# Patient Record
Sex: MALE | Race: WHITE | NOT HISPANIC OR LATINO | Employment: OTHER | URBAN - METROPOLITAN AREA
[De-identification: names, ages, dates, MRNs, and addresses within clinical notes are randomized per-mention and may not be internally consistent; named-entity substitution may affect disease eponyms.]

---

## 2017-06-26 RX ORDER — AMOXICILLIN 500 MG/1
500 TABLET, FILM COATED ORAL 2 TIMES DAILY
COMMUNITY
End: 2017-08-31

## 2017-06-26 RX ORDER — AMLODIPINE BESYLATE AND ATORVASTATIN CALCIUM 10; 20 MG/1; MG/1
1 TABLET, FILM COATED ORAL DAILY
Status: ON HOLD | COMMUNITY
End: 2017-06-27 | Stop reason: CLARIF

## 2017-06-26 RX ORDER — SIMVASTATIN 40 MG
40 TABLET ORAL
COMMUNITY
End: 2019-01-15 | Stop reason: SDUPTHER

## 2017-06-26 RX ORDER — ASPIRIN 81 MG/1
81 TABLET ORAL DAILY
COMMUNITY

## 2017-06-26 RX ORDER — ATENOLOL 100 MG/1
100 TABLET ORAL DAILY
COMMUNITY
End: 2019-01-18 | Stop reason: SDUPTHER

## 2017-06-26 RX ORDER — PHENOL 1.4 %
600 AEROSOL, SPRAY (ML) MUCOUS MEMBRANE DAILY
COMMUNITY

## 2017-06-27 ENCOUNTER — HOSPITAL ENCOUNTER (OUTPATIENT)
Facility: HOSPITAL | Age: 72
Setting detail: OUTPATIENT SURGERY
Discharge: HOME | End: 2017-06-27
Attending: SURGERY | Admitting: SURGERY
Payer: MEDICARE

## 2017-06-27 ENCOUNTER — ANESTHESIA EVENT (OUTPATIENT)
Dept: GASTROENTEROLOGY | Facility: HOSPITAL | Age: 72
End: 2017-06-27
Payer: MEDICARE

## 2017-06-27 ENCOUNTER — ANESTHESIA (OUTPATIENT)
Dept: GASTROENTEROLOGY | Facility: HOSPITAL | Age: 72
End: 2017-06-27
Payer: MEDICARE

## 2017-06-27 VITALS
HEIGHT: 69 IN | WEIGHT: 184 LBS | OXYGEN SATURATION: 97 % | HEART RATE: 76 BPM | SYSTOLIC BLOOD PRESSURE: 116 MMHG | RESPIRATION RATE: 16 BRPM | DIASTOLIC BLOOD PRESSURE: 61 MMHG | BODY MASS INDEX: 27.25 KG/M2 | TEMPERATURE: 97.5 F

## 2017-06-27 RX ORDER — TAMSULOSIN HYDROCHLORIDE 0.4 MG/1
0.4 CAPSULE ORAL
COMMUNITY
End: 2019-01-07 | Stop reason: SDUPTHER

## 2017-06-27 RX ORDER — NIACIN 500 MG
500 TABLET ORAL
COMMUNITY
End: 2018-07-27 | Stop reason: SDUPTHER

## 2017-06-27 RX ORDER — PROPOFOL 10 MG/ML
INJECTION, EMULSION INTRAVENOUS AS NEEDED
Status: DISCONTINUED | OUTPATIENT
Start: 2017-06-27 | End: 2017-06-27 | Stop reason: SURG

## 2017-06-27 RX ORDER — PROPOFOL 10 MG/ML
INJECTION, EMULSION INTRAVENOUS CONTINUOUS PRN
Status: DISCONTINUED | OUTPATIENT
Start: 2017-06-27 | End: 2017-06-27 | Stop reason: SURG

## 2017-06-27 RX ORDER — AMLODIPINE BESYLATE AND BENAZEPRIL HYDROCHLORIDE 10; 20 MG/1; MG/1
1 CAPSULE ORAL DAILY
COMMUNITY
End: 2018-09-05 | Stop reason: SDUPTHER

## 2017-06-27 RX ORDER — SODIUM CHLORIDE 9 MG/ML
125 INJECTION, SOLUTION INTRAVENOUS CONTINUOUS
Status: DISCONTINUED | OUTPATIENT
Start: 2017-06-27 | End: 2017-06-27 | Stop reason: HOSPADM

## 2017-06-27 RX ADMIN — PROPOFOL 100 MCG/KG/MIN: 10 INJECTION, EMULSION INTRAVENOUS at 09:46

## 2017-06-27 RX ADMIN — SODIUM CHLORIDE 125 ML/HR: 0.9 INJECTION, SOLUTION INTRAVENOUS at 09:03

## 2017-06-27 RX ADMIN — PROPOFOL 30 MG: 10 INJECTION, EMULSION INTRAVENOUS at 09:46

## 2017-06-27 RX ADMIN — PROPOFOL 20 MG: 10 INJECTION, EMULSION INTRAVENOUS at 09:47

## 2017-08-31 ENCOUNTER — HOSPITAL ENCOUNTER (EMERGENCY)
Facility: HOSPITAL | Age: 72
Discharge: HOME | End: 2017-08-31
Attending: EMERGENCY MEDICINE | Admitting: EMERGENCY MEDICINE
Payer: MEDICARE

## 2017-08-31 ENCOUNTER — APPOINTMENT (EMERGENCY)
Dept: RADIOLOGY | Facility: HOSPITAL | Age: 72
End: 2017-08-31
Payer: MEDICARE

## 2017-08-31 VITALS
WEIGHT: 184 LBS | OXYGEN SATURATION: 96 % | TEMPERATURE: 98.6 F | HEART RATE: 66 BPM | BODY MASS INDEX: 27.17 KG/M2 | DIASTOLIC BLOOD PRESSURE: 78 MMHG | SYSTOLIC BLOOD PRESSURE: 117 MMHG | RESPIRATION RATE: 16 BRPM

## 2017-08-31 DIAGNOSIS — M54.9 BACK PAIN: Primary | ICD-10-CM

## 2017-08-31 LAB
ALBUMIN SERPL BCP-MCNC: 3.6 G/DL (ref 3.5–5)
ALP SERPL-CCNC: 73 U/L (ref 46–116)
ALT SERPL W P-5'-P-CCNC: 25 U/L (ref 12–78)
ANION GAP SERPL CALCULATED.3IONS-SCNC: 7 MMOL/L (ref 4–13)
AST SERPL W P-5'-P-CCNC: 21 U/L (ref 5–45)
ATRIAL RATE: 54 BPM
BASOPHILS # BLD AUTO: 0.03 THOUSANDS/ΜL (ref 0–0.1)
BASOPHILS NFR BLD AUTO: 1 % (ref 0–1)
BILIRUB SERPL-MCNC: 0.98 MG/DL (ref 0.2–1)
BUN SERPL-MCNC: 10 MG/DL (ref 5–25)
CALCIUM SERPL-MCNC: 9 MG/DL (ref 8.3–10.1)
CHLORIDE SERPL-SCNC: 105 MMOL/L (ref 100–108)
CO2 SERPL-SCNC: 26 MMOL/L (ref 21–32)
CREAT SERPL-MCNC: 0.76 MG/DL (ref 0.6–1.3)
EOSINOPHIL # BLD AUTO: 0.07 THOUSAND/ΜL (ref 0–0.61)
EOSINOPHIL NFR BLD AUTO: 1 % (ref 0–6)
ERYTHROCYTE [DISTWIDTH] IN BLOOD BY AUTOMATED COUNT: 14.8 % (ref 11.6–15.1)
GFR SERPL CREATININE-BSD FRML MDRD: 92 ML/MIN/1.73SQ M
GLUCOSE SERPL-MCNC: 105 MG/DL (ref 65–140)
HCT VFR BLD AUTO: 47.6 % (ref 36.5–49.3)
HGB BLD-MCNC: 16.6 G/DL (ref 12–17)
LYMPHOCYTES # BLD AUTO: 1.69 THOUSANDS/ΜL (ref 0.6–4.47)
LYMPHOCYTES NFR BLD AUTO: 27 % (ref 14–44)
MCH RBC QN AUTO: 29.3 PG (ref 26.8–34.3)
MCHC RBC AUTO-ENTMCNC: 34.9 G/DL (ref 31.4–37.4)
MCV RBC AUTO: 84 FL (ref 82–98)
MONOCYTES # BLD AUTO: 0.55 THOUSAND/ΜL (ref 0.17–1.22)
MONOCYTES NFR BLD AUTO: 9 % (ref 4–12)
NEUTROPHILS # BLD AUTO: 3.99 THOUSANDS/ΜL (ref 1.85–7.62)
NEUTS SEG NFR BLD AUTO: 62 % (ref 43–75)
NRBC BLD AUTO-RTO: 0 /100 WBCS
P AXIS: 63 DEGREES
PLATELET # BLD AUTO: 136 THOUSANDS/UL (ref 149–390)
PMV BLD AUTO: 10.2 FL (ref 8.9–12.7)
POTASSIUM SERPL-SCNC: 4 MMOL/L (ref 3.5–5.3)
PR INTERVAL: 190 MS
PROT SERPL-MCNC: 7.7 G/DL (ref 6.4–8.2)
QRS AXIS: 53 DEGREES
QRSD INTERVAL: 90 MS
QT INTERVAL: 436 MS
QTC INTERVAL: 413 MS
RBC # BLD AUTO: 5.66 MILLION/UL (ref 3.88–5.62)
SODIUM SERPL-SCNC: 138 MMOL/L (ref 136–145)
SPECIMEN SOURCE: NORMAL
SPECIMEN SOURCE: NORMAL
T WAVE AXIS: 49 DEGREES
TROPONIN I BLD-MCNC: 0 NG/ML (ref 0–0.08)
TROPONIN I BLD-MCNC: 0 NG/ML (ref 0–0.08)
VENTRICULAR RATE: 54 BPM
WBC # BLD AUTO: 6.34 THOUSAND/UL (ref 4.31–10.16)

## 2017-08-31 PROCEDURE — 96360 HYDRATION IV INFUSION INIT: CPT

## 2017-08-31 PROCEDURE — 93005 ELECTROCARDIOGRAM TRACING: CPT

## 2017-08-31 PROCEDURE — 99284 EMERGENCY DEPT VISIT MOD MDM: CPT

## 2017-08-31 PROCEDURE — 36415 COLL VENOUS BLD VENIPUNCTURE: CPT | Performed by: EMERGENCY MEDICINE

## 2017-08-31 PROCEDURE — 80053 COMPREHEN METABOLIC PANEL: CPT | Performed by: EMERGENCY MEDICINE

## 2017-08-31 PROCEDURE — 84484 ASSAY OF TROPONIN QUANT: CPT

## 2017-08-31 PROCEDURE — 85025 COMPLETE CBC W/AUTO DIFF WBC: CPT | Performed by: EMERGENCY MEDICINE

## 2017-08-31 PROCEDURE — 71275 CT ANGIOGRAPHY CHEST: CPT

## 2017-08-31 PROCEDURE — 74175 CTA ABDOMEN W/CONTRAST: CPT

## 2017-08-31 RX ADMIN — SODIUM CHLORIDE 500 ML: 0.9 INJECTION, SOLUTION INTRAVENOUS at 09:50

## 2017-08-31 RX ADMIN — IOHEXOL 100 ML: 350 INJECTION, SOLUTION INTRAVENOUS at 10:40

## 2017-10-23 ENCOUNTER — ALLSCRIPTS OFFICE VISIT (OUTPATIENT)
Dept: OTHER | Facility: OTHER | Age: 72
End: 2017-10-23

## 2017-10-23 LAB
BILIRUB UR QL STRIP: NORMAL
CLARITY UR: NORMAL
COLOR UR: YELLOW
GLUCOSE (HISTORICAL): NORMAL
HGB UR QL STRIP.AUTO: NORMAL
KETONES UR STRIP-MCNC: NORMAL MG/DL
LEUKOCYTE ESTERASE UR QL STRIP: NORMAL
NITRITE UR QL STRIP: NORMAL
PH UR STRIP.AUTO: 7 [PH]
PROT UR STRIP-MCNC: NORMAL MG/DL
SP GR UR STRIP.AUTO: 1.01
UROBILINOGEN UR QL STRIP.AUTO: 0.2

## 2018-01-13 VITALS
WEIGHT: 189 LBS | HEIGHT: 69 IN | BODY MASS INDEX: 27.99 KG/M2 | DIASTOLIC BLOOD PRESSURE: 70 MMHG | SYSTOLIC BLOOD PRESSURE: 112 MMHG

## 2018-04-23 DIAGNOSIS — R97.20 ELEVATED PROSTATE SPECIFIC ANTIGEN (PSA): ICD-10-CM

## 2018-04-30 ENCOUNTER — APPOINTMENT (OUTPATIENT)
Dept: LAB | Facility: HOSPITAL | Age: 73
End: 2018-04-30
Attending: UROLOGY
Payer: MEDICARE

## 2018-04-30 DIAGNOSIS — R97.20 ELEVATED PROSTATE SPECIFIC ANTIGEN (PSA): ICD-10-CM

## 2018-04-30 PROCEDURE — 36415 COLL VENOUS BLD VENIPUNCTURE: CPT

## 2018-04-30 PROCEDURE — 84153 ASSAY OF PSA TOTAL: CPT

## 2018-04-30 PROCEDURE — 84154 ASSAY OF PSA FREE: CPT

## 2018-05-01 LAB
PSA FREE MFR SERPL: 16 %
PSA FREE SERPL-MCNC: 0.67 NG/ML
PSA SERPL-MCNC: 4.2 NG/ML (ref 0–4)

## 2018-05-10 RX ORDER — DIPHENOXYLATE HYDROCHLORIDE AND ATROPINE SULFATE 2.5; .025 MG/1; MG/1
1 TABLET ORAL
COMMUNITY
Start: 2012-12-12

## 2018-05-10 RX ORDER — VITAMIN E 268 MG
400 CAPSULE ORAL
COMMUNITY
End: 2018-09-05 | Stop reason: ALTCHOICE

## 2018-05-14 ENCOUNTER — OFFICE VISIT (OUTPATIENT)
Dept: UROLOGY | Facility: MEDICAL CENTER | Age: 73
End: 2018-05-14
Payer: MEDICARE

## 2018-05-14 VITALS
SYSTOLIC BLOOD PRESSURE: 136 MMHG | WEIGHT: 185 LBS | DIASTOLIC BLOOD PRESSURE: 78 MMHG | HEIGHT: 69 IN | BODY MASS INDEX: 27.4 KG/M2

## 2018-05-14 DIAGNOSIS — N13.8 BPH WITH OBSTRUCTION/LOWER URINARY TRACT SYMPTOMS: Primary | ICD-10-CM

## 2018-05-14 DIAGNOSIS — R97.20 ELEVATED PSA: ICD-10-CM

## 2018-05-14 DIAGNOSIS — N40.1 BPH WITH OBSTRUCTION/LOWER URINARY TRACT SYMPTOMS: Primary | ICD-10-CM

## 2018-05-14 DIAGNOSIS — R31.29 OTHER MICROSCOPIC HEMATURIA: ICD-10-CM

## 2018-05-14 PROBLEM — D75.1 ERYTHROCYTOSIS: Status: ACTIVE | Noted: 2017-03-28

## 2018-05-14 PROBLEM — M16.11 PRIMARY OSTEOARTHRITIS OF RIGHT HIP: Status: ACTIVE | Noted: 2017-03-28

## 2018-05-14 LAB
BACTERIA UR QL AUTO: NORMAL /HPF
BILIRUB UR QL STRIP: NEGATIVE
CLARITY UR: CLEAR
COLOR UR: YELLOW
GLUCOSE UR STRIP-MCNC: NEGATIVE MG/DL
HGB UR QL STRIP.AUTO: NEGATIVE
HYALINE CASTS #/AREA URNS LPF: NORMAL /LPF
KETONES UR STRIP-MCNC: NEGATIVE MG/DL
LEUKOCYTE ESTERASE UR QL STRIP: NEGATIVE
NITRITE UR QL STRIP: NEGATIVE
NON-SQ EPI CELLS URNS QL MICRO: NORMAL /HPF
PH UR STRIP.AUTO: 5.5 [PH] (ref 4.5–8)
PROT UR STRIP-MCNC: NEGATIVE MG/DL
RBC #/AREA URNS AUTO: NORMAL /HPF
SL AMB  POCT GLUCOSE, UA: ABNORMAL
SL AMB LEUKOCYTE ESTERASE,UA: ABNORMAL
SL AMB POCT BILIRUBIN,UA: ABNORMAL
SL AMB POCT BLOOD,UA: ABNORMAL
SL AMB POCT CLARITY,UA: CLEAR
SL AMB POCT COLOR,UA: YELLOW
SL AMB POCT KETONES,UA: ABNORMAL
SL AMB POCT NITRITE,UA: ABNORMAL
SL AMB POCT PH,UA: 5.5
SL AMB POCT SPECIFIC GRAVITY,UA: 1.01
SL AMB POCT URINE PROTEIN: ABNORMAL
SL AMB POCT UROBILINOGEN: 0.2
SP GR UR STRIP.AUTO: 1.02 (ref 1–1.03)
UROBILINOGEN UR QL STRIP.AUTO: 0.2 E.U./DL
WBC #/AREA URNS AUTO: NORMAL /HPF

## 2018-05-14 PROCEDURE — 81003 URINALYSIS AUTO W/O SCOPE: CPT | Performed by: UROLOGY

## 2018-05-14 PROCEDURE — 81001 URINALYSIS AUTO W/SCOPE: CPT | Performed by: UROLOGY

## 2018-05-14 PROCEDURE — 99214 OFFICE O/P EST MOD 30 MIN: CPT | Performed by: UROLOGY

## 2018-05-14 NOTE — ASSESSMENT & PLAN NOTE
AUA symptom score is 7  He is happy with his voiding pattern  Urinalysis reveals trace positive blood and will be sent for microscopic evaluation  The patient will continue tamsulosin

## 2018-05-14 NOTE — PROGRESS NOTES
IPSS Questionnaire (AUA-7): Over the past month    1)  How often have you had a sensation of not emptying your bladder completely after you finish urinating? 1 - Less than 1 time in 5   2)  How often have you had to urinate again less than two hours after you finished urinating? 1 - Less than 1 time in 5   3)  How often have you found you stopped and started again several times when you urinated? 0 - Not at all   4) How difficult have you found it to postpone urination? 1 - Less than 1 time in 5   5) How often have you had a weak urinary stream?  1 - Less than 1 time in 5   6) How often have you had to push or strain to begin urination? 0 - Not at all   7) How many times did you most typically get up to urinate from the time you went to bed until the time you got up in the morning?   3 - 3 times   Total Score:  7

## 2018-05-14 NOTE — PATIENT INSTRUCTIONS
Benign Prostatic Hypertrophy   WHAT YOU NEED TO KNOW:   Benign prostatic hypertrophy (BPH) is a condition that causes your prostate gland to grow larger than normal  The prostate gland is the male sex gland that produces a fluid that is part of semen  It is about the size of a walnut and it is located under the bladder  As the prostate grows, it can squeeze the urethra  This can block urine flow and cause urinary problems  DISCHARGE INSTRUCTIONS:   Medicines:   · Alpha blockers: This medicine relaxes the muscles in your prostate and bladder  It may help you urinate more easily  · 5 alpha reductase inhibitors: These medicines block the production of a hormone that causes the prostate to get larger  It may help slow the growth of the prostate or shrink the prostate  · Take your medicine as directed  Contact your healthcare provider if you think your medicine is not helping or if you have side effects  Tell him or her if you are allergic to any medicine  Keep a list of the medicines, vitamins, and herbs you take  Include the amounts, and when and why you take them  Bring the list or the pill bottles to follow-up visits  Carry your medicine list with you in case of an emergency  Follow up with your healthcare provider as directed:  Write down your questions so you remember to ask them during your visits  Manage BPH:   · Do not let your bladder get too full before you empty it  Urinate when you feel the urge  · Limit alcohol  Do not drink large amounts of any liquid at one time  · Decrease the amount of salt you eat  Examples of salty foods are chips, cured meats, and canned soups  Do not use table salt  · Healthcare providers may tell you not to eat spicy foods such as chilli peppers  This may help you find out if spicy food makes your BPH symptoms worse  · You may have sex if you feel well  Contact your healthcare provider if:   · There is a large amount of blood in your urine  · Your signs and symptoms get worse  · You have a fever  · You have questions or concerns about your condition or care  Seek care immediately if:   · You are unable to urinate  · Your bladder feels very full and painful  © 2017 [address] Information is for End User's use only and may not be sold, redistributed or otherwise used for commercial purposes  All illustrations and images included in CareNotes® are the copyrighted property of A D A M [employer] Inc  or Azeem German  The above information is an  only  It is not intended as medical advice for individual conditions or treatments  Talk to your doctor, nurse or pharmacist before following any medical regimen to see if it is safe and effective for you

## 2018-05-14 NOTE — PROGRESS NOTES
Assessment/Plan:    BPH with obstruction/lower urinary tract symptoms  AUA symptom score is 7  He is happy with his voiding pattern  Urinalysis reveals trace positive blood and will be sent for microscopic evaluation  The patient will continue tamsulosin  Elevated PSA  PSA was 4 2 on April 30, 2018  This is  stable  The risk of prostate cancer is estimated at 23%  This was discussed with the patient  We discussed transrectal ultrasound of the prostate with biopsy  At this point we have elected to continue to follow the PSA and recheck it in 1 year  Diagnoses and all orders for this visit:    BPH with obstruction/lower urinary tract symptoms  -     POCT urine dip auto non-scope    Elevated PSA  -     PSA, total and free; Future    Other microscopic hematuria  -     Urinalysis with microscopic    Other orders  -     B Complex Vitamins (B COMPLEX 100 PO); Take by mouth  -     multivitamin (THERAGRAN) TABS; Take 1 tablet by mouth  -     vitamin E, tocopherol, 400 units capsule; Take 400 Units by mouth          Subjective:      Patient ID: Mykel Hutchison  is a 72 y o  male  73-year-old male followed for lower tract symptoms and for an elevated PSA  He is voiding well with the use of tamsulosin  His stream is good and he empties his bladder adequately  He gets up once a night to urinate  There is no gross hematuria, dysuria or symptoms of infection  He has no new back or bone pain  He has no urgency or incontinence  He is satisfied with his voiding pattern  The following portions of the patient's history were reviewed and updated as appropriate: allergies, current medications, past family history, past medical history, past social history, past surgical history and problem list     Review of Systems   Constitutional: Negative for chills, diaphoresis, fatigue and fever  HENT: Negative  Eyes: Negative  Respiratory: Negative  Cardiovascular: Negative  Endocrine: Negative  Musculoskeletal: Negative  Skin: Negative  Allergic/Immunologic: Negative  Neurological: Negative  Hematological: Negative  Psychiatric/Behavioral: Negative  Objective:      /78 (BP Location: Left arm, Patient Position: Sitting)   Ht 5' 9" (1 753 m)   Wt 83 9 kg (185 lb)   BMI 27 32 kg/m²          Physical Exam   Constitutional: He is oriented to person, place, and time  He appears well-developed and well-nourished  HENT:   Head: Normocephalic and atraumatic  Eyes: Conjunctivae are normal    Neck: Neck supple  Cardiovascular: Normal rate  Pulmonary/Chest: Effort normal    Abdominal: Soft  Bowel sounds are normal  He exhibits no distension and no mass  There is no tenderness  There is no rebound, no guarding and no CVA tenderness  Hernia confirmed negative in the right inguinal area and confirmed negative in the left inguinal area  Genitourinary: Rectum normal, testes normal and penis normal  Prostate is enlarged  Prostate is not tender  Right testis shows no mass  Left testis shows no mass  No phimosis or hypospadias  Genitourinary Comments: Prostate 2 times enlarged in softly nodular  No induration  Overall impression is palpably benign prostate gland  Musculoskeletal: He exhibits no edema  Neurological: He is alert and oriented to person, place, and time  Skin: Skin is warm and dry  Psychiatric: He has a normal mood and affect  His behavior is normal  Judgment and thought content normal    Nursing note and vitals reviewed

## 2018-05-14 NOTE — ASSESSMENT & PLAN NOTE
PSA was 4 2 on April 30, 2018  This is  stable  The risk of prostate cancer is estimated at 23%  This was discussed with the patient  We discussed transrectal ultrasound of the prostate with biopsy  At this point we have elected to continue to follow the PSA and recheck it in 1 year

## 2018-07-27 DIAGNOSIS — E78.5 HYPERLIPIDEMIA, UNSPECIFIED HYPERLIPIDEMIA TYPE: Primary | ICD-10-CM

## 2018-07-27 RX ORDER — NIACIN 500 MG
500 TABLET ORAL
Qty: 90 TABLET | Refills: 3 | Status: SHIPPED | OUTPATIENT
Start: 2018-07-27 | End: 2018-07-31 | Stop reason: SDUPTHER

## 2018-07-31 DIAGNOSIS — E78.5 HYPERLIPIDEMIA, UNSPECIFIED HYPERLIPIDEMIA TYPE: ICD-10-CM

## 2018-07-31 RX ORDER — NIACIN 500 MG
500 TABLET ORAL
Qty: 90 TABLET | Refills: 3 | Status: SHIPPED | OUTPATIENT
Start: 2018-07-31 | End: 2018-09-05 | Stop reason: SDUPTHER

## 2018-07-31 NOTE — TELEPHONE ENCOUNTER
Patient called he said he needs presp for niacin 500 mg called into [facility] at [phone]  90 day supply address is [address] [address]  Fax [phone] was sent to [facility] pharmacy please change pharmacy to [facility]

## 2018-09-05 ENCOUNTER — OFFICE VISIT (OUTPATIENT)
Dept: FAMILY MEDICINE CLINIC | Facility: CLINIC | Age: 73
End: 2018-09-05
Payer: MEDICARE

## 2018-09-05 VITALS
TEMPERATURE: 97.4 F | WEIGHT: 182.8 LBS | SYSTOLIC BLOOD PRESSURE: 132 MMHG | BODY MASS INDEX: 27.08 KG/M2 | HEART RATE: 72 BPM | DIASTOLIC BLOOD PRESSURE: 80 MMHG | HEIGHT: 69 IN

## 2018-09-05 DIAGNOSIS — R97.20 ELEVATED PSA: ICD-10-CM

## 2018-09-05 DIAGNOSIS — N40.1 BPH WITH OBSTRUCTION/LOWER URINARY TRACT SYMPTOMS: ICD-10-CM

## 2018-09-05 DIAGNOSIS — N13.8 BPH WITH OBSTRUCTION/LOWER URINARY TRACT SYMPTOMS: ICD-10-CM

## 2018-09-05 DIAGNOSIS — I25.10 CHRONIC CORONARY ARTERY DISEASE: ICD-10-CM

## 2018-09-05 DIAGNOSIS — Z23 NEED FOR INFLUENZA VACCINATION: ICD-10-CM

## 2018-09-05 DIAGNOSIS — I10 BENIGN ESSENTIAL HYPERTENSION: Primary | ICD-10-CM

## 2018-09-05 PROCEDURE — G0008 ADMIN INFLUENZA VIRUS VAC: HCPCS | Performed by: FAMILY MEDICINE

## 2018-09-05 PROCEDURE — 99214 OFFICE O/P EST MOD 30 MIN: CPT | Performed by: FAMILY MEDICINE

## 2018-09-05 PROCEDURE — 90662 IIV NO PRSV INCREASED AG IM: CPT | Performed by: FAMILY MEDICINE

## 2018-09-05 RX ORDER — BENAZEPRIL HYDROCHLORIDE 20 MG/1
20 TABLET ORAL DAILY
Refills: 3 | COMMUNITY
Start: 2018-08-16 | End: 2019-05-21 | Stop reason: SDUPTHER

## 2018-09-05 RX ORDER — AMLODIPINE BESYLATE 10 MG/1
10 TABLET ORAL DAILY
Refills: 3 | COMMUNITY
Start: 2018-08-16 | End: 2019-05-21 | Stop reason: SDUPTHER

## 2018-09-05 RX ORDER — NIACIN 500 MG/1
500 TABLET, EXTENDED RELEASE ORAL
Refills: 3 | COMMUNITY
Start: 2018-07-31 | End: 2019-01-07 | Stop reason: SDUPTHER

## 2018-09-05 NOTE — PATIENT INSTRUCTIONS
If the skin lesion on your left arm does not heal in the next 4-5 weeks then check in with advanced Dermatology  IF YOU HAVE NEVER HAD A TDAP SHOT (TETANUS/DIPHTHERIA/PERTUSSIS)  TALK TO YOUR PHARMACIST ABOUT GETTING ONE : GOOD FOR 10 YRS:ESPECIALLY IMPORTATN IF YOU HAVE YOUNG CHILDREN OR GRANDCHILDREN    GET A YEARLY FLU SHOT IN THE FALL : IF OVER 65 GET "HIGH DOSE"     ASK PHARMACIST ABOUT "SHINGRIX" THE NEW SHINGLES VACCINE IF OVER AGE 50      IF YOU HAVE NEVER HAD A TDAP SHOT (TETANUS/DIPHTHERIA/PERTUSSIS)  TALK TO YOUR PHARMACIST ABOUT GETTING ONE : GOOD FOR 10 YRS:ESPECIALLY IMPORTATN IF YOU HAVE YOUNG CHILDREN OR GRANDCHILDREN    GET A YEARLY FLU SHOT IN THE FALL : IF OVER 65 GET "HIGH DOSE"     ASK PHARMACIST ABOUT "SHINGRIX" THE NEW SHINGLES VACCINE IF OVER AGE 50      Stop the vitamin E     Tele your wife to take 2 Aleve twice a day for 1 week for the gout

## 2018-09-05 NOTE — PROGRESS NOTES
Assessment/Plan:    No problem-specific Assessment & Plan notes found for this encounter  Diagnoses and all orders for this visit:    Benign essential hypertension    Chronic coronary artery disease    Elevated PSA    BPH with obstruction/lower urinary tract symptoms    Other orders  -     amLODIPine (NORVASC) 10 mg tablet; Take 10 mg by mouth daily  -     benazepril (LOTENSIN) 20 mg tablet; Take 20 mg by mouth daily  -     niacin (NIASPAN) 500 mg CR tablet; Take 500 mg by mouth daily at bedtime          Subjective:      Patient ID: Mirela Ward  is a 72 y o  male  Blayne Seo comes today for his regular checkup  He has seen the cardiologist and the urologist recently and all was good  PSA was fine  Colonoscopy is up-to-date          The following portions of the patient's history were reviewed and updated as appropriate: allergies, current medications, past family history, past medical history, past social history, past surgical history and problem list     Review of Systems      Objective:  Vitals:    09/05/18 1045   BP: 132/80   BP Location: Left arm   Patient Position: Sitting   Cuff Size: Standard   Pulse: 72   Temp: (!) 97 4 °F (36 3 °C)   TempSrc: Oral   Weight: 82 9 kg (182 lb 12 8 oz)   Height: 5' 9" (1 753 m)      Physical Exam

## 2019-01-07 DIAGNOSIS — N40.0 BENIGN PROSTATIC HYPERPLASIA WITHOUT LOWER URINARY TRACT SYMPTOMS: Primary | ICD-10-CM

## 2019-01-07 RX ORDER — TAMSULOSIN HYDROCHLORIDE 0.4 MG/1
0.4 CAPSULE ORAL
Qty: 90 CAPSULE | Refills: 3 | Status: SHIPPED | OUTPATIENT
Start: 2019-01-07 | End: 2019-12-23 | Stop reason: SDUPTHER

## 2019-01-07 RX ORDER — NIACIN 500 MG/1
500 TABLET, EXTENDED RELEASE ORAL
Qty: 90 TABLET | Refills: 3 | Status: SHIPPED | OUTPATIENT
Start: 2019-01-07 | End: 2019-10-14 | Stop reason: SDUPTHER

## 2019-01-12 ENCOUNTER — TRANSCRIBE ORDERS (OUTPATIENT)
Dept: LAB | Facility: HOSPITAL | Age: 74
End: 2019-01-12

## 2019-01-12 ENCOUNTER — APPOINTMENT (OUTPATIENT)
Dept: LAB | Facility: HOSPITAL | Age: 74
End: 2019-01-12
Payer: MEDICARE

## 2019-01-12 DIAGNOSIS — I25.10 ATHEROSCLEROSIS OF NATIVE CORONARY ARTERY OF NATIVE HEART WITHOUT ANGINA PECTORIS: ICD-10-CM

## 2019-01-12 DIAGNOSIS — E78.5 HYPERLIPIDEMIA, UNSPECIFIED HYPERLIPIDEMIA TYPE: ICD-10-CM

## 2019-01-12 DIAGNOSIS — I25.10 ATHEROSCLEROSIS OF NATIVE CORONARY ARTERY OF NATIVE HEART WITHOUT ANGINA PECTORIS: Primary | ICD-10-CM

## 2019-01-12 LAB
ALBUMIN SERPL BCP-MCNC: 4 G/DL (ref 3.5–5)
ALP SERPL-CCNC: 68 U/L (ref 46–116)
ALT SERPL W P-5'-P-CCNC: 31 U/L (ref 12–78)
ANION GAP SERPL CALCULATED.3IONS-SCNC: 8 MMOL/L (ref 4–13)
AST SERPL W P-5'-P-CCNC: 22 U/L (ref 5–45)
BILIRUB SERPL-MCNC: 1.64 MG/DL (ref 0.2–1)
BUN SERPL-MCNC: 13 MG/DL (ref 5–25)
CALCIUM SERPL-MCNC: 9 MG/DL (ref 8.3–10.1)
CHLORIDE SERPL-SCNC: 99 MMOL/L (ref 100–108)
CHOLEST SERPL-MCNC: 138 MG/DL (ref 50–200)
CO2 SERPL-SCNC: 28 MMOL/L (ref 21–32)
CREAT SERPL-MCNC: 0.76 MG/DL (ref 0.6–1.3)
ERYTHROCYTE [DISTWIDTH] IN BLOOD BY AUTOMATED COUNT: 12.9 % (ref 11.6–15.1)
GFR SERPL CREATININE-BSD FRML MDRD: 91 ML/MIN/1.73SQ M
GLUCOSE P FAST SERPL-MCNC: 86 MG/DL (ref 65–99)
HCT VFR BLD AUTO: 51.6 % (ref 36.5–49.3)
HDLC SERPL-MCNC: 44 MG/DL (ref 40–60)
HGB BLD-MCNC: 17.1 G/DL (ref 12–17)
LDLC SERPL CALC-MCNC: 76 MG/DL (ref 0–100)
MCH RBC QN AUTO: 29.9 PG (ref 26.8–34.3)
MCHC RBC AUTO-ENTMCNC: 33.1 G/DL (ref 31.4–37.4)
MCV RBC AUTO: 90 FL (ref 82–98)
NONHDLC SERPL-MCNC: 94 MG/DL
PLATELET # BLD AUTO: 152 THOUSANDS/UL (ref 149–390)
PMV BLD AUTO: 10.2 FL (ref 8.9–12.7)
POTASSIUM SERPL-SCNC: 3.8 MMOL/L (ref 3.5–5.3)
PROT SERPL-MCNC: 8.1 G/DL (ref 6.4–8.2)
RBC # BLD AUTO: 5.72 MILLION/UL (ref 3.88–5.62)
SODIUM SERPL-SCNC: 135 MMOL/L (ref 136–145)
TRIGL SERPL-MCNC: 88 MG/DL
WBC # BLD AUTO: 5.75 THOUSAND/UL (ref 4.31–10.16)

## 2019-01-12 PROCEDURE — 80061 LIPID PANEL: CPT

## 2019-01-12 PROCEDURE — 36415 COLL VENOUS BLD VENIPUNCTURE: CPT

## 2019-01-12 PROCEDURE — 85027 COMPLETE CBC AUTOMATED: CPT

## 2019-01-12 PROCEDURE — 80053 COMPREHEN METABOLIC PANEL: CPT

## 2019-01-15 DIAGNOSIS — E78.00 HIGH CHOLESTEROL: Primary | ICD-10-CM

## 2019-01-15 RX ORDER — SIMVASTATIN 40 MG
40 TABLET ORAL
Qty: 90 TABLET | Refills: 3 | Status: SHIPPED | OUTPATIENT
Start: 2019-01-15 | End: 2020-01-14 | Stop reason: SDUPTHER

## 2019-01-18 DIAGNOSIS — I10 ESSENTIAL HYPERTENSION: Primary | ICD-10-CM

## 2019-01-18 RX ORDER — ATENOLOL 100 MG/1
100 TABLET ORAL DAILY
Qty: 90 TABLET | Refills: 3 | Status: SHIPPED | OUTPATIENT
Start: 2019-01-18 | End: 2020-01-14 | Stop reason: SDUPTHER

## 2019-03-05 ENCOUNTER — OFFICE VISIT (OUTPATIENT)
Dept: FAMILY MEDICINE CLINIC | Facility: CLINIC | Age: 74
End: 2019-03-05
Payer: MEDICARE

## 2019-03-05 VITALS
HEART RATE: 64 BPM | WEIGHT: 183.4 LBS | SYSTOLIC BLOOD PRESSURE: 130 MMHG | RESPIRATION RATE: 16 BRPM | DIASTOLIC BLOOD PRESSURE: 80 MMHG | HEIGHT: 69 IN | BODY MASS INDEX: 27.16 KG/M2 | TEMPERATURE: 97.8 F

## 2019-03-05 DIAGNOSIS — N52.9 MALE ERECTILE DISORDER: Primary | ICD-10-CM

## 2019-03-05 PROCEDURE — 99214 OFFICE O/P EST MOD 30 MIN: CPT | Performed by: FAMILY MEDICINE

## 2019-03-05 RX ORDER — SILDENAFIL 50 MG/1
50 TABLET, FILM COATED ORAL DAILY PRN
Qty: 10 TABLET | Refills: 6 | Status: SHIPPED | OUTPATIENT
Start: 2019-03-05 | End: 2022-01-18 | Stop reason: SDUPTHER

## 2019-03-05 NOTE — PROGRESS NOTES
Assessment/Plan:    No problem-specific Assessment & Plan notes found for this encounter  There are no diagnoses linked to this encounter  Subjective:      Patient ID: Brian Blood  is a 69 y o  male  PATIENT RETURNS FOR FOLLOW-UP OF CHRONIC MEDICAL CONDITIONS  NO HOSPITAL STAYS OR EMERGENCY VISITS RECENTLY  MEDS WERE REVIEWED AND NO SIDE EFFECTS  NO NEW ISSUES  UNLESS NOTED BELOW  NO NEW MEDICAL PROVIDER REPORTED  Colon UTD; having THR 4/19 ; ETT before is scheduled   The following portions of the patient's history were reviewed and updated as appropriate: allergies, current medications, past family history, past medical history, past social history, past surgical history and problem list     Review of Systems   Constitutional: Negative for activity change and appetite change  HENT: Negative for trouble swallowing  Eyes: Negative for visual disturbance  Respiratory: Negative for cough and shortness of breath  Cardiovascular: Negative for chest pain, palpitations and leg swelling  Gastrointestinal: Negative for abdominal pain and blood in stool  Endocrine: Negative for polyuria  Genitourinary: Negative for difficulty urinating and hematuria  Skin: Negative for rash  Neurological: Negative for dizziness  Psychiatric/Behavioral: Negative for behavioral problems  Objective:  Vitals:    03/05/19 1006   BP: 130/80   BP Location: Left arm   Patient Position: Sitting   Cuff Size: Standard   Pulse: 64   Resp: 16   Temp: 97 8 °F (36 6 °C)   Weight: 83 2 kg (183 lb 6 4 oz)   Height: 5' 9" (1 753 m)      Physical Exam   Constitutional: He appears well-developed and well-nourished  HENT:   Head: Normocephalic and atraumatic  Eyes: Conjunctivae are normal    Neck: Neck supple  No thyromegaly present  Cardiovascular: Normal rate, regular rhythm, normal heart sounds and intact distal pulses  No murmur heard    Pulmonary/Chest: Effort normal and breath sounds normal  No respiratory distress  Musculoskeletal: He exhibits no edema  Lymphadenopathy:     He has no cervical adenopathy  Skin: Skin is warm and dry  Psychiatric: He has a normal mood and affect  His behavior is normal          Patient's chronic problems that were reviewed today are stable  Meds reviewed and no changes made  Appropriate labs and imaging were ordered  Preventive measures appropriate for age and sex were reviewed with patient  Immunizations were updated as appropriate

## 2019-03-05 NOTE — PATIENT INSTRUCTIONS
Go to : [facility] online for the ED rx   WE RECOMMEND GETTING THE 2- DOSE SHINGLES VACCINE ([address]) FROM YOUR PHARMACY  CHECK WITH THEM ON THE COST  YOU SHOULD HAVE THIS IF OVER AGE 48, EVEN IF YOU HAVE HAD THE ORIGINAL VACCINE (ZOSTRIX)

## 2019-04-03 LAB
HBA1C MFR BLD HPLC: 5.8 %
HCV AB SER-ACNC: NEGATIVE

## 2019-04-12 ENCOUNTER — CONSULT (OUTPATIENT)
Dept: FAMILY MEDICINE CLINIC | Facility: CLINIC | Age: 74
End: 2019-04-12
Payer: MEDICARE

## 2019-04-12 VITALS
TEMPERATURE: 96.9 F | SYSTOLIC BLOOD PRESSURE: 126 MMHG | BODY MASS INDEX: 27.48 KG/M2 | DIASTOLIC BLOOD PRESSURE: 84 MMHG | HEART RATE: 54 BPM | HEIGHT: 68 IN | RESPIRATION RATE: 20 BRPM | WEIGHT: 181.3 LBS

## 2019-04-12 DIAGNOSIS — I10 BENIGN ESSENTIAL HYPERTENSION: Primary | ICD-10-CM

## 2019-04-12 DIAGNOSIS — G89.29 CHRONIC LEFT HIP PAIN: ICD-10-CM

## 2019-04-12 DIAGNOSIS — N13.8 BPH WITH OBSTRUCTION/LOWER URINARY TRACT SYMPTOMS: ICD-10-CM

## 2019-04-12 DIAGNOSIS — N40.1 BPH WITH OBSTRUCTION/LOWER URINARY TRACT SYMPTOMS: ICD-10-CM

## 2019-04-12 DIAGNOSIS — M25.552 CHRONIC LEFT HIP PAIN: ICD-10-CM

## 2019-04-12 DIAGNOSIS — M16.11 PRIMARY OSTEOARTHRITIS OF RIGHT HIP: ICD-10-CM

## 2019-04-12 DIAGNOSIS — I25.10 CHRONIC CORONARY ARTERY DISEASE: ICD-10-CM

## 2019-04-12 DIAGNOSIS — E78.49 OTHER HYPERLIPIDEMIA: ICD-10-CM

## 2019-04-12 DIAGNOSIS — R97.20 ELEVATED PSA: ICD-10-CM

## 2019-04-12 PROCEDURE — 99214 OFFICE O/P EST MOD 30 MIN: CPT | Performed by: FAMILY MEDICINE

## 2019-04-30 ENCOUNTER — TELEPHONE (OUTPATIENT)
Dept: OTHER | Facility: OTHER | Age: 74
End: 2019-04-30

## 2019-05-14 ENCOUNTER — APPOINTMENT (OUTPATIENT)
Dept: LAB | Facility: HOSPITAL | Age: 74
End: 2019-05-14
Attending: UROLOGY
Payer: MEDICARE

## 2019-05-14 DIAGNOSIS — N13.8 BPH WITH OBSTRUCTION/LOWER URINARY TRACT SYMPTOMS: ICD-10-CM

## 2019-05-14 DIAGNOSIS — N40.1 BPH WITH OBSTRUCTION/LOWER URINARY TRACT SYMPTOMS: ICD-10-CM

## 2019-05-14 DIAGNOSIS — R97.20 ELEVATED PSA: ICD-10-CM

## 2019-05-14 DIAGNOSIS — R97.20 ELEVATED PSA: Primary | ICD-10-CM

## 2019-05-14 PROCEDURE — 36415 COLL VENOUS BLD VENIPUNCTURE: CPT

## 2019-05-14 PROCEDURE — 84154 ASSAY OF PSA FREE: CPT

## 2019-05-14 PROCEDURE — 84153 ASSAY OF PSA TOTAL: CPT

## 2019-05-15 LAB
PSA FREE MFR SERPL: 9.9 %
PSA FREE SERPL-MCNC: 0.67 NG/ML
PSA SERPL-MCNC: 6.8 NG/ML (ref 0–4)

## 2019-05-20 RX ORDER — HYDROCODONE BITARTRATE AND ACETAMINOPHEN 5; 325 MG/1; MG/1
TABLET ORAL
Refills: 0 | COMMUNITY
Start: 2019-05-01 | End: 2019-10-14

## 2019-05-20 RX ORDER — GABAPENTIN 300 MG/1
CAPSULE ORAL
Refills: 0 | COMMUNITY
Start: 2019-05-01 | End: 2019-10-14

## 2019-05-21 ENCOUNTER — OFFICE VISIT (OUTPATIENT)
Dept: UROLOGY | Facility: MEDICAL CENTER | Age: 74
End: 2019-05-21
Payer: MEDICARE

## 2019-05-21 VITALS
HEART RATE: 54 BPM | SYSTOLIC BLOOD PRESSURE: 118 MMHG | DIASTOLIC BLOOD PRESSURE: 82 MMHG | BODY MASS INDEX: 27.43 KG/M2 | WEIGHT: 181 LBS | HEIGHT: 68 IN

## 2019-05-21 DIAGNOSIS — N40.1 BPH WITH OBSTRUCTION/LOWER URINARY TRACT SYMPTOMS: Primary | ICD-10-CM

## 2019-05-21 DIAGNOSIS — I10 ESSENTIAL HYPERTENSION, BENIGN: Primary | ICD-10-CM

## 2019-05-21 DIAGNOSIS — R97.20 ELEVATED PSA: ICD-10-CM

## 2019-05-21 DIAGNOSIS — N13.8 BPH WITH OBSTRUCTION/LOWER URINARY TRACT SYMPTOMS: Primary | ICD-10-CM

## 2019-05-21 LAB
SL AMB  POCT GLUCOSE, UA: NORMAL
SL AMB LEUKOCYTE ESTERASE,UA: NORMAL
SL AMB POCT BILIRUBIN,UA: NORMAL
SL AMB POCT BLOOD,UA: NORMAL
SL AMB POCT CLARITY,UA: CLEAR
SL AMB POCT COLOR,UA: YELLOW
SL AMB POCT KETONES,UA: NORMAL
SL AMB POCT NITRITE,UA: NORMAL
SL AMB POCT PH,UA: 7
SL AMB POCT SPECIFIC GRAVITY,UA: 1.02
SL AMB POCT URINE PROTEIN: NORMAL
SL AMB POCT UROBILINOGEN: 0.2

## 2019-05-21 PROCEDURE — 99214 OFFICE O/P EST MOD 30 MIN: CPT | Performed by: UROLOGY

## 2019-05-21 PROCEDURE — 81003 URINALYSIS AUTO W/O SCOPE: CPT | Performed by: UROLOGY

## 2019-05-21 RX ORDER — BENAZEPRIL HYDROCHLORIDE 20 MG/1
20 TABLET ORAL DAILY
Qty: 90 TABLET | Refills: 3 | Status: SHIPPED | OUTPATIENT
Start: 2019-05-21 | End: 2019-10-14

## 2019-05-21 RX ORDER — AMLODIPINE BESYLATE 10 MG/1
10 TABLET ORAL DAILY
Qty: 90 TABLET | Refills: 3 | Status: SHIPPED | OUTPATIENT
Start: 2019-05-21 | End: 2020-04-13

## 2019-07-09 ENCOUNTER — APPOINTMENT (OUTPATIENT)
Dept: LAB | Facility: HOSPITAL | Age: 74
End: 2019-07-09
Attending: UROLOGY
Payer: MEDICARE

## 2019-07-09 ENCOUNTER — TRANSCRIBE ORDERS (OUTPATIENT)
Dept: LAB | Facility: HOSPITAL | Age: 74
End: 2019-07-09

## 2019-07-09 DIAGNOSIS — R97.20 ELEVATED PSA: ICD-10-CM

## 2019-07-09 PROCEDURE — 36415 COLL VENOUS BLD VENIPUNCTURE: CPT

## 2019-07-09 PROCEDURE — 84154 ASSAY OF PSA FREE: CPT

## 2019-07-09 PROCEDURE — 84153 ASSAY OF PSA TOTAL: CPT

## 2019-07-10 LAB
PSA FREE MFR SERPL: 17.6 %
PSA FREE SERPL-MCNC: 0.74 NG/ML
PSA SERPL-MCNC: 4.2 NG/ML (ref 0–4)

## 2019-07-15 NOTE — PROGRESS NOTES
7/16/2019      Chief Complaint   Patient presents with    Follow-up    Benign Prostatic Hypertrophy    Elevated PSA       Assessment and Plan    69 y o  male managed by Dr Sophie Calvert    1  BPH  - continue tamsulosin 0 4mg nightly  - not bothered by his current urinary pattern so will not add more medication or proceed with urolift workup    2  Elevated PSA  - PSA 4 2 (7/9/19), 6 8 (5/14/2019), 4 2 (4/30/18)  - PSA back to baseline, JUAN LUIS 5/2019 with no nodules  - resume annual surveillance    FU 1 year with PSA prior and JUAN LUIS at visit        History of Present Illness  Michelle Al  is a 69 y o  male here for follow up evaluation of BPH and elevated PSA  The patient is doing well from a lower urinary tract standpoint  He continues on tamsulosin 0 4 mg nightly and is happy with his current urinary pattern for the most part  He does have some nocturia but he states he follow her back asleep and this is not bothersome to him  His PSA elevated to 6 8 5/14/2019  This was repeated in his PSA has returned back to baseline at 4 2  Review of Systems   Constitutional: Negative for activity change, chills and fever  Gastrointestinal: Negative for abdominal distention and abdominal pain  Musculoskeletal: Negative for back pain and gait problem  Psychiatric/Behavioral: Negative for behavioral problems and confusion  Urinary Incontinence Screening      Most Recent Value   Urinary Incontinence   Urinary Incontinence? No   Incomplete emptying? No   Urinary frequency? Yes   Urinary urgency? Yes   Urinary hesitancy? No   Dysuria (painful difficult urination)? No   Nocturia (waking up to use the bathroom)? Yes [3x]   Straining (having to push to go)? Yes   Weak stream?  Yes   Intermittent stream?  Yes          AUA SYMPTOM SCORE      Most Recent Value   AUA SYMPTOM SCORE   How often have you had a sensation of not emptying your bladder completely after you finished urinating?   1   How often have you had to urinate again less than two hours after you finished urinating? 2   How often have you found you stopped and started again several times when you urinate? 2   How often have you found it difficult to postpone urination? 2   How often have you had a weak urinary stream?  1   How often have you had to push or strain to begin urination? 1   How many times did you most typically get up to urinate from the time you went to bed at night until the time you got up in the morning? 3   Quality of Life: If you were to spend the rest of your life with your urinary condition just the way it is now, how would you feel about that?  3   AUA SYMPTOM SCORE  12          Past Medical History  Past Medical History:   Diagnosis Date    Acute MI ([facility] [address] ) 2002    Arthritis     Atypical chest pain 2008    Basal cell carcinoma 2009    Coronary artery disease     Diverticulosis 2008    Hematuria, microscopic     History of varicose veins     Hyperlipidemia     Hypertension     Nocturia     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     Urinary tract infection        Past Social History  Past Surgical History:   Procedure Laterality Date    COLONOSCOPY      CORONARY ANGIOPLASTY      right CA x 3 vessels: redo PTCA-LAD 10/04    CORONARY STENT PLACEMENT  2002    JOINT REPLACEMENT      R hip    [place] COLONOSCOPY FLX DX W/COLLJ SPEC WHEN PFRMD N/A 2017    Procedure: COLONOSCOPY;  Surgeon: Elver Boland MD;  Location: [facility]; Service: Colorectal    TOTAL HIP ARTHROPLASTY Right      Social History     Tobacco Use   Smoking Status Former Smoker    Types: Cigarettes    Last attempt to quit: 1970    Years since quittin 5   Smokeless Tobacco Never Used   Tobacco Comment    quit 1972   0 5 packs/2 00 pack years       Past Family History  Family History   Problem Relation Age of Onset    Aneurysm Father         post op AAA repair    Hypertension Sister     Hypertension Brother  Hypertension Mother     Diabetes Mother        Past Social history  Social History     Socioeconomic History    Marital status: /Civil Union     Spouse name: Not on file    Number of children: Not on file    Years of education: Not on file    Highest education level: Not on file   Occupational History    Not on file   Social Needs    Financial resource strain: Not on file    Food insecurity:     Worry: Not on file     Inability: Not on file    Transportation needs:     Medical: Not on file     Non-medical: Not on file   Tobacco Use    Smoking status: Former Smoker     Types: Cigarettes     Last attempt to quit: 1970     Years since quittin 5    Smokeless tobacco: Never Used    Tobacco comment: quit    0 5 packs/2 00 pack years   Substance and Sexual Activity    Alcohol use: No    Drug use: No    Sexual activity: Not on file   Lifestyle    Physical activity:     Days per week: Not on file     Minutes per session: Not on file    Stress: Not on file   Relationships    Social connections:     Talks on phone: Not on file     Gets together: Not on file     Attends Protestant service: Not on file     Active member of club or organization: Not on file     Attends meetings of clubs or organizations: Not on file     Relationship status: Not on file    Intimate partner violence:     Fear of current or ex partner: Not on file     Emotionally abused: Not on file     Physically abused: Not on file     Forced sexual activity: Not on file   Other Topics Concern    Not on file   Social History Narrative    Not on file       Current Medications  Current Outpatient Medications   Medication Sig Dispense Refill    amLODIPine (NORVASC) 10 mg tablet Take 1 tablet (10 mg total) by mouth daily 90 tablet 3    aspirin (ECOTRIN LOW STRENGTH) 81 mg EC tablet Take 81 mg by mouth daily      atenolol (TENORMIN) 100 mg tablet Take 1 tablet (100 mg total) by mouth daily 90 tablet 3    B Complex Vitamins (B COMPLEX 100 PO) Take by mouth      benazepril (LOTENSIN) 20 mg tablet Take 1 tablet (20 mg total) by mouth daily 90 tablet 3    calcium carbonate (OS-LUCA) 600 MG tablet Take 600 mg by mouth 2 (two) times a day with meals      Coenzyme Q10 (CO Q 10 PO) Take by mouth      gabapentin (NEURONTIN) 300 mg capsule 1 CAP(S) ORALLY ONCE A DAY (AT BEDTIME) FOR 5 DAYS  NEW MED FOR NERVE PAIN   0    HYDROcodone-acetaminophen (NORCO) 5-325 mg per tablet TAKE 1-2 EVERY 4-6 HRS AS NEEDED FOR PAIN, 1 TAB FOR MODERATE AND 2 TABS FOR SEVERE PAIN  0    multivitamin (THERAGRAN) TABS Take 1 tablet by mouth      niacin (NIASPAN) 500 mg CR tablet Take 1 tablet (500 mg total) by mouth daily at bedtime 90 tablet 3    Omega-3 Fatty Acids (FISH OIL PO) Take by mouth      sildenafil (VIAGRA) 50 MG tablet Take 1 tablet (50 mg total) by mouth daily as needed for erectile dysfunction 10 tablet 6    simvastatin (ZOCOR) 40 mg tablet Take 1 tablet (40 mg total) by mouth daily at bedtime for 90 days 90 tablet 3    tamsulosin (FLOMAX) 0 4 mg Take 1 capsule (0 4 mg total) by mouth daily with dinner 90 capsule 3     No current facility-administered medications for this visit  Allergies  Allergies   Allergen Reactions    No Active Allergies     Other          The following portions of the patient's history were reviewed and updated as appropriate: allergies, current medications, past medical history, past social history, past surgical history and problem list       Vitals  Vitals:    07/16/19 0952   BP: 112/76   BP Location: Left arm   Patient Position: Sitting   Cuff Size: Adult   Pulse: 57   Weight: 84 1 kg (185 lb 6 4 oz)   Height: 5' 8" (1 727 m)         Physical Exam  Constitutional   General appearance: Patient is seated and in no acute distress, well appearing and well nourished  Head and Face   Head and face: Normal     Eyes   Conjunctiva and lids: No erythema, swelling or discharge      Ears, Nose, Mouth, and Throat   Hearing: Normal     Pulmonary   Respiratory effort: No increased work of breathing or signs of respiratory distress  Cardiovascular   Examination of extremities for edema and/or varicosities: Normal     Abdomen   Abdomen: Non-tender, no masses  Musculoskeletal   Gait and station: Normal     Skin   Skin and subcutaneous tissue: Warm, dry, and intact  No visible lesions or rashes  Psychiatric   Judgment and insight: Normal  Recent and remote memory:  Normal  Mood and affect: Normal      Results  No results found for this or any previous visit (from the past 1 hour(s)) ]  Lab Results   Component Value Date    PSA 4 2 (H) 07/09/2019    PSA 6 8 (H) 05/14/2019    PSA 4 2 (H) 04/30/2018     Lab Results   Component Value Date    CALCIUM 9 0 01/12/2019    K 3 8 01/12/2019    CO2 28 01/12/2019    CL 99 (L) 01/12/2019    BUN 13 01/12/2019    CREATININE 0 76 01/12/2019     Lab Results   Component Value Date    WBC 5 75 01/12/2019    HGB 17 1 (H) 01/12/2019    HCT 51 6 (H) 01/12/2019    MCV 90 01/12/2019     01/12/2019       Orders  Orders Placed This Encounter   Procedures    PSA, Total Screen     This is a patient instruction: This test is non-fasting  Please drink two glasses of water morning of bloodwork          Standing Status:   Future     Standing Expiration Date:   7/16/2020

## 2019-07-16 ENCOUNTER — OFFICE VISIT (OUTPATIENT)
Dept: UROLOGY | Facility: MEDICAL CENTER | Age: 74
End: 2019-07-16
Payer: MEDICARE

## 2019-07-16 VITALS
HEART RATE: 57 BPM | HEIGHT: 68 IN | DIASTOLIC BLOOD PRESSURE: 76 MMHG | SYSTOLIC BLOOD PRESSURE: 112 MMHG | BODY MASS INDEX: 28.1 KG/M2 | WEIGHT: 185.4 LBS

## 2019-07-16 DIAGNOSIS — R97.20 ELEVATED PSA: ICD-10-CM

## 2019-07-16 DIAGNOSIS — N40.1 BPH WITH OBSTRUCTION/LOWER URINARY TRACT SYMPTOMS: Primary | ICD-10-CM

## 2019-07-16 DIAGNOSIS — N13.8 BPH WITH OBSTRUCTION/LOWER URINARY TRACT SYMPTOMS: Primary | ICD-10-CM

## 2019-07-16 PROCEDURE — 99213 OFFICE O/P EST LOW 20 MIN: CPT | Performed by: PHYSICIAN ASSISTANT

## 2019-10-05 ENCOUNTER — APPOINTMENT (OUTPATIENT)
Dept: LAB | Facility: HOSPITAL | Age: 74
End: 2019-10-05
Payer: MEDICARE

## 2019-10-05 ENCOUNTER — TRANSCRIBE ORDERS (OUTPATIENT)
Dept: LAB | Facility: HOSPITAL | Age: 74
End: 2019-10-05

## 2019-10-05 DIAGNOSIS — E78.49 FAMILIAL COMBINED HYPERLIPIDEMIA: ICD-10-CM

## 2019-10-05 DIAGNOSIS — I25.10 ATHEROSCLEROSIS OF NATIVE CORONARY ARTERY OF NATIVE HEART WITHOUT ANGINA PECTORIS: ICD-10-CM

## 2019-10-05 DIAGNOSIS — I25.10 ATHEROSCLEROSIS OF NATIVE CORONARY ARTERY OF NATIVE HEART WITHOUT ANGINA PECTORIS: Primary | ICD-10-CM

## 2019-10-05 DIAGNOSIS — I10 ESSENTIAL HYPERTENSION, MALIGNANT: ICD-10-CM

## 2019-10-05 LAB
ALBUMIN SERPL BCP-MCNC: 3.9 G/DL (ref 3.5–5)
ALP SERPL-CCNC: 73 U/L (ref 46–116)
ALT SERPL W P-5'-P-CCNC: 29 U/L (ref 12–78)
ANION GAP SERPL CALCULATED.3IONS-SCNC: 5 MMOL/L (ref 4–13)
AST SERPL W P-5'-P-CCNC: 23 U/L (ref 5–45)
BASOPHILS # BLD AUTO: 0.08 THOUSANDS/ΜL (ref 0–0.1)
BASOPHILS NFR BLD AUTO: 1 % (ref 0–1)
BILIRUB SERPL-MCNC: 1.52 MG/DL (ref 0.2–1)
BUN SERPL-MCNC: 11 MG/DL (ref 5–25)
CALCIUM SERPL-MCNC: 9.4 MG/DL (ref 8.3–10.1)
CHLORIDE SERPL-SCNC: 103 MMOL/L (ref 100–108)
CHOLEST SERPL-MCNC: 131 MG/DL (ref 50–200)
CO2 SERPL-SCNC: 27 MMOL/L (ref 21–32)
CREAT SERPL-MCNC: 0.78 MG/DL (ref 0.6–1.3)
EOSINOPHIL # BLD AUTO: 0.11 THOUSAND/ΜL (ref 0–0.61)
EOSINOPHIL NFR BLD AUTO: 2 % (ref 0–6)
ERYTHROCYTE [DISTWIDTH] IN BLOOD BY AUTOMATED COUNT: 13.8 % (ref 11.6–15.1)
GFR SERPL CREATININE-BSD FRML MDRD: 90 ML/MIN/1.73SQ M
GLUCOSE P FAST SERPL-MCNC: 99 MG/DL (ref 65–99)
HCT VFR BLD AUTO: 50.6 % (ref 36.5–49.3)
HDLC SERPL-MCNC: 46 MG/DL (ref 40–60)
HGB BLD-MCNC: 16.5 G/DL (ref 12–17)
IMM GRANULOCYTES # BLD AUTO: 0.01 THOUSAND/UL (ref 0–0.2)
IMM GRANULOCYTES NFR BLD AUTO: 0 % (ref 0–2)
LDLC SERPL CALC-MCNC: 64 MG/DL (ref 0–100)
LYMPHOCYTES # BLD AUTO: 2.28 THOUSANDS/ΜL (ref 0.6–4.47)
LYMPHOCYTES NFR BLD AUTO: 39 % (ref 14–44)
MCH RBC QN AUTO: 29.1 PG (ref 26.8–34.3)
MCHC RBC AUTO-ENTMCNC: 32.6 G/DL (ref 31.4–37.4)
MCV RBC AUTO: 89 FL (ref 82–98)
MONOCYTES # BLD AUTO: 0.68 THOUSAND/ΜL (ref 0.17–1.22)
MONOCYTES NFR BLD AUTO: 12 % (ref 4–12)
NEUTROPHILS # BLD AUTO: 2.7 THOUSANDS/ΜL (ref 1.85–7.62)
NEUTS SEG NFR BLD AUTO: 46 % (ref 43–75)
NONHDLC SERPL-MCNC: 85 MG/DL
NRBC BLD AUTO-RTO: 0 /100 WBCS
PLATELET # BLD AUTO: 159 THOUSANDS/UL (ref 149–390)
PMV BLD AUTO: 10.2 FL (ref 8.9–12.7)
POTASSIUM SERPL-SCNC: 3.9 MMOL/L (ref 3.5–5.3)
PROT SERPL-MCNC: 7.8 G/DL (ref 6.4–8.2)
RBC # BLD AUTO: 5.67 MILLION/UL (ref 3.88–5.62)
SODIUM SERPL-SCNC: 135 MMOL/L (ref 136–145)
TRIGL SERPL-MCNC: 105 MG/DL
WBC # BLD AUTO: 5.86 THOUSAND/UL (ref 4.31–10.16)

## 2019-10-05 PROCEDURE — 85025 COMPLETE CBC W/AUTO DIFF WBC: CPT

## 2019-10-05 PROCEDURE — 80061 LIPID PANEL: CPT

## 2019-10-05 PROCEDURE — 36415 COLL VENOUS BLD VENIPUNCTURE: CPT

## 2019-10-05 PROCEDURE — 80053 COMPREHEN METABOLIC PANEL: CPT

## 2019-10-14 ENCOUNTER — OFFICE VISIT (OUTPATIENT)
Dept: FAMILY MEDICINE CLINIC | Facility: CLINIC | Age: 74
End: 2019-10-14
Payer: MEDICARE

## 2019-10-14 VITALS
WEIGHT: 182.9 LBS | HEART RATE: 54 BPM | HEIGHT: 69 IN | SYSTOLIC BLOOD PRESSURE: 120 MMHG | DIASTOLIC BLOOD PRESSURE: 80 MMHG | TEMPERATURE: 97.7 F | OXYGEN SATURATION: 96 % | BODY MASS INDEX: 27.09 KG/M2

## 2019-10-14 DIAGNOSIS — Z23 IMMUNIZATION DUE: Primary | ICD-10-CM

## 2019-10-14 DIAGNOSIS — I25.10 CHRONIC CORONARY ARTERY DISEASE: ICD-10-CM

## 2019-10-14 DIAGNOSIS — E78.49 OTHER HYPERLIPIDEMIA: ICD-10-CM

## 2019-10-14 DIAGNOSIS — N40.1 BPH WITH OBSTRUCTION/LOWER URINARY TRACT SYMPTOMS: ICD-10-CM

## 2019-10-14 DIAGNOSIS — N40.0 BENIGN PROSTATIC HYPERPLASIA WITHOUT LOWER URINARY TRACT SYMPTOMS: ICD-10-CM

## 2019-10-14 DIAGNOSIS — I10 BENIGN ESSENTIAL HYPERTENSION: ICD-10-CM

## 2019-10-14 DIAGNOSIS — R97.20 ELEVATED PSA: ICD-10-CM

## 2019-10-14 DIAGNOSIS — N13.8 BPH WITH OBSTRUCTION/LOWER URINARY TRACT SYMPTOMS: ICD-10-CM

## 2019-10-14 PROBLEM — G89.29 CHRONIC LEFT HIP PAIN: Status: RESOLVED | Noted: 2019-04-12 | Resolved: 2019-10-14

## 2019-10-14 PROBLEM — M25.552 CHRONIC LEFT HIP PAIN: Status: RESOLVED | Noted: 2019-04-12 | Resolved: 2019-10-14

## 2019-10-14 PROCEDURE — 90662 IIV NO PRSV INCREASED AG IM: CPT

## 2019-10-14 PROCEDURE — G0439 PPPS, SUBSEQ VISIT: HCPCS | Performed by: FAMILY MEDICINE

## 2019-10-14 PROCEDURE — 99214 OFFICE O/P EST MOD 30 MIN: CPT | Performed by: FAMILY MEDICINE

## 2019-10-14 PROCEDURE — G0008 ADMIN INFLUENZA VIRUS VAC: HCPCS

## 2019-10-14 RX ORDER — NIACIN 500 MG/1
500 TABLET, EXTENDED RELEASE ORAL
Qty: 90 TABLET | Refills: 3 | Status: SHIPPED | OUTPATIENT
Start: 2019-10-14 | End: 2020-10-22 | Stop reason: SDUPTHER

## 2019-10-14 NOTE — PATIENT INSTRUCTIONS
WE RECOMMEND GETTING THE 2- DOSE SHINGLES VACCINE ([address]) FROM YOUR PHARMACY  CHECK WITH THEM ON THE COST  YOU SHOULD HAVE THIS IF OVER AGE 48, EVEN IF YOU HAVE HAD THE ORIGINAL VACCINE (ZOSTRIX)  Check in January if still too expensive  Get living will completed

## 2019-10-14 NOTE — PROGRESS NOTES
Assessment/Plan:    No problem-specific Assessment & Plan notes found for this encounter  Diagnoses and all orders for this visit:    Immunization due  -     influenza vaccine, 3716-2084, high-dose, PF 0 5 mL (FLUZONE HIGH-DOSE)    Benign essential hypertension    Chronic coronary artery disease    Elevated PSA    Other hyperlipidemia    BPH with obstruction/lower urinary tract symptoms          Subjective:      Patient ID: Paresh Gavin  is a 69 y o  male  PATIENT RETURNS FOR FOLLOW-UP OF CHRONIC MEDICAL CONDITIONS  NO HOSPITAL STAYS OR EMERGENCY VISITS RECENTLY  MEDS WERE REVIEWED AND NO SIDE EFFECTS  NO NEW ISSUES  UNLESS NOTED BELOW  NO NEW MEDICAL PROVIDER REPORTED  THE CHRONIC DISEASES LISTED ABOVE ARE STABLE AND UNCHANGED/ THE PLAN OF CARE FOR THOSE WILL REMAIN UNCHANGED UNLESS NOTED BELOW        AWV/sub     Has appt w/ pulm to f/u asx lung nodules on CT scan (had similar in 2008)       The following portions of the patient's history were reviewed and updated as appropriate: allergies, current medications, past family history, past medical history, past social history, past surgical history and problem list     Review of Systems      Objective:  Vitals:    10/14/19 0857   BP: 120/80   BP Location: Left arm   Patient Position: Sitting   Cuff Size: Adult   Pulse: (!) 54   Temp: 97 7 °F (36 5 °C)   TempSrc: Temporal   SpO2: 96%   Weight: 83 kg (182 lb 14 4 oz)   Height: 5' 8 5" (1 74 m)      Physical Exam     Assessment and Plan:     Problem List Items Addressed This Visit        Cardiovascular and Mediastinum    Benign essential hypertension    Chronic coronary artery disease       Genitourinary    BPH with obstruction/lower urinary tract symptoms       Other    Elevated PSA    Hyperlipidemia      Other Visit Diagnoses     Immunization due    -  Primary    Relevant Orders    influenza vaccine, [id number], high-dose, PF 0 5 mL (FLUZONE HIGH-DOSE)           Preventive health issues were discussed with patient, and age appropriate screening tests were ordered as noted in patient's After Visit Summary  Personalized health advice and appropriate referrals for health education or preventive services given if needed, as noted in patient's After Visit Summary  History of Present Illness:     Patient presents for Medicare Annual Wellness visit    Patient Care Team:  Glade Frankel, MD as PCP - General  Dennie Bake, MD as Endoscopist     Problem List:     Patient Active Problem List   Diagnosis    Benign essential hypertension    Chronic coronary artery disease    Elevated PSA    Male erectile disorder    Erythrocytosis    Hyperlipidemia    Primary osteoarthritis of right hip    BPH with obstruction/lower urinary tract symptoms      Past Medical and Surgical History:     Past Medical History:   Diagnosis Date    Acute MI ([facility] [address] ) 06/2002    Arthritis     Atypical chest pain 03/05/2008    Basal cell carcinoma 2009    Coronary artery disease     Diverticulosis 2008    Hematuria, microscopic     History of varicose veins 2008    Hyperlipidemia     Hypertension     Nocturia     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     Urinary tract infection      Past Surgical History:   Procedure Laterality Date    COLONOSCOPY      CORONARY ANGIOPLASTY      right CA x 3 vessels: redo PTCA-LAD 10/04    CORONARY STENT PLACEMENT  01/01/2002    JOINT REPLACEMENT      R hip    [place] COLONOSCOPY FLX DX W/COLLJ SPEC WHEN PFRMD N/A 6/27/2017    Procedure: COLONOSCOPY;  Surgeon: Dennie Bake, MD;  Location: [facility];   Service: Colorectal    TOTAL HIP ARTHROPLASTY Right     TOTAL HIP ARTHROPLASTY Left 04/30/2019      Family History:     Family History   Problem Relation Age of Onset    Aneurysm Father         post op AAA repair    Hypertension Sister     Hypertension Brother     Hypertension Mother     Diabetes Mother       Social History:     Social History     Socioeconomic History    Marital status: /Civil Union     Spouse name: None    Number of children: None    Years of education: None    Highest education level: None   Occupational History    None   Social Needs    Financial resource strain: None    Food insecurity:     Worry: None     Inability: None    Transportation needs:     Medical: None     Non-medical: None   Tobacco Use    Smoking status: Former Smoker     Types: Cigarettes     Last attempt to quit: 1970     Years since quittin 8    Smokeless tobacco: Former User    Tobacco comment: quit    0 5 packs/2 00 pack years   Substance and Sexual Activity    Alcohol use: No    Drug use: No    Sexual activity: None   Lifestyle    Physical activity:     Days per week: None     Minutes per session: None    Stress: None   Relationships    Social connections:     Talks on phone: None     Gets together: None     Attends Anglican service: None     Active member of club or organization: None     Attends meetings of clubs or organizations: None     Relationship status: None    Intimate partner violence:     Fear of current or ex partner: None     Emotionally abused: None     Physically abused: None     Forced sexual activity: None   Other Topics Concern    None   Social History Narrative    None       Medications and Allergies:     Current Outpatient Medications   Medication Sig Dispense Refill    amLODIPine (NORVASC) 10 mg tablet Take 1 tablet (10 mg total) by mouth daily 90 tablet 3    aspirin (ECOTRIN LOW STRENGTH) 81 mg EC tablet Take 81 mg by mouth daily      atenolol (TENORMIN) 100 mg tablet Take 1 tablet (100 mg total) by mouth daily 90 tablet 3    B Complex Vitamins (B COMPLEX 100 PO) Take by mouth      calcium carbonate (OS-LUCA) 600 MG tablet Take 600 mg by mouth 2 (two) times a day with meals      Coenzyme Q10 (CO Q 10 PO) Take by mouth      multivitamin (THERAGRAN) TABS Take 1 tablet by mouth      niacin (NIASPAN) 500 mg CR tablet Take 1 tablet (500 mg total) by mouth daily at bedtime 90 tablet 3    Omega-3 Fatty Acids (FISH OIL PO) Take by mouth      sildenafil (VIAGRA) 50 MG tablet Take 1 tablet (50 mg total) by mouth daily as needed for erectile dysfunction 10 tablet 6    tamsulosin (FLOMAX) 0 4 mg Take 1 capsule (0 4 mg total) by mouth daily with dinner 90 capsule 3    simvastatin (ZOCOR) 40 mg tablet Take 1 tablet (40 mg total) by mouth daily at bedtime for 90 days 90 tablet 3     No current facility-administered medications for this visit        Allergies   Allergen Reactions    No Active Allergies     Other       Immunizations:     Immunization History   Administered Date(s) Administered    H1N1, All Formulations 02/24/2010    INFLUENZA 09/15/2017    Influenza Split High Dose Preservative Free IM 10/15/2014, 10/19/2016, 09/15/2017    Influenza TIV (IM) 01/01/2007, 06/30/2008, 11/10/2008, 11/24/2009, 10/24/2011, 11/04/2012    Influenza, high dose seasonal 0 5 mL 09/05/2018    Pneumococcal Conjugate 13-Valent 02/18/2015    Pneumococcal Polysaccharide PPV23 02/07/2012    Tdap 10/12/2012    Zoster 11/18/2013      Health Maintenance:         Topic Date Due    CRC Screening: Colonoscopy  06/27/2027    Hepatitis C Screening  Completed         Topic Date Due    INFLUENZA VACCINE  07/01/2019      Medicare Health Risk Assessment:     /80 (BP Location: Left arm, Patient Position: Sitting, Cuff Size: Adult)   Pulse (!) 54   Temp 97 7 °F (36 5 °C) (Temporal)   Ht 5' 8 5" (1 74 m)   Wt 83 kg (182 lb 14 4 oz)   SpO2 96%   BMI 27 40 kg/m²          Depression Screening:   PHQ-2 Score: 0      Advance Care Planning:   Living will: No      Comments: Papers given amd reviewed     Cognitive Screening:   Provider or family/friend/caregiver concerned regarding cognition?: No    PREVENTIVE SCREENINGS      Cardiovascular Screening:    General: Screening Not Indicated and History Lipid Disorder      Diabetes Screening:     General: Screening Current      Colorectal Cancer Screening:     General: Screening Current      Prostate Cancer Screening:    General: Screening Current      Osteoporosis Screening:    General: Screening Not Indicated      Abdominal Aortic Aneurysm (AAA) Screening:    Risk factors include: age between 73-67 yo and tobacco use        General: Screening Current      Lung Cancer Screening:     General: Screening Not Indicated      Hepatitis C Screening:    General: Screening Current    Other Counseling Topics:   Exercising regularly       Brianne Joseph MD

## 2019-10-14 NOTE — PROGRESS NOTES
Assessment and Plan:     Problem List Items Addressed This Visit     None      Visit Diagnoses     Immunization due    -  Primary           Preventive health issues were discussed with patient, and age appropriate screening tests were ordered as noted in patient's After Visit Summary  Personalized health advice and appropriate referrals for health education or preventive services given if needed, as noted in patient's After Visit Summary  History of Present Illness:     Patient presents for Medicare Annual Wellness visit    Patient Care Team:  Marylu Peña MD as PCP - General  Lucero Sun MD as Endoscopist     Problem List:     Patient Active Problem List   Diagnosis    Benign essential hypertension    Chronic coronary artery disease    Elevated PSA    Male erectile disorder    Erythrocytosis    Hyperlipidemia    Primary osteoarthritis of right hip    BPH with obstruction/lower urinary tract symptoms    Chronic left hip pain      Past Medical and Surgical History:     Past Medical History:   Diagnosis Date    Acute MI ([facility] [address] ) 06/2002    Arthritis     Atypical chest pain 03/05/2008    Basal cell carcinoma 2009    Coronary artery disease     Diverticulosis 2008    Hematuria, microscopic     History of varicose veins 2008    Hyperlipidemia     Hypertension     Nocturia     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     Urinary tract infection      Past Surgical History:   Procedure Laterality Date    COLONOSCOPY      CORONARY ANGIOPLASTY      right CA x 3 vessels: redo PTCA-LAD 10/04    CORONARY STENT PLACEMENT  01/01/2002    JOINT REPLACEMENT      R hip    FL COLONOSCOPY FLX DX W/COLLJ SPEC WHEN PFRMD N/A 6/27/2017    Procedure: COLONOSCOPY;  Surgeon: Lucero Sun MD;  Location: [facility];   Service: Colorectal    TOTAL HIP ARTHROPLASTY Right     TOTAL HIP ARTHROPLASTY Left 04/30/2019      Family History:     Family History   Problem Relation Age of Onset    Aneurysm Father         post op AAA repair    Hypertension Sister     Hypertension Brother     Hypertension Mother     Diabetes Mother       Social History:     Social History     Socioeconomic History    Marital status: /Civil Union     Spouse name: None    Number of children: None    Years of education: None    Highest education level: None   Occupational History    None   Social Needs    Financial resource strain: None    Food insecurity:     Worry: None     Inability: None    Transportation needs:     Medical: None     Non-medical: None   Tobacco Use    Smoking status: Former Smoker     Types: Cigarettes     Last attempt to quit: 1970     Years since quittin 8    Smokeless tobacco: Former User    Tobacco comment: quit    0 5 packs/2 00 pack years   Substance and Sexual Activity    Alcohol use: No    Drug use: No    Sexual activity: None   Lifestyle    Physical activity:     Days per week: None     Minutes per session: None    Stress: None   Relationships    Social connections:     Talks on phone: None     Gets together: None     Attends Faith service: None     Active member of club or organization: None     Attends meetings of clubs or organizations: None     Relationship status: None    Intimate partner violence:     Fear of current or ex partner: None     Emotionally abused: None     Physically abused: None     Forced sexual activity: None   Other Topics Concern    None   Social History Narrative    None       Medications and Allergies:     Current Outpatient Medications   Medication Sig Dispense Refill    amLODIPine (NORVASC) 10 mg tablet Take 1 tablet (10 mg total) by mouth daily 90 tablet 3    aspirin (ECOTRIN LOW STRENGTH) 81 mg EC tablet Take 81 mg by mouth daily      atenolol (TENORMIN) 100 mg tablet Take 1 tablet (100 mg total) by mouth daily 90 tablet 3    B Complex Vitamins (B COMPLEX 100 PO) Take by mouth      calcium carbonate (OS-LUCA) 600 MG tablet Take 600 mg by mouth 2 (two) times a day with meals      Coenzyme Q10 (CO Q 10 PO) Take by mouth      multivitamin (THERAGRAN) TABS Take 1 tablet by mouth      niacin (NIASPAN) 500 mg CR tablet Take 1 tablet (500 mg total) by mouth daily at bedtime 90 tablet 3    Omega-3 Fatty Acids (FISH OIL PO) Take by mouth      sildenafil (VIAGRA) 50 MG tablet Take 1 tablet (50 mg total) by mouth daily as needed for erectile dysfunction 10 tablet 6    tamsulosin (FLOMAX) 0 4 mg Take 1 capsule (0 4 mg total) by mouth daily with dinner 90 capsule 3    simvastatin (ZOCOR) 40 mg tablet Take 1 tablet (40 mg total) by mouth daily at bedtime for 90 days 90 tablet 3     No current facility-administered medications for this visit  Allergies   Allergen Reactions    No Active Allergies     Other       Immunizations:     Immunization History   Administered Date(s) Administered    H1N1, All Formulations 02/24/2010    INFLUENZA 09/15/2017    Influenza Split High Dose Preservative Free IM 10/15/2014, 10/19/2016, 09/15/2017    Influenza TIV (IM) 01/01/2007, 06/30/2008, 11/10/2008, 11/24/2009, 10/24/2011, 11/04/2012    Influenza, high dose seasonal 0 5 mL 09/05/2018    Pneumococcal Conjugate 13-Valent 02/18/2015    Pneumococcal Polysaccharide PPV23 02/07/2012    Tdap 10/12/2012    Zoster 11/18/2013      Health Maintenance:         Topic Date Due    CRC Screening: Colonoscopy  06/27/2027    Hepatitis C Screening  Completed         Topic Date Due    INFLUENZA VACCINE  07/01/2019      Medicare Health Risk Assessment:     Ht 5' 8 5" (1 74 m)   Wt 83 kg (182 lb 14 4 oz)   BMI 27 40 kg/m²      Lucila Mortimer is here for his Subsequent Wellness visit  Last Medicare Wellness visit information reviewed, patient interviewed and updates made to the record today  Health Risk Assessment:   Patient rates overall health as very good  Patient feels that their physical health rating is slightly better  Eyesight was rated as same  Hearing was rated as same  Patient feels that their emotional and mental health rating is same  Pain experienced in the last 7 days has been none  Patient states that he has experienced no weight loss or gain in last 6 months  Depression Screening:   PHQ-2 Score: 0      Fall Risk Screening: In the past year, patient has experienced: no history of falling in past year      Home Safety:  Patient does not have trouble with stairs inside or outside of their home  Patient has working smoke alarms and has no working carbon monoxide detector  Home safety hazards include: none  Nutrition:   Current diet is Regular  Medications:   Patient is currently taking over-the-counter supplements  OTC medications include: see medication list  Patient is able to manage medications  Activities of Daily Living (ADLs)/Instrumental Activities of Daily Living (IADLs):   Walk and transfer into and out of bed and chair?: Yes  Dress and groom yourself?: Yes    Bathe or shower yourself?: Yes    Feed yourself?  Yes  Do your laundry/housekeeping?: Yes  Manage your money, pay your bills and track your expenses?: Yes  Make your own meals?: Yes    Do your own shopping?: Yes    Previous Hospitalizations:   Any hospitalizations or ED visits within the last 12 months?: Yes    How many hospitalizations have you had in the last year?: 1-2    PREVENTIVE SCREENINGS      Cardiovascular Screening:    General: Screening Not Indicated and History Lipid Disorder      Diabetes Screening:     General: Screening Current      Colorectal Cancer Screening:     General: Screening Current      Prostate Cancer Screening:    General: Screening Current      Abdominal Aortic Aneurysm (AAA) Screening:    Risk factors include: age between 73-67 yo and tobacco use        Hepatitis C Screening:    General: Screening Current      Daniel Mishra MD

## 2019-12-23 DIAGNOSIS — N40.0 BENIGN PROSTATIC HYPERPLASIA WITHOUT LOWER URINARY TRACT SYMPTOMS: ICD-10-CM

## 2019-12-23 RX ORDER — TAMSULOSIN HYDROCHLORIDE 0.4 MG/1
CAPSULE ORAL
Qty: 90 CAPSULE | Refills: 3 | Status: SHIPPED | OUTPATIENT
Start: 2019-12-23 | End: 2021-01-11 | Stop reason: SDUPTHER

## 2020-01-02 ENCOUNTER — OFFICE VISIT (OUTPATIENT)
Dept: FAMILY MEDICINE CLINIC | Facility: CLINIC | Age: 75
End: 2020-01-02
Payer: MEDICARE

## 2020-01-02 VITALS
BODY MASS INDEX: 27.31 KG/M2 | OXYGEN SATURATION: 95 % | SYSTOLIC BLOOD PRESSURE: 124 MMHG | DIASTOLIC BLOOD PRESSURE: 84 MMHG | HEART RATE: 66 BPM | TEMPERATURE: 98.5 F | HEIGHT: 69 IN | WEIGHT: 184.4 LBS | RESPIRATION RATE: 18 BRPM

## 2020-01-02 DIAGNOSIS — J06.9 VIRAL UPPER RESPIRATORY INFECTION: Primary | ICD-10-CM

## 2020-01-02 PROBLEM — R91.1 LUNG NODULE SEEN ON IMAGING STUDY: Status: ACTIVE | Noted: 2019-04-19

## 2020-01-02 PROCEDURE — 99214 OFFICE O/P EST MOD 30 MIN: CPT | Performed by: NURSE PRACTITIONER

## 2020-01-02 RX ORDER — BENZONATATE 100 MG/1
100 CAPSULE ORAL 3 TIMES DAILY PRN
Qty: 20 CAPSULE | Refills: 0 | Status: SHIPPED | OUTPATIENT
Start: 2020-01-02 | End: 2020-04-22 | Stop reason: ALTCHOICE

## 2020-01-02 NOTE — PATIENT INSTRUCTIONS
Viral Syndrome   WHAT YOU NEED TO KNOW:   What is viral syndrome? Viral syndrome is a term used for a viral infection that has no clear cause  Viruses are spread easily from person to person through the air and on shared items  What are the signs and symptoms of viral syndrome? Signs and symptoms may start slowly or suddenly and last hours to days  They can be mild to severe and can change over days or hours  You may have any of the following:  · Fever and chills    · A runny or stuffy nose     · Cough, sore throat, or hoarseness     · Headache, or pain and pressure around your eyes     · Muscle aches and joint pain     · Shortness of breath or wheezing     · Abdominal pain, cramps, and diarrhea     · Nausea, vomiting, or loss of appetite  How is viral syndrome diagnosed and treated? Your healthcare provider will ask about your symptoms and examine you  Tell him about any recent travel or insect bites  An illness caused by a virus usually goes away in 10 to 14 days without treatment  You may need medicine to help manage your symptoms such as fever, muscle aches, cough, or congestion  How can I manage my symptoms? · Drink liquids as directed  to prevent dehydration  Ask how much liquid to drink each day and which liquids are best for you  Ask if you should drink an oral rehydration solution (ORS)  An ORS has the right amounts of water, salts, and sugar you need to replace body fluids  This may help prevent dehydration caused by vomiting or diarrhea  Do not drink liquids with caffeine  Liquids with caffeine can make dehydration worse  · Get plenty of rest  to help your body heal  Take naps throughout the day  Ask your healthcare provider when you can return to work and your normal activities  · Use a cool mist humidifier  to help you breathe easier if you have nasal or chest congestion  Ask your healthcare provider how to use a cool mist humidifier       · Eat honey or use cough drops  to help decrease throat discomfort  Ask your healthcare provider how much honey you should eat each day  Cough drops are available without a doctor's order  Follow directions for taking cough drops  · Do not smoke and stay away from others who smoke  Nicotine and other chemicals in cigarettes and cigars can cause lung damage  Smoking can also delay healing  Ask your healthcare provider for information if you currently smoke and need help to quit  E-cigarettes or smokeless tobacco still contain nicotine  Talk to your healthcare provider before you use these products  · Wash your hands frequently  to prevent the spread of germs to others  Use soap and water  Use gel hand  when soap and water are not available  Wash your hands after you use the bathroom, cough, or sneeze  Wash your hands before you prepare or eat food  Call 911 or have someone else call 911 if:   · You have a seizure  · You cannot be woken  · You have chest pain or trouble breathing  When should I seek immediate care? · You have a stiff neck, a bad headache, and sensitivity to light  · You feel weak, dizzy, or confused  · You stop urinating or urinate a lot less than normal      · You cough up blood or thick, yellow or green, mucus  · You have severe abdominal pain or your abdomen is larger than usual   When should I contact my healthcare provider? · Your symptoms do not get better with treatment or get worse after 3 days  · You have a rash or ear pain  · You have burning when you urinate  · You have questions or concerns about your condition or care  CARE AGREEMENT:   You have the right to help plan your care  Learn about your health condition and how it may be treated  Discuss treatment options with your caregivers to decide what care you want to receive  You always have the right to refuse treatment  The above information is an  only   It is not intended as medical advice for individual conditions or treatments  Talk to your doctor, nurse or pharmacist before following any medical regimen to see if it is safe and effective for you  © 2017 [employer]  Information is for End User's use only and may not be sold, redistributed or otherwise used for commercial purposes  All illustrations and images included in CareNotes® are the copyrighted property of [employer]  or Azeem German  1  mucinex as needed for congestion  2  Delsym as needed for cough  3  Encourage fluids     4  Tessalon as needed for cough   RX

## 2020-01-02 NOTE — PROGRESS NOTES
BMI Counseling: Body mass index is 27 63 kg/m²  The BMI is above normal  Nutrition recommendations include reducing portion sizes, decreasing overall calorie intake, 3-5 servings of fruits/vegetables daily, reducing fast food intake, consuming healthier snacks, decreasing soda and/or juice intake, moderation in carbohydrate intake, increasing intake of lean protein, reducing intake of saturated fat and trans fat and reducing intake of cholesterol  Exercise recommendations include moderate aerobic physical activity for 150 minutes/week and exercising 3-5 times per week  Assessment/Plan:    No problem-specific Assessment & Plan notes found for this encounter  Diagnoses and all orders for this visit:    Viral upper respiratory infection  -     benzonatate (TESSALON PERLES) 100 mg capsule; Take 1 capsule (100 mg total) by mouth 3 (three) times a day as needed for cough    BMI 27 0-27 9,adult          Subjective:      Patient ID: Debbie David  is a 69 y o  male  HPI  Started on Sunday with URI symptoms and then developed cough   No fevers or chills  Afraid to try anything  Cough is bothering him the most     Able to sleep  Taking fluids  The following portions of the patient's history were reviewed and updated as appropriate: allergies, current medications, past family history, past medical history, past social history, past surgical history and problem list     Review of Systems   Constitutional: Negative for activity change, appetite change, chills, fatigue and fever  HENT: Positive for congestion and postnasal drip  Negative for sore throat  Respiratory: Positive for cough  Negative for shortness of breath and wheezing  Cardiovascular: Negative for chest pain  Gastrointestinal: Negative for abdominal pain, constipation and diarrhea  Genitourinary: Negative for dysuria, frequency and urgency  Neurological: Negative for light-headedness           Objective:  Vitals:    01/02/20 1257 BP: 124/84   BP Location: Left arm   Patient Position: Sitting   Cuff Size: Standard   Pulse: 66   Resp: 18   Temp: 98 5 °F (36 9 °C)   TempSrc: Temporal   SpO2: 95%   Weight: 83 6 kg (184 lb 6 4 oz)   Height: 5' 8 5" (1 74 m)      Physical Exam   Constitutional: He is oriented to person, place, and time  He appears well-developed and well-nourished  HENT:   Right Ear: External ear normal    Left Ear: External ear normal    Nose: Nose normal    Mouth/Throat: Oropharynx is clear and moist    Eyes: Conjunctivae and EOM are normal    Neck: Neck supple  Cardiovascular: Normal rate, regular rhythm and normal heart sounds  Pulmonary/Chest: Effort normal and breath sounds normal    Musculoskeletal: Normal range of motion  Lymphadenopathy:     He has no cervical adenopathy  Neurological: He is alert and oriented to person, place, and time  Skin: Skin is warm  Vitals reviewed  Patient Instructions   Viral Syndrome   WHAT YOU NEED TO KNOW:   What is viral syndrome? Viral syndrome is a term used for a viral infection that has no clear cause  Viruses are spread easily from person to person through the air and on shared items  What are the signs and symptoms of viral syndrome? Signs and symptoms may start slowly or suddenly and last hours to days  They can be mild to severe and can change over days or hours  You may have any of the following:  · Fever and chills    · A runny or stuffy nose     · Cough, sore throat, or hoarseness     · Headache, or pain and pressure around your eyes     · Muscle aches and joint pain     · Shortness of breath or wheezing     · Abdominal pain, cramps, and diarrhea     · Nausea, vomiting, or loss of appetite  How is viral syndrome diagnosed and treated? Your healthcare provider will ask about your symptoms and examine you  Tell him about any recent travel or insect bites  An illness caused by a virus usually goes away in 10 to 14 days without treatment   You may need medicine to help manage your symptoms such as fever, muscle aches, cough, or congestion  How can I manage my symptoms? · Drink liquids as directed  to prevent dehydration  Ask how much liquid to drink each day and which liquids are best for you  Ask if you should drink an oral rehydration solution (ORS)  An ORS has the right amounts of water, salts, and sugar you need to replace body fluids  This may help prevent dehydration caused by vomiting or diarrhea  Do not drink liquids with caffeine  Liquids with caffeine can make dehydration worse  · Get plenty of rest  to help your body heal  Take naps throughout the day  Ask your healthcare provider when you can return to work and your normal activities  · Use a cool mist humidifier  to help you breathe easier if you have nasal or chest congestion  Ask your healthcare provider how to use a cool mist humidifier  · Eat honey or use cough drops  to help decrease throat discomfort  Ask your healthcare provider how much honey you should eat each day  Cough drops are available without a doctor's order  Follow directions for taking cough drops  · Do not smoke and stay away from others who smoke  Nicotine and other chemicals in cigarettes and cigars can cause lung damage  Smoking can also delay healing  Ask your healthcare provider for information if you currently smoke and need help to quit  E-cigarettes or smokeless tobacco still contain nicotine  Talk to your healthcare provider before you use these products  · Wash your hands frequently  to prevent the spread of germs to others  Use soap and water  Use gel hand  when soap and water are not available  Wash your hands after you use the bathroom, cough, or sneeze  Wash your hands before you prepare or eat food  Call 911 or have someone else call 911 if:   · You have a seizure  · You cannot be woken  · You have chest pain or trouble breathing  When should I seek immediate care?    · You have a stiff neck, a bad headache, and sensitivity to light  · You feel weak, dizzy, or confused  · You stop urinating or urinate a lot less than normal      · You cough up blood or thick, yellow or green, mucus  · You have severe abdominal pain or your abdomen is larger than usual   When should I contact my healthcare provider? · Your symptoms do not get better with treatment or get worse after 3 days  · You have a rash or ear pain  · You have burning when you urinate  · You have questions or concerns about your condition or care  CARE AGREEMENT:   You have the right to help plan your care  Learn about your health condition and how it may be treated  Discuss treatment options with your caregivers to decide what care you want to receive  You always have the right to refuse treatment  The above information is an  only  It is not intended as medical advice for individual conditions or treatments  Talk to your doctor, nurse or pharmacist before following any medical regimen to see if it is safe and effective for you  © 2017 [address] [employer] Information is for End User's use only and may not be sold, redistributed or otherwise used for commercial purposes  All illustrations and images included in CareNotes® are the copyrighted property of [employer]  or [employer]  1  mucinex as needed for congestion  2  Delsym as needed for cough  3  Encourage fluids     4  Tessalon as needed for cough   RX

## 2020-01-14 DIAGNOSIS — E78.00 HIGH CHOLESTEROL: ICD-10-CM

## 2020-01-14 DIAGNOSIS — I10 ESSENTIAL HYPERTENSION: ICD-10-CM

## 2020-01-14 RX ORDER — SIMVASTATIN 40 MG
40 TABLET ORAL
Qty: 90 TABLET | Refills: 3 | Status: SHIPPED | OUTPATIENT
Start: 2020-01-14 | End: 2020-12-12

## 2020-01-14 RX ORDER — ATENOLOL 100 MG/1
100 TABLET ORAL DAILY
Qty: 90 TABLET | Refills: 3 | Status: SHIPPED | OUTPATIENT
Start: 2020-01-14 | End: 2020-12-12

## 2020-04-13 DIAGNOSIS — I10 ESSENTIAL HYPERTENSION, BENIGN: ICD-10-CM

## 2020-04-13 RX ORDER — BENAZEPRIL HYDROCHLORIDE 20 MG/1
TABLET ORAL
Qty: 90 TABLET | Refills: 3 | Status: SHIPPED | OUTPATIENT
Start: 2020-04-13 | End: 2021-03-08

## 2020-04-13 RX ORDER — AMLODIPINE BESYLATE 10 MG/1
TABLET ORAL
Qty: 90 TABLET | Refills: 3 | Status: SHIPPED | OUTPATIENT
Start: 2020-04-13 | End: 2021-03-08

## 2020-04-22 ENCOUNTER — OFFICE VISIT (OUTPATIENT)
Dept: FAMILY MEDICINE CLINIC | Facility: CLINIC | Age: 75
End: 2020-04-22
Payer: MEDICARE

## 2020-04-22 VITALS
HEART RATE: 55 BPM | SYSTOLIC BLOOD PRESSURE: 150 MMHG | WEIGHT: 184.8 LBS | HEIGHT: 69 IN | OXYGEN SATURATION: 94 % | BODY MASS INDEX: 27.37 KG/M2 | DIASTOLIC BLOOD PRESSURE: 80 MMHG

## 2020-04-22 DIAGNOSIS — R91.1 LUNG NODULE SEEN ON IMAGING STUDY: ICD-10-CM

## 2020-04-22 DIAGNOSIS — R97.20 ELEVATED PSA: ICD-10-CM

## 2020-04-22 DIAGNOSIS — N13.8 BPH WITH OBSTRUCTION/LOWER URINARY TRACT SYMPTOMS: ICD-10-CM

## 2020-04-22 DIAGNOSIS — I10 BENIGN ESSENTIAL HYPERTENSION: Primary | ICD-10-CM

## 2020-04-22 DIAGNOSIS — I25.10 CHRONIC CORONARY ARTERY DISEASE: ICD-10-CM

## 2020-04-22 DIAGNOSIS — N40.1 BPH WITH OBSTRUCTION/LOWER URINARY TRACT SYMPTOMS: ICD-10-CM

## 2020-04-22 DIAGNOSIS — E78.49 OTHER HYPERLIPIDEMIA: ICD-10-CM

## 2020-04-22 PROCEDURE — 99214 OFFICE O/P EST MOD 30 MIN: CPT | Performed by: FAMILY MEDICINE

## 2020-04-22 PROCEDURE — 1160F RVW MEDS BY RX/DR IN RCRD: CPT | Performed by: FAMILY MEDICINE

## 2020-04-22 PROCEDURE — 3008F BODY MASS INDEX DOCD: CPT | Performed by: FAMILY MEDICINE

## 2020-04-22 PROCEDURE — 3077F SYST BP >= 140 MM HG: CPT | Performed by: FAMILY MEDICINE

## 2020-04-22 PROCEDURE — 1036F TOBACCO NON-USER: CPT | Performed by: FAMILY MEDICINE

## 2020-04-22 PROCEDURE — 4040F PNEUMOC VAC/ADMIN/RCVD: CPT | Performed by: FAMILY MEDICINE

## 2020-04-22 PROCEDURE — 3079F DIAST BP 80-89 MM HG: CPT | Performed by: FAMILY MEDICINE

## 2020-07-14 ENCOUNTER — TRANSCRIBE ORDERS (OUTPATIENT)
Dept: LAB | Facility: HOSPITAL | Age: 75
End: 2020-07-14

## 2020-07-14 ENCOUNTER — APPOINTMENT (OUTPATIENT)
Dept: LAB | Facility: HOSPITAL | Age: 75
End: 2020-07-14
Payer: MEDICARE

## 2020-07-14 DIAGNOSIS — I25.10 ATHEROSCLEROSIS OF NATIVE CORONARY ARTERY OF NATIVE HEART WITHOUT ANGINA PECTORIS: Primary | ICD-10-CM

## 2020-07-14 DIAGNOSIS — N40.1 BPH WITH OBSTRUCTION/LOWER URINARY TRACT SYMPTOMS: ICD-10-CM

## 2020-07-14 DIAGNOSIS — E78.49 FAMILIAL COMBINED HYPERLIPIDEMIA: ICD-10-CM

## 2020-07-14 DIAGNOSIS — N13.8 BPH WITH OBSTRUCTION/LOWER URINARY TRACT SYMPTOMS: ICD-10-CM

## 2020-07-14 DIAGNOSIS — I10 ESSENTIAL HYPERTENSION, MALIGNANT: ICD-10-CM

## 2020-07-14 DIAGNOSIS — R97.20 ELEVATED PSA: ICD-10-CM

## 2020-07-14 DIAGNOSIS — I25.10 ATHEROSCLEROSIS OF NATIVE CORONARY ARTERY OF NATIVE HEART WITHOUT ANGINA PECTORIS: ICD-10-CM

## 2020-07-14 LAB
ALBUMIN SERPL BCP-MCNC: 3.7 G/DL (ref 3.5–5)
ALP SERPL-CCNC: 76 U/L (ref 46–116)
ALT SERPL W P-5'-P-CCNC: 34 U/L (ref 12–78)
AST SERPL W P-5'-P-CCNC: 20 U/L (ref 5–45)
BASOPHILS # BLD AUTO: 0.06 THOUSANDS/ΜL (ref 0–0.1)
BASOPHILS NFR BLD AUTO: 1 % (ref 0–1)
BILIRUB DIRECT SERPL-MCNC: 0.27 MG/DL (ref 0–0.2)
BILIRUB SERPL-MCNC: 1.2 MG/DL (ref 0.2–1)
CHOLEST SERPL-MCNC: 123 MG/DL (ref 50–200)
EOSINOPHIL # BLD AUTO: 0.15 THOUSAND/ΜL (ref 0–0.61)
EOSINOPHIL NFR BLD AUTO: 3 % (ref 0–6)
ERYTHROCYTE [DISTWIDTH] IN BLOOD BY AUTOMATED COUNT: 13.6 % (ref 11.6–15.1)
HCT VFR BLD AUTO: 51 % (ref 36.5–49.3)
HDLC SERPL-MCNC: 47 MG/DL
HGB BLD-MCNC: 16.7 G/DL (ref 12–17)
IMM GRANULOCYTES # BLD AUTO: 0.02 THOUSAND/UL (ref 0–0.2)
IMM GRANULOCYTES NFR BLD AUTO: 0 % (ref 0–2)
LDLC SERPL CALC-MCNC: 57 MG/DL (ref 0–100)
LYMPHOCYTES # BLD AUTO: 2.19 THOUSANDS/ΜL (ref 0.6–4.47)
LYMPHOCYTES NFR BLD AUTO: 40 % (ref 14–44)
MCH RBC QN AUTO: 29.9 PG (ref 26.8–34.3)
MCHC RBC AUTO-ENTMCNC: 32.7 G/DL (ref 31.4–37.4)
MCV RBC AUTO: 91 FL (ref 82–98)
MONOCYTES # BLD AUTO: 0.64 THOUSAND/ΜL (ref 0.17–1.22)
MONOCYTES NFR BLD AUTO: 12 % (ref 4–12)
NEUTROPHILS # BLD AUTO: 2.44 THOUSANDS/ΜL (ref 1.85–7.62)
NEUTS SEG NFR BLD AUTO: 44 % (ref 43–75)
NONHDLC SERPL-MCNC: 76 MG/DL
NRBC BLD AUTO-RTO: 0 /100 WBCS
PLATELET # BLD AUTO: 153 THOUSANDS/UL (ref 149–390)
PMV BLD AUTO: 10.6 FL (ref 8.9–12.7)
PROT SERPL-MCNC: 7.7 G/DL (ref 6.4–8.2)
PSA SERPL-MCNC: 4 NG/ML (ref 0–4)
RBC # BLD AUTO: 5.58 MILLION/UL (ref 3.88–5.62)
TRIGL SERPL-MCNC: 94 MG/DL
WBC # BLD AUTO: 5.5 THOUSAND/UL (ref 4.31–10.16)

## 2020-07-14 PROCEDURE — 36415 COLL VENOUS BLD VENIPUNCTURE: CPT

## 2020-07-14 PROCEDURE — 80061 LIPID PANEL: CPT

## 2020-07-14 PROCEDURE — 85025 COMPLETE CBC W/AUTO DIFF WBC: CPT

## 2020-07-14 PROCEDURE — G0103 PSA SCREENING: HCPCS

## 2020-07-14 PROCEDURE — 80076 HEPATIC FUNCTION PANEL: CPT

## 2020-07-24 ENCOUNTER — OFFICE VISIT (OUTPATIENT)
Dept: UROLOGY | Facility: MEDICAL CENTER | Age: 75
End: 2020-07-24
Payer: MEDICARE

## 2020-07-24 VITALS
DIASTOLIC BLOOD PRESSURE: 70 MMHG | WEIGHT: 185 LBS | SYSTOLIC BLOOD PRESSURE: 114 MMHG | TEMPERATURE: 98.4 F | HEIGHT: 69 IN | BODY MASS INDEX: 27.4 KG/M2

## 2020-07-24 DIAGNOSIS — N40.1 BPH WITH OBSTRUCTION/LOWER URINARY TRACT SYMPTOMS: Primary | ICD-10-CM

## 2020-07-24 DIAGNOSIS — R97.20 ELEVATED PSA: ICD-10-CM

## 2020-07-24 DIAGNOSIS — N13.8 BPH WITH OBSTRUCTION/LOWER URINARY TRACT SYMPTOMS: Primary | ICD-10-CM

## 2020-07-24 PROCEDURE — 3074F SYST BP LT 130 MM HG: CPT | Performed by: UROLOGY

## 2020-07-24 PROCEDURE — 3078F DIAST BP <80 MM HG: CPT | Performed by: UROLOGY

## 2020-07-24 PROCEDURE — 3008F BODY MASS INDEX DOCD: CPT | Performed by: UROLOGY

## 2020-07-24 PROCEDURE — 99214 OFFICE O/P EST MOD 30 MIN: CPT | Performed by: UROLOGY

## 2020-07-24 PROCEDURE — 4040F PNEUMOC VAC/ADMIN/RCVD: CPT | Performed by: UROLOGY

## 2020-07-24 PROCEDURE — 1036F TOBACCO NON-USER: CPT | Performed by: UROLOGY

## 2020-07-24 PROCEDURE — 1160F RVW MEDS BY RX/DR IN RCRD: CPT | Performed by: UROLOGY

## 2020-07-24 NOTE — ASSESSMENT & PLAN NOTE
PSA continues to improve and is presently 4 0 (July 14, 2020)  His digital rectal examination reveals enlarged benign-feeling prostate  We will continue to follow his PSA and plan to recheck in 1 year  He will return in 1 year for follow-up

## 2020-07-24 NOTE — PROGRESS NOTES
Assessment/Plan:    BPH with obstruction/lower urinary tract symptoms  AUA symptom score 6  He is satisfied his voiding pattern on tamsulosin  Options were discussed and we will continue present therapy  He will return in 1 year and we will  check a urinalysis prior to his next visit  Elevated PSA  PSA continues to improve and is presently 4 0 (July 14, 2020)  His digital rectal examination reveals enlarged benign-feeling prostate  We will continue to follow his PSA and plan to recheck in 1 year  He will return in 1 year for follow-up  Diagnoses and all orders for this visit:    BPH with obstruction/lower urinary tract symptoms  -     PSA Total, Diagnostic; Future  -     Urinalysis with microscopic; Future    Elevated PSA          Subjective:      Patient ID: Nathalia Aguilera  is a 70 y o  male  Benign Prostatic Hypertrophy   This is a chronic problem  The current episode started more than 1 year ago  The problem is unchanged  Irritative symptoms include nocturia (Nocturia 2-3x)  Irritative symptoms do not include frequency or urgency  Obstructive symptoms include dribbling and a slower stream  Obstructive symptoms do not include incomplete emptying, an intermittent stream, straining or a weak stream  Pertinent negatives include no chills, dysuria, genital pain, hematuria, hesitancy, nausea or vomiting  AUA score is 0-7  His sexual activity is non-contributory to the current illness  Nothing aggravates the symptoms  Past treatments include tamsulosin  The treatment provided moderate relief  He has been using treatment for 2 or more years  He is 3 weeks post left hip replacement  The following portions of the patient's history were reviewed and updated as appropriate: allergies, current medications, past family history, past medical history, past social history, past surgical history and problem list     Review of Systems   Constitutional: Negative for chills, diaphoresis, fatigue and fever  HENT: Negative  Eyes: Negative  Respiratory: Negative  Cardiovascular: Negative  Gastrointestinal: Negative  Negative for nausea and vomiting  Endocrine: Negative  Genitourinary: Positive for nocturia (Nocturia 2-3x)  Negative for dysuria, frequency, hematuria, hesitancy, incomplete emptying and urgency  See HPI   Musculoskeletal: Negative  Negative for arthralgias  Skin: Negative  Allergic/Immunologic: Negative  Neurological: Negative  Hematological: Negative  Psychiatric/Behavioral: Negative  AUA SYMPTOM SCORE      Most Recent Value   AUA SYMPTOM SCORE   How often have you had a sensation of not emptying your bladder completely after you finished urinating? 0   How often have you had to urinate again less than two hours after you finished urinating? 1   How often have you found you stopped and started again several times when you urinate? 2   How often have you found it difficult to postpone urination? 0   How often have you had a weak urinary stream?  0   How often have you had to push or strain to begin urination? 0   How many times did you most typically get up to urinate from the time you went to bed at night until the time you got up in the morning? 3   Quality of Life: If you were to spend the rest of your life with your urinary condition just the way it is now, how would you feel about that?  2   AUA SYMPTOM SCORE  6            Objective:      /70 (BP Location: Left arm, Patient Position: Sitting, Cuff Size: Adult)   Temp 98 4 °F (36 9 °C)   Ht 5' 8 5" (1 74 m)   Wt 83 9 kg (185 lb)   BMI 27 72 kg/m²          Physical Exam   Constitutional: He is oriented to person, place, and time  He appears well-developed and well-nourished  HENT:   Head: Normocephalic and atraumatic  Eyes: Conjunctivae are normal    Neck: Neck supple  Cardiovascular: Normal rate  Pulmonary/Chest: Effort normal    Abdominal: Soft   Bowel sounds are normal  He exhibits no distension and no mass  There is no tenderness  There is no rebound, no guarding and no CVA tenderness  No hernia  Genitourinary: Rectum normal, testes normal and penis normal  Right testis shows no mass  Left testis shows no mass  No phimosis or hypospadias  Genitourinary Comments: Prostate 2 5  times enlarged and palpably benign  Musculoskeletal: He exhibits no edema  Neurological: He is alert and oriented to person, place, and time  Skin: Skin is warm and dry  Psychiatric: He has a normal mood and affect  His behavior is normal  Judgment and thought content normal    Vitals reviewed

## 2020-07-24 NOTE — ASSESSMENT & PLAN NOTE
AUA symptom score 6  He is satisfied his voiding pattern on tamsulosin  Options were discussed and we will continue present therapy  He will return in 1 year and we will  check a urinalysis prior to his next visit

## 2020-10-22 ENCOUNTER — OFFICE VISIT (OUTPATIENT)
Dept: FAMILY MEDICINE CLINIC | Facility: CLINIC | Age: 75
End: 2020-10-22
Payer: MEDICARE

## 2020-10-22 VITALS
WEIGHT: 184.2 LBS | OXYGEN SATURATION: 95 % | SYSTOLIC BLOOD PRESSURE: 120 MMHG | HEART RATE: 61 BPM | TEMPERATURE: 97 F | HEIGHT: 69 IN | DIASTOLIC BLOOD PRESSURE: 90 MMHG | BODY MASS INDEX: 27.28 KG/M2

## 2020-10-22 DIAGNOSIS — I25.10 CHRONIC CORONARY ARTERY DISEASE: ICD-10-CM

## 2020-10-22 DIAGNOSIS — N40.1 BPH WITH OBSTRUCTION/LOWER URINARY TRACT SYMPTOMS: ICD-10-CM

## 2020-10-22 DIAGNOSIS — E78.49 OTHER HYPERLIPIDEMIA: ICD-10-CM

## 2020-10-22 DIAGNOSIS — I10 BENIGN ESSENTIAL HYPERTENSION: ICD-10-CM

## 2020-10-22 DIAGNOSIS — R97.20 ELEVATED PSA: ICD-10-CM

## 2020-10-22 DIAGNOSIS — I25.709 ATHEROSCLEROSIS OF CORONARY ARTERY BYPASS GRAFT OF NATIVE HEART WITH ANGINA PECTORIS (HCC): ICD-10-CM

## 2020-10-22 DIAGNOSIS — Z23 IMMUNIZATION DUE: Primary | ICD-10-CM

## 2020-10-22 DIAGNOSIS — N13.8 BPH WITH OBSTRUCTION/LOWER URINARY TRACT SYMPTOMS: ICD-10-CM

## 2020-10-22 DIAGNOSIS — N40.0 BENIGN PROSTATIC HYPERPLASIA WITHOUT LOWER URINARY TRACT SYMPTOMS: ICD-10-CM

## 2020-10-22 DIAGNOSIS — C44.41 BASAL CELL CARCINOMA (BCC) OF SCALP: ICD-10-CM

## 2020-10-22 LAB
ANION GAP SERPL CALCULATED.3IONS-SCNC: 6 MMOL/L (ref 4–13)
BUN SERPL-MCNC: 14 MG/DL (ref 5–25)
CALCIUM SERPL-MCNC: 9.1 MG/DL (ref 8.3–10.1)
CHLORIDE SERPL-SCNC: 101 MMOL/L (ref 100–108)
CO2 SERPL-SCNC: 27 MMOL/L (ref 21–32)
CREAT SERPL-MCNC: 0.9 MG/DL (ref 0.6–1.3)
CREAT UR-MCNC: 159 MG/DL
GFR SERPL CREATININE-BSD FRML MDRD: 84 ML/MIN/1.73SQ M
GLUCOSE SERPL-MCNC: 78 MG/DL (ref 65–140)
MICROALBUMIN UR-MCNC: 9.9 MG/L (ref 0–20)
MICROALBUMIN/CREAT 24H UR: 6 MG/G CREATININE (ref 0–30)
POTASSIUM SERPL-SCNC: 4 MMOL/L (ref 3.5–5.3)
SODIUM SERPL-SCNC: 134 MMOL/L (ref 136–145)

## 2020-10-22 PROCEDURE — 90662 IIV NO PRSV INCREASED AG IM: CPT | Performed by: FAMILY MEDICINE

## 2020-10-22 PROCEDURE — 99214 OFFICE O/P EST MOD 30 MIN: CPT | Performed by: FAMILY MEDICINE

## 2020-10-22 PROCEDURE — 1123F ACP DISCUSS/DSCN MKR DOCD: CPT | Performed by: FAMILY MEDICINE

## 2020-10-22 PROCEDURE — 82043 UR ALBUMIN QUANTITATIVE: CPT | Performed by: FAMILY MEDICINE

## 2020-10-22 PROCEDURE — G0439 PPPS, SUBSEQ VISIT: HCPCS | Performed by: FAMILY MEDICINE

## 2020-10-22 PROCEDURE — 36415 COLL VENOUS BLD VENIPUNCTURE: CPT | Performed by: FAMILY MEDICINE

## 2020-10-22 PROCEDURE — 82570 ASSAY OF URINE CREATININE: CPT | Performed by: FAMILY MEDICINE

## 2020-10-22 PROCEDURE — 80048 BASIC METABOLIC PNL TOTAL CA: CPT | Performed by: FAMILY MEDICINE

## 2020-10-22 PROCEDURE — G0008 ADMIN INFLUENZA VIRUS VAC: HCPCS | Performed by: FAMILY MEDICINE

## 2020-10-22 RX ORDER — NIACIN 500 MG/1
500 TABLET, EXTENDED RELEASE ORAL
Qty: 90 TABLET | Refills: 3 | Status: SHIPPED | OUTPATIENT
Start: 2020-10-22 | End: 2021-10-25 | Stop reason: SDUPTHER

## 2020-12-08 ENCOUNTER — TELEPHONE (OUTPATIENT)
Dept: FAMILY MEDICINE CLINIC | Facility: CLINIC | Age: 75
End: 2020-12-08

## 2020-12-11 DIAGNOSIS — E78.00 HIGH CHOLESTEROL: ICD-10-CM

## 2020-12-11 DIAGNOSIS — I10 ESSENTIAL HYPERTENSION: ICD-10-CM

## 2020-12-12 RX ORDER — ATENOLOL 100 MG/1
TABLET ORAL
Qty: 90 TABLET | Refills: 3 | Status: SHIPPED | OUTPATIENT
Start: 2020-12-12 | End: 2021-12-01

## 2020-12-12 RX ORDER — SIMVASTATIN 40 MG
TABLET ORAL
Qty: 90 TABLET | Refills: 3 | Status: SHIPPED | OUTPATIENT
Start: 2020-12-12 | End: 2021-12-01

## 2021-01-11 DIAGNOSIS — N40.0 BENIGN PROSTATIC HYPERPLASIA WITHOUT LOWER URINARY TRACT SYMPTOMS: ICD-10-CM

## 2021-01-11 DIAGNOSIS — E78.00 HIGH CHOLESTEROL: ICD-10-CM

## 2021-01-11 RX ORDER — TAMSULOSIN HYDROCHLORIDE 0.4 MG/1
0.4 CAPSULE ORAL
Qty: 90 CAPSULE | Refills: 3 | Status: SHIPPED | OUTPATIENT
Start: 2021-01-11 | End: 2022-01-03

## 2021-02-13 ENCOUNTER — IMMUNIZATIONS (OUTPATIENT)
Dept: FAMILY MEDICINE CLINIC | Facility: HOSPITAL | Age: 76
End: 2021-02-13

## 2021-02-13 DIAGNOSIS — Z23 ENCOUNTER FOR IMMUNIZATION: Primary | ICD-10-CM

## 2021-02-13 PROCEDURE — 91300 SARS-COV-2 / COVID-19 MRNA VACCINE (PFIZER-BIONTECH) 30 MCG: CPT

## 2021-02-13 PROCEDURE — 0001A SARS-COV-2 / COVID-19 MRNA VACCINE (PFIZER-BIONTECH) 30 MCG: CPT

## 2021-03-06 ENCOUNTER — IMMUNIZATIONS (OUTPATIENT)
Dept: FAMILY MEDICINE CLINIC | Facility: HOSPITAL | Age: 76
End: 2021-03-06

## 2021-03-06 DIAGNOSIS — Z23 ENCOUNTER FOR IMMUNIZATION: Primary | ICD-10-CM

## 2021-03-06 PROCEDURE — 0002A SARS-COV-2 / COVID-19 MRNA VACCINE (PFIZER-BIONTECH) 30 MCG: CPT

## 2021-03-06 PROCEDURE — 91300 SARS-COV-2 / COVID-19 MRNA VACCINE (PFIZER-BIONTECH) 30 MCG: CPT

## 2021-03-08 DIAGNOSIS — I10 ESSENTIAL HYPERTENSION, BENIGN: ICD-10-CM

## 2021-03-08 RX ORDER — BENAZEPRIL HYDROCHLORIDE 20 MG/1
TABLET ORAL
Qty: 90 TABLET | Refills: 3 | Status: SHIPPED | OUTPATIENT
Start: 2021-03-08 | End: 2022-03-08

## 2021-03-08 RX ORDER — AMLODIPINE BESYLATE 10 MG/1
TABLET ORAL
Qty: 90 TABLET | Refills: 3 | Status: SHIPPED | OUTPATIENT
Start: 2021-03-08 | End: 2022-03-08

## 2021-04-22 ENCOUNTER — OFFICE VISIT (OUTPATIENT)
Dept: FAMILY MEDICINE CLINIC | Facility: CLINIC | Age: 76
End: 2021-04-22
Payer: MEDICARE

## 2021-04-22 VITALS
SYSTOLIC BLOOD PRESSURE: 126 MMHG | HEIGHT: 69 IN | BODY MASS INDEX: 27.49 KG/M2 | HEART RATE: 51 BPM | DIASTOLIC BLOOD PRESSURE: 78 MMHG | TEMPERATURE: 97.7 F | OXYGEN SATURATION: 98 % | WEIGHT: 185.6 LBS

## 2021-04-22 DIAGNOSIS — I10 BENIGN ESSENTIAL HYPERTENSION: ICD-10-CM

## 2021-04-22 DIAGNOSIS — I25.10 CHRONIC CORONARY ARTERY DISEASE: Primary | ICD-10-CM

## 2021-04-22 DIAGNOSIS — N40.1 BPH WITH OBSTRUCTION/LOWER URINARY TRACT SYMPTOMS: ICD-10-CM

## 2021-04-22 DIAGNOSIS — R91.1 LUNG NODULE SEEN ON IMAGING STUDY: ICD-10-CM

## 2021-04-22 DIAGNOSIS — R97.20 ELEVATED PSA: ICD-10-CM

## 2021-04-22 DIAGNOSIS — E78.49 OTHER HYPERLIPIDEMIA: ICD-10-CM

## 2021-04-22 DIAGNOSIS — M16.11 PRIMARY OSTEOARTHRITIS OF RIGHT HIP: ICD-10-CM

## 2021-04-22 DIAGNOSIS — N13.8 BPH WITH OBSTRUCTION/LOWER URINARY TRACT SYMPTOMS: ICD-10-CM

## 2021-04-22 PROBLEM — Z23 IMMUNIZATION DUE: Status: RESOLVED | Noted: 2020-10-22 | Resolved: 2021-04-22

## 2021-04-22 PROCEDURE — 99214 OFFICE O/P EST MOD 30 MIN: CPT | Performed by: FAMILY MEDICINE

## 2021-04-22 NOTE — PROGRESS NOTES
Assessment/Plan:    Elevated PSA  4 0 7/21       Diagnoses and all orders for this visit:    Chronic coronary artery disease    Benign essential hypertension    Primary osteoarthritis of right hip    BPH with obstruction/lower urinary tract symptoms    Lung nodule seen on imaging study    Elevated PSA    Other hyperlipidemia          Subjective:      Patient ID: Shreya Ye  is a 78 y o  male  PATIENT RETURNS FOR FOLLOW-UP OF CHRONIC MEDICAL CONDITIONS  NO HOSPITAL STAYS OR EMERGENCY VISITS RECENTLY  MEDS WERE REVIEWED AND NO SIDE EFFECTS  NO NEW ISSUES  UNLESS NOTED BELOW  NO NEW MEDICAL PROVIDER REPORTED  THE CHRONIC DISEASES LISTED ABOVE ARE STABLE AND UNCHANGED/ THE PLAN OF CARE FOR THOSE WILL REMAIN UNCHANGED UNLESS NOTED BELOW  The following portions of the patient's history were reviewed and updated as appropriate: allergies, current medications, past family history, past medical history, past social history, past surgical history and problem list     Review of Systems   Constitutional: Negative for activity change, appetite change, fatigue, fever and unexpected weight change  HENT: Negative for congestion, dental problem and sneezing  Eyes: Negative for discharge and visual disturbance  Respiratory: Negative for cough and wheezing  Gastrointestinal: Negative for abdominal pain, constipation, diarrhea, nausea and vomiting  Endocrine: Negative for polydipsia and polyuria  Genitourinary: Negative for dysuria and frequency  Musculoskeletal: Negative for arthralgias  Skin: Negative for rash  Allergic/Immunologic: Negative for environmental allergies and food allergies  Neurological: Negative for headaches  Hematological: Negative for adenopathy  Psychiatric/Behavioral: Negative for behavioral problems and sleep disturbance           Objective:  Vitals:    04/22/21 0858   BP: 126/78   BP Location: Left arm   Patient Position: Sitting   Cuff Size: Large   Pulse: (!) 51   Temp: 97 7 °F (36 5 °C)   TempSrc: Temporal   SpO2: 98%   Weight: 84 2 kg (185 lb 9 6 oz)   Height: 5' 8 5" (1 74 m)      Physical Exam  Constitutional:       Appearance: Normal appearance  He is well-developed  HENT:      Head: Normocephalic and atraumatic  Eyes:      Conjunctiva/sclera: Conjunctivae normal    Neck:      Musculoskeletal: Neck supple  Thyroid: No thyromegaly  Cardiovascular:      Rate and Rhythm: Normal rate and regular rhythm  Heart sounds: Normal heart sounds  No murmur  Pulmonary:      Effort: Pulmonary effort is normal  No respiratory distress  Breath sounds: Normal breath sounds  Lymphadenopathy:      Cervical: No cervical adenopathy  Skin:     General: Skin is warm and dry  Neurological:      Mental Status: He is alert  Psychiatric:         Behavior: Behavior normal            Patient's chronic problems that were reviewed today are stable  Recent hospital stays reviewed  Recent labs and imaging reviewed  Recent visits to other providers reviewed  Meds reviewed and no changes made  Appropriate labs and imaging were ordered  Preventive measures appropriate for age and sex were reviewed with patient  Immunizations were updated as appropriate

## 2021-06-26 ENCOUNTER — APPOINTMENT (OUTPATIENT)
Dept: LAB | Facility: HOSPITAL | Age: 76
End: 2021-06-26
Payer: MEDICARE

## 2021-06-26 DIAGNOSIS — I10 ESSENTIAL HYPERTENSION, MALIGNANT: ICD-10-CM

## 2021-06-26 DIAGNOSIS — I25.10 ATHEROSCLEROSIS OF NATIVE CORONARY ARTERY OF NATIVE HEART WITHOUT ANGINA PECTORIS: ICD-10-CM

## 2021-06-26 DIAGNOSIS — E78.49 FAMILIAL COMBINED HYPERLIPIDEMIA: ICD-10-CM

## 2021-06-26 LAB
ALBUMIN SERPL BCP-MCNC: 3.5 G/DL (ref 3.5–5)
ALP SERPL-CCNC: 64 U/L (ref 46–116)
ALT SERPL W P-5'-P-CCNC: 37 U/L (ref 12–78)
ANION GAP SERPL CALCULATED.3IONS-SCNC: 6 MMOL/L (ref 4–13)
AST SERPL W P-5'-P-CCNC: 26 U/L (ref 5–45)
BASOPHILS # BLD AUTO: 0.03 THOUSANDS/ΜL (ref 0–0.1)
BASOPHILS NFR BLD AUTO: 1 % (ref 0–1)
BILIRUB SERPL-MCNC: 1.34 MG/DL (ref 0.2–1)
BUN SERPL-MCNC: 10 MG/DL (ref 5–25)
CALCIUM SERPL-MCNC: 8.9 MG/DL (ref 8.3–10.1)
CHLORIDE SERPL-SCNC: 104 MMOL/L (ref 100–108)
CHOLEST SERPL-MCNC: 134 MG/DL (ref 50–200)
CO2 SERPL-SCNC: 25 MMOL/L (ref 21–32)
CREAT SERPL-MCNC: 0.77 MG/DL (ref 0.6–1.3)
EOSINOPHIL # BLD AUTO: 0.16 THOUSAND/ΜL (ref 0–0.61)
EOSINOPHIL NFR BLD AUTO: 3 % (ref 0–6)
ERYTHROCYTE [DISTWIDTH] IN BLOOD BY AUTOMATED COUNT: 13.5 % (ref 11.6–15.1)
GFR SERPL CREATININE-BSD FRML MDRD: 89 ML/MIN/1.73SQ M
GLUCOSE P FAST SERPL-MCNC: 89 MG/DL (ref 65–99)
HCT VFR BLD AUTO: 49.5 % (ref 36.5–49.3)
HDLC SERPL-MCNC: 49 MG/DL
HGB BLD-MCNC: 16 G/DL (ref 12–17)
IMM GRANULOCYTES # BLD AUTO: 0.01 THOUSAND/UL (ref 0–0.2)
IMM GRANULOCYTES NFR BLD AUTO: 0 % (ref 0–2)
LDLC SERPL CALC-MCNC: 65 MG/DL (ref 0–100)
LYMPHOCYTES # BLD AUTO: 1.79 THOUSANDS/ΜL (ref 0.6–4.47)
LYMPHOCYTES NFR BLD AUTO: 32 % (ref 14–44)
MCH RBC QN AUTO: 29.9 PG (ref 26.8–34.3)
MCHC RBC AUTO-ENTMCNC: 32.3 G/DL (ref 31.4–37.4)
MCV RBC AUTO: 93 FL (ref 82–98)
MONOCYTES # BLD AUTO: 0.7 THOUSAND/ΜL (ref 0.17–1.22)
MONOCYTES NFR BLD AUTO: 13 % (ref 4–12)
NEUTROPHILS # BLD AUTO: 2.87 THOUSANDS/ΜL (ref 1.85–7.62)
NEUTS SEG NFR BLD AUTO: 51 % (ref 43–75)
NONHDLC SERPL-MCNC: 85 MG/DL
NRBC BLD AUTO-RTO: 0 /100 WBCS
PLATELET # BLD AUTO: 159 THOUSANDS/UL (ref 149–390)
PMV BLD AUTO: 10.5 FL (ref 8.9–12.7)
POTASSIUM SERPL-SCNC: 3.9 MMOL/L (ref 3.5–5.3)
PROT SERPL-MCNC: 7.3 G/DL (ref 6.4–8.2)
RBC # BLD AUTO: 5.35 MILLION/UL (ref 3.88–5.62)
SODIUM SERPL-SCNC: 135 MMOL/L (ref 136–145)
TRIGL SERPL-MCNC: 99 MG/DL
WBC # BLD AUTO: 5.56 THOUSAND/UL (ref 4.31–10.16)

## 2021-06-26 PROCEDURE — 85025 COMPLETE CBC W/AUTO DIFF WBC: CPT

## 2021-06-26 PROCEDURE — 80061 LIPID PANEL: CPT

## 2021-06-26 PROCEDURE — 80053 COMPREHEN METABOLIC PANEL: CPT

## 2021-06-26 PROCEDURE — 36415 COLL VENOUS BLD VENIPUNCTURE: CPT

## 2021-07-24 ENCOUNTER — OFFICE VISIT (OUTPATIENT)
Dept: URGENT CARE | Age: 76
End: 2021-07-24
Payer: MEDICARE

## 2021-07-24 ENCOUNTER — APPOINTMENT (OUTPATIENT)
Dept: LAB | Facility: HOSPITAL | Age: 76
End: 2021-07-24
Attending: UROLOGY
Payer: MEDICARE

## 2021-07-24 VITALS
BODY MASS INDEX: 27.58 KG/M2 | SYSTOLIC BLOOD PRESSURE: 130 MMHG | HEIGHT: 68 IN | TEMPERATURE: 98.2 F | DIASTOLIC BLOOD PRESSURE: 70 MMHG | WEIGHT: 182 LBS | OXYGEN SATURATION: 98 % | HEART RATE: 62 BPM | RESPIRATION RATE: 18 BRPM

## 2021-07-24 DIAGNOSIS — N13.8 BPH WITH OBSTRUCTION/LOWER URINARY TRACT SYMPTOMS: ICD-10-CM

## 2021-07-24 DIAGNOSIS — R35.0 FREQUENCY OF MICTURITION: Primary | ICD-10-CM

## 2021-07-24 DIAGNOSIS — N40.1 BPH WITH OBSTRUCTION/LOWER URINARY TRACT SYMPTOMS: ICD-10-CM

## 2021-07-24 LAB
BACTERIA UR QL AUTO: ABNORMAL /HPF
BILIRUB UR QL STRIP: NEGATIVE
CLARITY UR: CLEAR
COLOR UR: YELLOW
GLUCOSE UR STRIP-MCNC: NEGATIVE MG/DL
HGB UR QL STRIP.AUTO: NEGATIVE
HYALINE CASTS #/AREA URNS LPF: ABNORMAL /LPF
KETONES UR STRIP-MCNC: NEGATIVE MG/DL
LEUKOCYTE ESTERASE UR QL STRIP: ABNORMAL
NITRITE UR QL STRIP: NEGATIVE
NON-SQ EPI CELLS URNS QL MICRO: ABNORMAL /HPF
PH UR STRIP.AUTO: 7 [PH]
PROT UR STRIP-MCNC: NEGATIVE MG/DL
PSA SERPL-MCNC: 18 NG/ML (ref 0–4)
RBC #/AREA URNS AUTO: ABNORMAL /HPF
SL AMB  POCT GLUCOSE, UA: ABNORMAL
SL AMB LEUKOCYTE ESTERASE,UA: ABNORMAL
SL AMB POCT BILIRUBIN,UA: ABNORMAL
SL AMB POCT BLOOD,UA: ABNORMAL
SL AMB POCT CLARITY,UA: CLEAR
SL AMB POCT COLOR,UA: ABNORMAL
SL AMB POCT KETONES,UA: ABNORMAL
SL AMB POCT NITRITE,UA: ABNORMAL
SL AMB POCT PH,UA: 5
SL AMB POCT SPECIFIC GRAVITY,UA: 1.03
SL AMB POCT URINE PROTEIN: ABNORMAL
SL AMB POCT UROBILINOGEN: 0.2
SP GR UR STRIP.AUTO: 1.01 (ref 1–1.03)
UROBILINOGEN UR QL STRIP.AUTO: 1 E.U./DL
WBC #/AREA URNS AUTO: ABNORMAL /HPF

## 2021-07-24 PROCEDURE — 87086 URINE CULTURE/COLONY COUNT: CPT | Performed by: PHYSICIAN ASSISTANT

## 2021-07-24 PROCEDURE — G0463 HOSPITAL OUTPT CLINIC VISIT: HCPCS | Performed by: PHYSICIAN ASSISTANT

## 2021-07-24 PROCEDURE — 36415 COLL VENOUS BLD VENIPUNCTURE: CPT

## 2021-07-24 PROCEDURE — 87186 SC STD MICRODIL/AGAR DIL: CPT | Performed by: PHYSICIAN ASSISTANT

## 2021-07-24 PROCEDURE — 99203 OFFICE O/P NEW LOW 30 MIN: CPT | Performed by: PHYSICIAN ASSISTANT

## 2021-07-24 PROCEDURE — 81002 URINALYSIS NONAUTO W/O SCOPE: CPT | Performed by: PHYSICIAN ASSISTANT

## 2021-07-24 PROCEDURE — 81001 URINALYSIS AUTO W/SCOPE: CPT

## 2021-07-24 PROCEDURE — 84153 ASSAY OF PSA TOTAL: CPT

## 2021-07-24 RX ORDER — CIPROFLOXACIN 250 MG/1
250 TABLET, FILM COATED ORAL EVERY 12 HOURS SCHEDULED
Qty: 14 TABLET | Refills: 0 | Status: SHIPPED | OUTPATIENT
Start: 2021-07-24 | End: 2021-07-31

## 2021-07-24 NOTE — PATIENT INSTRUCTIONS

## 2021-07-24 NOTE — PROGRESS NOTES
[facility]        NAME: Oliver Meeks  is a 78 y o  male  : 1945    MRN: [id number]  DATE: 2021  TIME: 12:22 PM    Assessment and Plan   Frequency of micturition [R35 0]  1  Frequency of micturition  POCT urine dip    Urine culture    ciprofloxacin (CIPRO) 250 mg tablet         Patient Instructions       Follow up with PCP in 3-5 days  Proceed to  ER if symptoms worsen  Chief Complaint     Chief Complaint   Patient presents with    Possible UTI     Patient first noticed s/s on Thursday night- having burning and frequency         History of Present Illness         Patient presents with urinary frequency, urgency and burning since Thursday  He did have a urinalysis and the PSA done today ordered by his urologist   He denies any fevers, chills, nausea, flank pain  Review of Systems   Review of Systems   Constitutional: Negative  HENT: Negative  Respiratory: Negative  Cardiovascular: Negative  Gastrointestinal: Negative  Genitourinary: Positive for dysuria, frequency and urgency  Negative for flank pain  Musculoskeletal: Negative  Neurological: Negative  Psychiatric/Behavioral: Negative            Current Medications       Current Outpatient Medications:     amLODIPine (NORVASC) 10 mg tablet, TAKE 1 TABLET BY MOUTH EVERY DAY, Disp: 90 tablet, Rfl: 3    aspirin (ECOTRIN LOW STRENGTH) 81 mg EC tablet, Take 81 mg by mouth daily, Disp: , Rfl:     atenolol (TENORMIN) 100 mg tablet, TAKE 1 TABLET BY MOUTH EVERY DAY, Disp: 90 tablet, Rfl: 3    B Complex Vitamins (B COMPLEX 100 PO), Take by mouth, Disp: , Rfl:     benazepril (LOTENSIN) 20 mg tablet, TAKE 1 TABLET BY MOUTH EVERY DAY, Disp: 90 tablet, Rfl: 3    calcium carbonate (OS-LUCA) 600 MG tablet, Take 600 mg by mouth daily , Disp: , Rfl:     ciprofloxacin (CIPRO) 250 mg tablet, Take 1 tablet (250 mg total) by mouth every 12 (twelve) hours for 7 days, Disp: 14 tablet, Rfl: 0    Coenzyme Q10 (CO Q 10 PO), Take by mouth, Disp: , Rfl:     multivitamin (THERAGRAN) TABS, Take 1 tablet by mouth, Disp: , Rfl:     niacin (NIASPAN) 500 mg CR tablet, Take 1 tablet (500 mg total) by mouth daily at bedtime, Disp: 90 tablet, Rfl: 3    Omega-3 Fatty Acids (FISH OIL PO), Take by mouth daily , Disp: , Rfl:     sildenafil (VIAGRA) 50 MG tablet, Take 1 tablet (50 mg total) by mouth daily as needed for erectile dysfunction, Disp: 10 tablet, Rfl: 6    simvastatin (ZOCOR) 40 mg tablet, TAKE 1 TABLET BY MOUTH DAILY AT BEDTIME, Disp: 90 tablet, Rfl: 3    tamsulosin (FLOMAX) 0 4 mg, Take 1 capsule (0 4 mg total) by mouth daily with dinner, Disp: 90 capsule, Rfl: 3    Current Allergies     Allergies as of 07/24/2021 - Reviewed 07/24/2021   Allergen Reaction Noted    No active allergies  05/19/2018    Other  04/20/2018            The following portions of the patient's history were reviewed and updated as appropriate: allergies, current medications, past family history, past medical history, past social history, past surgical history and problem list      Past Medical History:   Diagnosis Date    Acute MI ([address] ) 06/2002    Arthritis     Atypical chest pain 03/05/2008    Basal cell carcinoma 2009    Coronary artery disease     Diverticulosis 2008    Hematuria, microscopic     History of varicose veins 2008    Hyperlipidemia     Hypertension     Nocturia     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     Urinary tract infection        Past Surgical History:   Procedure Laterality Date    COLONOSCOPY      CORONARY ANGIOPLASTY      right CA x 3 vessels: redo [facility]CA-LAD 10/04    CORONARY STENT PLACEMENT  01/01/2002    JOINT REPLACEMENT      R hip    LA COLONOSCOPY FLX DX W/COLLJ SPEC WHEN PFRMD N/A 6/27/2017    Procedure: COLONOSCOPY;  Surgeon: Ashlie Braga MD;  Location: [facility];   Service: Colorectal    TOTAL HIP ARTHROPLASTY Right     TOTAL HIP ARTHROPLASTY Left 04/30/2019       Family History   Problem Relation Age of Onset    Aneurysm Father         post op AAA repair    Hypertension Sister     Hypertension Brother     Hypertension Mother     Diabetes Mother          Medications have been verified  Objective   /70 (BP Location: Right arm, Patient Position: Sitting, Cuff Size: Standard)   Pulse 62   Temp 98 2 °F (36 8 °C) (Temporal)   Resp 18   Ht 5' 8" (1 727 m)   Wt 82 6 kg (182 lb)   SpO2 98%   BMI 27 67 kg/m²        Physical Exam     Physical Exam  Vitals and nursing note reviewed  Constitutional:       General: He is not in acute distress  Appearance: Normal appearance  He is not ill-appearing or toxic-appearing  HENT:      Head: Normocephalic and atraumatic  Cardiovascular:      Rate and Rhythm: Normal rate and regular rhythm  Pulses: Normal pulses  Heart sounds: Normal heart sounds  Pulmonary:      Effort: Pulmonary effort is normal       Breath sounds: Normal breath sounds  Abdominal:      General: Abdomen is flat  Tenderness: There is no abdominal tenderness  There is no right CVA tenderness or left CVA tenderness  Skin:     General: Skin is warm and dry  Capillary Refill: Capillary refill takes less than 2 seconds  Neurological:      General: No focal deficit present  Mental Status: He is alert and oriented to person, place, and time     Psychiatric:         Mood and Affect: Mood normal          Behavior: Behavior normal

## 2021-07-26 LAB — BACTERIA UR CULT: ABNORMAL

## 2021-08-03 RX ORDER — MAGNESIUM OXIDE 400 MG/1
400 TABLET ORAL DAILY
COMMUNITY

## 2021-08-05 ENCOUNTER — OFFICE VISIT (OUTPATIENT)
Dept: UROLOGY | Facility: MEDICAL CENTER | Age: 76
End: 2021-08-05
Payer: MEDICARE

## 2021-08-05 VITALS
DIASTOLIC BLOOD PRESSURE: 80 MMHG | HEART RATE: 54 BPM | BODY MASS INDEX: 27.58 KG/M2 | HEIGHT: 68 IN | SYSTOLIC BLOOD PRESSURE: 120 MMHG | WEIGHT: 182 LBS

## 2021-08-05 DIAGNOSIS — N40.1 BPH WITH OBSTRUCTION/LOWER URINARY TRACT SYMPTOMS: Primary | ICD-10-CM

## 2021-08-05 DIAGNOSIS — N13.8 BPH WITH OBSTRUCTION/LOWER URINARY TRACT SYMPTOMS: Primary | ICD-10-CM

## 2021-08-05 DIAGNOSIS — R97.20 ELEVATED PSA: ICD-10-CM

## 2021-08-05 LAB
SL AMB  POCT GLUCOSE, UA: ABNORMAL
SL AMB LEUKOCYTE ESTERASE,UA: ABNORMAL
SL AMB POCT BILIRUBIN,UA: ABNORMAL
SL AMB POCT BLOOD,UA: ABNORMAL
SL AMB POCT CLARITY,UA: CLEAR
SL AMB POCT COLOR,UA: YELLOW
SL AMB POCT KETONES,UA: ABNORMAL
SL AMB POCT NITRITE,UA: ABNORMAL
SL AMB POCT PH,UA: 7.5
SL AMB POCT SPECIFIC GRAVITY,UA: 1.02
SL AMB POCT URINE PROTEIN: ABNORMAL
SL AMB POCT UROBILINOGEN: 0.2

## 2021-08-05 PROCEDURE — 81003 URINALYSIS AUTO W/O SCOPE: CPT | Performed by: NURSE PRACTITIONER

## 2021-08-05 PROCEDURE — 99214 OFFICE O/P EST MOD 30 MIN: CPT | Performed by: NURSE PRACTITIONER

## 2021-08-05 NOTE — PROGRESS NOTES
8/5/2021      No chief complaint on file  Assessment and Plan    78 y o  male managed by Dr Evie Quinones    1  Benign prostatic hyperplasia with lower urinary tract symptoms  · Urine dip in office unmarkable  · Continue tamsulosin  · Discussed need to maintain adequate hydration upwards to 40 oz of water Intake per day  · Will continue to monitor for worsening/progression of urinary symptoms  · Uro lift patient education information provided  · Bladder Scan PVR and uroflow at next office visit    2  Elevated PSA  · PSA performed 07/24/2021 resulted 18 0  · Patient reports diagnosis of urinary tract infection 07/24/2021 is noted p o  antibiotics  · JUAN LUIS-prostate size of about 40 g smooth nontender  No nodules noted  · Repeat PSA in 3 months and JUAN LUIS in 1 year  · Follow up in the office in 3 months        History of Present Illness  Michelle Al  is a 78 y o  male here for follow up evaluation of urinary symptoms secondary to benign prostatic hyperplasia  Patient is also here for evaluation of prostate cancer screening  Patient has been managed on tamsulosin with good results  He denies side effects of medication  He currently reports continued control of urinary symptoms with no complaints  He reports his urinary stream to be full and strong  On rare occasion in move the night he does note a weak urinary stream   Patient reports a recent diagnosis of urinary tract infection 07/24/2021  He was treated with p o  antibiotics with complete resolution of urinary tract symptoms  He denies a family history of prostate cancer  Component       PSA, Total   Latest Ref Rng & Units       0 0 - 4 0 ng/mL   4/30/2018      11:55 AM 4 2 (H)   5/14/2019      11:54 AM 6 8 (H)   7/9/2019      8:48 AM 4 2 (H)   7/14/2020      8:50 AM 4 0   7/24/2021      8:42 AM 18 0 (H)       Review of Systems   Constitutional: Negative for chills and fever  Respiratory: Negative for cough and shortness of breath      Cardiovascular: Negative for chest pain  Gastrointestinal: Negative for abdominal distention, abdominal pain, blood in stool, nausea and vomiting  Genitourinary: Negative for difficulty urinating, dysuria, enuresis, flank pain, frequency, hematuria and urgency  Musculoskeletal: Negative for back pain  Skin: Negative for rash  Past Medical History  Past Medical History:   Diagnosis Date    Acute MI ([facility] [address] ) 2002    Arthritis     Atypical chest pain 2008    Basal cell carcinoma 2009    Coronary artery disease     Diverticulosis     Hematuria, microscopic     History of varicose veins     Hyperlipidemia     Hypertension     Nocturia     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     Urinary tract infection        Past Social History  Past Surgical History:   Procedure Laterality Date    COLONOSCOPY      CORONARY ANGIOPLASTY      right CA x 3 vessels: redo PTCA-LAD 10/04    CORONARY STENT PLACEMENT  2002    JOINT REPLACEMENT      R hip    [place] COLONOSCOPY FLX DX W/COLLJ SPEC WHEN PFRMD N/A 2017    Procedure: COLONOSCOPY;  Surgeon: Chidi Angulo MD;  [facility]; Service: Colorectal    TOTAL HIP ARTHROPLASTY Right     TOTAL HIP ARTHROPLASTY Left 2019     Social History     Tobacco Use   Smoking Status Former Smoker    Types: Cigarettes    Quit date: 1970    Years since quittin 6   Smokeless Tobacco Former User   Tobacco Comment    quit    0 5 packs/2 00 pack years       Past Family History  Family History   Problem Relation Age of Onset    Aneurysm Father         post op AAA repair    Hypertension Sister     Hypertension Brother     Hypertension Mother     Diabetes Mother        Past Social history  Social History     Socioeconomic History    Marital status: /Civil Union     Spouse name: Not on file    Number of children: Not on file    Years of education: Not on file    Highest education level: Not on file   Occupational History    Not on file   Tobacco Use    Smoking status: Former Smoker     Types: Cigarettes     Quit date: 1970     Years since quittin 6    Smokeless tobacco: Former User    Tobacco comment: quit    0 5 packs/2 00 pack years   Vaping Use    Vaping Use: Never used   Substance and Sexual Activity    Alcohol use: No    Drug use: No    Sexual activity: Not on file   Other Topics Concern    Not on file   Social History Narrative    Not on file     Social Determinants of Health     Financial Resource Strain:     Difficulty of Paying Living Expenses:    Food Insecurity:     Worried About Running Out of Food in the Last Year:     [address] in the Last Year:    Transportation Needs:     Lack of Transportation (Medical):      Lack of Transportation (Non-Medical):    Physical Activity:     Days of Exercise per Week:     Minutes of Exercise per Session:    Stress:     Feeling of Stress :    Social Connections:     Frequency of Communication with Friends and Family:     Frequency of Social Gatherings with Friends and Family:     Attends Holiness Services:     Active Member of Clubs or Organizations:     Attends Club or Organization Meetings:     Marital Status:    Intimate Partner Violence:     Fear of Current or Ex-Partner:     Emotionally Abused:     Physically Abused:     Sexually Abused:        Current Medications  Current Outpatient Medications   Medication Sig Dispense Refill    amLODIPine (NORVASC) 10 mg tablet TAKE 1 TABLET BY MOUTH EVERY DAY 90 tablet 3    ascorbic acid (VITAMIN C) 1000 MG tablet Take 1,000 mg by mouth daily      aspirin (ECOTRIN LOW STRENGTH) 81 mg EC tablet Take 81 mg by mouth daily      atenolol (TENORMIN) 100 mg tablet TAKE 1 TABLET BY MOUTH EVERY DAY 90 tablet 3    B Complex Vitamins (B COMPLEX 100 PO) Take by mouth      benazepril (LOTENSIN) 20 mg tablet TAKE 1 TABLET BY MOUTH EVERY DAY 90 tablet 3    calcium carbonate (OS-LUCA) 600 MG tablet Take 600 mg by mouth daily       Coenzyme Q10 (CO Q 10 PO) Take by mouth      magnesium oxide (MAG-OX) 400 mg tablet Take 400 mg by mouth daily      multivitamin (THERAGRAN) TABS Take 1 tablet by mouth      niacin (NIASPAN) 500 mg CR tablet Take 1 tablet (500 mg total) by mouth daily at bedtime 90 tablet 3    Omega-3 Fatty Acids (FISH OIL PO) Take by mouth daily       sildenafil (VIAGRA) 50 MG tablet Take 1 tablet (50 mg total) by mouth daily as needed for erectile dysfunction 10 tablet 6    simvastatin (ZOCOR) 40 mg tablet TAKE 1 TABLET BY MOUTH DAILY AT BEDTIME 90 tablet 3    tamsulosin (FLOMAX) 0 4 mg Take 1 capsule (0 4 mg total) by mouth daily with dinner 90 capsule 3     No current facility-administered medications for this visit  Allergies  Allergies   Allergen Reactions    No Active Allergies     Other          The following portions of the patient's history were reviewed and updated as appropriate: allergies, current medications, past medical history, past social history, past surgical history and problem list       Vitals  Vitals:    08/05/21 0909   BP: 120/80   Pulse: (!) 54   Weight: 82 6 kg (182 lb)   Height: 5' 8" (1 727 m)           Physical Exam  Physical Exam  Vitals reviewed  Constitutional:       General: He is not in acute distress  Appearance: Normal appearance  He is normal weight  HENT:      Head: Normocephalic  Pulmonary:      Effort: No respiratory distress  Breath sounds: Normal breath sounds  Genitourinary:     Comments: JUAN LUIS- prostate 40 grams with no nodules noted   Skin:     General: Skin is warm and dry  Neurological:      General: No focal deficit present  Mental Status: He is alert and oriented to person, place, and time     Psychiatric:         Mood and Affect: Mood normal          Behavior: Behavior normal        Results  Recent Results (from the past 1 hour(s))   POCT urine dip auto non-scope    Collection Time: 08/05/21  9:16 AM   Result Value Ref Range     COLOR,UA yellow     CLARITY,UA clear     SPECIFIC GRAVITY,UA 1 020      PH,UA 7 5     LEUKOCYTE ESTERASE,UA neg     NITRITE,UA neg     GLUCOSE, UA neg     KETONES,UA neg     BILIRUBIN,UA neg     BLOOD,UA trace-intact     POCT URINE PROTEIN neg     SL AMB POCT UROBILINOGEN 0 2      Lab Results   Component Value Date    PSA 18 0 (H) 07/24/2021    PSA 4 0 07/14/2020    PSA 4 2 (H) 07/09/2019     Lab Results   Component Value Date    CALCIUM 8 9 06/26/2021    K 3 9 06/26/2021    CO2 25 06/26/2021     06/26/2021    BUN 10 06/26/2021    CREATININE 0 77 06/26/2021     Lab Results   Component Value Date    WBC 5 56 06/26/2021    HGB 16 0 06/26/2021    HCT 49 5 (H) 06/26/2021    MCV 93 06/26/2021     06/26/2021     Orders  Orders Placed This Encounter   Procedures    PSA Total, Diagnostic     Standing Status:   Future     Standing Expiration Date:   8/5/2022    POCT urine dip auto non-scope       GLYNN Wills

## 2021-10-25 ENCOUNTER — TELEPHONE (OUTPATIENT)
Dept: FAMILY MEDICINE CLINIC | Facility: CLINIC | Age: 76
End: 2021-10-25

## 2021-10-25 DIAGNOSIS — N40.0 BENIGN PROSTATIC HYPERPLASIA WITHOUT LOWER URINARY TRACT SYMPTOMS: ICD-10-CM

## 2021-10-25 RX ORDER — NIACIN 500 MG/1
500 TABLET, EXTENDED RELEASE ORAL
Qty: 90 TABLET | Refills: 3 | Status: SHIPPED | OUTPATIENT
Start: 2021-10-25 | End: 2021-10-26 | Stop reason: SDUPTHER

## 2021-10-26 DIAGNOSIS — N40.0 BENIGN PROSTATIC HYPERPLASIA WITHOUT LOWER URINARY TRACT SYMPTOMS: ICD-10-CM

## 2021-10-26 RX ORDER — NIACIN 500 MG/1
500 TABLET, EXTENDED RELEASE ORAL
Qty: 90 TABLET | Refills: 3 | Status: SHIPPED | OUTPATIENT
Start: 2021-10-26

## 2021-11-06 ENCOUNTER — APPOINTMENT (OUTPATIENT)
Dept: LAB | Facility: HOSPITAL | Age: 76
End: 2021-11-06
Payer: MEDICARE

## 2021-11-06 DIAGNOSIS — R97.20 ELEVATED PSA: ICD-10-CM

## 2021-11-06 LAB — PSA SERPL-MCNC: 4.2 NG/ML (ref 0–4)

## 2021-11-06 PROCEDURE — 84153 ASSAY OF PSA TOTAL: CPT

## 2021-11-10 ENCOUNTER — OFFICE VISIT (OUTPATIENT)
Dept: FAMILY MEDICINE CLINIC | Facility: CLINIC | Age: 76
End: 2021-11-10
Payer: MEDICARE

## 2021-11-10 VITALS
TEMPERATURE: 96.7 F | OXYGEN SATURATION: 96 % | RESPIRATION RATE: 18 BRPM | HEIGHT: 68 IN | DIASTOLIC BLOOD PRESSURE: 82 MMHG | WEIGHT: 183.6 LBS | HEART RATE: 63 BPM | SYSTOLIC BLOOD PRESSURE: 124 MMHG | BODY MASS INDEX: 27.83 KG/M2

## 2021-11-10 DIAGNOSIS — I25.10 CHRONIC CORONARY ARTERY DISEASE: ICD-10-CM

## 2021-11-10 DIAGNOSIS — N40.1 BPH WITH OBSTRUCTION/LOWER URINARY TRACT SYMPTOMS: ICD-10-CM

## 2021-11-10 DIAGNOSIS — R97.20 ELEVATED PSA: ICD-10-CM

## 2021-11-10 DIAGNOSIS — E78.49 OTHER HYPERLIPIDEMIA: ICD-10-CM

## 2021-11-10 DIAGNOSIS — M16.11 PRIMARY OSTEOARTHRITIS OF RIGHT HIP: ICD-10-CM

## 2021-11-10 DIAGNOSIS — N13.8 BPH WITH OBSTRUCTION/LOWER URINARY TRACT SYMPTOMS: ICD-10-CM

## 2021-11-10 DIAGNOSIS — I25.709 ATHEROSCLEROSIS OF CORONARY ARTERY BYPASS GRAFT OF NATIVE HEART WITH ANGINA PECTORIS (HCC): ICD-10-CM

## 2021-11-10 DIAGNOSIS — I10 BENIGN ESSENTIAL HYPERTENSION: ICD-10-CM

## 2021-11-10 DIAGNOSIS — Z23 NEED FOR IMMUNIZATION AGAINST INFLUENZA: ICD-10-CM

## 2021-11-10 DIAGNOSIS — H61.23 CERUMEN DEBRIS ON TYMPANIC MEMBRANE OF BOTH EARS: ICD-10-CM

## 2021-11-10 DIAGNOSIS — R91.1 LUNG NODULE SEEN ON IMAGING STUDY: ICD-10-CM

## 2021-11-10 DIAGNOSIS — Z00.00 MEDICARE ANNUAL WELLNESS VISIT, SUBSEQUENT: Primary | ICD-10-CM

## 2021-11-10 PROCEDURE — 99214 OFFICE O/P EST MOD 30 MIN: CPT | Performed by: FAMILY MEDICINE

## 2021-11-10 PROCEDURE — G0439 PPPS, SUBSEQ VISIT: HCPCS | Performed by: FAMILY MEDICINE

## 2021-11-10 PROCEDURE — G0008 ADMIN INFLUENZA VIRUS VAC: HCPCS

## 2021-11-10 PROCEDURE — 90662 IIV NO PRSV INCREASED AG IM: CPT

## 2021-11-18 ENCOUNTER — OFFICE VISIT (OUTPATIENT)
Dept: UROLOGY | Facility: MEDICAL CENTER | Age: 76
End: 2021-11-18
Payer: MEDICARE

## 2021-11-18 VITALS
SYSTOLIC BLOOD PRESSURE: 118 MMHG | WEIGHT: 183 LBS | BODY MASS INDEX: 27.83 KG/M2 | HEART RATE: 78 BPM | DIASTOLIC BLOOD PRESSURE: 80 MMHG

## 2021-11-18 DIAGNOSIS — Z12.5 PROSTATE CANCER SCREENING: Primary | ICD-10-CM

## 2021-11-18 LAB — POST-VOID RESIDUAL VOLUME, ML POC: 0 ML

## 2021-11-18 PROCEDURE — 51798 US URINE CAPACITY MEASURE: CPT | Performed by: NURSE PRACTITIONER

## 2021-11-18 PROCEDURE — 99213 OFFICE O/P EST LOW 20 MIN: CPT | Performed by: NURSE PRACTITIONER

## 2021-12-01 DIAGNOSIS — E78.00 HIGH CHOLESTEROL: ICD-10-CM

## 2021-12-01 DIAGNOSIS — I10 ESSENTIAL HYPERTENSION: ICD-10-CM

## 2021-12-01 RX ORDER — SIMVASTATIN 40 MG
TABLET ORAL
Qty: 90 TABLET | Refills: 3 | Status: SHIPPED | OUTPATIENT
Start: 2021-12-01

## 2021-12-01 RX ORDER — ATENOLOL 100 MG/1
TABLET ORAL
Qty: 90 TABLET | Refills: 3 | Status: SHIPPED | OUTPATIENT
Start: 2021-12-01

## 2022-01-03 DIAGNOSIS — N40.0 BENIGN PROSTATIC HYPERPLASIA WITHOUT LOWER URINARY TRACT SYMPTOMS: ICD-10-CM

## 2022-01-03 RX ORDER — TAMSULOSIN HYDROCHLORIDE 0.4 MG/1
CAPSULE ORAL
Qty: 90 CAPSULE | Refills: 3 | Status: SHIPPED | OUTPATIENT
Start: 2022-01-03

## 2022-01-05 ENCOUNTER — TELEMEDICINE (OUTPATIENT)
Dept: FAMILY MEDICINE CLINIC | Facility: CLINIC | Age: 77
End: 2022-01-05
Payer: MEDICARE

## 2022-01-05 DIAGNOSIS — B34.9 VIRAL INFECTION: Primary | ICD-10-CM

## 2022-01-05 PROCEDURE — 99442 PR PHYS/QHP TELEPHONE EVALUATION 11-20 MIN: CPT | Performed by: FAMILY MEDICINE

## 2022-01-05 NOTE — PROGRESS NOTES
Virtual Regular Visit    Verification of patient location:    Patient is located in the following state in which I hold an active license [place]      Assessment/Plan:    Problem List Items Addressed This Visit        Other    Viral infection - Primary     Improving  · Symptom onset 01/02/2022  · Symptoms include congestion postnasal drip, headache, and slight cough  · Do afebrile  · Fully vaccinated with booster  · The patient is remain in self quarantine since symptom onset  Recommend continued quarantine until 5 days from symptom onset  As long as patient is afebrile and symptoms continue to improve he may leave quarantine but continue to wear a mask for 5 more days  · As testing for COVID 19 would not change treatment, patient does not require testing at this time  Follow-up if symptoms worsen or fail to improve  · All questions answered                    Reason for visit is   Chief Complaint   Patient presents with    Cough     symptoms started  1/2/2022      Cold Like Symptoms    Headache    Virtual Regular Visit        Encounter provider [address] [place] [address] Modesta Mendenhallo [address], DO    Provider located at [facility] AT [place] [id number]      Recent Visits  No visits were found meeting these conditions  Showing recent visits within past 7 days and meeting all other requirements  Today's Visits  Date Type Provider Dept   01/05/22 Telemedicine Fan Lobo DO [facility] [address]    Showing today's visits and meeting all other requirements  Future Appointments  No visits were found meeting these conditions  Showing future appointments within next 150 days and meeting all other requirements       The patient was identified by name and date of birth  Brian Blood  was informed that this is a telemedicine visit and that the visit is being conducted through Telephone  My office door was closed   No one else was in the room  He acknowledged consent and understanding of privacy and security of the video platform  The patient has agreed to participate and understands they can discontinue the visit at any time  Patient is aware this is a billable service  Subjective  Johnny Palmer  is a 78 y o  male presents today for cold like symptoms  Symptoms: congestion, headache, raspy voice, cough, post nasal drip,  Symptom onset: 1/2/22   Medications tried: none  COVID vaccine: Fully vaccinated with booster  Sick contacts: wife          HPI     Past Medical History:   Diagnosis Date    Acute MI ([address] ) 06/2002    Arthritis     Atypical chest pain 03/05/2008    Basal cell carcinoma 2009    Coronary artery disease     Diverticulosis 2008    Hematuria, microscopic     History of varicose veins 2008    Hyperlipidemia     Hypertension     Nocturia     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     Urinary tract infection        Past Surgical History:   Procedure Laterality Date    COLONOSCOPY      CORONARY ANGIOPLASTY      right CA x 3 vessels: redo PTCA-LAD 10/04    CORONARY STENT PLACEMENT  01/01/2002    JOINT REPLACEMENT      R hip    [place] COLONOSCOPY FLX DX W/COLLJ SPEC WHEN PFRMD N/A 6/27/2017    Procedure: COLONOSCOPY;  Surgeon: Malcolm Roca MD;  [facility];   Service: Colorectal    TOTAL HIP ARTHROPLASTY Right     TOTAL HIP ARTHROPLASTY Left 04/30/2019       Current Outpatient Medications   Medication Sig Dispense Refill    amLODIPine (NORVASC) 10 mg tablet TAKE 1 TABLET BY MOUTH EVERY DAY 90 tablet 3    ascorbic acid (VITAMIN C) 1000 MG tablet Take 1,000 mg by mouth daily      aspirin (ECOTRIN LOW STRENGTH) 81 mg EC tablet Take 81 mg by mouth daily      atenolol (TENORMIN) 100 mg tablet TAKE 1 TABLET BY MOUTH EVERY DAY 90 tablet 3    B Complex Vitamins (B COMPLEX 100 PO) Take by mouth      benazepril (LOTENSIN) 20 mg tablet TAKE 1 TABLET BY MOUTH EVERY DAY 90 tablet 3    calcium carbonate (OS-LUCA) 600 MG tablet Take 600 mg by mouth daily       Coenzyme Q10 (CO Q 10 PO) Take by mouth daily        magnesium oxide (MAG-OX) 400 mg tablet Take 400 mg by mouth daily      multivitamin (THERAGRAN) TABS Take 1 tablet by mouth      niacin (NIASPAN) 500 mg CR tablet Take 1 tablet (500 mg total) by mouth daily at bedtime 90 tablet 3    Omega-3 Fatty Acids (FISH OIL PO) Take by mouth daily       sildenafil (VIAGRA) 50 MG tablet Take 1 tablet (50 mg total) by mouth daily as needed for erectile dysfunction 10 tablet 6    simvastatin (ZOCOR) 40 mg tablet TAKE 1 TABLET BY MOUTH EVERYDAY AT BEDTIME 90 tablet 3    tamsulosin (FLOMAX) 0 4 mg TAKE 1 CAPSULE BY MOUTH EVERY DAY WITH DINNER 90 capsule 3     No current facility-administered medications for this visit  Allergies   Allergen Reactions    No Active Allergies     Other        Review of Systems   Constitutional: Negative for activity change, chills, fatigue and fever  HENT: Positive for congestion, postnasal drip, rhinorrhea, sinus pressure and sinus pain  Negative for hearing loss, sneezing and sore throat  Eyes: Negative for visual disturbance  Respiratory: Negative for cough, chest tightness, shortness of breath and wheezing  Cardiovascular: Negative for chest pain, palpitations and leg swelling  Gastrointestinal: Negative for abdominal pain, blood in stool, constipation, diarrhea, nausea and vomiting  Genitourinary: Negative for difficulty urinating, dysuria, flank pain, frequency, hematuria and urgency  Musculoskeletal: Negative for back pain, myalgias and neck pain  Neurological: Negative for dizziness, syncope, light-headedness, numbness and headaches  Video Exam    There were no vitals filed for this visit  It was my intent to perform this visit via video technology but the patient was not able to do a video connection so the visit was completed via audio telephone only        I spent 15 minutes directly with the patient during this visit    VIRTUAL VISIT 50 Jeremie Gilliland  verbally agrees to participate in [employer]  Pt is aware that [employer] could be limited without vital signs or the ability to perform a full hands-on physical Billy Pérez  understands he or the provider may request at any time to terminate the video visit and request the patient to seek care or treatment in person

## 2022-01-05 NOTE — ASSESSMENT & PLAN NOTE
Improving  · Symptom onset 01/02/2022  · Symptoms include congestion postnasal drip, headache, and slight cough  · Do afebrile  · Fully vaccinated with booster  · The patient is remain in self quarantine since symptom onset  Recommend continued quarantine until 5 days from symptom onset  As long as patient is afebrile and symptoms continue to improve he may leave quarantine but continue to wear a mask for 5 more days  · As testing for COVID 19 would not change treatment, patient does not require testing at this time    Follow-up if symptoms worsen or fail to improve  · All questions answered

## 2022-01-08 ENCOUNTER — OFFICE VISIT (OUTPATIENT)
Dept: URGENT CARE | Age: 77
End: 2022-01-08
Payer: MEDICARE

## 2022-01-08 VITALS — HEART RATE: 60 BPM | TEMPERATURE: 98.8 F | OXYGEN SATURATION: 96 %

## 2022-01-08 DIAGNOSIS — R05.9 COUGH: ICD-10-CM

## 2022-01-08 DIAGNOSIS — J01.90 ACUTE SINUSITIS, RECURRENCE NOT SPECIFIED, UNSPECIFIED LOCATION: Primary | ICD-10-CM

## 2022-01-08 PROCEDURE — 99213 OFFICE O/P EST LOW 20 MIN: CPT | Performed by: PHYSICIAN ASSISTANT

## 2022-01-08 PROCEDURE — G0463 HOSPITAL OUTPT CLINIC VISIT: HCPCS | Performed by: PHYSICIAN ASSISTANT

## 2022-01-08 RX ORDER — AMOXICILLIN AND CLAVULANATE POTASSIUM 875; 125 MG/1; MG/1
1 TABLET, FILM COATED ORAL EVERY 12 HOURS SCHEDULED
Qty: 14 TABLET | Refills: 0 | Status: SHIPPED | OUTPATIENT
Start: 2022-01-08 | End: 2022-01-15

## 2022-01-08 RX ORDER — BENZONATATE 200 MG/1
200 CAPSULE ORAL 3 TIMES DAILY PRN
Qty: 20 CAPSULE | Refills: 0 | Status: SHIPPED | OUTPATIENT
Start: 2022-01-08 | End: 2022-05-11

## 2022-01-08 NOTE — PROGRESS NOTES
[facility]        NAME: Lea Lee  is a 78 y o  male  : 1945    MRN: [id number]  DATE: 2022  TIME: 9:44 AM    Assessment and Plan   Acute sinusitis, recurrence not specified, unspecified location [[address]]  1  Acute sinusitis, recurrence not specified, unspecified location  amoxicillin-clavulanate (AUGMENTIN) 875-125 mg per tablet    benzonatate (TESSALON) 200 MG capsule   2  Cough  benzonatate (TESSALON) 200 MG capsule         Patient Instructions       Follow up with PCP in 3-5 days  Proceed to  ER if symptoms worsen  Chief Complaint     Chief Complaint   Patient presents with    sinus congestion    head congestion    Cough     with phlegm         History of Present Illness       Patient for evaluation sinus congestion, pressure, postnasal drip, cough  Cough  Associated symptoms include postnasal drip  Pertinent negatives include no ear pain, rhinorrhea, sore throat, shortness of breath or wheezing  Review of Systems   Review of Systems   Constitutional: Negative  HENT: Positive for congestion, postnasal drip and sinus pressure  Negative for ear discharge, ear pain, rhinorrhea, sinus pain, sore throat, trouble swallowing and voice change  Eyes: Negative  Respiratory: Positive for cough  Negative for shortness of breath and wheezing  Cardiovascular: Negative            Current Medications       Current Outpatient Medications:     amLODIPine (NORVASC) 10 mg tablet, TAKE 1 TABLET BY MOUTH EVERY DAY, Disp: 90 tablet, Rfl: 3    ascorbic acid (VITAMIN C) 1000 MG tablet, Take 1,000 mg by mouth daily, Disp: , Rfl:     aspirin (ECOTRIN LOW STRENGTH) 81 mg EC tablet, Take 81 mg by mouth daily, Disp: , Rfl:     atenolol (TENORMIN) 100 mg tablet, TAKE 1 TABLET BY MOUTH EVERY DAY, Disp: 90 tablet, Rfl: 3    B Complex Vitamins (B COMPLEX 100 PO), Take by mouth, Disp: , Rfl:     benazepril (LOTENSIN) 20 mg tablet, TAKE 1 TABLET BY MOUTH EVERY DAY, Disp: 90 tablet, Rfl: 3    calcium carbonate (OS-LUCA) 600 MG tablet, Take 600 mg by mouth daily , Disp: , Rfl:     Coenzyme Q10 (CO Q 10 PO), Take by mouth daily  , Disp: , Rfl:     magnesium oxide (MAG-OX) 400 mg tablet, Take 400 mg by mouth daily, Disp: , Rfl:     multivitamin (THERAGRAN) TABS, Take 1 tablet by mouth, Disp: , Rfl:     niacin (NIASPAN) 500 mg CR tablet, Take 1 tablet (500 mg total) by mouth daily at bedtime, Disp: 90 tablet, Rfl: 3    Omega-3 Fatty Acids (FISH OIL PO), Take by mouth daily , Disp: , Rfl:     sildenafil (VIAGRA) 50 MG tablet, Take 1 tablet (50 mg total) by mouth daily as needed for erectile dysfunction, Disp: 10 tablet, Rfl: 6    simvastatin (ZOCOR) 40 mg tablet, TAKE 1 TABLET BY MOUTH EVERYDAY AT BEDTIME, Disp: 90 tablet, Rfl: 3    tamsulosin (FLOMAX) 0 4 mg, TAKE 1 CAPSULE BY MOUTH EVERY DAY WITH DINNER, Disp: 90 capsule, Rfl: 3    amoxicillin-clavulanate (AUGMENTIN) 875-125 mg per tablet, Take 1 tablet by mouth every 12 (twelve) hours for 7 days, Disp: 14 tablet, Rfl: 0    benzonatate (TESSALON) 200 MG capsule, Take 1 capsule (200 mg total) by mouth 3 (three) times a day as needed for cough, Disp: 20 capsule, Rfl: 0    Current Allergies     Allergies as of 01/08/2022 - Reviewed 01/08/2022   Allergen Reaction Noted    No active allergies  05/19/2018    Other  04/20/2018            The following portions of the patient's history were reviewed and updated as appropriate: allergies, current medications, past family history, past medical history, past social history, past surgical history and problem list      Past Medical History:   Diagnosis Date    Acute MI ([facility] [address] ) 06/2002    Arthritis     Atypical chest pain 03/05/2008    Basal cell carcinoma 2009    Coronary artery disease     Diverticulosis 2008    Hematuria, microscopic     History of varicose veins 2008    Hyperlipidemia     Hypertension     Nocturia     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     Urinary tract infection        Past Surgical History:   Procedure Laterality Date    COLONOSCOPY      CORONARY ANGIOPLASTY      right CA x 3 vessels: redo PTCA-LAD 10/04    CORONARY STENT PLACEMENT  01/01/2002    JOINT REPLACEMENT      R hip    [place] COLONOSCOPY FLX DX W/COLLJ SPEC WHEN PFRMD N/A 6/27/2017    Procedure: COLONOSCOPY;  Surgeon: Zeb Payne MD;  Location:  GI LAB; Service: Colorectal    TOTAL HIP ARTHROPLASTY Right     TOTAL HIP ARTHROPLASTY Left 04/30/2019       Family History   Problem Relation Age of Onset    Aneurysm Father         post op AAA repair    Hypertension Sister     Hypertension Brother     Hypertension Mother     Diabetes Mother          Medications have been verified  Objective   Pulse 60   Temp 98 8 °F (37 1 °C) (Temporal)   SpO2 96%   No LMP for male patient  Physical Exam     Physical Exam  Vitals and nursing note reviewed  Constitutional:       General: He is not in acute distress  Appearance: Normal appearance  He is well-developed  He is not ill-appearing, toxic-appearing or diaphoretic  HENT:      Head: Normocephalic and atraumatic  Right Ear: Tympanic membrane and external ear normal       Left Ear: Tympanic membrane and external ear normal       Nose: Congestion present  No rhinorrhea  Comments: Bilateral nasal congestion and mucopurulent drainage  Mouth/Throat:      Pharynx: No oropharyngeal exudate or posterior oropharyngeal erythema  Eyes:      General:         Right eye: No discharge  Left eye: No discharge  Conjunctiva/sclera: Conjunctivae normal       Pupils: Pupils are equal, round, and reactive to light  Cardiovascular:      Rate and Rhythm: Normal rate and regular rhythm  Heart sounds: Normal heart sounds  No murmur heard  Pulmonary:      Effort: Pulmonary effort is normal  No respiratory distress  Breath sounds: Normal breath sounds  No stridor  No wheezing or rales  Lymphadenopathy:      Cervical: Cervical adenopathy present  Skin:     General: Skin is warm and dry  Neurological:      Mental Status: He is alert and oriented to person, place, and time  Psychiatric:         Behavior: Behavior normal          Thought Content:  Thought content normal          Judgment: Judgment normal

## 2022-01-18 ENCOUNTER — TELEPHONE (OUTPATIENT)
Dept: UROLOGY | Facility: AMBULATORY SURGERY CENTER | Age: 77
End: 2022-01-18

## 2022-01-18 ENCOUNTER — TELEPHONE (OUTPATIENT)
Dept: UROLOGY | Facility: MEDICAL CENTER | Age: 77
End: 2022-01-18

## 2022-01-18 DIAGNOSIS — N52.9 MALE ERECTILE DISORDER: ICD-10-CM

## 2022-01-18 RX ORDER — SILDENAFIL 50 MG/1
50 TABLET, FILM COATED ORAL DAILY PRN
Qty: 10 TABLET | Refills: 6 | Status: SHIPPED | OUTPATIENT
Start: 2022-01-18 | End: 2022-01-18 | Stop reason: SDUPTHER

## 2022-01-18 RX ORDER — SILDENAFIL 50 MG/1
50 TABLET, FILM COATED ORAL DAILY PRN
Qty: 10 TABLET | Refills: 6 | Status: SHIPPED | OUTPATIENT
Start: 2022-01-18

## 2022-01-20 ENCOUNTER — OFFICE VISIT (OUTPATIENT)
Dept: FAMILY MEDICINE CLINIC | Facility: CLINIC | Age: 77
End: 2022-01-20
Payer: MEDICARE

## 2022-01-20 VITALS
WEIGHT: 184 LBS | HEART RATE: 63 BPM | HEIGHT: 68 IN | OXYGEN SATURATION: 97 % | DIASTOLIC BLOOD PRESSURE: 80 MMHG | TEMPERATURE: 98 F | SYSTOLIC BLOOD PRESSURE: 124 MMHG | BODY MASS INDEX: 27.89 KG/M2

## 2022-01-20 DIAGNOSIS — K62.89 RECTAL IRRITATION: Primary | ICD-10-CM

## 2022-01-20 DIAGNOSIS — I25.709 ATHEROSCLEROSIS OF CORONARY ARTERY BYPASS GRAFT OF NATIVE HEART WITH ANGINA PECTORIS (HCC): ICD-10-CM

## 2022-01-20 PROCEDURE — 99213 OFFICE O/P EST LOW 20 MIN: CPT | Performed by: NURSE PRACTITIONER

## 2022-01-20 RX ORDER — TRIAMCINOLONE ACETONIDE 1 MG/G
CREAM TOPICAL 2 TIMES DAILY
Qty: 30 G | Refills: 0 | Status: SHIPPED | OUTPATIENT
Start: 2022-01-20 | End: 2022-05-23

## 2022-01-20 NOTE — PROGRESS NOTES
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)          Subjective:      Patient ID: Raymundo Berman  is a 78 y o  male  HPI  Had diarrhea and then started with rectal burning    Was on an antibiotic cortisone not working           The following portions of the patient's history were reviewed and updated as appropriate: allergies, current medications, past family history, past medical history, past social history, past surgical history and problem list     Review of Systems   Constitutional: Negative for activity change, appetite change, fatigue and fever  HENT: Negative for congestion  Gastrointestinal: Negative for constipation, diarrhea and rectal pain  Musculoskeletal: Negative for neck pain  Psychiatric/Behavioral: Negative for sleep disturbance  Objective:  Vitals:    01/20/22 0753   BP: 124/80   BP Location: Left arm   Patient Position: Sitting   Cuff Size: Standard   Pulse: 63   Temp: 98 °F (36 7 °C)   TempSrc: Temporal   SpO2: 97%   Weight: 83 5 kg (184 lb)   Height: 5' 8" (1 727 m)      Physical Exam  Vitals reviewed  Constitutional:       Appearance: Normal appearance  Abdominal:      Comments: Rectal area is red and inflammed    MA in room during exam   Neurological:      Mental Status: He is alert

## 2022-03-06 DIAGNOSIS — I10 ESSENTIAL HYPERTENSION, BENIGN: ICD-10-CM

## 2022-03-08 RX ORDER — AMLODIPINE BESYLATE 10 MG/1
TABLET ORAL
Qty: 90 TABLET | Refills: 3 | Status: SHIPPED | OUTPATIENT
Start: 2022-03-08

## 2022-03-08 RX ORDER — BENAZEPRIL HYDROCHLORIDE 20 MG/1
TABLET ORAL
Qty: 90 TABLET | Refills: 3 | Status: SHIPPED | OUTPATIENT
Start: 2022-03-08

## 2022-04-21 ENCOUNTER — RA CDI HCC (OUTPATIENT)
Dept: OTHER | Facility: HOSPITAL | Age: 77
End: 2022-04-21

## 2022-04-21 NOTE — PROGRESS NOTES
[address]  coding opportunities       Chart reviewed, no opportunity found: CHART REVIEWED, NO OPPORTUNITY FOUND        Patients Insurance     Medicare Insurance: Medicare

## 2022-04-24 ENCOUNTER — HOSPITAL ENCOUNTER (EMERGENCY)
Facility: HOSPITAL | Age: 77
Discharge: HOME | End: 2022-04-24
Attending: EMERGENCY MEDICINE | Admitting: EMERGENCY MEDICINE
Payer: MEDICARE

## 2022-04-24 ENCOUNTER — APPOINTMENT (EMERGENCY)
Dept: RADIOLOGY | Facility: HOSPITAL | Age: 77
End: 2022-04-24
Payer: MEDICARE

## 2022-04-24 VITALS
OXYGEN SATURATION: 99 % | SYSTOLIC BLOOD PRESSURE: 133 MMHG | HEART RATE: 61 BPM | RESPIRATION RATE: 18 BRPM | DIASTOLIC BLOOD PRESSURE: 76 MMHG | TEMPERATURE: 99.6 F
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
## 2022-04-24 LAB
ATRIAL RATE: 63 BPM
P AXIS: 44 DEGREES
PR INTERVAL: 160 MS
QRS AXIS: 46 DEGREES
QRSD INTERVAL: 90 MS
QT INTERVAL: 414 MS
QTC INTERVAL: 423 MS
T WAVE AXIS: 11 DEGREES
VENTRICULAR RATE: 63 BPM

## 2022-04-24 PROCEDURE — 99283 EMERGENCY DEPT VISIT LOW MDM: CPT

## 2022-04-24 PROCEDURE — 93005 ELECTROCARDIOGRAM TRACING: CPT

## 2022-04-24 PROCEDURE — 73502 X-RAY EXAM HIP UNI 2-3 VIEWS: CPT

## 2022-04-24 PROCEDURE — 99284 EMERGENCY DEPT VISIT MOD MDM: CPT | Performed by: EMERGENCY MEDICINE

## 2022-04-24 PROCEDURE — 93010 ELECTROCARDIOGRAM REPORT: CPT | Performed by: INTERNAL MEDICINE

## 2022-04-24 PROCEDURE — 71101 X-RAY EXAM UNILAT RIBS/CHEST: CPT

## 2022-04-24 NOTE — ED ATTENDING ATTESTATION
4/24/2022  I, Bradley Serrato MD, saw and evaluated the patient  I have discussed the patient with the resident/non-physician practitioner and agree with the resident's/non-physician practitioner's findings, Plan of Care, and MDM as documented in the resident's/non-physician practitioner's note, except where noted  All available labs and Radiology studies were reviewed  I was present for key portions of any procedure(s) performed by the resident/non-physician practitioner and I was immediately available to provide assistance  At this point I agree with the current assessment done in the Emergency Department    I have conducted an independent evaluation of this patient a history and physical is as follows:  Pt was walking upstairs and fell and hit r upper chest wall and flank Pt got up and was able to walk no head strike no syncope pt takes asa Pt has bilat hip replacement and was concerned about them PE: alert neck and spine nontender heart reg lungs clear mild r upper chest wall tenderness abd soft nontender ext nad hips nontender neuro nonfocal MDM: will do cxr and pelvis   ED Course         Critical Care Time  Procedures

## 2022-04-24 NOTE — ED PROVIDER NOTES
History  Chief Complaint   Patient presents with    Fall     i tripped up the stair my right thigh hurts and my right side of ribs hurt  i take a baby asa daily denies striking head  walking without difficulty hx of hip replacements      Rocío Harris  is a 78 y o  with PMH of CAD, hypertension, hyperlipidemia, history of prosthetic hip replacement who presents with complaints of fall  Had patient reports approximately 1:30 p m  today he fell walking up stairs  He reports that he tripped, fell forward, bracing himself with his right arm tucked under his shoulder  He did not fall down any steps  He denies any head strike, loss of consciousness, seizure activity  He only reports some pain associated in his right anterior thigh, as well as right sided chest wall  He denies any pain in his hip region, pain with breathing  He denies any difficulties walking, unilateral weakness, headaches, vision changes  The pt also denies any symptoms of fevers, chills, dizziness, chest pain, shortness of breath, numbness, weakness in the arms or legs, blood in the urine or stool, difficulties walking, or sick contacts  The pt has no other complaints at this time  Prior to Admission Medications   Prescriptions Last Dose Informant Patient Reported? Taking?    B Complex Vitamins (B COMPLEX 100 PO)  Self Yes No   Sig: Take by mouth   Coenzyme Q10 (CO Q 10 PO)  Self Yes No   Sig: Take by mouth daily     Omega-3 Fatty Acids (FISH OIL PO)  Self Yes No   Sig: Take by mouth daily    amLODIPine (NORVASC) 10 mg tablet   No No   Sig: TAKE 1 TABLET BY MOUTH EVERY DAY   ascorbic acid (VITAMIN C) 1000 MG tablet  Self Yes No   Sig: Take 1,000 mg by mouth daily   aspirin (ECOTRIN LOW STRENGTH) 81 mg EC tablet  Self Yes Yes   Sig: Take 81 mg by mouth daily   atenolol (TENORMIN) 100 mg tablet  Self No No   Sig: TAKE 1 TABLET BY MOUTH EVERY DAY   benazepril (LOTENSIN) 20 mg tablet   No No   Sig: TAKE 1 TABLET BY MOUTH EVERY DAY benzonatate (TESSALON) 200 MG capsule  Self No No   Sig: Take 1 capsule (200 mg total) by mouth 3 (three) times a day as needed for cough   calcium carbonate (OS-LUCA) 600 MG tablet  Self Yes No   Sig: Take 600 mg by mouth daily    magnesium oxide (MAG-OX) 400 mg tablet  Self Yes No   Sig: Take 400 mg by mouth daily   multivitamin (THERAGRAN) TABS  Self Yes No   Sig: Take 1 tablet by mouth   niacin (NIASPAN) 500 mg CR tablet  Self No No   Sig: Take 1 tablet (500 mg total) by mouth daily at bedtime   sildenafil (VIAGRA) 50 MG tablet  Self No No   Sig: Take 1 tablet (50 mg total) by mouth daily as needed for erectile dysfunction   simvastatin (ZOCOR) 40 mg tablet  Self No No   Sig: TAKE 1 TABLET BY MOUTH EVERYDAY AT BEDTIME   tamsulosin (FLOMAX) 0 4 mg  Self No No   Sig: TAKE 1 CAPSULE BY MOUTH EVERY DAY WITH DINNER   triamcinolone (KENALOG) 0 1 % cream   No No   Sig: Apply topically 2 (two) times a day      Facility-Administered Medications: None       Past Medical History:   Diagnosis Date    Acute MI ([facility] [address] ) 06/2002    Arthritis     Atypical chest pain 03/05/2008    Basal cell carcinoma 2009    Coronary artery disease     Diverticulosis 2008    Hematuria, microscopic     History of varicose veins 2008    Hyperlipidemia     Hypertension     Nocturia     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     Urinary tract infection        Past Surgical History:   Procedure Laterality Date    COLONOSCOPY      CORONARY ANGIOPLASTY      right CA x 3 vessels: redo [facility] 10/04    CORONARY STENT PLACEMENT  01/01/2002    JOINT REPLACEMENT      R hip    FL COLONOSCOPY FLX DX W/COLLJ SPEC WHEN PFRMD N/A 6/27/2017    Procedure: COLONOSCOPY;  Surgeon: Cintia Rivers MD;  Location: [facility];   Service: Colorectal    TOTAL HIP ARTHROPLASTY Right     TOTAL HIP ARTHROPLASTY Left 04/30/2019       Family History   Problem Relation Age of Onset    Aneurysm Father         post op AAA repair    Hypertension Sister     Hypertension Brother     Hypertension Mother     Diabetes Mother      I have reviewed and agree with the history as documented  E-Cigarette/Vaping    E-Cigarette Use Never User      E-Cigarette/Vaping Substances    Nicotine No     THC No     CBD No     Flavoring No     Other No     Unknown No      Social History     Tobacco Use    Smoking status: Former Smoker     Types: Cigarettes     Quit date: 1970     Years since quittin 3    Smokeless tobacco: Former User    Tobacco comment: quit    0 5 packs/2 00 pack years   Vaping Use    Vaping Use: Never used   Substance Use Topics    Alcohol use: No    Drug use: No        Review of Systems   Constitutional: Negative for chills and fever  HENT: Negative for sore throat  Eyes: Negative for pain and visual disturbance  Respiratory: Negative for cough and shortness of breath  Cardiovascular: Negative for chest pain and palpitations  Gastrointestinal: Negative for abdominal pain and vomiting  Genitourinary: Negative for dysuria  Skin: Negative for color change and rash  Neurological: Negative for syncope  All other systems reviewed and are negative  Physical Exam  ED Triage Vitals [22 1455]   Temperature Pulse Respirations Blood Pressure SpO2   99 6 °F (37 6 °C) 64 16 123/83 95 %      Temp Source Heart Rate Source Patient Position - Orthostatic VS BP Location FiO2 (%)   Temporal Monitor Sitting Left arm --      Pain Score       --             Orthostatic Vital Signs  Vitals:    22 1455   BP: 123/83   Pulse: 64   Patient Position - Orthostatic VS: Sitting       Physical Exam  Vitals and nursing note reviewed  Constitutional:       Appearance: He is well-developed  HENT:      Head: Normocephalic and atraumatic  Eyes:      Conjunctiva/sclera: Conjunctivae normal    Cardiovascular:      Rate and Rhythm: Normal rate and regular rhythm  Heart sounds: No murmur heard        Pulmonary: Effort: Pulmonary effort is normal  No respiratory distress  Breath sounds: Normal breath sounds  Abdominal:      Palpations: Abdomen is soft  Tenderness: There is no abdominal tenderness  Musculoskeletal:      Cervical back: Neck supple  Comments: Abrasion noted over the right anterior thigh  Point tenderness over the right anterior thigh as well  Negative tenderness over the right hip region  No evidence of ecchymosis, crepitus, limb shortening     Mild tenderness in the right upper flank area/chest wall  No evidence of bruising or injury in any other location  Patient ambulates without difficulty   Skin:     General: Skin is warm and dry  Neurological:      Mental Status: He is alert  ED Medications  Medications - No data to display    Diagnostic Studies  Results Reviewed     None                 XR hip/pelv 2-3 vws right    (Results Pending)   XR ribs with pa chest min 3 views RIGHT    (Results Pending)         Procedures  Procedures      ED Course  ED Course as of 04/24/22 1653   Sun Apr 24, 2022   1627 EKG shows NSR, no ST or T wave abnormalities, normal intervals, normal axis, no acute changes when compared to prior                                         MDM  Number of Diagnoses or Management Options  Fall, initial encounter  Diagnosis management comments: Rakan Johnson  is a 78 y o  who presents with complaints of a fall    Vital signs are stable, physical exam shows the abrasion point tenderness over the right anterior thigh areas was the right flank/chest wall  ED Initial Impression: This patient's presentation appears to be related to mechanical fall  Overall he appears well and is able to ambulate without issue  Overall this appears musculoskeletal in nature  Will obtain imaging to rule out right hip pathology in addition to chest wall tenderness    ED Management: R hip plain films, EKG, Rib series   Pt denying pain control for now    Reassessment: Plain films showed no evidence of new fracture  EKG appeared stable  Therefore felt safe to send home  Advised on return precautions and close follow up  I discussed the above care and treatment plan with the pt and answered all of their relevant questions  Reviewed test results and imaging findings, as well as pertinent details of the treatment plan as described above  He expressed understanding, was agreeable to the plan of care, and had no further questions or concerns  Portions of the record may have been created with voice recognition software   Occasional wrong word or "sound a like" substitutions may have occurred due to the inherent limitations of voice recognition software   Read the chart carefully and recognize, using context, where substitutions have occurred         Amount and/or Complexity of Data Reviewed  Clinical lab tests: ordered and reviewed  Tests in the radiology section of CPT®: ordered and reviewed    Risk of Complications, Morbidity, and/or Mortality  Presenting problems: low  Diagnostic procedures: minimal  Management options: minimal    Patient Progress  Patient progress: improved      Disposition  Final diagnoses:   None     ED Disposition     None      Follow-up Information    None         Patient's Medications   Discharge Prescriptions    No medications on file     No discharge procedures on file  PDMP Review     None           ED Provider  Attending physically available and evaluated Shaista Clements    I managed the patient along with the ED Attending      Electronically Signed by         Gracie Brandt MD  04/24/22 4179

## 2022-04-24 NOTE — DISCHARGE INSTRUCTIONS
Your workup here was not concerning for anything dangerous  Therefore there is no need for you to stay at the hospital for further testing  We feel safe to send you home  You can use Tylenol for management of your symptoms  You should follow up with your primary care physician to assess for resolution of your symptoms and to determine if there is further evaluation that needs to be performed      Return to the emergency department if you have any symptoms of inability to walk, or worsening pain

## 2022-05-11 ENCOUNTER — OFFICE VISIT (OUTPATIENT)
Dept: FAMILY MEDICINE CLINIC | Facility: CLINIC | Age: 77
End: 2022-05-11
Payer: MEDICARE

## 2022-05-11 VITALS
SYSTOLIC BLOOD PRESSURE: 136 MMHG | BODY MASS INDEX: 28.13 KG/M2 | DIASTOLIC BLOOD PRESSURE: 84 MMHG | TEMPERATURE: 97.5 F | HEART RATE: 54 BPM | OXYGEN SATURATION: 98 % | WEIGHT: 185 LBS

## 2022-05-11 DIAGNOSIS — R07.81 RIB PAIN: ICD-10-CM

## 2022-05-11 DIAGNOSIS — I25.10 CHRONIC CORONARY ARTERY DISEASE: ICD-10-CM

## 2022-05-11 DIAGNOSIS — R97.20 ELEVATED PSA: Primary | ICD-10-CM

## 2022-05-11 DIAGNOSIS — I10 BENIGN ESSENTIAL HYPERTENSION: ICD-10-CM

## 2022-05-11 DIAGNOSIS — R91.1 LUNG NODULE SEEN ON IMAGING STUDY: ICD-10-CM

## 2022-05-11 DIAGNOSIS — N13.8 BPH WITH OBSTRUCTION/LOWER URINARY TRACT SYMPTOMS: ICD-10-CM

## 2022-05-11 DIAGNOSIS — N40.1 BPH WITH OBSTRUCTION/LOWER URINARY TRACT SYMPTOMS: ICD-10-CM

## 2022-05-11 DIAGNOSIS — E78.49 OTHER HYPERLIPIDEMIA: ICD-10-CM

## 2022-05-11 DIAGNOSIS — I25.709 ATHEROSCLEROSIS OF CORONARY ARTERY BYPASS GRAFT OF NATIVE HEART WITH ANGINA PECTORIS (HCC): ICD-10-CM

## 2022-05-11 PROCEDURE — 99214 OFFICE O/P EST MOD 30 MIN: CPT | Performed by: FAMILY MEDICINE

## 2022-05-11 RX ORDER — TRAMADOL HYDROCHLORIDE 50 MG/1
50 TABLET ORAL EVERY 6 HOURS PRN
Qty: 20 TABLET | Refills: 0 | Status: SHIPPED | OUTPATIENT
Start: 2022-05-11

## 2022-05-11 NOTE — PROGRESS NOTES
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)    Benign essential hypertension    Chronic coronary artery disease    Lung nodule seen on imaging study    Other hyperlipidemia          Subjective:      Patient ID: Juan Smith  is a 78 y o  male  PATIENT RETURNS FOR FOLLOW-UP OF CHRONIC MEDICAL CONDITIONS  ANY HOSPITAL VISITS, EMERGENCY VISITS AND OTHER PROVIDER VISITS SINCE LAST TIME WERE REVIEWED  MEDS WERE REVIEWED AND NO SIDE EFFECTS  NO NEW ISSUES  UNLESS NOTED BELOW  NO NEW MEDICAL PROVIDER REPORTED  THE CHRONIC DISEASES LISTED ABOVE ARE STABLE AND UNCHANGED/ THE PLAN OF CARE FOR THOSE WILL REMAIN UNCHANGED UNLESS NOTED BELOW  Elian Gael 2 wks ago injured R ribcage : seen ER : no acute fx; still w/ some discomfort      The following portions of the patient's history were reviewed and updated as appropriate: allergies, current medications, past family history, past medical history, past social history, past surgical history and problem list     Review of Systems   Constitutional: Negative for activity change and appetite change  HENT: Negative for trouble swallowing  Eyes: Negative for visual disturbance  Respiratory: Negative for cough and shortness of breath  Cardiovascular: Negative for chest pain, palpitations and leg swelling  Gastrointestinal: Negative for abdominal pain and blood in stool  Endocrine: Negative for polyuria  Genitourinary: Negative for difficulty urinating and hematuria  Skin: Negative for rash  Neurological: Negative for dizziness  Psychiatric/Behavioral: Negative for behavioral problems           Objective:  Vitals:    05/11/22 0922   BP: 136/84   BP Location: Left arm   Patient Position: Sitting   Cuff Size: Standard   Pulse: (!) 54   Temp: 97 5 °F (36 4 °C)   TempSrc: Temporal   SpO2: 98%   Weight: 83 9 kg (185 lb)      Physical Exam  Constitutional:       Appearance: He is well-developed  HENT:      Head: Normocephalic and atraumatic  Eyes:      Conjunctiva/sclera: Conjunctivae normal    Neck:      Thyroid: No thyromegaly  Cardiovascular:      Rate and Rhythm: Normal rate and regular rhythm  Heart sounds: Normal heart sounds  No murmur heard  Pulmonary:      Effort: Pulmonary effort is normal  No respiratory distress  Breath sounds: Normal breath sounds  Musculoskeletal:         General: Tenderness present  Cervical back: Neck supple  Lymphadenopathy:      Cervical: No cervical adenopathy  Skin:     General: Skin is warm and dry  Psychiatric:         Behavior: Behavior normal            Patient's chronic problems that were reviewed today are stable  Recent hospital stays reviewed  Recent labs and imaging reviewed  Recent visits to other providers reviewed  Meds reviewed and no changes made  Appropriate labs and imaging were ordered  Preventive measures appropriate for age and sex were reviewed with patient  Immunizations were updated as appropriate

## 2022-05-21 ENCOUNTER — HOSPITAL ENCOUNTER (INPATIENT)
Facility: HOSPITAL | Age: 77
LOS: 1 days | Discharge: HOME | DRG: 310 | End: 2022-05-23
Attending: EMERGENCY MEDICINE | Admitting: INTERNAL MEDICINE
Payer: MEDICARE

## 2022-05-21 ENCOUNTER — APPOINTMENT (EMERGENCY)
Dept: RADIOLOGY | Facility: HOSPITAL | Age: 77
DRG: 310 | End: 2022-05-21
Payer: MEDICARE

## 2022-05-21 DIAGNOSIS — R53.1 GENERALIZED WEAKNESS: ICD-10-CM

## 2022-05-21 DIAGNOSIS — I48.91 NEW ONSET ATRIAL FIBRILLATION (HCC): Primary | ICD-10-CM

## 2022-05-21 DIAGNOSIS — R06.00 DYSPNEA ON EXERTION: ICD-10-CM

## 2022-05-21 PROBLEM — R73.03 PREDIABETES: Status: ACTIVE | Noted: 2022-05-21

## 2022-05-21 LAB
2HR DELTA HS TROPONIN: 0 NG/L
4HR DELTA HS TROPONIN: 0 NG/L
ALBUMIN SERPL BCP-MCNC: 3.9 G/DL (ref 3.5–5)
ALP SERPL-CCNC: 85 U/L (ref 46–116)
ALT SERPL W P-5'-P-CCNC: 37 U/L (ref 12–78)
ANION GAP SERPL CALCULATED.3IONS-SCNC: 6 MMOL/L (ref 4–13)
APTT PPP: 29 SECONDS (ref 23–37)
AST SERPL W P-5'-P-CCNC: 25 U/L (ref 5–45)
BASOPHILS # BLD AUTO: 0.04 THOUSANDS/ΜL (ref 0–0.1)
BASOPHILS NFR BLD AUTO: 1 % (ref 0–1)
BILIRUB SERPL-MCNC: 1.4 MG/DL (ref 0.2–1)
BUN SERPL-MCNC: 15 MG/DL (ref 5–25)
CALCIUM SERPL-MCNC: 10 MG/DL (ref 8.3–10.1)
CARDIAC TROPONIN I PNL SERPL HS: 5 NG/L
CHLORIDE SERPL-SCNC: 104 MMOL/L (ref 100–108)
CO2 SERPL-SCNC: 25 MMOL/L (ref 21–32)
CREAT SERPL-MCNC: 0.97 MG/DL (ref 0.6–1.3)
EOSINOPHIL # BLD AUTO: 0.04 THOUSAND/ΜL (ref 0–0.61)
EOSINOPHIL NFR BLD AUTO: 1 % (ref 0–6)
ERYTHROCYTE [DISTWIDTH] IN BLOOD BY AUTOMATED COUNT: 15.1 % (ref 11.6–15.1)
EST. AVERAGE GLUCOSE BLD GHB EST-MCNC: 114 MG/DL
FLUAV RNA RESP QL NAA+PROBE: NEGATIVE
FLUBV RNA RESP QL NAA+PROBE: NEGATIVE
GFR SERPL CREATININE-BSD FRML MDRD: 76 ML/MIN/1.73SQ M
GLUCOSE SERPL-MCNC: 144 MG/DL (ref 65–140)
HBA1C MFR BLD: 5.6 %
HCT VFR BLD AUTO: 58.4 % (ref 36.5–49.3)
HGB BLD-MCNC: 19.1 G/DL (ref 12–17)
IMM GRANULOCYTES # BLD AUTO: 0.02 THOUSAND/UL (ref 0–0.2)
IMM GRANULOCYTES NFR BLD AUTO: 0 % (ref 0–2)
LYMPHOCYTES # BLD AUTO: 1.71 THOUSANDS/ΜL (ref 0.6–4.47)
LYMPHOCYTES NFR BLD AUTO: 22 % (ref 14–44)
MCH RBC QN AUTO: 29.7 PG (ref 26.8–34.3)
MCHC RBC AUTO-ENTMCNC: 32.7 G/DL (ref 31.4–37.4)
MCV RBC AUTO: 91 FL (ref 82–98)
MONOCYTES # BLD AUTO: 0.5 THOUSAND/ΜL (ref 0.17–1.22)
MONOCYTES NFR BLD AUTO: 7 % (ref 4–12)
NEUTROPHILS # BLD AUTO: 5.34 THOUSANDS/ΜL (ref 1.85–7.62)
NEUTS SEG NFR BLD AUTO: 69 % (ref 43–75)
NRBC BLD AUTO-RTO: 0 /100 WBCS
PLATELET # BLD AUTO: 213 THOUSANDS/UL (ref 149–390)
PMV BLD AUTO: 9.8 FL (ref 8.9–12.7)
POTASSIUM SERPL-SCNC: 4.2 MMOL/L (ref 3.5–5.3)
PROT SERPL-MCNC: 8.2 G/DL (ref 6.4–8.2)
RBC # BLD AUTO: 6.44 MILLION/UL (ref 3.88–5.62)
RSV RNA RESP QL NAA+PROBE: NEGATIVE
SARS-COV-2 RNA RESP QL NAA+PROBE: NEGATIVE
SODIUM SERPL-SCNC: 135 MMOL/L (ref 136–145)
TSH SERPL DL<=0.05 MIU/L-ACNC: 0.85 UIU/ML (ref 0.45–4.5)
WBC # BLD AUTO: 7.65 THOUSAND/UL (ref 4.31–10.16)

## 2022-05-21 PROCEDURE — 85025 COMPLETE CBC W/AUTO DIFF WBC: CPT | Performed by: EMERGENCY MEDICINE

## 2022-05-21 PROCEDURE — 85730 THROMBOPLASTIN TIME PARTIAL: CPT | Performed by: STUDENT IN AN ORGANIZED HEALTH CARE EDUCATION/TRAINING PROGRAM

## 2022-05-21 PROCEDURE — 0241U HB NFCT DS VIR RESP RNA 4 TRGT: CPT | Performed by: EMERGENCY MEDICINE

## 2022-05-21 PROCEDURE — 36415 COLL VENOUS BLD VENIPUNCTURE: CPT | Performed by: EMERGENCY MEDICINE

## 2022-05-21 PROCEDURE — 84484 ASSAY OF TROPONIN QUANT: CPT | Performed by: EMERGENCY MEDICINE

## 2022-05-21 PROCEDURE — 84443 ASSAY THYROID STIM HORMONE: CPT | Performed by: STUDENT IN AN ORGANIZED HEALTH CARE EDUCATION/TRAINING PROGRAM

## 2022-05-21 PROCEDURE — 99285 EMERGENCY DEPT VISIT HI MDM: CPT | Performed by: EMERGENCY MEDICINE

## 2022-05-21 PROCEDURE — 83036 HEMOGLOBIN GLYCOSYLATED A1C: CPT | Performed by: STUDENT IN AN ORGANIZED HEALTH CARE EDUCATION/TRAINING PROGRAM

## 2022-05-21 PROCEDURE — 71046 X-RAY EXAM CHEST 2 VIEWS: CPT

## 2022-05-21 PROCEDURE — 80053 COMPREHEN METABOLIC PANEL: CPT | Performed by: EMERGENCY MEDICINE

## 2022-05-21 PROCEDURE — 96360 HYDRATION IV INFUSION INIT: CPT

## 2022-05-21 PROCEDURE — 84484 ASSAY OF TROPONIN QUANT: CPT | Performed by: STUDENT IN AN ORGANIZED HEALTH CARE EDUCATION/TRAINING PROGRAM

## 2022-05-21 PROCEDURE — 93005 ELECTROCARDIOGRAM TRACING: CPT

## 2022-05-21 PROCEDURE — 99285 EMERGENCY DEPT VISIT HI MDM: CPT

## 2022-05-21 RX ORDER — ACETAMINOPHEN 325 MG/1
650 TABLET ORAL EVERY 6 HOURS PRN
Status: DISCONTINUED | OUTPATIENT
Start: 2022-05-21 | End: 2022-05-23 | Stop reason: HOSPADM

## 2022-05-21 RX ORDER — HEPARIN SODIUM 5000 [USP'U]/ML
5000 INJECTION, SOLUTION INTRAVENOUS; SUBCUTANEOUS EVERY 8 HOURS SCHEDULED
Status: DISCONTINUED | OUTPATIENT
Start: 2022-05-21 | End: 2022-05-21

## 2022-05-21 RX ORDER — TAMSULOSIN HYDROCHLORIDE 0.4 MG/1
0.4 CAPSULE ORAL
Status: DISCONTINUED | OUTPATIENT
Start: 2022-05-21 | End: 2022-05-23 | Stop reason: HOSPADM

## 2022-05-21 RX ORDER — LISINOPRIL 20 MG/1
20 TABLET ORAL DAILY
Status: DISCONTINUED | OUTPATIENT
Start: 2022-05-21 | End: 2022-05-23 | Stop reason: HOSPADM

## 2022-05-21 RX ORDER — AMLODIPINE BESYLATE 10 MG/1
10 TABLET ORAL DAILY
Status: DISCONTINUED | OUTPATIENT
Start: 2022-05-21 | End: 2022-05-23 | Stop reason: HOSPADM

## 2022-05-21 RX ORDER — PRAVASTATIN SODIUM 80 MG/1
80 TABLET ORAL
Status: DISCONTINUED | OUTPATIENT
Start: 2022-05-21 | End: 2022-05-23 | Stop reason: HOSPADM

## 2022-05-21 RX ORDER — HEPARIN SODIUM 10000 [USP'U]/100ML
3-30 INJECTION, SOLUTION INTRAVENOUS
Status: DISCONTINUED | OUTPATIENT
Start: 2022-05-21 | End: 2022-05-21

## 2022-05-21 RX ORDER — TRAMADOL HYDROCHLORIDE 50 MG/1
50 TABLET ORAL EVERY 6 HOURS PRN
Status: DISCONTINUED | OUTPATIENT
Start: 2022-05-21 | End: 2022-05-21

## 2022-05-21 RX ORDER — ASPIRIN 81 MG/1
81 TABLET ORAL DAILY
Status: DISCONTINUED | OUTPATIENT
Start: 2022-05-21 | End: 2022-05-23 | Stop reason: HOSPADM

## 2022-05-21 RX ORDER — ATENOLOL 50 MG/1
100 TABLET ORAL DAILY
Status: DISCONTINUED | OUTPATIENT
Start: 2022-05-21 | End: 2022-05-23 | Stop reason: HOSPADM

## 2022-05-21 RX ADMIN — SODIUM CHLORIDE 500 ML: 900 INJECTION, SOLUTION INTRAVENOUS at 11:06

## 2022-05-21 RX ADMIN — ASPIRIN 81 MG: 81 TABLET, COATED ORAL at 19:09

## 2022-05-21 RX ADMIN — APIXABAN 5 MG: 5 TABLET, FILM COATED ORAL at 19:10

## 2022-05-21 RX ADMIN — ATENOLOL 100 MG: 50 TABLET ORAL at 19:10

## 2022-05-21 RX ADMIN — TAMSULOSIN HYDROCHLORIDE 0.4 MG: 0.4 CAPSULE ORAL at 19:09

## 2022-05-21 RX ADMIN — PRAVASTATIN SODIUM 80 MG: 80 TABLET ORAL at 19:09

## 2022-05-21 NOTE — ED ATTENDING ATTESTATION
5/21/2022  I saw and evaluated the patient  I have discussed the patient with the resident physician and agree with the resident's findings, assessment and plan as documented in the resident physician's note, unless otherwise documented below  All available laboratory and imaging studies were reviewed by myself  I was present for key portions of any procedure(s) performed by the resident and I was immediately available to provide assistance  I agree with the current assessment done in the Emergency Department  I have conducted an independent evaluation of this patient  Emergency Department Note- Rosenda Mcwilliams  78 y o  male MRN: [id number]    Unit/Bed#: [facility] Encounter: [id number]    Chief Complaint   Patient presents with    Weakness - Generalized     Patient states he was walking 3 miles yesterday got tired and sweaty denies cp / Noel Lola  is a 78 y o  male with PMH of CAD on ASA, HTN, presenting with general weakness  Patient is physically fit, he walks 3 miles daily  Yesterday, 2 warts the end of his walk, he felt very tired, broke out in sweat, and felt overall weak  This is not usual for him  There was nothing different about his walk yesterday, he took the usual route  He has not had any recent illnesses, no fevers or chills  He has not had any chest pain  He had no palpitations or shortness of breath  Since he continued to have generalized weakness including into today, he is presenting for further evaluation  No symptoms at present, no chest pain, no palpitations, no shortness of breath  No leg swelling  No change in health  Has been taking aspirin as prescribed  Heart attack 20 years ago felt much different, significant chest pain at that time      REVIEW OF SYSTEMS    Constitutional: denies fevers, reports generalized weakness  Visual/Eyes: no changes in vision  HENT: no rhinorrhea, no sore throat  Cardiac: no chest pain  Respiratory: no shortness of breath, no cough  GI: no abdominal pain, nausea, vomiting, or diarrhea  Heme/Onc: no easy bruising  Endocrine: no diabetes  Neuro: no focal weakness or numbness, no headaches    Ten systems reviewed and negative unless otherwise noted in HPI and above    PAST MEDICAL HISTORY  Past Medical History:   Diagnosis Date    Acute MI ([facility] [address] ) 06/2002    Arthritis     Atypical chest pain 03/05/2008    Basal cell carcinoma 2009    Coronary artery disease     Diverticulosis 2008    Hematuria, microscopic     History of varicose veins 2008    Hyperlipidemia     Hypertension     Nocturia     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     Urinary tract infection        SURGICAL HISTORY  Past Surgical History:   Procedure Laterality Date    COLONOSCOPY      CORONARY ANGIOPLASTY      right CA x 3 vessels: redo PTCA-LAD 10/04    CORONARY STENT PLACEMENT  01/01/2002    JOINT REPLACEMENT      R hip    LA COLONOSCOPY FLX DX W/COLLJ SPEC WHEN PFRMD N/A 6/27/2017    Procedure: COLONOSCOPY;  Surgeon: Alvin Saleem MD;  Location: [facility]; Service: Colorectal    TOTAL HIP ARTHROPLASTY Right     TOTAL HIP ARTHROPLASTY Left 04/30/2019       FAMILY HISTORY  Family History   Problem Relation Age of Onset    Aneurysm Father         post op AAA repair    Hypertension Sister     Hypertension Brother     Hypertension Mother     Diabetes Mother         CURRENT MEDICATIONS  No current facility-administered medications on file prior to encounter       Current Outpatient Medications on File Prior to Encounter   Medication Sig    amLODIPine (NORVASC) 10 mg tablet TAKE 1 TABLET BY MOUTH EVERY DAY    ascorbic acid (VITAMIN C) 1000 MG tablet Take 1,000 mg by mouth daily    aspirin (ECOTRIN LOW STRENGTH) 81 mg EC tablet Take 81 mg by mouth daily    atenolol (TENORMIN) 100 mg tablet TAKE 1 TABLET BY MOUTH EVERY DAY    B Complex Vitamins (B COMPLEX 100 PO) Take by mouth    benazepril (LOTENSIN) 20 mg tablet TAKE 1 TABLET BY MOUTH EVERY DAY    calcium carbonate (OS-LUCA) 600 MG tablet Take 600 mg by mouth daily     Coenzyme Q10 (CO Q 10 PO) Take by mouth daily      magnesium oxide (MAG-OX) 400 mg tablet Take 400 mg by mouth daily    multivitamin (THERAGRAN) TABS Take 1 tablet by mouth    niacin (NIASPAN) 500 mg CR tablet Take 1 tablet (500 mg total) by mouth daily at bedtime    Omega-3 Fatty Acids (FISH OIL PO) Take by mouth daily     sildenafil (VIAGRA) 50 MG tablet Take 1 tablet (50 mg total) by mouth daily as needed for erectile dysfunction    simvastatin (ZOCOR) 40 mg tablet TAKE 1 TABLET BY MOUTH EVERYDAY AT BEDTIME    tamsulosin (FLOMAX) 0 4 mg TAKE 1 CAPSULE BY MOUTH EVERY DAY WITH DINNER    traMADol (ULTRAM) 50 mg tablet Take 1 tablet (50 mg total) by mouth every 6 (six) hours as needed for moderate pain    triamcinolone (KENALOG) 0 1 % cream Apply topically 2 (two) times a day (Patient not taking: Reported on 2022)       ALLERGIES  Allergies   Allergen Reactions    No Active Allergies     Other        SOCIAL HISTORY  Social History     Socioeconomic History    Marital status: /Civil Union     Spouse name: None    Number of children: None    Years of education: None    Highest education level: None   Occupational History    None   Tobacco Use    Smoking status: Former Smoker     Types: Cigarettes     Quit date: 1970     Years since quittin 4    Smokeless tobacco: Former User    Tobacco comment: quit 1972   0 5 packs/2 00 pack years   Vaping Use    Vaping Use: Never used   Substance and Sexual Activity    Alcohol use: No    Drug use: No    Sexual activity: Not Currently   Other Topics Concern    None   Social History Narrative    None     Social Determinants of Health     Financial Resource Strain: Not on file   Food Insecurity: Not on file   Transportation Needs: Not on file   Physical Activity: Not on file   Stress: Not on file   Social Connections: Not on file   Intimate Partner Violence: Not on file   Housing Stability: Not on file       PHYSICAL EXAM  /88   Pulse 78   Temp 98 °F (36 7 °C) (Oral)   Resp 18   SpO2 98%   Vital signs and nursing notes reviewed    CONSTITUTIONAL: male appearing stated age resting in bed, in no acute distress  HEENT: atraumatic, normocephalic  Sclera anicteric, conjunctiva are not injected  Moist oral mucosa  CARDIOVASCULAR/CHEST:  Irregularly irregular, no M/R/G  2+ radial pulses  No lower extremity edema  PULMONARY: Breathing comfortably on RA  Breath sounds are equal and clear to auscultation  ABDOMEN: non-distended  BS present, normoactive  Non-tender  MSK: moves all extremities, no deformities, no peripheral edema, no calf asymmetry  NEURO: Awake, alert, and oriented x 3  Face symmetric  Moves all extremities spontaneously  No focal neurologic deficits  SKIN: Warm, appears well-perfused  MENTAL STATUS: Normal affect        DIAGNOSTIC STUDIES  Results Reviewed     Procedure Component Value Units Date/Time    COVID/FLU/RSV - 2 hour TAT [[id number]] Collected: 05/21/22 1355    Lab Status: In process Specimen: Nares from Nose Updated: 05/21/22 1403    HS Troponin I 2hr [[id number]] Collected: 05/21/22 1355    Lab Status:  In process Specimen: Blood from Arm, Right Updated: 05/21/22 1403    HS Troponin I 4hr [[id number]]     Lab Status: No result Specimen: Blood     Comprehensive metabolic panel [[id number]]  (Abnormal) Collected: 05/21/22 1105    Lab Status: Final result Specimen: Blood from Arm, Right Updated: 05/21/22 1203     Sodium 135 mmol/L      Potassium 4 2 mmol/L      Chloride 104 mmol/L      CO2 25 mmol/L      ANION GAP 6 mmol/L      BUN 15 mg/dL      Creatinine 0 97 mg/dL      Glucose 144 mg/dL      Calcium 10 0 mg/dL      AST 25 U/L      ALT 37 U/L      Alkaline Phosphatase 85 U/L      Total Protein 8 2 g/dL      Albumin 3 9 g/dL      Total Bilirubin 1 40 mg/dL      eGFR 76 ml/min/1 73sq m     Narrative:      National Kid[employer] Disease Foundation guidelines for Chronic Kidney Disease (CKD):     Stage 1 with normal or high GFR (GFR > 90 mL/min/1 73 square meters)    Stage 2 Mild CKD (GFR = 60-89 mL/min/1 73 square meters)    Stage 3A Moderate CKD (GFR = 45-59 mL/min/1 73 square meters)    Stage 3B Moderate CKD (GFR = 30-44 mL/min/1 73 square meters)    Stage 4 Severe CKD (GFR = 15-29 mL/min/1 73 square meters)    Stage 5 End Stage CKD (GFR <15 mL/min/1 73 square meters)  Note: GFR calculation is accurate only with a steady state creatinine    HS Troponin 0hr (reflex protocol) [[id number]]  (Normal) Collected: 05/21/22 1105    Lab Status: Final result Specimen: Blood from Arm, Right Updated: 05/21/22 1201     hs TnI 0hr 5 ng/L     CBC and differential [[id number]]  (Abnormal) Collected: 05/21/22 1105    Lab Status: Final result Specimen: Blood from Arm, Right Updated: 05/21/22 1126     WBC 7 65 Thousand/uL      RBC 6 44 Million/uL      Hemoglobin 19 1 g/dL      Hematocrit 58 4 %      MCV 91 fL      MCH 29 7 pg      MCHC 32 7 g/dL      RDW 15 1 %      MPV 9 8 fL      Platelets 211 Thousands/uL      nRBC 0 /100 WBCs      Neutrophils Relative 69 %      Immat GRANS % 0 %      Lymphocytes Relative 22 %      Monocytes Relative 7 %      Eosinophils Relative 1 %      Basophils Relative 1 %      Neutrophils Absolute 5 34 Thousands/µL      Immature Grans Absolute 0 02 Thousand/uL      Lymphocytes Absolute 1 71 Thousands/µL      Monocytes Absolute 0 50 Thousand/µL      Eosinophils Absolute 0 04 Thousand/µL      Basophils Absolute 0 04 Thousands/µL           XR chest 2 views   Final Result      No acute cardiopulmonary disease                    Workstation performed: [id number]             PROCEDURES  ECG 12 Lead Documentation Only    Date/Time: 5/21/2022 10:57 AM  Performed by: Mikayla Franco MD  Authorized by: Mikayla Franco MD     Comments:      Atrial fibrillation, VR 74, QRS 96, QTc 410, normal axis, non-specific ST segment abnormalities in inferior leads, no STEMI  A fib is new compared to prior EKG dated 4/24/2022  ECG 12 Lead Documentation Only    Date/Time: 5/21/2022 11:58 AM  Performed by: Jose Antonio Pérez MD  Authorized by: Jose Antonio Pérez MD     Comments:      Atrial fibrillation, VR 78, QRS 86, QTc 410, normal axis, non-specific ST segment abnormalities in inferior leads, no STEMI  ED COURSE  Medications   sodium chloride 0 9 % bolus 500 mL (500 mL Intravenous New Bag 5/21/22 1106)     78 y o  male presenting with generalized weakness and dyspnea on exertion since yesterday  VS reviewed, afebrile, not hypoxic  WNL  EKG is with new onset atrial fibrillation, likely explaining patient's symptoms  Labs reveal CMP with Na 135, no significant electrolyte abnormalities  HS trop is 5, repeat unchanged  CBC with Hgb 19 1, up from 16 in June 2021, likely due to dehydartion  TSH normal 0 847  Patient is not clinically overloaded, gentle 500 mL bolus administered for hydration  Repeat EKG is with a fib  CXR to my review is without infiltrates to suggest pneumonia  Covid-19/RSV/Influenza PCR negative  Patient admitted for further evaluation        CLINICAL IMPRESSION  Final diagnoses:   New onset atrial fibrillation (HCC)   Dyspnea on exertion   Generalized weakness

## 2022-05-21 NOTE — ASSESSMENT & PLAN NOTE
S/p stents in the past  Follows cardiology out pt [facility], last seen 7/2021, stress echo was negative for ischemia  -cont medical directed therapy: ASA, statin, ace-I, BB  -outpatient follow up

## 2022-05-21 NOTE — ASSESSMENT & PLAN NOTE
Pt presents to [facility] 5/21/22 with ZAMORA and diaphoresis/ fatigue that began day prior to presentation  when he was on his daily 3 mile walk  Pt states that he never experienced this before    -pt found to be in Afib on presentation, rate controlled w HR 87, stable BP, pt taking his home medication: amlodipine 10mg daily, atenolol 1000mg daily, benzapril   -Troponin 5 x3  No delta  No further trend        Echo:  Normal systolic, diastolic function    -RRF/[id number], BNP normal  -chads Vasc score 5 -started hep gtt transitioned to Eliquis   -tele, patient converted to normal sinus rhythm

## 2022-05-21 NOTE — ED PROVIDER NOTES
History  Chief Complaint   Patient presents with    Weakness - Generalized     Patient states he was walking 3 miles yesterday got tired and sweaty denies cp / sob     60-year-old male history of hypertension and diabetes presenting with weakness  Patient reports he normally walks 3 miles a day does the same walk without difficulty  Patient reports he was doing that walk yesterday when he started feeling diaphoretic, dyspneic, and generally weak at the end  Symptoms persisted through later that day  Denies any associated chest pain or shortness of breath at rest   Denies any viral symptoms for subjective fevers or chills  Currently asymptomatic and states he is coming in for further evaluation for cardiac assessment given his previous history  Denies any other complaints  Prior to Admission Medications   Prescriptions Last Dose Informant Patient Reported? Taking?    B Complex Vitamins (B COMPLEX 100 PO)  Self Yes No   Sig: Take by mouth   Coenzyme Q10 (CO Q 10 PO)  Self Yes No   Sig: Take by mouth daily     Omega-3 Fatty Acids (FISH OIL PO)  Self Yes No   Sig: Take by mouth daily    amLODIPine (NORVASC) 10 mg tablet   No No   Sig: TAKE 1 TABLET BY MOUTH EVERY DAY   ascorbic acid (VITAMIN C) 1000 MG tablet  Self Yes No   Sig: Take 1,000 mg by mouth daily   aspirin (ECOTRIN LOW STRENGTH) 81 mg EC tablet  Self Yes No   Sig: Take 81 mg by mouth daily   atenolol (TENORMIN) 100 mg tablet  Self No No   Sig: TAKE 1 TABLET BY MOUTH EVERY DAY   benazepril (LOTENSIN) 20 mg tablet   No No   Sig: TAKE 1 TABLET BY MOUTH EVERY DAY   calcium carbonate (OS-LUCA) 600 MG tablet  Self Yes No   Sig: Take 600 mg by mouth daily    magnesium oxide (MAG-OX) 400 mg tablet  Self Yes No   Sig: Take 400 mg by mouth daily   multivitamin (THERAGRAN) TABS  Self Yes No   Sig: Take 1 tablet by mouth   niacin (NIASPAN) 500 mg CR tablet  Self No No   Sig: Take 1 tablet (500 mg total) by mouth daily at bedtime   sildenafil (VIAGRA) 50 MG tablet  Self No No   Sig: Take 1 tablet (50 mg total) by mouth daily as needed for erectile dysfunction   simvastatin (ZOCOR) 40 mg tablet  Self No No   Sig: TAKE 1 TABLET BY MOUTH EVERYDAY AT BEDTIME   tamsulosin (FLOMAX) 0 4 mg  Self No No   Sig: TAKE 1 CAPSULE BY MOUTH EVERY DAY WITH DINNER   traMADol (ULTRAM) 50 mg tablet   No No   Sig: Take 1 tablet (50 mg total) by mouth every 6 (six) hours as needed for moderate pain   triamcinolone (KENALOG) 0 1 % cream   No No   Sig: Apply topically 2 (two) times a day   Patient not taking: Reported on 5/11/2022      Facility-Administered Medications: None       Past Medical History:   Diagnosis Date    Acute MI ([facility] [address] ) 06/2002    Arthritis     Atypical chest pain 03/05/2008    Basal cell carcinoma 2009    Coronary artery disease     Diverticulosis 2008    Hematuria, microscopic     History of varicose veins 2008    Hyperlipidemia     Hypertension     Nocturia     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     Urinary tract infection        Past Surgical History:   Procedure Laterality Date    COLONOSCOPY      CORONARY ANGIOPLASTY      right CA x 3 vessels: redo PTCA-LAD 10/04    CORONARY STENT PLACEMENT  01/01/2002    JOINT REPLACEMENT      R hip    OR COLONOSCOPY FLX DX W/COLLJ SPEC WHEN PFRMD N/A 6/27/2017    Procedure: COLONOSCOPY;  Surgeon: Matthew Cordero MD;  Location: [facility]; Service: Colorectal    TOTAL HIP ARTHROPLASTY Right     TOTAL HIP ARTHROPLASTY Left 04/30/2019       Family History   Problem Relation Age of Onset    Aneurysm Father         post op AAA repair    Hypertension Sister     Hypertension Brother     Hypertension Mother     Diabetes Mother      I have reviewed and agree with the history as documented      E-Cigarette/Vaping    E-Cigarette Use Never User      E-Cigarette/Vaping Substances    Nicotine No     THC No     CBD No     Flavoring No     Other No     Unknown No      Social History     Tobacco Use  Smoking status: Former Smoker     Types: Cigarettes     Quit date: 1970     Years since quittin 4    Smokeless tobacco: Former User    Tobacco comment: quit 1972   0 5 packs/2 00 pack years   Vaping Use    Vaping Use: Never used   Substance Use Topics    Alcohol use: No    Drug use: No        Review of Systems   Constitutional: Negative for appetite change, chills, diaphoresis, fever and unexpected weight change  HENT: Negative for congestion and rhinorrhea  Eyes: Negative for photophobia and visual disturbance  Respiratory: Negative for cough, chest tightness and shortness of breath  Cardiovascular: Negative for chest pain, palpitations and leg swelling  Gastrointestinal: Negative for abdominal distention, abdominal pain, blood in stool, constipation, diarrhea, nausea and vomiting  Genitourinary: Negative for dysuria and hematuria  Musculoskeletal: Negative for back pain, joint swelling, neck pain and neck stiffness  Skin: Negative for color change, pallor, rash and wound  Neurological: Positive for weakness  Negative for dizziness, syncope, light-headedness and headaches  Psychiatric/Behavioral: Negative for agitation  All other systems reviewed and are negative  Physical Exam  ED Triage Vitals   Temperature Pulse Respirations Blood Pressure SpO2   22 0941 22 0941 22 0941 22 0941 22 0941   98 °F (36 7 °C) 78 18 142/88 98 %      Temp Source Heart Rate Source Patient Position - Orthostatic VS BP Location FiO2 (%)   22 0941 22 0941 22 1623 22 1623 --   Oral Monitor Sitting Right arm       Pain Score       22 1620       No Pain             Orthostatic Vital Signs  Vitals:    22 2041 22 1458 22 1527 22 0739   BP: 124/76 124/76 132/84 126/75   Pulse: (!) 53 (!) 53 57    Patient Position - Orthostatic VS:           Physical Exam  Vitals and nursing note reviewed     Constitutional: General: He is not in acute distress  Appearance: Normal appearance  He is well-developed  He is not ill-appearing, toxic-appearing or diaphoretic  HENT:      Head: Normocephalic and atraumatic  Nose: Nose normal  No congestion or rhinorrhea  Mouth/Throat:      Mouth: Mucous membranes are moist       Pharynx: Oropharynx is clear  No oropharyngeal exudate or posterior oropharyngeal erythema  Eyes:      General: No scleral icterus  Right eye: No discharge  Left eye: No discharge  Extraocular Movements: Extraocular movements intact  Conjunctiva/sclera: Conjunctivae normal       Pupils: Pupils are equal, round, and reactive to light  Neck:      Vascular: No JVD  Trachea: No tracheal deviation  Cardiovascular:      Rate and Rhythm: Normal rate  Rhythm irregular  Heart sounds: Normal heart sounds  No murmur heard  No friction rub  No gallop  Comments: Normal rate and irregularly irregular rhythm  Pulmonary:      Effort: Pulmonary effort is normal  No respiratory distress  Breath sounds: Normal breath sounds  No stridor  No wheezing or rales  Comments: Clear to auscultation bilaterally  Chest:      Chest wall: No tenderness  Abdominal:      General: Bowel sounds are normal  There is no distension  Palpations: Abdomen is soft  Tenderness: There is no abdominal tenderness  There is no right CVA tenderness, left CVA tenderness, guarding or rebound  Comments: Soft, nontender, nondistended  Normal bowel sounds throughout   Musculoskeletal:         General: No swelling, tenderness, deformity or signs of injury  Normal range of motion  Cervical back: Normal range of motion and neck supple  No rigidity  No muscular tenderness  Right lower leg: No edema  Left lower leg: No edema  Lymphadenopathy:      Cervical: No cervical adenopathy  Skin:     General: Skin is warm and dry  Coloration: Skin is not pale  Findings: No erythema or rash  Neurological:      General: No focal deficit present  Mental Status: He is alert  Mental status is at baseline  Sensory: No sensory deficit  Motor: No weakness or abnormal muscle tone  Coordination: Coordination normal       Gait: Gait normal       Comments: Alert  Strength and sensation grossly intact  Ambulatory without difficulty at baseline   Psychiatric:         Behavior: Behavior normal          Thought Content:  Thought content normal          ED Medications  Medications   sodium chloride 0 9 % bolus 500 mL (0 mL Intravenous Stopped 5/21/22 1223)       Diagnostic Studies  Results Reviewed     Procedure Component Value Units Date/Time    CBC and differential [[id number]]  (Abnormal) Collected: 05/22/22 0510    Lab Status: Final result Specimen: Blood from Arm, Right Updated: 05/22/22 0653     WBC 7 80 Thousand/uL      RBC 5 54 Million/uL      Hemoglobin 16 7 g/dL      Hematocrit 50 3 %      MCV 91 fL      MCH 30 1 pg      MCHC 33 2 g/dL      RDW 14 3 %      MPV 10 4 fL      Platelets 213 Thousands/uL      nRBC 0 /100 WBCs      Neutrophils Relative 52 %      Immat GRANS % 0 %      Lymphocytes Relative 36 %      Monocytes Relative 9 %      Eosinophils Relative 2 %      Basophils Relative 1 %      Neutrophils Absolute 4 13 Thousands/µL      Immature Grans Absolute 0 01 Thousand/uL      Lymphocytes Absolute 2 78 Thousands/µL      Monocytes Absolute 0 71 Thousand/µL      Eosinophils Absolute 0 12 Thousand/µL      Basophils Absolute 0 05 Thousands/µL     Basic metabolic panel [[id number]] Collected: 05/22/22 0510    Lab Status: Final result Specimen: Blood from Arm, Right Updated: 05/22/22 0630     Sodium 136 mmol/L      Potassium 3 7 mmol/L      Chloride 105 mmol/L      CO2 25 mmol/L      ANION GAP 6 mmol/L      BUN 13 mg/dL      Creatinine 0 80 mg/dL      Glucose 97 mg/dL      Glucose, Fasting 97 mg/dL      Calcium 9 1 mg/dL      eGFR 87 ml/min/1 73sq m Narrative:      National Kidney Disease Foundation guidelines for Chronic Kidney Disease (CKD):     Stage 1 with normal or high GFR (GFR > 90 mL/min/1 73 square meters)    Stage 2 Mild CKD (GFR = 60-89 mL/min/1 73 square meters)    Stage 3A Moderate CKD (GFR = 45-59 mL/min/1 73 square meters)    Stage 3B Moderate CKD (GFR = 30-44 mL/min/1 73 square meters)    Stage 4 Severe CKD (GFR = 15-29 mL/min/1 73 square meters)    Stage 5 End Stage CKD (GFR <15 mL/min/1 73 square meters)  Note: GFR calculation is accurate only with a steady state creatinine    APTT [[id number]]  (Normal) Collected: 05/21/22 1706    Lab Status: Final result Specimen: Blood from Arm, Left Updated: 05/21/22 1730     PTT 29 seconds     HS Troponin I 4hr [[id number]]  (Normal) Collected: 05/21/22 1525    Lab Status: Final result Specimen: Blood from Arm, Right Updated: 05/21/22 1626     hs TnI 4hr 5 ng/L      Delta 4hr hsTnI 0 ng/L     Hemoglobin A1C w/ EAG Estimation [[id number]] Collected: 05/21/22 1105    Lab Status: Final result Specimen: Blood from Arm, Right Updated: 05/21/22 1610     Hemoglobin A1C 5 6 %       mg/dl     TSH, 3rd generation [[id number]]  (Normal) Collected: 05/21/22 1105    Lab Status: Final result Specimen: Blood from Arm, Right Updated: 05/21/22 1544     TSH 3RD GENERATON 0 847 uIU/mL     Narrative:      Patients undergoing fluorescein dye angiography may retain small amounts of fluorescein in the body for 48-72 hours post procedure  Samples containing fluorescein can produce falsely depressed TSH values  If the patient had this procedure,a specimen should be resubmitted post fluorescein clearance        HS Troponin I 2hr [[id number]]  (Normal) Collected: 05/21/22 1355    Lab Status: Final result Specimen: Blood from Arm, Right Updated: 05/21/22 1503     hs TnI 2hr 5 ng/L      Delta 2hr hsTnI 0 ng/L     COVID/FLU/RSV - 2 hour TAT [[id number]]  (Normal) Collected: 05/21/22 1355    Lab Status: Final result Specimen: Nares from Nose Updated: 05/21/22 1458     SARS-CoV-2 Negative     INFLUENZA A PCR Negative     INFLUENZA B PCR Negative     RSV PCR Negative    Narrative:      FOR PEDIATRIC PATIENTS - copy/paste COVID Guidelines URL to browser: [email or web address]    SARS-CoV-2 assay is a Nucleic Acid Amplification assay intended for the  qualitative detection of nucleic acid from SARS-CoV-2 in nasopharyngeal  swabs  Results are for the presumptive identification of SARS-CoV-2 RNA  Positive results are indicative of infection with SARS-CoV-2, the virus  causing COVID-19, but do not rule out bacterial infection or co-infection  with other viruses  Laboratories within the [place] and its  territories are required to report all positive results to the appropriate  public health authorities  Negative results do not preclude SARS-CoV-2  infection and should not be used as the sole basis for treatment or other  patient management decisions  Negative results must be combined with  clinical observations, patient history, and epidemiological information  This test has not been FDA cleared or approved  This test has been authorized by FDA under an Emergency Use Authorization  (EUA)  This test is only authorized for the duration of time the  declaration that circumstances exist justifying the authorization of the  emergency use of an in vitro diagnostic tests for detection of SARS-CoV-2  virus and/or diagnosis of COVID-19 infection under section 564(b)(1) of  the Act, 21 U  S C  468WCV-4(D)(7), unless the authorization is terminated  or revoked sooner  The test has been validated but independent review by FDA  and CLIA is pending  Test performed using [employer] Gene[employer]pert: This RT-PCR assay targets N2,  a region unique to SARS-CoV-2  A conserved region in the E-gene was chosen  for pan-Sarbecovirus detection which includes SARS-CoV-2      Comprehensive metabolic panel [[id number]]  (Abnormal) Collected: 05/21/22 1105    Lab Status: Final result Specimen: Blood from Arm, Right Updated: 05/21/22 1203     Sodium 135 mmol/L      Potassium 4 2 mmol/L      Chloride 104 mmol/L      CO2 25 mmol/L      ANION GAP 6 mmol/L      BUN 15 mg/dL      Creatinine 0 97 mg/dL      Glucose 144 mg/dL      Calcium 10 0 mg/dL      AST 25 U/L      ALT 37 U/L      Alkaline Phosphatase 85 U/L      Total Protein 8 2 g/dL      Albumin 3 9 g/dL      Total Bilirubin 1 40 mg/dL      eGFR 76 ml/min/1 73sq m     Narrative:      National Kidney Disease Foundation guidelines for Chronic Kidney Disease (CKD):     Stage 1 with normal or high GFR (GFR > 90 mL/min/1 73 square meters)    Stage 2 Mild CKD (GFR = 60-89 mL/min/1 73 square meters)    Stage 3A Moderate CKD (GFR = 45-59 mL/min/1 73 square meters)    Stage 3B Moderate CKD (GFR = 30-44 mL/min/1 73 square meters)    Stage 4 Severe CKD (GFR = 15-29 mL/min/1 73 square meters)    Stage 5 End Stage CKD (GFR <15 mL/min/1 73 square meters)  Note: GFR calculation is accurate only with a steady state creatinine    HS Troponin 0hr (reflex protocol) [[id number]]  (Normal) Collected: 05/21/22 1105    Lab Status: Final result Specimen: Blood from Arm, Right Updated: 05/21/22 1201     hs TnI 0hr 5 ng/L     CBC and differential [[id number]]  (Abnormal) Collected: 05/21/22 1105    Lab Status: Final result Specimen: Blood from Arm, Right Updated: 05/21/22 1126     WBC 7 65 Thousand/uL      RBC 6 44 Million/uL      Hemoglobin 19 1 g/dL      Hematocrit 58 4 %      MCV 91 fL      MCH 29 7 pg      MCHC 32 7 g/dL      RDW 15 1 %      MPV 9 8 fL      Platelets 476 Thousands/uL      nRBC 0 /100 WBCs      Neutrophils Relative 69 %      Immat GRANS % 0 %      Lymphocytes Relative 22 %      Monocytes Relative 7 %      Eosinophils Relative 1 %      Basophils Relative 1 %      Neutrophils Absolute 5 34 Thousands/µL      Immature Grans Absolute 0 02 Thousand/uL      Lymphocytes Absolute 1 71 Thousands/µL      Monocytes Absolute 0 50 Thousand/µL      Eosinophils Absolute 0 04 Thousand/µL      Basophils Absolute 0 04 Thousands/µL                  XR chest 2 views   Final Result by Edilma Alas MD (05/21 9051)      No acute cardiopulmonary disease  Workstation performed: [id number]               Procedures  Procedures      ED Course                                       MDM  Number of Diagnoses or Management Options  Dyspnea on exertion  Generalized weakness  New onset atrial fibrillation (HCC)  Diagnosis management comments: 53-year-old male history of hypertension and diabetes presenting with weakness  No reported chest pain  Dyspnea with exertion  Currently asymptomatic  Plan for cardiac evaluation  Reassess  EKG normal rate, afib  No reported history of afib  Labs no acute process  Admitted for new onset afib workup and evaluation          Amount and/or Complexity of Data Reviewed  Clinical lab tests: reviewed and ordered  Tests in the radiology section of CPT®: reviewed and ordered  Tests in the medicine section of CPT®: reviewed and ordered  Review and summarize past medical records: yes  Discuss the patient with other providers: yes  Independent visualization of images, tracings, or specimens: yes    Risk of Complications, Morbidity, and/or Mortality  Presenting problems: moderate  Diagnostic procedures: moderate  Management options: moderate    Patient Progress  Patient progress: improved      Disposition  Final diagnoses:   New onset atrial fibrillation ([address] )   Dyspnea on exertion   Generalized weakness     Time reflects when diagnosis was documented in both MDM as applicable and the Disposition within this note     Time User Action Codes Description Comment    5/21/2022 12:28 PM Wolverine Comp Add [I48 91] New onset atrial fibrillation ([address] )     5/21/2022 12:28 PM Wolverine Comp Add [R06 00] Dyspnea on exertion     5/21/2022 12:28 PM Fiona Comp Add [R53 1] Generalized weakness       ED Disposition     ED Disposition   Admit    Condition   Stable    Date/Time   Sat May 21, 2022 12:28 PM    Comment   Case was discussed with SOD and the patient's admission status was agreed to be Admission Status: observation status to the service of Dr Rosa West              Follow-up Information     Follow up With Specialties Details Why Contact Info    Taina Mccartney MD Family Medicine Follow up in 1 week(s)  [address] Lamberto Weiss MD Cardiology Follow up in 1 month(s)  [address]  [phone]            Discharge Medication List as of 5/23/2022 11:55 AM      CONTINUE these medications which have CHANGED    Details   rivaroxaban (Xarelto) 20 mg tablet Take 1 tablet (20 mg total) by mouth daily with breakfast, Starting Mon 5/23/2022, Until Tue 9/20/2022, Normal         CONTINUE these medications which have NOT CHANGED    Details   amLODIPine (NORVASC) 10 mg tablet TAKE 1 TABLET BY MOUTH EVERY DAY, Normal      ascorbic acid (VITAMIN C) 1000 MG tablet Take 1,000 mg by mouth daily, Historical Med      aspirin (ECOTRIN LOW STRENGTH) 81 mg EC tablet Take 81 mg by mouth daily, Historical Med      atenolol (TENORMIN) 100 mg tablet TAKE 1 TABLET BY MOUTH EVERY DAY, Normal      B Complex Vitamins (B COMPLEX 100 PO) Take by mouth, Historical Med      benazepril (LOTENSIN) 20 mg tablet TAKE 1 TABLET BY MOUTH EVERY DAY, Normal      calcium carbonate (OS-LUCA) 600 MG tablet Take 600 mg by mouth daily , Historical Med      Coenzyme Q10 (CO Q 10 PO) Take by mouth daily  , Historical Med      magnesium oxide (MAG-OX) 400 mg tablet Take 400 mg by mouth daily, Historical Med      multivitamin (THERAGRAN) TABS Take 1 tablet by mouth, Starting Wed 12/12/2012, Historical Med      niacin (NIASPAN) 500 mg CR tablet Take 1 tablet (500 mg total) by mouth daily at bedtime, Starting Tue 10/26/2021, Print      Omega-3 Fatty Acids (FISH OIL PO) Take by mouth daily , Historical Med      sildenafil (VIAGRA) 50 MG tablet Take 1 tablet (50 mg total) by mouth daily as needed for erectile dysfunction, Starting Tue 1/18/2022, Print      simvastatin (ZOCOR) 40 mg tablet TAKE 1 TABLET BY MOUTH EVERYDAY AT BEDTIME, Normal      tamsulosin (FLOMAX) 0 4 mg TAKE 1 CAPSULE BY MOUTH EVERY DAY WITH DINNER, Normal      traMADol (ULTRAM) 50 mg tablet Take 1 tablet (50 mg total) by mouth every 6 (six) hours as needed for moderate pain, Starting Wed 5/11/2022, Print         STOP taking these medications       apixaban (ELIQUIS) 5 mg Comments:   Reason for Stopping:         triamcinolone (KENALOG) 0 1 % cream Comments:   Reason for Stopping:             Outpatient Discharge Orders   Activity as tolerated     Call provider for:  extreme fatigue     Call provider for: active or persistent bleeding     Activity as tolerated     Call provider for: active or persistent bleeding       PDMP Review     None           ED Provider  Attending physically available and evaluated Valente Pereira I managed the patient along with the ED Attending      Electronically Signed by         Alban Dakin, MD  05/24/22 2002

## 2022-05-21 NOTE — H&P
INTERNAL MEDICINE RESIDENCY ADMISSION H&P     Name: Daniel Alejo  Age & Sex: 78 y o  male   MRN: [id number]  Unit/Bed#: Nikunj Swann   Encounter: [id number]  Primary Care Provider: Brianne Joseph MD    Code Status: No Order  Admission Status: OBSERVATION  Disposition: Patient requires Med/Surg with Telemetry    Admit to team: SOD Team C     ASSESSMENT/PLAN     Principal Problem:    New onset [facility])  Active Problems:    Benign essential hypertension    Chronic coronary artery disease    Erythrocytosis    Hyperlipidemia    Elevated PSA    Male erectile disorder    Primary osteoarthritis of right hip    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 ([facility] [address] )    Prediabetes      * New onset [facility])  Assessment & Plan  [facility] [place] [facility] with ZAMORA and diaphoresis/ fatigue that began day prior to presentation  which he was on his daily 3 mile walk  Pt states that he never experienced this before    -pt found to be in Afib on presentation, rate controlled w HR 87, stable BP, pt taking his home medication: amlodipine 10mg daily, atenolol 1000mg daily, benzapril   -Troponin 5 x3  No delta   No further trend  -chads Vasc score 5 -started hep ggt  -check TSH  -follow up 2D echo  -tele   -consider cardiology consult       Benign essential hypertension  Assessment & Plan  Pt taking his home medication: amlodipine 10mg daily, atenolol 1000mg daily, benzapril 20mg daily (lisinopril in place of this while in pt)     Chronic coronary artery disease  Assessment & Plan  S/p stents in the past  Follows cardiology out pt [facility], last seen 7/2021, stress echo was negative for ischemia  -cont medical directed therapy: ASA, statin, ace-I, BB  -pt walks 3 miles daily      Erythrocytosis  Assessment & Plan  hgb elevated 19 1  Possibly concentrated 2/2 to diaphoresis 2/2 to Afib  -will monitor for now, if without resolution, obtain EPO level and Alexandr/Stat      Hyperlipidemia  Assessment & Plan  Cont statin     Prediabetes  Assessment & Plan  Prior A1c 5 8% in 2019  Recheck A1c 5 6%  OP follow up; continue lifestyle interventions     Primary osteoarthritis of right hip  Assessment & Plan  S/p total hip replacement in past with resolution of pain    Elevated PSA  Assessment & Plan  Continue tamsulosin  OP urology follow up      VTE Pharmacologic Prophylaxis: Heparin ggt , switch to eliquis   VTE Mechanical Prophylaxis: sequential compression device    CHIEF COMPLAINT     Chief Complaint   Patient presents with    Weakness - Generalized     Patient states he was walking 3 miles yesterday got tired and sweaty denies cp / sob      HISTORY OF PRESENT ILLNESS     Pt is a 51-year-old male with significant past medical history of CAD status post multiple stents in the past (follows up with Cardiology at [facility]), HTN, HLD, BPH, former tobacco smoker (quit more than 20 years ago) who presents [facility]21[facility]22 with ZAMORA and diaphoresis/ fatigue that began yesterday which he was on his daily 3 mile walk  Pt states that he never experienced this before  Denies any CP, palpitations, syncope, visual disturbance, N/V, abd pain, leg swelling  Since time of onset, pt continued to experience similar symptoms with any type of exertion  Denies recent flu like symptoms or sick contacts  Denies Hx of HE  BMI 28  On presentation to ED pt found to be in Afib which is New for pt  VS: HR well controlled at 78, /88  98% RA  Afebrile  EKG: Afib, HR 87  CXR PA/L: unremarkable  Echo pending    Patient's chads Vasc score of 5  start Hep ggt, can switch to eliquis     Admit under obs  Trend trop  Tele, consider cardiology consult     REVIEW OF SYSTEMS     Review of Systems   Constitutional: Positive for diaphoresis  Negative for chills, fatigue and fever  HENT: Negative  Eyes: Negative for visual disturbance  Respiratory: Positive for shortness of breath  Cardiovascular: Negative for chest pain and palpitations  Gastrointestinal: Negative  Negative for abdominal distention, diarrhea and nausea  Musculoskeletal: Negative  Neurological: Negative for syncope and light-headedness  Hematological: Negative  Psychiatric/Behavioral: Negative  All other systems reviewed and are negative  OBJECTIVE     Vitals:    22 0941   BP: 142/88   Pulse: 78   Resp: 18   Temp: 98 °F (36 7 °C)   TempSrc: Oral   SpO2: 98%      Temperature:   Temp (24hrs), Av °F (36 7 °C), Min:98 °F (36 7 °C), Max:98 °F (36 7 °C)    Temperature: 98 °F (36 7 °C)  Intake & Output:  I/O     None        Weights: There is no height or weight on file to calculate BMI  Weight (last 2 days)     None        Physical Exam  Vitals and nursing note reviewed  Constitutional:       General: He is not in acute distress  Appearance: Normal appearance  He is normal weight  He is not toxic-appearing  HENT:      Head: Normocephalic and atraumatic  Cardiovascular:      Rate and Rhythm: Normal rate  Rhythm irregular  Pulses: Normal pulses  Heart sounds: Normal heart sounds  No murmur heard  No gallop  Pulmonary:      Effort: Pulmonary effort is normal  No respiratory distress  Breath sounds: Normal breath sounds  No wheezing  Abdominal:      General: Bowel sounds are normal  There is no distension  Palpations: Abdomen is soft  Tenderness: There is no abdominal tenderness  Musculoskeletal:         General: No swelling or tenderness  Normal range of motion  Cervical back: Normal range of motion  Skin:     General: Skin is warm and dry  Coloration: Skin is not jaundiced  Neurological:      Mental Status: He is alert and oriented to person, place, and time  Mental status is at baseline  Psychiatric:         Mood and Affect: Mood normal          Thought Content:  Thought content normal          Judgment: Judgment normal        PAST MEDICAL HISTORY     Past Medical History:   Diagnosis Date    Acute MI ([facility] [address] ) 2002    Arthritis     Atypical chest pain 2008    Basal cell carcinoma 2009    Coronary artery disease     Diverticulosis 2008    Hematuria, microscopic     History of varicose veins     Hyperlipidemia     Hypertension     Nocturia     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     Urinary tract infection      PAST SURGICAL HISTORY     Past Surgical History:   Procedure Laterality Date    COLONOSCOPY      CORONARY ANGIOPLASTY      right CA x 3 vessels: redo PTCA-LAD 10/04    CORONARY STENT PLACEMENT  2002    JOINT REPLACEMENT      R hip    NE COLONOSCOPY FLX DX W/COLLJ SPEC WHEN PFRMD N/A 2017    Procedure: COLONOSCOPY;  Surgeon: Alvin Saleem MD;  Location: [facility]; Service: Colorectal    TOTAL HIP ARTHROPLASTY Right     TOTAL HIP ARTHROPLASTY Left 2019     SOCIAL & FAMILY HISTORY     Social History     Substance and Sexual Activity   Alcohol Use No     Social History     Substance and Sexual Activity   Drug Use No     Social History     Tobacco Use   Smoking Status Former Smoker    Types: Cigarettes    Quit date: 1970    Years since quittin 4   Smokeless Tobacco Former User   Tobacco Comment    quit 1972   0 5 packs/2 00 pack years     Family History   Problem Relation Age of Onset    Aneurysm Father         post op AAA repair    Hypertension Sister     Hypertension Brother     Hypertension Mother     Diabetes Mother      LABORATORY DATA     Labs: I have personally reviewed pertinent reports      Results from last 7 days   Lab Units 22  1105   WBC Thousand/uL 7 65   HEMOGLOBIN g/dL 19 1*   HEMATOCRIT % 58 4*   PLATELETS Thousands/uL 213   NEUTROS PCT % 69   MONOS PCT % 7      Results from last 7 days   Lab Units 22  1105   POTASSIUM mmol/L 4 2   CHLORIDE mmol/L 104   CO2 mmol/L 25   BUN mg/dL 15   CREATININE mg/dL 0 97   CALCIUM mg/dL 10 0   ALK PHOS U/L 85   ALT U/L 37   AST U/L 25 Micro:  Lab Results   Component Value Date    URINECX >100,000 cfu/ml Staphylococcus coagulase negative (A) 07/24/2021     IMAGING & DIAGNOSTIC TESTS     Imaging: I have personally reviewed pertinent reports  No results found  EKG, Pathology, and Other Studies: I have personally reviewed pertinent reports  ALLERGIES     Allergies   Allergen Reactions    No Active Allergies     Other      MEDICATIONS PRIOR TO ARRIVAL     Prior to Admission medications    Medication Sig Start Date End Date Taking?  Authorizing Provider   amLODIPine (NORVASC) 10 mg tablet TAKE 1 TABLET BY MOUTH EVERY DAY 3/8/22   Nilson Carrillo MD   ascorbic acid (VITAMIN C) 1000 MG tablet Take 1,000 mg by mouth daily    Historical Provider, MD   aspirin (ECOTRIN LOW STRENGTH) 81 mg EC tablet Take 81 mg by mouth daily    Historical Provider, MD   atenolol (TENORMIN) 100 mg tablet TAKE 1 TABLET BY MOUTH EVERY DAY 12/1/21   Nilson Carrillo MD   B Complex Vitamins (B COMPLEX 100 PO) Take by mouth    Historical Provider, MD   benazepril (LOTENSIN) 20 mg tablet TAKE 1 TABLET BY MOUTH EVERY DAY 3/8/22   Nilson Carrillo MD   calcium carbonate (OS-LUCA) 600 MG tablet Take 600 mg by mouth daily     Historical Provider, MD   Coenzyme Q10 (CO Q 10 PO) Take by mouth daily      Historical Provider, MD   magnesium oxide (MAG-OX) 400 mg tablet Take 400 mg by mouth daily    Historical Provider, MD   multivitamin [facility]) TABS Take 1 tablet by mouth 12/12/12   Historical Provider, MD   niacin (NIASPAN) 500 mg CR tablet Take 1 tablet (500 mg total) by mouth daily at bedtime 10/26/21   Nilson Carrillo MD   Omega-3 Fatty Acids (FISH OIL PO) Take by mouth daily     Historical Provider, MD   sildenafil (VIAGRA) 50 MG tablet Take 1 tablet (50 mg total) by mouth daily as needed for erectile dysfunction 1/18/22   GLYNN Light   simvastatin (ZOCOR) 40 mg tablet TAKE 1 TABLET BY MOUTH EVERYDAY AT BEDTIME 12/1/21   Nilson Carrillo MD   tamsulosin (FLOMAX) 0 4 mg TAKE 1 CAPSULE BY MOUTH EVERY DAY WITH DINNER 1/3/22   Sergo Black MD   traMADol [facility]) 50 mg tablet Take 1 tablet (50 mg total) by mouth every 6 (six) hours as needed for moderate pain 5/11/22   Sergo Black MD   triamcinolone (KENALOG) 0 1 % cream Apply topically 2 (two) times a day  Patient not taking: Reported on 5/11/2022 1/20/22   GLYNN Murray     MEDICATIONS ADMINISTERED IN LAST 24 HOURS     Medication Administration - last 24 hours from 05/20/2022 1227 to 05/21/2022 1227       Date/Time Order Dose Route Action Action by     05/21/2022 1223 sodium chloride 0 9 % bolus 500 mL 0 mL Intravenous Joy Duffy RN     05/21/2022 1106 sodium chloride 0 9 % bolus 500 mL 500 mL Intravenous New Bag Monie Aguilera RN        CURRENT MEDICATIONS     No current facility-administered medications for this encounter  Admission Time  I spent 30 minutes admitting the patient  This involved direct patient contact where I performed a full history and physical, reviewing previous records, and reviewing laboratory and other diagnostic studies  Portions of the record may have been created with voice recognition software  Occasional wrong word or "sound a like" substitutions may have occurred due to the inherent limitations of voice recognition software    Read the chart carefully and recognize, using context, where substitutions have occurred     ==    Antonina Polk MD   Chief Resident, PGY-3  Internal Medicine Residency  [facility]

## 2022-05-21 NOTE — ASSESSMENT & PLAN NOTE
Pt taking his home medication: amlodipine 10mg daily, atenolol 1000mg daily, benzapril 20mg daily (lisinopril in place of this while in pt)  Continue

## 2022-05-22 ENCOUNTER — APPOINTMENT (OUTPATIENT)
Dept: NON INVASIVE DIAGNOSTICS | Facility: HOSPITAL | Age: 77
DRG: 310 | End: 2022-05-22
Payer: MEDICARE

## 2022-05-22 PROBLEM — D75.1 ERYTHROCYTOSIS: Status: RESOLVED | Noted: 2017-03-28 | Resolved: 2022-05-22

## 2022-05-22 LAB
ANION GAP SERPL CALCULATED.3IONS-SCNC: 6 MMOL/L (ref 4–13)
AORTIC ROOT: 3.8 CM
APICAL FOUR CHAMBER EJECTION FRACTION: 62 %
ASCENDING AORTA: 3.5 CM
ATRIAL RATE: 56 BPM
BASOPHILS # BLD AUTO: 0.05 THOUSANDS/ΜL (ref 0–0.1)
BASOPHILS NFR BLD AUTO: 1 % (ref 0–1)
BUN SERPL-MCNC: 13 MG/DL (ref 5–25)
CALCIUM SERPL-MCNC: 9.1 MG/DL (ref 8.3–10.1)
CHLORIDE SERPL-SCNC: 105 MMOL/L (ref 100–108)
CO2 SERPL-SCNC: 25 MMOL/L (ref 21–32)
CREAT SERPL-MCNC: 0.8 MG/DL (ref 0.6–1.3)
E WAVE DECELERATION TIME: 248 MS
EOSINOPHIL # BLD AUTO: 0.12 THOUSAND/ΜL (ref 0–0.61)
EOSINOPHIL NFR BLD AUTO: 2 % (ref 0–6)
ERYTHROCYTE [DISTWIDTH] IN BLOOD BY AUTOMATED COUNT: 14.3 % (ref 11.6–15.1)
FRACTIONAL SHORTENING: 40 % (ref 28–44)
GFR SERPL CREATININE-BSD FRML MDRD: 87 ML/MIN/1.73SQ M
GLUCOSE P FAST SERPL-MCNC: 97 MG/DL (ref 65–99)
GLUCOSE SERPL-MCNC: 97 MG/DL (ref 65–140)
HCT VFR BLD AUTO: 50.3 % (ref 36.5–49.3)
HGB BLD-MCNC: 16.7 G/DL (ref 12–17)
IMM GRANULOCYTES # BLD AUTO: 0.01 THOUSAND/UL (ref 0–0.2)
IMM GRANULOCYTES NFR BLD AUTO: 0 % (ref 0–2)
INTERVENTRICULAR SEPTUM IN DIASTOLE (PARASTERNAL SHORT AXIS VIEW): 1 CM
INTERVENTRICULAR SEPTUM: 1 CM (ref 0.6–1.1)
LAAS-AP4: 20.3 CM2
LEFT ATRIUM SIZE: 3.8 CM
LEFT INTERNAL DIMENSION IN SYSTOLE: 3.2 CM (ref 2.1–4)
LEFT VENTRICULAR INTERNAL DIMENSION IN DIASTOLE: 5.3 CM (ref 3.5–6)
LEFT VENTRICULAR POSTERIOR WALL IN END DIASTOLE: 0.8 CM
LEFT VENTRICULAR STROKE VOLUME: 93 ML
LVSV (TEICH): 93 ML
LYMPHOCYTES # BLD AUTO: 2.78 THOUSANDS/ΜL (ref 0.6–4.47)
LYMPHOCYTES NFR BLD AUTO: 36 % (ref 14–44)
MCH RBC QN AUTO: 30.1 PG (ref 26.8–34.3)
MCHC RBC AUTO-ENTMCNC: 33.2 G/DL (ref 31.4–37.4)
MCV RBC AUTO: 91 FL (ref 82–98)
MONOCYTES # BLD AUTO: 0.71 THOUSAND/ΜL (ref 0.17–1.22)
MONOCYTES NFR BLD AUTO: 9 % (ref 4–12)
MV E'TISSUE VEL-SEP: 8 CM/S
MV PEAK A VEL: 1.02 M/S
MV PEAK E VEL: 77 CM/S
MV STENOSIS PRESSURE HALF TIME: 72 MS
MV VALVE AREA P 1/2 METHOD: 3.06 CM2
NEUTROPHILS # BLD AUTO: 4.13 THOUSANDS/ΜL (ref 1.85–7.62)
NEUTS SEG NFR BLD AUTO: 52 % (ref 43–75)
NRBC BLD AUTO-RTO: 0 /100 WBCS
NT-PROBNP SERPL-MCNC: 288 PG/ML
P AXIS: 49 DEGREES
PLATELET # BLD AUTO: 186 THOUSANDS/UL (ref 149–390)
PMV BLD AUTO: 10.4 FL (ref 8.9–12.7)
POTASSIUM SERPL-SCNC: 3.7 MMOL/L (ref 3.5–5.3)
PR INTERVAL: 198 MS
QRS AXIS: 30 DEGREES
QRSD INTERVAL: 94 MS
QT INTERVAL: 434 MS
QTC INTERVAL: 418 MS
RBC # BLD AUTO: 5.54 MILLION/UL (ref 3.88–5.62)
RIGHT ATRIUM AREA SYSTOLE A4C: 19.5 CM2
RIGHT VENTRICLE ID DIMENSION: 3.3 CM
SL CV LV EF: 65
SL CV PED ECHO LEFT VENTRICLE DIASTOLIC VOLUME (MOD BIPLANE) 2D: 133 ML
SL CV PED ECHO LEFT VENTRICLE SYSTOLIC VOLUME (MOD BIPLANE) 2D: 40 ML
SODIUM SERPL-SCNC: 136 MMOL/L (ref 136–145)
T WAVE AXIS: -7 DEGREES
VENTRICULAR RATE: 56 BPM
WBC # BLD AUTO: 7.8 THOUSAND/UL (ref 4.31–10.16)

## 2022-05-22 PROCEDURE — 93306 TTE W/DOPPLER COMPLETE: CPT | Performed by: INTERNAL MEDICINE

## 2022-05-22 PROCEDURE — 93306 TTE W/DOPPLER COMPLETE: CPT

## 2022-05-22 PROCEDURE — NC001 PR NO CHARGE: Performed by: INTERNAL MEDICINE

## 2022-05-22 PROCEDURE — 83880 ASSAY OF NATRIURETIC PEPTIDE: CPT | Performed by: INTERNAL MEDICINE

## 2022-05-22 PROCEDURE — 99222 1ST HOSP IP/OBS MODERATE 55: CPT | Performed by: INTERNAL MEDICINE

## 2022-05-22 PROCEDURE — 80048 BASIC METABOLIC PNL TOTAL CA: CPT | Performed by: STUDENT IN AN ORGANIZED HEALTH CARE EDUCATION/TRAINING PROGRAM

## 2022-05-22 PROCEDURE — 85025 COMPLETE CBC W/AUTO DIFF WBC: CPT | Performed by: STUDENT IN AN ORGANIZED HEALTH CARE EDUCATION/TRAINING PROGRAM

## 2022-05-22 PROCEDURE — 93010 ELECTROCARDIOGRAM REPORT: CPT | Performed by: INTERNAL MEDICINE

## 2022-05-22 RX ADMIN — ASPIRIN 81 MG: 81 TABLET, COATED ORAL at 09:31

## 2022-05-22 RX ADMIN — TAMSULOSIN HYDROCHLORIDE 0.4 MG: 0.4 CAPSULE ORAL at 17:18

## 2022-05-22 RX ADMIN — LISINOPRIL 20 MG: 20 TABLET ORAL at 09:31

## 2022-05-22 RX ADMIN — ATENOLOL 100 MG: 50 TABLET ORAL at 09:31

## 2022-05-22 RX ADMIN — APIXABAN 5 MG: 5 TABLET, FILM COATED ORAL at 09:31

## 2022-05-22 RX ADMIN — AMLODIPINE BESYLATE 10 MG: 10 TABLET ORAL at 09:31

## 2022-05-22 RX ADMIN — APIXABAN 5 MG: 5 TABLET, FILM COATED ORAL at 17:18

## 2022-05-22 RX ADMIN — PRAVASTATIN SODIUM 80 MG: 80 TABLET ORAL at 17:18

## 2022-05-22 NOTE — PROGRESS NOTES
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 ([facility] [address] )    Prediabetes      * New onset [facility])  Assessment & Plan  Pt presents to [facility] 5/21/22 with ZAMORA and diaphoresis/ fatigue that began day prior to presentation  when he was on his daily 3 mile walk  Pt states that he never experienced this before    -pt found to be in Afib on presentation, rate controlled w HR 87, stable BP, pt taking his home medication: amlodipine 10mg daily, atenolol 1000mg daily, benzapril   -Troponin 5 x3  No delta  No further trend  -TSH/HbA1c normal  -chads Vasc score 5 -started hep gtt transitioned to Eliquis  -follow up 2D echo, discharge after read  -tele, patient converted to normal sinus rhythm    Prediabetes  Assessment & Plan  Prior A1c 5 8% in 2019   Recheck A1c 5 6%  OP follow up; continue lifestyle interventions     Primary osteoarthritis of right hip  Assessment & Plan  S/p total hip replacement in past with resolution of pain    Hyperlipidemia  Assessment & Plan  Cont statin     Elevated PSA  Assessment & Plan  Continue tamsulosin  OP urology follow up    Chronic coronary artery disease  Assessment & Plan  S/p stents in the past  Follows cardiology out pt [facility], last seen 7/2021, stress echo was negative for ischemia  -cont medical directed therapy: ASA, statin, ace-I, BB  -outpatient follow up       Benign essential hypertension  Assessment & Plan  Pt taking his home medication: amlodipine 10mg daily, atenolol 1000mg daily, benzapril 20mg daily (lisinopril in place of this while in pt)  Continue  Erythrocytosis-resolved as of 2022  Assessment & Plan  hgb elevated 19 1  Like 2/2 dehydration  Possibly concentrated 2/2 to diaphoresis 2/2 to Afib          Disposition: inpatient, pending echocardiography      SUBJECTIVE     Patient seen and examined  No acute events overnight  Denies chest pain, palpitations  Feels well this morning  OBJECTIVE     Vitals:    22 1620 22 1623 22 1910 22 2041   BP:  118/77 124/76    BP Location:  Right arm     Pulse:  80 62    Resp:  17     Temp:  97 6 °F (36 4 °C) 98 1 °F (36 7 °C)    TempSrc:  Oral     SpO2: 94% 97% 95% 95%   Weight:  85 1 kg (187 lb 9 8 oz)     Height:  5' 8" (1 727 m)        Temperature:   Temp (24hrs), Av 9 °F (36 6 °C), Min:97 6 °F (36 4 °C), Max:98 1 °F (36 7 °C)    Temperature: 98 1 °F (36 7 °C)  Intake & Output:  I/O     None        Weights:   IBW (Ideal Body Weight): 68 4 kg    Body mass index is 28 53 kg/m²  Weight (last 2 days)     Date/Time Weight    22 16:23:19 85 1 (187 61)        Physical Exam  HENT:      Head: Normocephalic and atraumatic  Mouth/Throat:      Pharynx: Oropharynx is clear  Eyes:      Conjunctiva/sclera: Conjunctivae normal    Cardiovascular:      Rate and Rhythm: Normal rate and regular rhythm  Heart sounds: No murmur heard  Pulmonary:      Effort: Pulmonary effort is normal       Breath sounds: Normal breath sounds  Abdominal:      Palpations: Abdomen is soft  Tenderness: There is no abdominal tenderness  Musculoskeletal:      Right lower leg: No edema  Left lower leg: No edema  Neurological:      Mental Status: He is alert and oriented to person, place, and time  LABORATORY DATA     Labs: I have personally reviewed pertinent reports    Results from last 7 days   Lab Units 22  0510 05/21/22  1105   WBC Thousand/uL 7 80 7 65   HEMOGLOBIN g/dL 16 7 19 1*   HEMATOCRIT % 50 3* 58 4*   PLATELETS Thousands/uL 186 213   NEUTROS PCT % 52 69   MONOS PCT % 9 7      Results from last 7 days   Lab Units 05/22/22  0510 05/21/22  1105   POTASSIUM mmol/L 3 7 4 2   CHLORIDE mmol/L 105 104   CO2 mmol/L 25 25   BUN mg/dL 13 15   CREATININE mg/dL 0 80 0 97   CALCIUM mg/dL 9 1 10 0   ALK PHOS U/L  --  85   ALT U/L  --  37   AST U/L  --  25              Results from last 7 days   Lab Units 05/21/22  1706   PTT seconds 29               IMAGING & DIAGNOSTIC TESTING     Radiology Results: I have personally reviewed pertinent reports  XR chest 2 views    Result Date: 5/21/2022  Impression: No acute cardiopulmonary disease  Workstation performed: [id number]     Other Diagnostic Testing: I have personally reviewed pertinent reports  ACTIVE MEDICATIONS     Current Facility-Administered Medications   Medication Dose Route Frequency    acetaminophen (TYLENOL) tablet 650 mg  650 mg Oral Q6H PRN    amLODIPine (NORVASC) tablet 10 mg  10 mg Oral Daily    apixaban (ELIQUIS) tablet 5 mg  5 mg Oral BID    aspirin (ECOTRIN LOW STRENGTH) EC tablet 81 mg  81 mg Oral Daily    atenolol (TENORMIN) tablet 100 mg  100 mg Oral Daily    lisinopril (ZESTRIL) tablet 20 mg  20 mg Oral Daily    pravastatin (PRAVACHOL) tablet 80 mg  80 mg Oral Daily With Dinner    tamsulosin (FLOMAX) capsule 0 4 mg  0 4 mg Oral Daily With Dinner       VTE Pharmacologic Prophylaxis: Elliquis    Portions of the record may have been created with voice recognition software  Occasional wrong word or "sound a like" substitutions may have occurred due to the inherent limitations of voice recognition software    Read the chart carefully and recognize, using context, where substitutions have occurred   ==  Mike Javier MD  [address]  Internal Medicine Residency PGY-1

## 2022-05-22 NOTE — CASE MANAGEMENT
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 ([facility] ) 10/22/2020    Lung nodule seen on imaging study 2019    BPH with obstruction/lower urinary tract symptoms 2018    Elevated PSA 10/23/2017    Primary osteoarthritis of right hip 2017    Hyperlipidemia 2012    Benign essential hypertension 2008    Chronic coronary artery disease 2008    Male erectile disorder 2008      LOS (days): 0  Geometric Mean LOS (GMLOS) (days):   Days to GMLOS:     OBJECTIVE:              Current admission status: Observation       Preferred Pharmacy:   [facility]/pharmacy [facility][id number]  [address]  Phone: [phone] Fax: [phone]    [facility] [address] [place] [address]  Phone: [phone] Fax: [phone]    Primary Care Provider: Michael Epstein MD    Primary Insurance: MEDICARE  Secondary Insurance: AARP    ASSESSMENT:  [address] Proxies    There are no active Health Care Proxies on file  Patient Information  Admitted from[de-identified] Home  Mental Status: Alert  During Assessment patient was accompanied by: Not accompanied during assessment  Assessment information provided by[de-identified] Patient  Primary Caregiver: Self  Support Systems: Self, Spouse/significant other  [place] of Residence: [address] [place] [address] do you live in?: [facility] entry access options   Select all that apply : Stairs  Number of steps to enter home : 6  Type of Current Residence: Bi-level  Upon entering residence, is there a bedroom on the main floor (no further steps)?: Yes  Upon entering residence, is there a bathroom on the main floor (no further steps)?: No  In the last 12 months, was there a time when you were not able to pay the mortgage or rent on time?: Patient refused  In the last 12 months, was there a time when you did not have a steady place to sleep or slept in a shelter (including now)?: Patient refused  Homeless/housing insecurity resource given?: N/A  Living Arrangements: Lives w/ Spouse/significant other    Activities of Daily Living Prior to Admission  Functional Status: Independent  Completes ADLs independently?: Yes  Ambulates independently?: Yes  Does patient use assisted devices?: No  Does patient currently own DME?: No  Does patient have a history of Outpatient Therapy (PT/OT)?: No  Does the patient have a history of Short-Term Rehab?: No  Does patient have a history of HHC?: No  Does patient currently have [facility]?: No         Patient Information Continued  Income Source: Pension/long term  Does patient have prescription coverage?: No  Food insecurity resource given?: N/A         Means of Transportation  Was application for public transport provided?: N/A        DISCHARGE DETAILS:    Discharge planning discussed with[de-identified] Cm spoke to pt introduced self, role and discharged process  Pt presented as alert during conversation  Pt lives with spouse in bi-level, 6 step to enter  Pt reported being indep with ADLS PTA  Pt denies any hx of inpt rehab nor [facility] services  Pt to discharged home when medically cleared and spouse to transport upon discharged

## 2022-05-22 NOTE — DISCHARGE SUMMARY
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 ([address] )    Prediabetes      * [facility]  Assessment & Plan  Pt presents to [facility] 5/21/22 with ZAMORA and diaphoresis/ fatigue that began day prior to presentation  when he was on his daily 3 mile walk  Pt states that he never experienced this before    -pt found to be in Afib on presentation, rate controlled w HR 87, stable BP, pt taking his home medication: amlodipine 10mg daily, atenolol 1000mg daily, benzapril   -Troponin 5 x3  No delta  No further trend  Echo:  Normal systolic, diastolic function    -HARJINDER/FIZ2J, BNP normal  -chads Vasc score 5 -started hep gtt transitioned to Eliquis   -tele, patient converted to normal sinus rhythm    Prediabetes  Assessment & Plan  Prior A1c 5 8% in 2019   Recheck A1c 5 6%  OP follow up; continue lifestyle interventions     Primary osteoarthritis of right hip  Assessment & Plan  S/p total hip replacement in past with resolution of pain    Hyperlipidemia  Assessment & Plan  Cont statin     Elevated PSA  Assessment & Plan  Continue tamsulosin  OP urology follow up    Chronic coronary artery disease  Assessment & Plan  S/p stents in the past  Follows cardiology out pt [facility], last seen 7/2021, stress echo was negative for ischemia  -cont medical directed therapy: ASA, statin, ace-I, BB  -outpatient follow up       Benign essential hypertension  Assessment & Plan  Pt taking his home medication: amlodipine 10mg daily, atenolol 1000mg daily, benzapril 20mg daily (lisinopril in place of this while in pt)  Continue  Erythrocytosis-resolved as of 5/22/2022  Assessment & Plan  hgb elevated 19 1  Like 2/2 dehydration  Possibly concentrated 2/2 to diaphoresis 2/2 to Afib          Physical Exam  HENT:      Head: Normocephalic and atraumatic  Mouth/Throat:      Pharynx: Oropharynx is clear  Cardiovascular:      Rate and Rhythm: Normal rate and regular rhythm  Pulmonary:      Effort: Pulmonary effort is normal       Breath sounds: Normal breath sounds  Abdominal:      Palpations: Abdomen is soft  Musculoskeletal:      Right lower leg: No edema  Left lower leg: No edema  Neurological:      Mental Status: He is alert and oriented to person, place, and time  Neurologic Exam     Mental Status   Oriented to person, place, and time  Vitals:    05/23/22 0739   BP: 126/75   Pulse:    Resp: 17   Temp: 97 7 °F (36 5 °C)   SpO2:        Subjective:  Patient seen and examined  No acute events overnight  Patient feels well this morning  [address] COURSE     Patient is a pleasant 78year old male with PMH of heart attack 20 years ago, CAD s/p multiple stents, HTN, hyperlipidemia, and BPH presenting to [facility] ED 5/21 with weakness and diaphoresis on daily 3 mile walk, with symptoms continuing on any subsequent exertion  He denied palpitations, shortness of breath, chest pain, leg swelling  He is compliant with home medications  Found to have new onset atrial fibrillation on presentation, rate controlled with stable BP, chads vasc score 5 started on heparin 5/21 switched to eliquis  Admission Chest X-ray unremarkable  Trops negative  CBC showed erythrocytosis (HgB 19 1)  Admitted 5/21 for further evaluation  Patient converted to normal sinus rhythm overnight  Normal TSH, normal A1c  Erythrocytosis resolved (HgB 16 7), likely 2/2 to dehydration   Echo 5/22 showed EF 65% with normal valves and normal wall motion, thickness, and function  Patient be discharged on Xarelto  He was counseled on the risks/benefits of anticoagulation  He was given return precautions  He will follow up with his PCP in 1 week and his cardiologist within 1 month  Important follow-up:  - outpatient cardiology  - outpatient urology   - continue lifestyle interventions, outpatient follow up for prediabetes  - continue ASA, statin, amlodipine, atenolol, benzapril  - New medications: Xarelto     DISCHARGE INFORMATION     PCP at Discharge: Margo Paredes MD    Admitting Provider: Kim Brock MD  Admission Date: 5/21/2022    Discharge Provider: Latricia Stephenson DO  Discharge Date:  05/23/2022    Discharge Disposition: Home/Self Care  Discharge Condition: stable  Discharge with Lines: no    Discharge Diet: regular diet  Activity Restrictions: as tolerated  Test Results Pending at Discharge: N/A    Discharge Diagnoses:  Principal Problem:    New onset a-fib [facility])  Active Problems:    Benign essential hypertension    Chronic coronary artery disease    Elevated PSA    Male erectile disorder    Hyperlipidemia    Primary osteoarthritis of right hip    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 ([facility] [address] )    Prediabetes  Resolved Problems:    Erythrocytosis      Consulting Providers:      Diagnostic & Therapeutic Procedures Performed:  XR chest 2 views    Result Date: 5/21/2022  Impression: No acute cardiopulmonary disease  Workstation performed: [id number]       Code Status: Level 1 - Full Code  Advance Directive & Living Will: <no information>  Power of :    POLST:      Medications:  Current Discharge Medication List        Current Discharge Medication List        Current Discharge Medication List      CONTINUE these medications which have NOT CHANGED    Details   amLODIPine (NORVASC) 10 mg tablet TAKE 1 TABLET BY MOUTH EVERY DAY  Qty: 90 tablet, Refills: 3    Comments: DX Code Needed      Associated Diagnoses: Essential hypertension, benign      ascorbic acid (VITAMIN C) 1000 MG tablet Take 1,000 mg by mouth daily      aspirin (ECOTRIN LOW STRENGTH) 81 mg EC tablet Take 81 mg by mouth daily      atenolol (TENORMIN) 100 mg tablet TAKE 1 TABLET BY MOUTH EVERY DAY  Qty: 90 tablet, Refills: 3    Associated Diagnoses: Essential hypertension      B Complex Vitamins (B COMPLEX 100 PO) Take by mouth      benazepril (LOTENSIN) 20 mg tablet TAKE 1 TABLET BY MOUTH EVERY DAY  Qty: 90 tablet, Refills: 3    Comments: DX Code Needed      Associated Diagnoses: Essential hypertension, benign      calcium carbonate (OS-LUCA) 600 MG tablet Take 600 mg by mouth daily       Coenzyme Q10 (CO Q 10 PO) Take by mouth daily        magnesium oxide (MAG-OX) 400 mg tablet Take 400 mg by mouth daily      multivitamin (THERAGRAN) TABS Take 1 tablet by mouth      niacin (NIASPAN) 500 mg CR tablet Take 1 tablet (500 mg total) by mouth daily at bedtime  Qty: 90 tablet, Refills: 3    Associated Diagnoses: Benign prostatic hyperplasia without lower urinary tract symptoms      Omega-3 Fatty Acids (FISH OIL PO) Take by mouth daily       sildenafil (VIAGRA) 50 MG tablet Take 1 tablet (50 mg total) by mouth daily as needed for erectile dysfunction  Qty: 10 tablet, Refills: 6    Associated Diagnoses: Male erectile disorder      simvastatin (ZOCOR) 40 mg tablet TAKE 1 TABLET BY MOUTH EVERYDAY AT BEDTIME  Qty: 90 tablet, Refills: 3    Associated Diagnoses: High cholesterol      tamsulosin (FLOMAX) 0 4 mg TAKE 1 CAPSULE BY MOUTH EVERY DAY WITH DINNER  Qty: 90 capsule, Refills: 3    Associated Diagnoses: Benign prostatic hyperplasia without lower urinary tract symptoms      traMADol (ULTRAM) 50 mg tablet Take 1 tablet (50 mg total) by mouth every 6 (six) hours as needed for moderate pain  Qty: 20 tablet, Refills: 0    Associated Diagnoses: Rib pain      triamcinolone (KENALOG) 0 1 % cream Apply topically 2 (two) times a day  Qty: 30 g, Refills: 0    Associated Diagnoses: Rectal irritation             Allergies: Allergies   Allergen Reactions    No Active Allergies     Other        FOLLOW-UP     PCP Outpatient Follow-up:  yes within 1 week    Consulting Providers Follow-up:  follow with established cardiologist     Active Issues Requiring Follow-up:   New Onset AFib    Patient Active Problem List   Diagnosis    Benign essential hypertension    Chronic coronary artery disease    Elevated PSA    Male erectile disorder    Hyperlipidemia    Primary osteoarthritis of right hip    BPH with obstruction/lower urinary tract symptoms    Lung nodule seen on imaging study    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 ([facility] [address] )    Viral infection    New onset a-fib ([facility] )    Prediabetes         Discharge Statement:   I spent 1 hour minutes discharging the patient  This time was spent on the day of discharge  I had direct contact with the patient on the day of discharge  Additional documentation is required if more than 30 minutes were spent on discharge  Portions of the record may have been created with voice recognition software  Occasional wrong word or "sound a like" substitutions may have occurred due to the inherent limitations of voice recognition software    Read the chart carefully and recognize, using context, where substitutions have occurred     ==  Tin Ritchie MD  [address]  Internal Medicine Resident PGY-1

## 2022-05-23 ENCOUNTER — TRANSITIONAL CARE MANAGEMENT (OUTPATIENT)
Dept: FAMILY MEDICINE CLINIC | Facility: CLINIC | Age: 77
End: 2022-05-23

## 2022-05-23 ENCOUNTER — EPISODE CHANGES (OUTPATIENT)
Dept: CASE MANAGEMENT | Facility: OTHER | Age: 77
End: 2022-05-23

## 2022-05-23 VITALS
WEIGHT: 187 LBS | DIASTOLIC BLOOD PRESSURE: 75 MMHG | RESPIRATION RATE: 17 BRPM | BODY MASS INDEX: 28.34 KG/M2 | SYSTOLIC BLOOD PRESSURE: 126 MMHG | HEIGHT: 68 IN | OXYGEN SATURATION: 95 % | TEMPERATURE: 97.7 F | HEART RATE: 57 BPM

## 2022-05-23 PROCEDURE — 99239 HOSP IP/OBS DSCHRG MGMT >30: CPT | Performed by: INTERNAL MEDICINE

## 2022-05-23 RX ADMIN — AMLODIPINE BESYLATE 10 MG: 10 TABLET ORAL at 08:31

## 2022-05-23 RX ADMIN — LISINOPRIL 20 MG: 20 TABLET ORAL at 08:31

## 2022-05-23 RX ADMIN — APIXABAN 5 MG: 5 TABLET, FILM COATED ORAL at 08:31

## 2022-05-23 RX ADMIN — ATENOLOL 100 MG: 50 TABLET ORAL at 08:31

## 2022-05-23 RX ADMIN — ASPIRIN 81 MG: 81 TABLET, COATED ORAL at 08:31

## 2022-05-23 NOTE — PLAN OF CARE
Problem: PAIN - ADULT  Goal: Verbalizes/displays adequate comfort level or baseline comfort level  Description: Interventions:  - Encourage patient to monitor pain and request assistance  - Assess pain using appropriate pain scale  - Administer analgesics based on type and severity of pain and evaluate response  - Implement non-pharmacological measures as appropriate and evaluate response  - Consider cultural and social influences on pain and pain management  - Notify physician/advanced practitioner if interventions unsuccessful or patient reports new pain  Outcome: Progressing     Problem: DISCHARGE PLANNING  Goal: Discharge to home or other facility with appropriate resources  Description: INTERVENTIONS:  - Identify barriers to discharge w/patient and caregiver  - Arrange for needed discharge resources and transportation as appropriate  - Identify discharge learning needs (meds, wound care, etc )  - Arrange for interpretive services to assist at discharge as needed  - Refer to Case Management Department for coordinating discharge planning if the patient needs post-hospital services based on physician/advanced practitioner order or complex needs related to functional status, cognitive ability, or social support system  Outcome: Progressing     Problem: Knowledge Deficit  Goal: Patient/family/caregiver demonstrates understanding of disease process, treatment plan, medications, and discharge instructions  Description: Complete learning assessment and assess knowledge base    Interventions:  - Provide teaching at level of understanding  - Provide teaching via preferred learning methods  Outcome: Progressing     Problem: CARDIOVASCULAR - ADULT  Goal: Maintains optimal cardiac output and hemodynamic stability  Description: INTERVENTIONS:  - Monitor I/O, vital signs and rhythm  - Monitor for S/S and trends of decreased cardiac output  - Administer and titrate ordered vasoactive medications to optimize hemodynamic stability  - Assess quality of pulses, skin color and temperature  - Assess for signs of decreased coronary artery perfusion  - Instruct patient to report change in severity of symptoms  Outcome: Progressing  Goal: Absence of cardiac dysrhythmias or at baseline rhythm  Description: INTERVENTIONS:  - Continuous cardiac monitoring, vital signs, obtain 12 lead EKG if ordered  - Administer antiarrhythmic and heart rate control medications as ordered  - Monitor electrolytes and administer replacement therapy as ordered  Outcome: Progressing

## 2022-05-23 NOTE — NURSING NOTE
Patient and wife concerned about cost of eliquis  Please provide patient further drug information and possiblity of coverage card thank you

## 2022-05-24 ENCOUNTER — PATIENT OUTREACH (OUTPATIENT)
Dept: FAMILY MEDICINE CLINIC | Facility: CLINIC | Age: 77
End: 2022-05-24

## 2022-05-24 NOTE — PROGRESS NOTES
Contacted patient for f/u post hospitialization for a fib  Tamekachar Curry is doing well at home and denies needing additional assistance  No c/o palpitation, chest pain or sob  He has resumed his normal activities  Nutritional intake adequate, he is staying well hydrated  Taking all medications as prescribed  He started xarelto today, reviewed to watch for signs of bleeding  He does monitor BP at home but did not check today, encouraged to monitor several times per week  All f/u appointments scheduled  No transportation issues  Explained the bundle program and he is willing to participate  Provided him with my contact information for any questions or concerns  Agreeable to continued outreach

## 2022-05-25 LAB
ATRIAL RATE: 202 BPM
ATRIAL RATE: 326 BPM
P AXIS: 206 DEGREES
QRS AXIS: 55 DEGREES
QRS AXIS: 57 DEGREES
QRSD INTERVAL: 86 MS
QRSD INTERVAL: 96 MS
QT INTERVAL: 360 MS
QT INTERVAL: 370 MS
QTC INTERVAL: 410 MS
QTC INTERVAL: 410 MS
T WAVE AXIS: -25 DEGREES
T WAVE AXIS: 15 DEGREES
VENTRICULAR RATE: 74 BPM
VENTRICULAR RATE: 78 BPM

## 2022-05-25 PROCEDURE — 93010 ELECTROCARDIOGRAM REPORT: CPT | Performed by: INTERNAL MEDICINE

## 2022-05-27 NOTE — PHYSICIAN ADVISOR
Current patient class: Inpatient  The patient is currently on Hospital Day: 3 at [address] [place] [address]      This patient was originally admitted to the hospital under observation class  After admission the patient was reevaluated and determined to require further hospitalization  The patient was then documented to require at least a 2nd midnight in the hospital  As such the patient was then expected to satisfy the 2 midnight benchmark and was therefore eligible for inpatient admission  After review of the relevant documentation, labs, vital signs and test results, the patient is appropriate for INPATIENT ADMISSION  Admission to the hospital as an inpatient is a complex decision making process which requires the practitioner to consider the patients presenting complaint, history and physical examination and all relevant testing  With this in mind, in this case, the patient was deemed appropriate for INPATIENT ADMISSION  After review of the documentation and testing available at the time of the admission I concur with this clinical determination of medical necessity  Rationale is as follows: The patient was hospitalized initially under observation class with new onset atrial fibrillation and controlled ventricular response being on a beta-blocker  Heparin was converted to Eliquis  The patient's heart rate was low at times  Provider recommended an additional day of management  Telemetry monitoring was continued  Echocardiogram was ordered  The patient converted back to sinus rhythm during the stay  The provider had concern because of presentation with shortness of breath    He was hospitalized for two midnights total     The patients vitals on arrival were   ED Triage Vitals   Temperature Pulse Respirations Blood Pressure SpO2   05/21/22 0941 05/21/22 0941 05/21/22 0941 05/21/22 0941 05/21/22 0941   98 °F (36 7 °C) 78 18 142/88 98 %      Temp Source Heart Rate Source Patient Position - Orthostatic VS BP Location FiO2 (%)   05/21/22 0941 05/21/22 0941 05/21/22 1623 05/21/22 1623 --   Oral Monitor Sitting Right arm       Pain Score       05/21/22 1620       No Pain           Past Medical History:   Diagnosis Date    Acute MI ([facility] ) 06/2002    Arthritis     Atypical chest pain 03/05/2008    Basal cell carcinoma 2009    Coronary artery disease     Diverticulosis 2008    Hematuria, microscopic     History of varicose veins 2008    Hyperlipidemia     Hypertension     Nocturia     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     Urinary tract infection      Past Surgical History:   Procedure Laterality Date    COLONOSCOPY      CORONARY ANGIOPLASTY      right CA x 3 vessels: redo PTCA-LAD 10/04    CORONARY STENT PLACEMENT  01/01/2002    JOINT REPLACEMENT      R hip    W[place] COLONOSCOPY FLX DX W/COLLJ SPEC WHEN PFRMD N/A 6/27/2017    Procedure: COLONOSCOPY;  Surgeon: Kendra Collier MD;  Location: [facility]; Service: Colorectal    TOTAL HIP ARTHROPLASTY Right     TOTAL HIP ARTHROPLASTY Left 04/30/2019       The patient was admitted to the hospital at  3:55 PM on 5/22/22 for the following diagnosis:  Dyspnea on exertion [R06 00]  New onset atrial fibrillation ([facility] [address] [facility] ) [I48 91]  Generalized weakness [R53 1]    Consults have been placed to:   None    Vitals:    05/21/22 2041 05/22/22 1458 05/22/22 1527 05/23/22 0739   BP: 124/76 124/76 132/84 126/75   BP Location:       Pulse: (!) 53 (!) 53 57    Resp:    17   Temp:   97 5 °F (36 4 °C) 97 7 °F (36 5 °C)   TempSrc:       SpO2: 95%  95%    Weight: 84 8 kg (187 lb) 84 8 kg (187 lb)     Height: 5' 8" (1 727 m) 5' 8" (1 727 m)         Most recent labs:    No results for input(s): WBC, HGB, HCT, PLT, K, NA, CALCIUM, BUN, CREATININE, LIPASE, AMYLASE, INR, TROPONINI, CKTOTAL, AST, ALT, ALKPHOS, BILITOT in the last 72 hours      Scheduled Meds:  Continuous Infusions:No current facility-administered medications for this encounter  PRN Meds:      Surgical procedures (if appropriate):

## 2022-05-31 ENCOUNTER — OFFICE VISIT (OUTPATIENT)
Dept: FAMILY MEDICINE CLINIC | Facility: CLINIC | Age: 77
End: 2022-05-31
Payer: MEDICARE

## 2022-05-31 VITALS
SYSTOLIC BLOOD PRESSURE: 118 MMHG | TEMPERATURE: 97.2 F | OXYGEN SATURATION: 96 % | RESPIRATION RATE: 16 BRPM | HEIGHT: 68 IN | BODY MASS INDEX: 28.23 KG/M2 | WEIGHT: 186.3 LBS | HEART RATE: 78 BPM | DIASTOLIC BLOOD PRESSURE: 62 MMHG

## 2022-05-31 DIAGNOSIS — I48.91 NEW ONSET A-FIB (HCC): Primary | ICD-10-CM

## 2022-05-31 PROCEDURE — 99496 TRANSJ CARE MGMT HIGH F2F 7D: CPT | Performed by: FAMILY MEDICINE

## 2022-05-31 NOTE — PROGRESS NOTES
TCM Call (since 4/30/2022)     Date and time call was made  5/23/2022  3:19 PM    Hospital care reviewed  Discussed with Inpatient Physician    Patient was hospitialized at  [facility]    Date of Admission  05/21/22    Date of discharge  05/23/22    Disposition  Home    Current Symptoms  None      TCM Call (since 4/30/2022)     Post hospital issues  None    Should patient be enrolled in anticoag monitoring? No    Scheduled for follow up? Yes    Did you obtain your prescribed medications  Yes    Do you need help managing your prescriptions or medications  No    Is transportation to your appointment needed  No    I have advised the patient to call PCP with any new or worsening symptoms  [address] or [employer] other    Support System  Partner    The type of support provided  Emotional; Financial; Physical    Do you have social support  Yes, as much as I need    Are you recieving any outpatient services  No    Are you recieving home care services  No    Are you using any community resources  No    Current waiver services  No    Have you fallen in the last 12 months  No    Interperter language line needed  No    Counseling  Patient               Assessment/Plan:      1  New onset a-fib [facility])  Assessment & Plan:  Stable  5/22/2022: ECHO = wnl, EF 65% no valvular disease  Has cardiology follow up with Dr Eliana Castellanos on 6/29/2022  Currently RRR  Rate is 66  Anticoagulated on xarelto 20mg - waiver form provided for patient to fill out  Denies any abnormal bleeding  Discussed precautions and answered all patients questions  Encouraged hydration and continued walking/sexual activity as tolerated  Follow up at next scheduled appointment              Subjective:      Patient ID: Margie Martell  is a 78 y o  male presents today for hospital follow up  He was out walking and felt tired and diaphoretic when he got back   When he was seen in ED he was found to be in afib and self converted after fluid hydration  He is cutting grass and doing yard work without difficulty  Has tooth extraction 6/22/22 6/29/2022: Cardiology Dr Lata Swan      hospitals    The following portions of the patient's history were reviewed and updated as appropriate: allergies, current medications, past family history, past medical history, past social history, past surgical history and problem list     Review of Systems   Constitutional: Negative for activity change, chills, diaphoresis, fatigue and fever  HENT: Negative for congestion, ear pain, hearing loss, postnasal drip, rhinorrhea, sinus pressure, sinus pain, sneezing and sore throat  Eyes: Negative for visual disturbance  Respiratory: Negative for cough, chest tightness, shortness of breath and wheezing  Cardiovascular: Negative for chest pain, palpitations and leg swelling  Gastrointestinal: Negative for abdominal pain, blood in stool, constipation, diarrhea, nausea and vomiting  Genitourinary: Negative for difficulty urinating, dysuria, flank pain, frequency, hematuria and urgency  Musculoskeletal: Negative for arthralgias, back pain, myalgias and neck pain  Neurological: Negative for dizziness, syncope, weakness, light-headedness, numbness and headaches  Objective:      /62 (BP Location: Left arm, Patient Position: Sitting, Cuff Size: Adult)   Pulse 78   Temp (!) 97 2 °F (36 2 °C) (Tympanic)   Resp 16   Ht 5' 8" (1 727 m)   Wt 84 5 kg (186 lb 4 8 oz)   SpO2 96%   BMI 28 33 kg/m²          Physical Exam  Vitals reviewed  Constitutional:       General: He is not in acute distress  Appearance: He is well-developed  He is not diaphoretic  HENT:      Head: Normocephalic and atraumatic  Right Ear: External ear normal       Left Ear: External ear normal       Nose: Nose normal  No congestion  Mouth/Throat:      Mouth: Mucous membranes are moist       Pharynx: Oropharynx is clear  No oropharyngeal exudate  Eyes:      General: No scleral icterus  Pupils: Pupils are equal, round, and reactive to light  Neck:      Thyroid: No thyromegaly  Vascular: No JVD  Trachea: No tracheal deviation  Cardiovascular:      Rate and Rhythm: Normal rate and regular rhythm  Pulses: Normal pulses  Heart sounds: Normal heart sounds  No murmur heard  No friction rub  Pulmonary:      Effort: Pulmonary effort is normal  No respiratory distress  Breath sounds: Normal breath sounds  No wheezing or rales  Chest:      Chest wall: No tenderness  Abdominal:      General: Bowel sounds are normal  There is no distension  Palpations: Abdomen is soft  Tenderness: There is no abdominal tenderness  There is no right CVA tenderness, left CVA tenderness, guarding or rebound  Musculoskeletal:         General: No tenderness  Normal range of motion  Cervical back: Normal range of motion and neck supple  No tenderness  Right lower leg: No edema  Left lower leg: No edema  Lymphadenopathy:      Cervical: No cervical adenopathy  Skin:     General: Skin is warm and dry  Neurological:      Mental Status: He is alert and oriented to person, place, and time  Mental status is at baseline  Deep Tendon Reflexes: Reflexes are normal and symmetric  Psychiatric:         Mood and Affect: Mood normal          Behavior: Behavior normal          Thought Content:  Thought content normal          Judgment: Judgment normal

## 2022-06-18 ENCOUNTER — APPOINTMENT (OUTPATIENT)
Dept: LAB | Facility: HOSPITAL | Age: 77
End: 2022-06-18
Payer: MEDICARE

## 2022-06-18 DIAGNOSIS — E78.49 FAMILIAL COMBINED HYPERLIPIDEMIA: ICD-10-CM

## 2022-06-18 DIAGNOSIS — I25.10 DISEASE OF CARDIOVASCULAR SYSTEM: ICD-10-CM

## 2022-06-18 DIAGNOSIS — I10 ESSENTIAL HYPERTENSION, MALIGNANT: ICD-10-CM

## 2022-06-18 LAB
ALBUMIN SERPL BCP-MCNC: 3.7 G/DL (ref 3.5–5)
ALP SERPL-CCNC: 72 U/L (ref 46–116)
ALT SERPL W P-5'-P-CCNC: 34 U/L (ref 12–78)
ANION GAP SERPL CALCULATED.3IONS-SCNC: 3 MMOL/L (ref 4–13)
AST SERPL W P-5'-P-CCNC: 26 U/L (ref 5–45)
BILIRUB SERPL-MCNC: 1.34 MG/DL (ref 0.2–1)
BUN SERPL-MCNC: 14 MG/DL (ref 5–25)
CALCIUM SERPL-MCNC: 8.9 MG/DL (ref 8.3–10.1)
CHLORIDE SERPL-SCNC: 103 MMOL/L (ref 100–108)
CHOLEST SERPL-MCNC: 136 MG/DL
CO2 SERPL-SCNC: 29 MMOL/L (ref 21–32)
CREAT SERPL-MCNC: 0.81 MG/DL (ref 0.6–1.3)
ERYTHROCYTE [DISTWIDTH] IN BLOOD BY AUTOMATED COUNT: 13.9 % (ref 11.6–15.1)
GFR SERPL CREATININE-BSD FRML MDRD: 86 ML/MIN/1.73SQ M
GLUCOSE P FAST SERPL-MCNC: 98 MG/DL (ref 65–99)
HCT VFR BLD AUTO: 50.3 % (ref 36.5–49.3)
HDLC SERPL-MCNC: 48 MG/DL
HGB BLD-MCNC: 16.3 G/DL (ref 12–17)
LDLC SERPL CALC-MCNC: 69 MG/DL (ref 0–100)
MCH RBC QN AUTO: 29.2 PG (ref 26.8–34.3)
MCHC RBC AUTO-ENTMCNC: 32.4 G/DL (ref 31.4–37.4)
MCV RBC AUTO: 90 FL (ref 82–98)
NONHDLC SERPL-MCNC: 88 MG/DL
PLATELET # BLD AUTO: 179 THOUSANDS/UL (ref 149–390)
PMV BLD AUTO: 9.7 FL (ref 8.9–12.7)
POTASSIUM SERPL-SCNC: 3.6 MMOL/L (ref 3.5–5.3)
PROT SERPL-MCNC: 7.6 G/DL (ref 6.4–8.2)
RBC # BLD AUTO: 5.58 MILLION/UL (ref 3.88–5.62)
SODIUM SERPL-SCNC: 135 MMOL/L (ref 136–145)
TRIGL SERPL-MCNC: 95 MG/DL
TSH SERPL DL<=0.05 MIU/L-ACNC: 1.37 UIU/ML (ref 0.45–4.5)
WBC # BLD AUTO: 5.84 THOUSAND/UL (ref 4.31–10.16)

## 2022-06-18 PROCEDURE — 85027 COMPLETE CBC AUTOMATED: CPT

## 2022-06-18 PROCEDURE — 84443 ASSAY THYROID STIM HORMONE: CPT

## 2022-06-18 PROCEDURE — 36415 COLL VENOUS BLD VENIPUNCTURE: CPT

## 2022-06-18 PROCEDURE — 80053 COMPREHEN METABOLIC PANEL: CPT

## 2022-06-18 PROCEDURE — 80061 LIPID PANEL: CPT

## 2022-06-21 ENCOUNTER — PATIENT OUTREACH (OUTPATIENT)
Dept: FAMILY MEDICINE CLINIC | Facility: CLINIC | Age: 77
End: 2022-06-21

## 2022-06-29 ENCOUNTER — PATIENT OUTREACH (OUTPATIENT)
Dept: FAMILY MEDICINE CLINIC | Facility: CLINIC | Age: 77
End: 2022-06-29

## 2022-06-29 NOTE — PROGRESS NOTES
Contacted patient for f/u  Aaliyah Polo is managing well at home and denies chest pain, palpitations and sob  He is performing his normal activities without issue  He recently went to cardiology  will be wearing a holter monitor next week  Nutritional intake adequate  He is taking all medications as prescribed  Follow up appointments scheduled  No needs at this time  Agreeable to continued outreach

## 2022-07-29 ENCOUNTER — PATIENT OUTREACH (OUTPATIENT)
Dept: FAMILY MEDICINE CLINIC | Facility: CLINIC | Age: 77
End: 2022-07-29

## 2022-07-29 ENCOUNTER — TELEPHONE (OUTPATIENT)
Dept: FAMILY MEDICINE CLINIC | Facility: CLINIC | Age: 77
End: 2022-07-29

## 2022-07-29 NOTE — TELEPHONE ENCOUNTER
Patient is having a colonoscopy 8/4 an h wants to know does he need to stop rivaroxaban (Xarelto) 20 mg tablet  3 days prior  Awaiting a call back

## 2022-07-29 NOTE — PROGRESS NOTES
Contacted patient for f/u  Candice Theodore is managing well at home and denies any needs at this time  No c/o chest pain, palpitations or sob  He exercises daily by taking a 3 5 mile walk  Nutritional intake adequate, he is staying well hydrated  He takes all medications as prescribed  Follow up appointments scheduled  He is scheduled for colonoscopy on 8/4  He questioned when he should stop xarelto  Instructed him to contact GI office for clarification  No other questions at this time  Agreeable to continued outreach

## 2022-08-19 ENCOUNTER — PATIENT OUTREACH (OUTPATIENT)
Dept: FAMILY MEDICINE CLINIC | Facility: CLINIC | Age: 77
End: 2022-08-19

## 2022-08-19 NOTE — PROGRESS NOTES
Contacted patient for f/u  Gabby Ye is managing well at home with no complaints  He denies chest pain, palpitations or sob  He continues to exercise by taking daily walks and working outside his home  Nutritional intake adequate  He is taking all medications as prescribed  Follow up with PCP scheduled for November  He denies any needs at this time

## 2022-08-22 ENCOUNTER — PATIENT OUTREACH (OUTPATIENT)
Dept: FAMILY MEDICINE CLINIC | Facility: CLINIC | Age: 77
End: 2022-08-22

## 2022-09-15 DIAGNOSIS — I48.91 NEW ONSET ATRIAL FIBRILLATION (HCC): ICD-10-CM

## 2022-09-29 ENCOUNTER — HOSPITAL ENCOUNTER (EMERGENCY)
Facility: HOSPITAL | Age: 77
Discharge: HOME | End: 2022-09-29
Attending: EMERGENCY MEDICINE
Payer: MEDICARE

## 2022-09-29 VITALS
HEART RATE: 69 BPM | TEMPERATURE: 98.2 F | SYSTOLIC BLOOD PRESSURE: 120 MMHG | DIASTOLIC BLOOD PRESSURE: 83 MMHG | OXYGEN SATURATION: 98 % | RESPIRATION RATE: 18 BRPM

## 2022-09-29 DIAGNOSIS — R53.83 FATIGUE: Primary | ICD-10-CM

## 2022-09-29 LAB
ANION GAP SERPL CALCULATED.3IONS-SCNC: 6 MMOL/L (ref 4–13)
ATRIAL RATE: 102 BPM
BASOPHILS # BLD AUTO: 0.05 THOUSANDS/ΜL (ref 0–0.1)
BASOPHILS NFR BLD AUTO: 1 % (ref 0–1)
BILIRUB UR QL STRIP: NEGATIVE
BUN SERPL-MCNC: 9 MG/DL (ref 5–25)
CALCIUM SERPL-MCNC: 8.8 MG/DL (ref 8.3–10.1)
CHLORIDE SERPL-SCNC: 101 MMOL/L (ref 96–108)
CLARITY UR: CLEAR
CO2 SERPL-SCNC: 25 MMOL/L (ref 21–32)
COLOR UR: NORMAL
CREAT SERPL-MCNC: 0.8 MG/DL (ref 0.6–1.3)
EOSINOPHIL # BLD AUTO: 0.03 THOUSAND/ΜL (ref 0–0.61)
EOSINOPHIL NFR BLD AUTO: 0 % (ref 0–6)
ERYTHROCYTE [DISTWIDTH] IN BLOOD BY AUTOMATED COUNT: 13.1 % (ref 11.6–15.1)
GFR SERPL CREATININE-BSD FRML MDRD: 87 ML/MIN/1.73SQ M
GLUCOSE SERPL-MCNC: 106 MG/DL (ref 65–140)
GLUCOSE UR STRIP-MCNC: NEGATIVE MG/DL
HCT VFR BLD AUTO: 51.5 % (ref 36.5–49.3)
HGB BLD-MCNC: 16.8 G/DL (ref 12–17)
HGB UR QL STRIP.AUTO: NEGATIVE
IMM GRANULOCYTES # BLD AUTO: 0.04 THOUSAND/UL (ref 0–0.2)
IMM GRANULOCYTES NFR BLD AUTO: 0 % (ref 0–2)
KETONES UR STRIP-MCNC: NEGATIVE MG/DL
LEUKOCYTE ESTERASE UR QL STRIP: NEGATIVE
LYMPHOCYTES # BLD AUTO: 2.41 THOUSANDS/ΜL (ref 0.6–4.47)
LYMPHOCYTES NFR BLD AUTO: 22 % (ref 14–44)
MCH RBC QN AUTO: 29.7 PG (ref 26.8–34.3)
MCHC RBC AUTO-ENTMCNC: 32.6 G/DL (ref 31.4–37.4)
MCV RBC AUTO: 91 FL (ref 82–98)
MONOCYTES # BLD AUTO: 0.73 THOUSAND/ΜL (ref 0.17–1.22)
MONOCYTES NFR BLD AUTO: 7 % (ref 4–12)
NEUTROPHILS # BLD AUTO: 7.62 THOUSANDS/ΜL (ref 1.85–7.62)
NEUTS SEG NFR BLD AUTO: 70 % (ref 43–75)
NITRITE UR QL STRIP: NEGATIVE
NRBC BLD AUTO-RTO: 0 /100 WBCS
PH UR STRIP.AUTO: 7 [PH]
PLATELET # BLD AUTO: 253 THOUSANDS/UL (ref 149–390)
PMV BLD AUTO: 9.8 FL (ref 8.9–12.7)
POTASSIUM SERPL-SCNC: 4.2 MMOL/L (ref 3.5–5.3)
PROT UR STRIP-MCNC: NEGATIVE MG/DL
QRS AXIS: 68 DEGREES
QRSD INTERVAL: 98 MS
QT INTERVAL: 378 MS
QTC INTERVAL: 441 MS
RBC # BLD AUTO: 5.66 MILLION/UL (ref 3.88–5.62)
SODIUM SERPL-SCNC: 132 MMOL/L (ref 135–147)
SP GR UR STRIP.AUTO: 1.01 (ref 1–1.03)
T WAVE AXIS: -7 DEGREES
TSH SERPL DL<=0.05 MIU/L-ACNC: 1.35 UIU/ML (ref 0.45–4.5)
UROBILINOGEN UR STRIP-ACNC: <2 MG/DL
VENTRICULAR RATE: 82 BPM
WBC # BLD AUTO: 10.88 THOUSAND/UL (ref 4.31–10.16)

## 2022-09-29 PROCEDURE — 36415 COLL VENOUS BLD VENIPUNCTURE: CPT | Performed by: STUDENT IN AN ORGANIZED HEALTH CARE EDUCATION/TRAINING PROGRAM

## 2022-09-29 PROCEDURE — 85025 COMPLETE CBC W/AUTO DIFF WBC: CPT | Performed by: STUDENT IN AN ORGANIZED HEALTH CARE EDUCATION/TRAINING PROGRAM

## 2022-09-29 PROCEDURE — 93010 ELECTROCARDIOGRAM REPORT: CPT | Performed by: INTERNAL MEDICINE

## 2022-09-29 PROCEDURE — 81003 URINALYSIS AUTO W/O SCOPE: CPT | Performed by: STUDENT IN AN ORGANIZED HEALTH CARE EDUCATION/TRAINING PROGRAM

## 2022-09-29 PROCEDURE — 80048 BASIC METABOLIC PNL TOTAL CA: CPT | Performed by: STUDENT IN AN ORGANIZED HEALTH CARE EDUCATION/TRAINING PROGRAM

## 2022-09-29 PROCEDURE — 93005 ELECTROCARDIOGRAM TRACING: CPT

## 2022-09-29 PROCEDURE — 84443 ASSAY THYROID STIM HORMONE: CPT | Performed by: STUDENT IN AN ORGANIZED HEALTH CARE EDUCATION/TRAINING PROGRAM

## 2022-09-29 PROCEDURE — 99284 EMERGENCY DEPT VISIT MOD MDM: CPT | Performed by: EMERGENCY MEDICINE

## 2022-09-29 PROCEDURE — 99283 EMERGENCY DEPT VISIT LOW MDM: CPT

## 2022-09-29 NOTE — DISCHARGE INSTRUCTIONS
You were seen in the ED for fatigue  Your evaluation was normal      Please follow up with your family doctor  Return to the ED for any new or concerning symptoms

## 2022-09-29 NOTE — ED PROVIDER NOTES
History  Chief Complaint   Patient presents with    Fatigue     Pt states he has been taking his pulse lately and he is higher than usual from 80-100bpm, pt states baseline is in 50's, pt denies feeling palpitations CP or SOB, pt states he occasionally feels more sluggish than usual, pt states he did his daily 3 mile walk and felt normal      Patient is a 72-year-old male, past medical history of AFib on Xarelto, coronary artery disease, hypertension, hyperlipidemia, and arthritis, who presents to the emergency department for an elevated heart rate  Patient states he was instructed to take his blood pressure every once in a while  Approximately 2 weeks ago, he noticed that his heart rate has been more elevated than normal at rest (states normal for him is around 60, however it has been in the 80s)  He states his blood pressure has been fine  He states the only associated symptom is intermittent generalized fatigue  Currently, patient states he has no symptoms  He denies any chest pain, dizziness, lightheadedness, vision changes, abdominal pain, back pain, or any other new or concerning symptoms  History provided by:  Patient   used: No    Fatigue  Severity:  Mild  Timing:  Intermittent  Chronicity:  New  Relieved by:  Nothing  Worsened by:  Nothing  Ineffective treatments:  None tried  Associated symptoms: no abdominal pain, no chest pain, no dizziness, no headaches and no shortness of breath        Prior to Admission Medications   Prescriptions Last Dose Informant Patient Reported? Taking?    B Complex Vitamins (B COMPLEX 100 PO)  Self Yes No   Sig: Take by mouth   Coenzyme Q10 (CO Q 10 PO)  Self Yes No   Sig: Take by mouth daily     Omega-3 Fatty Acids (FISH OIL PO)  Self Yes No   Sig: Take by mouth daily    amLODIPine (NORVASC) 10 mg tablet   No No   Sig: TAKE 1 TABLET BY MOUTH EVERY DAY   ascorbic acid (VITAMIN C) 1000 MG tablet  Self Yes No   Sig: Take 1,000 mg by mouth daily aspirin (ECOTRIN LOW STRENGTH) 81 mg EC tablet  Self Yes No   Sig: Take 81 mg by mouth daily   atenolol (TENORMIN) 100 mg tablet  Self No No   Sig: TAKE 1 TABLET BY MOUTH EVERY DAY   benazepril (LOTENSIN) 20 mg tablet   No No   Sig: TAKE 1 TABLET BY MOUTH EVERY DAY   calcium carbonate (OS-LUCA) 600 MG tablet  Self Yes No   Sig: Take 600 mg by mouth daily    magnesium oxide (MAG-OX) 400 mg tablet  Self Yes No   Sig: Take 400 mg by mouth daily   multivitamin (THERAGRAN) TABS  Self Yes No   Sig: Take 1 tablet by mouth   niacin (NIASPAN) 500 mg CR tablet  Self No No   Sig: Take 1 tablet (500 mg total) by mouth daily at bedtime   rivaroxaban (Xarelto) 20 mg tablet   No No   Sig: Take 1 tablet (20 mg total) by mouth daily with breakfast   sildenafil (VIAGRA) 50 MG tablet  Self No No   Sig: Take 1 tablet (50 mg total) by mouth daily as needed for erectile dysfunction   simvastatin (ZOCOR) 40 mg tablet  Self No No   Sig: TAKE 1 TABLET BY MOUTH EVERYDAY AT BEDTIME   tamsulosin (FLOMAX) 0 4 mg  Self No No   Sig: TAKE 1 CAPSULE BY MOUTH EVERY DAY WITH DINNER   traMADol (ULTRAM) 50 mg tablet   No No   Sig: Take 1 tablet (50 mg total) by mouth every 6 (six) hours as needed for moderate pain   triamcinolone (KENALOG) 0 1 % cream   No No   Sig: APPLY TOPICALLY TWICE A DAY      Facility-Administered Medications: None       Past Medical History:   Diagnosis Date    Acute MI ([address] ) 06/2002    Arthritis     Atypical chest pain 03/05/2008    Basal cell carcinoma 2009    Coronary artery disease     Diverticulosis 2008    Hematuria, microscopic     History of varicose veins 2008    Hyperlipidemia     Hypertension     Nocturia     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     Urinary tract infection        Past Surgical History:   Procedure Laterality Date    COLONOSCOPY      CORONARY ANGIOPLASTY      right CA x 3 vessels: redo [facility]-L[facility] 10/04    CORONARY STENT PLACEMENT  01/01/2002    JOINT REPLACEMENT      R hip  PA COLONOSCOPY FLX DX W/COLLJ SPEC WHEN PFRMD N/A 2017    Procedure: COLONOSCOPY;  Surgeon: Allie Peterson MD;  Location: [facility]; Service: Colorectal    TOTAL HIP ARTHROPLASTY Right     TOTAL HIP ARTHROPLASTY Left 2019       Family History   Problem Relation Age of Onset    Aneurysm Father         post op AAA repair    Hypertension Sister     Hypertension Brother     Hypertension Mother     Diabetes Mother      I have reviewed and agree with the history as documented  E-Cigarette/Vaping    E-Cigarette Use Never User      E-Cigarette/Vaping Substances    Nicotine No     THC No     CBD No     Flavoring No     Other No     Unknown No      Social History     Tobacco Use    Smoking status: Former Smoker     Types: Cigarettes     Quit date: 1970     Years since quittin 7    Smokeless tobacco: Former User    Tobacco comment: quit    0 5 packs/2 00 pack years   Vaping Use    Vaping Use: Never used   Substance Use Topics    Alcohol use: No    Drug use: No        Review of Systems   Constitutional: Positive for fatigue  Respiratory: Negative for shortness of breath  Cardiovascular: Negative for chest pain  Gastrointestinal: Negative for abdominal pain  Neurological: Negative for dizziness and headaches  Physical Exam  ED Triage Vitals [22 1141]   Temperature Pulse Respirations Blood Pressure SpO2   98 2 °F (36 8 °C) 71 18 112/80 97 %      Temp Source Heart Rate Source Patient Position - Orthostatic VS BP Location FiO2 (%)   Oral Monitor Sitting Left arm --      Pain Score       --             Orthostatic Vital Signs  Vitals:    22 1141 22 1144 22 1319   BP: 112/80 112/80 120/83   Pulse: 71 82 69   Patient Position - Orthostatic VS: Sitting Sitting Lying       Physical Exam  Vitals and nursing note reviewed  Constitutional:       General: He is not in acute distress  Appearance: He is well-developed  He is not diaphoretic  HENT:      Head: Normocephalic and atraumatic  Right Ear: External ear normal       Left Ear: External ear normal       Nose: Nose normal    Eyes:      General: Lids are normal  No scleral icterus  Cardiovascular:      Rate and Rhythm: Normal rate and regular rhythm  Heart sounds: Normal heart sounds  No murmur heard  No friction rub  No gallop  Pulmonary:      Effort: Pulmonary effort is normal  No respiratory distress  Breath sounds: Normal breath sounds  No wheezing or rales  Abdominal:      Palpations: Abdomen is soft  Tenderness: There is no abdominal tenderness  There is no guarding or rebound  Musculoskeletal:         General: No deformity  Normal range of motion  Cervical back: Normal range of motion and neck supple  Skin:     General: Skin is warm and dry  Neurological:      General: No focal deficit present  Mental Status: He is alert     Psychiatric:         Mood and Affect: Mood normal          Behavior: Behavior normal          ED Medications  Medications - No data to display    Diagnostic Studies  Results Reviewed     Procedure Component Value Units Date/Time    UA w Reflex to Microscopic w Reflex to Culture [[id number]] Collected: 09/29/22 1325    Lab Status: Final result Specimen: Urine, Clean Catch Updated: 09/29/22 1406     Color, UA Light Yellow     Clarity, UA Clear     Specific Gravity, UA 1 014     pH, UA 7 0     Leukocytes, UA Negative     Nitrite, UA Negative     Protein, UA Negative mg/dl      Glucose, UA Negative mg/dl      Ketones, UA Negative mg/dl      Urobilinogen, UA <2 0 mg/dl      Bilirubin, UA Negative     Occult Blood, UA Negative    Basic metabolic panel [[id number]]  (Abnormal) Collected: 09/29/22 1325    Lab Status: Final result Specimen: Blood from Arm, Right Updated: 09/29/22 1404     Sodium 132 mmol/L      Potassium 4 2 mmol/L      Chloride 101 mmol/L      CO2 25 mmol/L      ANION GAP 6 mmol/L      BUN 9 mg/dL      Creatinine 0 80 mg/dL      Glucose 106 mg/dL      Calcium 8 8 mg/dL      eGFR 87 ml/min/1 73sq m     Narrative:      [place] guidelines for Chronic Kidney Disease (CKD):     Stage 1 with normal or high GFR (GFR > 90 mL/min/1 73 square meters)    Stage 2 Mild CKD (GFR = 60-89 mL/min/1 73 square meters)    Stage 3A Moderate CKD (GFR = 45-59 mL/min/1 73 square meters)    Stage 3B Moderate CKD (GFR = 30-44 mL/min/1 73 square meters)    Stage 4 Severe CKD (GFR = 15-29 mL/min/1 73 square meters)    Stage 5 End Stage CKD (GFR <15 mL/min/1 73 square meters)  Note: GFR calculation is accurate only with a steady state creatinine    TSH, 3rd generation with Free T4 reflex [[id number]]  (Normal) Collected: 09/29/22 1325    Lab Status: Final result Specimen: Blood from Arm, Right Updated: 09/29/22 1404     TSH 3RD GENERATON 1 350 uIU/mL     Narrative:      Patients undergoing fluorescein dye angiography may retain small amounts of fluorescein in the body for 48-72 hours post procedure  Samples containing fluorescein can produce falsely depressed TSH values  If the patient had this procedure,a specimen should be resubmitted post fluorescein clearance        CBC and differential [[id number]]  (Abnormal) Collected: 09/29/22 1325    Lab Status: Final result Specimen: Blood from Arm, Right Updated: 09/29/22 1338     WBC 10 88 Thousand/uL      RBC 5 66 Million/uL      Hemoglobin 16 8 g/dL      Hematocrit 51 5 %      MCV 91 fL      MCH 29 7 pg      MCHC 32 6 g/dL      RDW 13 1 %      MPV 9 8 fL      Platelets 059 Thousands/uL      nRBC 0 /100 WBCs      Neutrophils Relative 70 %      Immat GRANS % 0 %      Lymphocytes Relative 22 %      Monocytes Relative 7 %      Eosinophils Relative 0 %      Basophils Relative 1 %      Neutrophils Absolute 7 62 Thousands/µL      Immature Grans Absolute 0 04 Thousand/uL      Lymphocytes Absolute 2 41 Thousands/µL      Monocytes Absolute 0 73 Thousand/µL      Eosinophils Absolute 0 03 Thousand/µL      Basophils Absolute 0 05 Thousands/µL                  No orders to display         Procedures  ECG 12 Lead Documentation Only    Date/Time: 9/29/2022 5:12 PM  Performed by: Brianna Butler DO  Authorized by: Brianna Butler DO     ECG reviewed by me, the ED Provider: yes    Patient location:  ED  Interpretation:     Interpretation: abnormal    Rate:     ECG rate:  82    ECG rate assessment: normal    Rhythm:     Rhythm: atrial fibrillation    Ectopy:     Ectopy: none    QRS:     QRS axis:  Normal  Conduction:     Conduction: normal    ST segments:     ST segments:  Normal  T waves:     T waves: normal            ED Course  ED Course as of 09/29/22 1740   Thu Sep 29, 2022   1339 Hemoglobin: 16 8   1339 WBC(!): 10 88   1410 Patient re-evaluated  Resting comfortably  Asymptomatic at this time  Heart rate high 60s, low 70s  Discussed negative workup  Will discharge  Recommended PCP follow-up  Return precautions discussed  Patient verbalized understanding and agreed to plan of care  Identification of Seniors at [address] [place] [address] Most Recent Value   (ISAR) Identification of Seniors at Risk    Before the illness or injury that brought you to the Emergency, did you need someone to help you on a regular basis? 0 Filed at: 09/29/2022 1149   In the last 24 hours, have you needed more help than usual? 0 Filed at: 09/29/2022 1149   Have you been hospitalized for one or more nights during the past 6 months? 1 Filed at: 09/29/2022 1149   In general, do you see well? 0 Filed at: 09/29/2022 1149   In general, do you have serious problems with your memory? 0 Filed at: 09/29/2022 1149   Do you take more than three different medications every day?  1 Filed at: 09/29/2022 1149   ISAR Score 2 Filed at: 09/29/2022 1149                              MDM  Number of Diagnoses or Management Options  Fatigue  Diagnosis management comments: Patient is a 78 y o  male who presents to the ED for an elevated resting HR (80s, still WNL), as well as intermittent fatigue  Pt non-toxic appearing, vitals WNL  Unclear etiology of elevated resting HR  Differential includes electrolyte abnormality, anemia, UTI     Plan:  Labs, EKG, reassessment                 Portions of the record may have been created with voice recognition software  Occasional wrong word or "sound a like" substitutions may have occurred due to the inherent limitations of voice recognition software  Read the chart carefully and recognize, using context, where substitutions have occurred  Amount and/or Complexity of Data Reviewed  Clinical lab tests: ordered  Tests in the medicine section of CPT®: ordered    Risk of Complications, Morbidity, and/or Mortality  Presenting problems: low  Diagnostic procedures: minimal  Management options: minimal    Patient Progress  Patient progress: stable      Disposition  Final diagnoses:   Fatigue     Time reflects when diagnosis was documented in both MDM as applicable and the Disposition within this note     Time User Action Codes Description Comment    9/29/2022  2:09 PM Vj Richter Add [R53 [id number]] Fatigue       ED Disposition     ED Disposition   Discharge    Condition   Stable    Date/Time   Thu Sep 29, 2022  2:09 PM    [address]  discharge to home/self care                 Follow-up Information     Follow up With Specialties Details Why Contact Info Additional Information    Libby Varma MD [facility]   [address] [facility] [phone]       [address] [place] [address] Emergency Department Emergency Medicine  As needed [address] [phone]  [address] [facility] [address] [facility] Emergency Department, [address], [place], [address]          Discharge Medication List as of 9/29/2022  2:21 PM      CONTINUE these medications which have NOT CHANGED    Details   amLODIPine (NORVASC) 10 mg tablet TAKE 1 TABLET BY MOUTH EVERY DAY, Normal      ascorbic acid (VITAMIN C) 1000 MG tablet Take 1,000 mg by mouth daily, Historical Med      aspirin (ECOTRIN LOW STRENGTH) 81 mg EC tablet Take 81 mg by mouth daily, Historical Med      atenolol (TENORMIN) 100 mg tablet TAKE 1 TABLET BY MOUTH EVERY DAY, Normal      B Complex Vitamins (B COMPLEX 100 PO) Take by mouth, Historical Med      benazepril (LOTENSIN) 20 mg tablet TAKE 1 TABLET BY MOUTH EVERY DAY, Normal      calcium carbonate (OS-LUCA) 600 MG tablet Take 600 mg by mouth daily , Historical Med      Coenzyme Q10 (CO Q 10 PO) Take by mouth daily  , Historical Med      magnesium oxide (MAG-OX) 400 mg tablet Take 400 mg by mouth daily, Historical Med      multivitamin (THERAGRAN) TABS Take 1 tablet by mouth, Starting Wed 12/12/2012, Historical Med      niacin (NIASPAN) 500 mg CR tablet Take 1 tablet (500 mg total) by mouth daily at bedtime, Starting Tue 10/26/2021, Print      Omega-3 Fatty Acids (FISH OIL PO) Take by mouth daily , Historical Med      rivaroxaban (Xarelto) 20 mg tablet Take 1 tablet (20 mg total) by mouth daily with breakfast, Starting Thu 9/15/2022, Until Fri 1/13/2023, Normal      sildenafil (VIAGRA) 50 MG tablet Take 1 tablet (50 mg total) by mouth daily as needed for erectile dysfunction, Starting Tue 1/18/2022, Print      simvastatin (ZOCOR) 40 mg tablet TAKE 1 TABLET BY MOUTH EVERYDAY AT BEDTIME, Normal      tamsulosin (FLOMAX) 0 4 mg TAKE 1 CAPSULE BY MOUTH EVERY DAY WITH DINNER, Normal      traMADol (ULTRAM) 50 mg tablet Take 1 tablet (50 mg total) by mouth every 6 (six) hours as needed for moderate pain, Starting Wed 5/11/2022, Print      triamcinolone (KENALOG) 0 1 % cream APPLY TOPICALLY TWICE A DAY, Normal           No discharge procedures on file  PDMP Review     None           ED Provider  Attending physically available and evaluated Tosha Kumarloc WILSON managed the patient along with the ED Attending      Electronically Signed by         Elder Barros,   09/29/22 5905

## 2022-09-29 NOTE — ED ATTENDING ATTESTATION
9/29/2022  Edouard Kaur DO, saw and evaluated the patient  I have discussed the patient with the resident/non-physician practitioner and agree with the resident's/non-physician practitioner's findings, Plan of Care, and MDM as documented in the resident's/non-physician practitioner's note, except where noted  All available labs and Radiology studies were reviewed  I was present for key portions of any procedure(s) performed by the resident/non-physician practitioner and I was immediately available to provide assistance  At this point I agree with the current assessment done in the Emergency Department  I have conducted an independent evaluation of this patient a history and physical is as follows:    77-year-old male presents for evaluation of generalized fatigue and he noted that his resting heart rate was higher than it normally is  Otherwise offers no other complaints  Patient is asymptomatic at this time  Reviewed history, noted to have AFib diagnosed 2 months ago started on a NOAC  No other complaints  Impression:  Generalized fatigue unclear etiology plan:  EKG, CBC, BMP, UA, TSH, reassess        ED Course         Critical Care Time  Procedures

## 2022-11-06 ENCOUNTER — RA CDI HCC (OUTPATIENT)
Dept: OTHER | Facility: HOSPITAL | Age: 77
End: 2022-11-06

## 2022-11-07 ENCOUNTER — APPOINTMENT (OUTPATIENT)
Dept: LAB | Facility: CLINIC | Age: 77
End: 2022-11-07

## 2022-11-07 DIAGNOSIS — Z12.5 PROSTATE CANCER SCREENING: ICD-10-CM

## 2022-11-07 LAB — PSA SERPL-MCNC: 4.9 NG/ML (ref 0–4)

## 2022-11-14 ENCOUNTER — OFFICE VISIT (OUTPATIENT)
Dept: FAMILY MEDICINE CLINIC | Facility: CLINIC | Age: 77
End: 2022-11-14

## 2022-11-14 VITALS
WEIGHT: 183.6 LBS | OXYGEN SATURATION: 99 % | SYSTOLIC BLOOD PRESSURE: 102 MMHG | DIASTOLIC BLOOD PRESSURE: 80 MMHG | TEMPERATURE: 96.1 F | HEART RATE: 53 BPM | BODY MASS INDEX: 27.83 KG/M2 | HEIGHT: 68 IN

## 2022-11-14 DIAGNOSIS — N13.8 BPH WITH OBSTRUCTION/LOWER URINARY TRACT SYMPTOMS: ICD-10-CM

## 2022-11-14 DIAGNOSIS — R73.03 PREDIABETES: ICD-10-CM

## 2022-11-14 DIAGNOSIS — I48.91 NEW ONSET A-FIB (HCC): ICD-10-CM

## 2022-11-14 DIAGNOSIS — N40.1 BPH WITH OBSTRUCTION/LOWER URINARY TRACT SYMPTOMS: ICD-10-CM

## 2022-11-14 DIAGNOSIS — M16.11 PRIMARY OSTEOARTHRITIS OF RIGHT HIP: ICD-10-CM

## 2022-11-14 DIAGNOSIS — I10 BENIGN ESSENTIAL HYPERTENSION: Primary | ICD-10-CM

## 2022-11-14 DIAGNOSIS — I25.709 ATHEROSCLEROSIS OF CORONARY ARTERY BYPASS GRAFT OF NATIVE HEART WITH ANGINA PECTORIS (HCC): ICD-10-CM

## 2022-11-14 DIAGNOSIS — R91.1 LUNG NODULE SEEN ON IMAGING STUDY: ICD-10-CM

## 2022-11-14 PROBLEM — B34.9 VIRAL INFECTION: Status: RESOLVED | Noted: 2022-01-05 | Resolved: 2022-11-14

## 2022-11-14 RX ORDER — CHLORHEXIDINE GLUCONATE 0.12 MG/ML
RINSE ORAL
COMMUNITY
Start: 2022-08-26 | End: 2022-11-14

## 2022-11-14 RX ORDER — POLYETHYLENE GLYCOL-3350 AND ELECTROLYTES WITH FLAVOR PACK 240; 5.84; 2.98; 6.72; 22.72 G/278.26G; G/278.26G; G/278.26G; G/278.26G; G/278.26G
POWDER, FOR SOLUTION ORAL
COMMUNITY
End: 2022-11-14

## 2022-11-14 RX ORDER — POLYETHYLENE GLYCOL-3350 AND ELECTROLYTES 236; 6.74; 5.86; 2.97; 22.74 G/274.31G; G/274.31G; G/274.31G; G/274.31G; G/274.31G
4000 POWDER, FOR SOLUTION ORAL
COMMUNITY
End: 2022-11-14

## 2022-11-14 RX ORDER — B-COMPLEX WITH VITAMIN C
1 TABLET ORAL DAILY
COMMUNITY
Start: 2022-09-23

## 2022-11-14 RX ORDER — HYDROCODONE BITARTRATE AND ACETAMINOPHEN 5; 325 MG/1; MG/1
1 TABLET ORAL
COMMUNITY
Start: 2022-08-26 | End: 2022-11-14

## 2022-11-14 NOTE — PROGRESS NOTES
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 ([address] )    New onset a-fib (HCC)    Primary osteoarthritis of right hip    BPH with obstruction/lower urinary tract symptoms    Lung nodule seen on imaging study    Other orders  -     calcium carbonate-vitamin D 500 mg-5 mcg per tablet; Take 1 tablet by mouth daily  -     Discontinue: chlorhexidine (PERIDEX) 0 12 % solution; RINSE MOUTH WITH 15ML (1 CAPFUL) FOR 30 SECONDS IN MORNING AND EVENING AFTER BRUSHING, THEN SPIT (Patient not taking: Reported on 11/14/2022)  -     Discontinue: HYDROcodone-acetaminophen (NORCO) 5-325 mg per tablet; Take 1 tablet by mouth every 4 to 6 hours as needed for pain (Patient not taking: Reported on 11/14/2022)  -     Discontinue: polyethylene glycol (GaviLyte-C) 4000 mL solution; Gavilyte-C 240 gram-22 72 gram-6 72 gram-5 84 gram oral solution   TAKE 4000 ML BY ORAL ROUTE  (Patient not taking: Reported on 11/14/2022)  -     Discontinue: polyethylene glycol (GaviLyte-G) 4000 mL solution; 4,000 mL (Patient not taking: Reported on 11/14/2022)          Subjective:      Patient ID: Brian Blood  is a 78 y o  male  PATIENT RETURNS FOR FOLLOW-UP OF CHRONIC MEDICAL CONDITIONS  ANY HOSPITAL VISITS, EMERGENCY VISITS AND OTHER PROVIDER VISITS SINCE LAST TIME WERE REVIEWED  MEDS WERE REVIEWED AND NO SIDE EFFECTS  NO NEW ISSUES  UNLESS NOTED BELOW  NO NEW MEDICAL PROVIDER REPORTED  THE CHRONIC DISEASES LISTED ABOVE ARE STABLE AND UNCHANGED/ THE PLAN OF CARE FOR THOSE WILL REMAIN UNCHANGED UNLESS NOTED BELOW      Awv/sub     Had New onset afib in spring :now on NOAC       The following portions of the patient's history were reviewed and updated as appropriate: allergies, current medications, past family history, past medical history, past social history, past surgical history and problem list     Review of Systems   Constitutional: Negative for activity change and appetite change  HENT: Negative for trouble swallowing  Eyes: Negative for visual disturbance  Respiratory: Negative for cough and shortness of breath  Cardiovascular: Negative for chest pain, palpitations and leg swelling  Gastrointestinal: Negative for abdominal pain and blood in stool  Endocrine: Negative for polyuria  Genitourinary: Negative for difficulty urinating and hematuria  Skin: Negative for rash  Neurological: Negative for dizziness  Psychiatric/Behavioral: Negative for behavioral problems  Objective:  Vitals:    11/14/22 0929   BP: 102/80   BP Location: Left arm   Patient Position: Sitting   Cuff Size: Standard   Pulse: (!) 53   Temp: (!) 96 1 °F (35 6 °C)   TempSrc: Temporal   SpO2: 99%   Weight: 83 3 kg (183 lb 9 6 oz)   Height: 5' 8 25" (1 734 m)      Physical Exam  Constitutional:       Appearance: He is well-developed  HENT:      Head: Normocephalic and atraumatic  Eyes:      Conjunctiva/sclera: Conjunctivae normal    Neck:      Thyroid: No thyromegaly  Cardiovascular:      Rate and Rhythm: Normal rate and regular rhythm  Heart sounds: Normal heart sounds  No murmur heard  Pulmonary:      Effort: Pulmonary effort is normal  No respiratory distress  Breath sounds: Normal breath sounds  Musculoskeletal:      Cervical back: Neck supple  Lymphadenopathy:      Cervical: No cervical adenopathy  Skin:     General: Skin is warm and dry  Psychiatric:         Behavior: Behavior normal            Patient's chronic problems that were reviewed today are stable  Recent hospital stays reviewed  Recent labs and imaging reviewed  Recent visits to other providers reviewed  Meds reviewed and no changes made  Appropriate labs and imaging were ordered  Preventive measures appropriate for age and sex were reviewed with patient  Immunizations were updated as appropriate

## 2022-11-14 NOTE — PATIENT INSTRUCTIONS
Discussed with your cardiologist the possibility of coming off of aspirin  Medicare Preventive Visit Patient Instructions  Thank you for completing your Welcome to Medicare Visit or Medicare Annual Wellness Visit today  Your next wellness visit will be due in one year (11/15/2023)  The screening/preventive services that you may require over the next 5-10 years are detailed below  Some tests may not apply to you based off risk factors and/or age  Screening tests ordered at today's visit but not completed yet may show as past due  Also, please note that scanned in results may not display below  Preventive Screenings:  Service Recommendations Previous Testing/Comments   Colorectal Cancer Screening  · Colonoscopy    · Fecal Occult Blood Test (FOBT)/Fecal Immunochemical Test (FIT)  · Fecal DNA/Cologuard Test  · Flexible Sigmoidoscopy Age: 39-70 years old   Colonoscopy: every 10 years (May be performed more frequently if at higher risk)  OR  FOBT/FIT: every 1 year  OR  Cologuard: every 3 years  OR  Sigmoidoscopy: every 5 years  Screening may be recommended earlier than age 39 if at higher risk for colorectal cancer  Also, an individualized decision between you and your healthcare provider will decide whether screening between the ages of 74-80 would be appropriate   Colonoscopy: 08/04/2022  FOBT/FIT: Not on file  Cologuard: Not on file  Sigmoidoscopy: Not on file    Screening Current  Screening Current     Prostate Cancer Screening Individualized decision between patient and health care provider in men between ages of 53-78   Medicare will cover every 12 months beginning on the day after your 50th birthday PSA: 4 9 ng/mL     Screening Not Indicated  Screening Not Indicated     Hepatitis C Screening Once for adults born between 1945 and 1965  More frequently in patients at high risk for Hepatitis C Hep C Antibody: 04/03/2019    Screening Current  Screening Current   Diabetes Screening 1-2 times per year if you're at risk for diabetes or have pre-diabetes Fasting glucose: 98 mg/dL (6/18/2022)  A1C: 5 6 % (5/21/2022)  Screening Current  Screening Current   Cholesterol Screening Once every 5 years if you don't have a lipid disorder  May order more often based on risk factors  Lipid panel: 06/18/2022  Screening Not Indicated  History Lipid Disorder  Screening Not Indicated  History Lipid Disorder      Other Preventive Screenings Covered by Medicare:  1  Abdominal Aortic Aneurysm (AAA) Screening: covered once if your at risk  You're considered to be at risk if you have a family history of AAA or a male between the age of 73-68 who smoking at least 100 cigarettes in your lifetime  2  Lung Cancer Screening: covers low dose CT scan once per year if you meet all of the following conditions: (1) Age 50-69; (2) No signs or symptoms of lung cancer; (3) Current smoker or have quit smoking within the last 15 years; (4) You have a tobacco smoking history of at least 20 pack years (packs per day x number of years you smoked); (5) You get a written order from a healthcare provider  3  Glaucoma Screening: covered annually if you're considered high risk: (1) You have diabetes OR (2) Family history of glaucoma OR (3)  aged 48 and older OR (3)  American aged 72 and older  3  Osteoporosis Screening: covered every 2 years if you meet one of the following conditions: (1) Have a vertebral abnormality; (2) On glucocorticoid therapy for more than 3 months; (3) Have primary hyperparathyroidism; (4) On osteoporosis medications and need to assess response to drug therapy  5  HIV Screening: covered annually if you're between the age of 12-76  Also covered annually if you are younger than 13 and older than 72 with risk factors for HIV infection  For pregnant patients, it is covered up to 3 times per pregnancy      Immunizations:  Immunization Recommendations   Influenza Vaccine Annual influenza vaccination during flu season is recommended for all persons aged >= 6 months who do not have contraindications   Pneumococcal Vaccine   * Pneumococcal conjugate vaccine = PCV13 (Prevnar 13), PCV15 (Vaxneuvance), PCV20 (Prevnar 20)  * Pneumococcal polysaccharide vaccine = PPSV23 (Pneumovax) Adults 2364 years old: 1-3 doses may be recommended based on certain risk factors  Adults 72 years old: 1-2 doses may be recommended based off what pneumonia vaccine you previously received   Hepatitis B Vaccine 3 dose series if at intermediate or high risk (ex: diabetes, end stage renal disease, liver disease)   Tetanus (Td) Vaccine - COST NOT COVERED BY MEDICARE PART B Following completion of primary series, a booster dose should be given every 10 years to maintain immunity against tetanus  Td may also be given as tetanus wound prophylaxis  Tdap Vaccine - COST NOT COVERED BY MEDICARE PART B Recommended at least once for all adults  For pregnant patients, recommended with each pregnancy  Shingles Vaccine (Shingrix) - COST NOT COVERED BY MEDICARE PART B  2 shot series recommended in those aged 48 and above     Health Maintenance Due:      Topic Date Due   • Colorectal Cancer Screening  06/27/2027   • Hepatitis C Screening  Completed     Immunizations Due:  There are no preventive care reminders to display for this patient  Advance Directives   What are advance directives? Advance directives are legal documents that state your wishes and plans for medical care  These plans are made ahead of time in case you lose your ability to make decisions for yourself  Advance directives can apply to any medical decision, such as the treatments you want, and if you want to donate organs  What are the types of advance directives? There are many types of advance directives, and each state has rules about how to use them  You may choose a combination of any of the following:  · Living will: This is a written record of the treatment you want   You can also choose which treatments you do not want, which to limit, and which to stop at a certain time  This includes surgery, medicine, IV fluid, and tube feedings  · Durable power of  for healthcare [place] [facility]): This is a written record that states who you want to make healthcare choices for you when you are unable to make them for yourself  This person, called a proxy, is usually a family member or a friend  You may choose more than 1 proxy  · Do not resuscitate (DNR) order:  A DNR order is used in case your heart stops beating or you stop breathing  It is a request not to have certain forms of treatment, such as CPR  A DNR order may be included in other types of advance directives  · Medical directive: This covers the care that you want if you are in a coma, near death, or unable to make decisions for yourself  You can list the treatments you want for each condition  Treatment may include pain medicine, surgery, blood transfusions, dialysis, IV or tube feedings, and a ventilator (breathing machine)  · Values history: This document has questions about your views, beliefs, and how you feel and think about life  This information can help others choose the care that you would choose  Why are advance directives important? An advance directive helps you control your care  Although spoken wishes may be used, it is better to have your wishes written down  Spoken wishes can be misunderstood, or not followed  Treatments may be given even if you do not want them  An advance directive may make it easier for your family to make difficult choices about your care  Weight Management   Why it is important to manage your weight:  Being overweight increases your risk of health conditions such as heart disease, high blood pressure, type 2 diabetes, and certain types of cancer  It can also increase your risk for osteoarthritis, sleep apnea, and other respiratory problems  Aim for a slow, steady weight loss   Even a small amount of weight loss can lower your risk of health problems  How to lose weight safely:  A safe and healthy way to lose weight is to eat fewer calories and get regular exercise  You can lose up about 1 pound a week by decreasing the number of calories you eat by 500 calories each day  Healthy meal plan for weight management:  A healthy meal plan includes a variety of foods, contains fewer calories, and helps you stay healthy  A healthy meal plan includes the following:  · Eat whole-grain foods more often  A healthy meal plan should contain fiber  Fiber is the part of grains, fruits, and vegetables that is not broken down by your body  Whole-grain foods are healthy and provide extra fiber in your diet  Some examples of whole-grain foods are whole-wheat breads and pastas, oatmeal, brown rice, and bulgur  · Eat a variety of vegetables every day  Include dark, leafy greens such as spinach, kale, gino greens, and mustard greens  Eat yellow and orange vegetables such as carrots, sweet potatoes, and winter squash  · Eat a variety of fruits every day  Choose fresh or canned fruit (canned in its own juice or light syrup) instead of juice  Fruit juice has very little or no fiber  · Eat low-fat dairy foods  Drink fat-free (skim) milk or 1% milk  Eat fat-free yogurt and low-fat cottage cheese  Try low-fat cheeses such as mozzarella and other reduced-fat cheeses  · Choose meat and other protein foods that are low in fat  Choose beans or other legumes such as split peas or lentils  Choose fish, skinless poultry (chicken or turkey), or lean cuts of red meat (beef or pork)  Before you cook meat or poultry, cut off any visible fat  · Use less fat and oil  Try baking foods instead of frying them  Add less fat, such as margarine, sour cream, regular salad dressing and mayonnaise to foods  Eat fewer high-fat foods  Some examples of high-fat foods include french fries, doughnuts, ice cream, and cakes  · Eat fewer sweets    Limit foods and drinks that are high in sugar  This includes candy, cookies, regular soda, and sweetened drinks  Exercise:  Exercise at least 30 minutes per day on most days of the week  Some examples of exercise include walking, biking, dancing, and swimming  You can also fit in more physical activity by taking the stairs instead of the elevator or parking farther away from stores  Ask your healthcare provider about the best exercise plan for you  © Copyright [employer] 2018 Information is for End User's use only and may not be sold, redistributed or otherwise used for commercial purposes   All illustrations and images included in CareNotes® are the copyrighted property of [employer]  or [address] [place] [address]

## 2022-11-14 NOTE — PROGRESS NOTES
Assessment and Plan:     Problem List Items Addressed This Visit    None          Preventive health issues were discussed with patient, and age appropriate screening tests were ordered as noted in patient's After Visit Summary  Personalized health advice and appropriate referrals for health education or preventive services given if needed, as noted in patient's After Visit Summary       History of Present Illness:     Patient presents for a Medicare Wellness Visit    HPI   Patient Care Team:  Cecelia Donnelly MD as PCP - General  Stacey Lowery MD as Endoscopist     Review of Systems:     Review of Systems     Problem List:     Patient Active Problem List   Diagnosis   • Benign essential hypertension   • Chronic coronary artery disease   • Elevated PSA   • Male erectile disorder   • Hyperlipidemia   • Primary osteoarthritis of right hip   • BPH with obstruction/lower urinary tract symptoms   • Lung nodule seen on imaging study   • 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 ([facility] )   • Viral infection   • New onset a-fib [facility])   • Prediabetes      Past Medical and Surgical History:     Past Medical History:   Diagnosis Date   • [facility]   • Arthritis    • Atypical chest pain 03/05/2008   • Basal cell carcinoma 2009   • Coronary artery disease    • Diverticulosis 2008   • Hematuria, microscopic    • History of varicose veins 2008   • Hyperlipidemia    • Hypertension    • Myocardial infarction [facility])    • Nocturia    • 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    • Urinary tract infection      Past Surgical History:   Procedure Laterality Date   • COLONOSCOPY     • CORONARY ANGIOPLASTY      right CA x 3 vessels: redo PTCA-LAD 10/04   • CORONARY STENT PLACEMENT  01/01/2002   • JOINT REPLACEMENT      R hip   • TN COLONOSCOPY FLX DX W/COLLJ SPEC WHEN PFRMD N/A 6/27/2017    Procedure: COLONOSCOPY;  Surgeon: Stacey Lowery MD;  Location: [facility]; Service: Colorectal   • TOTAL HIP ARTHROPLASTY Right    • TOTAL HIP ARTHROPLASTY Left 2019      Family History:     Family History   Problem Relation Age of Onset   • Aneurysm Father         post op AAA repair   • Hypertension Sister    • Hypertension Brother    • Hypertension Mother    • Diabetes Mother       Social History:     Social History     Socioeconomic History   • Marital status: /Civil Union     Spouse name: None   • Number of children: None   • Years of education: None   • Highest education level: None   Occupational History   • None   Tobacco Use   • Smoking status: Former Smoker     Packs/day: 0 50     Years: 5 00     Pack years: 2 50     Types: Cigarettes     Start date: 1967     Quit date: 1970     Years since quittin 9   • Smokeless tobacco: Former User   • Tobacco comment: quit    0 5 packs/2 00 pack years   Vaping Use   • Vaping Use: Never used   Substance and Sexual Activity   • Alcohol use: No   • Drug use: No   • Sexual activity: Yes     Partners: Female     Birth control/protection: None   Other Topics Concern   • None   Social History Narrative   • None     Social Determinants of Health     Financial Resource Strain: Low Risk    • Difficulty of Paying Living Expenses: Not hard at all   Food Insecurity: Not on file   Transportation Needs: No Transportation Needs   • Lack of Transportation (Medical): No   • Lack of Transportation (Non-Medical):  No   Physical Activity: Not on file   Stress: Not on file   Social Connections: Not on file   Intimate Partner Violence: Not on file   Housing Stability: Unknown   • Unable to Pay for Housing in the Last Year: Patient refused   • Number of Places Lived in the Last Year: Not on file   • Unstable Housing in the Last Year: Patient refused      Medications and Allergies:     Current Outpatient Medications   Medication Sig Dispense Refill   • amLODIPine (NORVASC) 10 mg tablet TAKE 1 TABLET BY MOUTH EVERY DAY 90 tablet 3   • ascorbic acid (VITAMIN C) 1000 MG tablet Take 1,000 mg by mouth daily     • aspirin (ECOTRIN LOW STRENGTH) 81 mg EC tablet Take 81 mg by mouth daily     • atenolol (TENORMIN) 100 mg tablet TAKE 1 TABLET BY MOUTH EVERY DAY 90 tablet 3   • B Complex Vitamins (B COMPLEX 100 PO) Take by mouth     • benazepril (LOTENSIN) 20 mg tablet TAKE 1 TABLET BY MOUTH EVERY DAY 90 tablet 3   • calcium carbonate (OS-LUCA) 600 MG tablet Take 600 mg by mouth daily      • calcium carbonate-vitamin D 500 mg-5 mcg per tablet Take 1 tablet by mouth daily     • magnesium oxide (MAG-OX) 400 mg tablet Take 400 mg by mouth daily     • multivitamin (THERAGRAN) TABS Take 1 tablet by mouth     • niacin (NIASPAN) 500 mg CR tablet Take 1 tablet (500 mg total) by mouth daily at bedtime 90 tablet 3   • Omega-3 Fatty Acids (FISH OIL PO) Take by mouth daily      • rivaroxaban (Xarelto) 20 mg tablet Take 1 tablet (20 mg total) by mouth daily with breakfast 90 tablet 3   • sildenafil (VIAGRA) 50 MG tablet Take 1 tablet (50 mg total) by mouth daily as needed for erectile dysfunction 10 tablet 6   • simvastatin (ZOCOR) 40 mg tablet TAKE 1 TABLET BY MOUTH EVERYDAY AT BEDTIME 90 tablet 3   • tamsulosin (FLOMAX) 0 4 mg TAKE 1 CAPSULE BY MOUTH EVERY DAY WITH DINNER 90 capsule 3   • chlorhexidine (PERIDEX) 0 12 % solution RINSE MOUTH WITH 15ML (1 CAPFUL) FOR 30 SECONDS IN MORNING AND EVENING AFTER BRUSHING, THEN SPIT (Patient not taking: Reported on 11/14/2022)     • Coenzyme Q10 (CO Q 10 PO) Take by mouth daily   (Patient not taking: Reported on 11/14/2022)     • HYDROcodone-acetaminophen (NORCO) 5-325 mg per tablet Take 1 tablet by mouth every 4 to 6 hours as needed for pain (Patient not taking: Reported on 11/14/2022)     • polyethylene glycol (GaviLyte-C) 4000 mL solution Gavilyte-C 240 gram-22 72 gram-6 72 gram-5 84 gram oral solution   TAKE 4000 ML BY ORAL ROUTE  (Patient not taking: Reported on 11/14/2022)     • polyethylene glycol (GaviLyte-G) 4000 mL solution 4,000 mL (Patient not taking: Reported on 11/14/2022)     • traMADol (ULTRAM) 50 mg tablet Take 1 tablet (50 mg total) by mouth every 6 (six) hours as needed for moderate pain (Patient not taking: Reported on 11/14/2022) 20 tablet 0   • triamcinolone (KENALOG) 0 1 % cream APPLY TOPICALLY TWICE A DAY (Patient not taking: Reported on 11/14/2022) 30 g 3     No current facility-administered medications for this visit  Allergies   Allergen Reactions   • No Active Allergies    • Other       Immunizations:     Immunization History   Administered Date(s) Administered   • COVID-19 MODERNA VACC 0 5 ML IM 10/21/2022   • COVID-19 PFIZER VACCINE 0 3 ML IM 02/13/2021, 03/06/2021, 10/06/2021   • H1N1, All Formulations 02/24/2010   • INFLUENZA 09/15/2017, 10/11/2022   • Influenza Split High Dose Preservative Free IM 10/15/2014, 10/19/2016, 09/15/2017   • Influenza, high dose seasonal 0 7 mL 09/05/2018, 10/14/2019, 10/22/2020, 11/10/2021   • Influenza, seasonal, injectable 01/01/2007, 06/30/2008, 11/10/2008, 11/24/2009, 10/24/2011, 11/04/2012   • Pneumococcal Conjugate 13-Valent 02/18/2015   • Pneumococcal Polysaccharide PPV23 02/07/2012   • Tdap 10/12/2012   • Zoster 11/18/2013   • Zoster Vaccine Recombinant 12/27/2019, 06/24/2020      Health Maintenance:         Topic Date Due   • Colorectal Cancer Screening  06/27/2027   • Hepatitis C Screening  Completed     There are no preventive care reminders to display for this patient  Medicare Screening Tests and Risk Assessments:     Nataly Mitchell is here for his Subsequent Wellness visit  Health Risk Assessment:   Patient rates overall health as very good  Patient feels that their physical health rating is same  Patient is satisfied with their life  Eyesight was rated as same  Hearing was rated as same  Patient feels that their emotional and mental health rating is same  Patients states they are never, rarely angry   Patient states they are never, rarely unusually tired/fatigued  Pain experienced in the last 7 days has been none  Patient states that he has experienced no weight loss or gain in last 6 months  Depression Screening:   PHQ-2 Score: 0      Fall Risk Screening: In the past year, patient has experienced: no history of falling in past year      Home Safety:  Patient does not have trouble with stairs inside or outside of their home  Patient has working smoke alarms and has no working carbon monoxide detector  Home safety hazards include: none  Nutrition:   Current diet is Low Saturated Fat and No Added Salt  Medications:   Patient is not currently taking any over-the-counter supplements  Patient is able to manage medications  Activities of Daily Living (ADLs)/Instrumental Activities of Daily Living (IADLs):   Walk and transfer into and out of bed and chair?: Yes  Dress and groom yourself?: Yes    Bathe or shower yourself?: Yes    Feed yourself?  Yes  Do your laundry/housekeeping?: Yes  Manage your money, pay your bills and track your expenses?: Yes  Make your own meals?: Yes    Do your own shopping?: Yes    Durable Medical Equipment Suppliers  N/A    Previous Hospitalizations:   Any hospitalizations or ED visits within the last 12 months?: Yes    How many hospitalizations have you had in the last year?: 1-2    Advance Care Planning:   Living will: No    Durable POA for healthcare: No    Advanced directive: No      PREVENTIVE SCREENINGS      Cardiovascular Screening:    General: Screening Not Indicated and History Lipid Disorder      Diabetes Screening:     General: Screening Current      Colorectal Cancer Screening:     General: Screening Current      Prostate Cancer Screening:    General: Screening Not Indicated      Abdominal Aortic Aneurysm (AAA) Screening:    Risk factors include: age between 73-69 yo and tobacco use        Lung Cancer Screening:     General: Screening Not Indicated      Hepatitis C Screening:    General: Screening Current    Screening, Brief Intervention, and Referral to Treatment (SBIRT)    Screening  Typical number of drinks in a day: 0  Typical number of drinks in a week: 0  Interpretation: Low risk drinking behavior  AUDIT-C Screenin) How often did you have a drink containing alcohol in the past year? never  2) How many drinks did you have on a typical day when you were drinking in the past year? 0  3) How often did you have 6 or more drinks on one occasion in the past year? never    AUDIT-C Score: 0  Interpretation: Score 0-3 (male): Negative screen for alcohol misuse    Single Item Drug Screening:  How often have you used an illegal drug (including marijuana) or a prescription medication for non-medical reasons in the past year? never    Single Item Drug Screen Score: 0  Interpretation: Negative screen for possible drug use disorder    No exam data present     Physical Exam:     There were no vitals taken for this visit      Physical Exam     Cecelia Donnelly MD

## 2022-11-14 NOTE — PROGRESS NOTES
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 ([facility] [address] )    New onset a-fib ([facility] [address] )       Musculoskeletal and Integument    Primary osteoarthritis of right hip       Genitourinary    BPH with obstruction/lower urinary tract symptoms       Other    Lung nodule seen on imaging study    Prediabetes     Recent b/w OK                 Preventive health issues were discussed with patient, and age appropriate screening tests were ordered as noted in patient's After Visit Summary  Personalized health advice and appropriate referrals for health education or preventive services given if needed, as noted in patient's After Visit Summary       History of Present Illness:     Patient presents for a Medicare Wellness Visit    HPI   Patient Care Team:  Araceli Berry MD as PCP - General  Audra Stafford MD as Endoscopist     Review of Systems:     Review of Systems     Problem List:     Patient Active Problem List   Diagnosis   • Benign essential hypertension   • Chronic coronary artery disease   • Elevated PSA   • Male erectile disorder   • Hyperlipidemia   • Primary osteoarthritis of right hip   • BPH with obstruction/lower urinary tract symptoms   • Lung nodule seen on imaging study   • 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 ([facility] [address] )   • New onset a-fib [facility])   • Prediabetes      Past Medical and Surgical History:     Past Medical History:   Diagnosis Date   • Acute MI ([facility] [address] ) 06/2002   • Arthritis    • Atypical chest pain 03/05/2008   • Basal cell carcinoma 2009   • Coronary artery disease    • Diverticulosis 2008   • Hematuria, microscopic    • History of varicose veins 2008   • Hyperlipidemia    • Hypertension    • Myocardial infarction [facility])    • Nocturia    • 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    • Urinary tract infection      Past Surgical History:   Procedure Laterality Date   • COLONOSCOPY     • CORONARY ANGIOPLASTY      right CA x 3 vessels: redo PTCA-LAD 10/04   • CORONARY STENT PLACEMENT  2002   • JOINT REPLACEMENT      R hip   • [place] COLONOSCOPY FLX DX W/COLLJ SPEC WHEN PFRMD N/A 2017    Procedure: COLONOSCOPY;  Surgeon: Mukund Lombardi MD;  Location: [facility]; Service: Colorectal   • TOTAL HIP ARTHROPLASTY Right    • TOTAL HIP ARTHROPLASTY Left 2019      Family History:     Family History   Problem Relation Age of Onset   • Aneurysm Father         post op AAA repair   • Hypertension Sister    • Hypertension Brother    • Hypertension Mother    • Diabetes Mother       Social History:     Social History     Socioeconomic History   • Marital status: /Civil Union     Spouse name: None   • Number of children: None   • Years of education: None   • Highest education level: None   Occupational History   • None   Tobacco Use   • Smoking status: Former Smoker     Packs/day: 0 50     Years: 5 00     Pack years: 2 50     Types: Cigarettes     Start date: 1967     Quit date: 1970     Years since quittin 9   • Smokeless tobacco: Former User   • Tobacco comment: quit    0 5 packs/2 00 pack years   Vaping Use   • Vaping Use: Never used   Substance and Sexual Activity   • Alcohol use: No   • Drug use: No   • Sexual activity: Yes     Partners: Female     Birth control/protection: None   Other Topics Concern   • None   Social History Narrative   • None     Social Determinants of Health     Financial Resource Strain: Low Risk    • Difficulty of Paying Living Expenses: Not hard at all   Food Insecurity: Not on file   Transportation Needs: No Transportation Needs   • Lack of Transportation (Medical): No   • Lack of Transportation (Non-Medical):  No   Physical Activity: Not on file   Stress: Not on file   Social Connections: Not on file   Intimate Partner Violence: Not on file   Housing Stability: Unknown   • Unable to Pay for Housing in the Last Year: Patient refused   • Number of Places Lived in the Last Year: Not on file   • Unstable Housing in the Last Year: Patient refused      Medications and Allergies:     Current Outpatient Medications   Medication Sig Dispense Refill   • amLODIPine (NORVASC) 10 mg tablet TAKE 1 TABLET BY MOUTH EVERY DAY 90 tablet 3   • ascorbic acid (VITAMIN C) 1000 MG tablet Take 1,000 mg by mouth daily     • aspirin (ECOTRIN LOW STRENGTH) 81 mg EC tablet Take 81 mg by mouth daily     • atenolol (TENORMIN) 100 mg tablet TAKE 1 TABLET BY MOUTH EVERY DAY 90 tablet 3   • B Complex Vitamins (B COMPLEX 100 PO) Take by mouth     • benazepril (LOTENSIN) 20 mg tablet TAKE 1 TABLET BY MOUTH EVERY DAY 90 tablet 3   • calcium carbonate (OS-LUCA) 600 MG tablet Take 600 mg by mouth daily      • calcium carbonate-vitamin D 500 mg-5 mcg per tablet Take 1 tablet by mouth daily     • magnesium oxide (MAG-OX) 400 mg tablet Take 400 mg by mouth daily     • multivitamin (THERAGRAN) TABS Take 1 tablet by mouth     • niacin (NIASPAN) 500 mg CR tablet Take 1 tablet (500 mg total) by mouth daily at bedtime 90 tablet 3   • Omega-3 Fatty Acids (FISH OIL PO) Take by mouth daily      • rivaroxaban (Xarelto) 20 mg tablet Take 1 tablet (20 mg total) by mouth daily with breakfast 90 tablet 3   • sildenafil (VIAGRA) 50 MG tablet Take 1 tablet (50 mg total) by mouth daily as needed for erectile dysfunction 10 tablet 6   • simvastatin (ZOCOR) 40 mg tablet TAKE 1 TABLET BY MOUTH EVERYDAY AT BEDTIME 90 tablet 3   • tamsulosin (FLOMAX) 0 4 mg TAKE 1 CAPSULE BY MOUTH EVERY DAY WITH DINNER 90 capsule 3   • triamcinolone (KENALOG) 0 1 % cream APPLY TOPICALLY TWICE A DAY (Patient not taking: Reported on 11/14/2022) 30 g 3     No current facility-administered medications for this visit       Allergies   Allergen Reactions   • No Active Allergies    • Other       Immunizations:     Immunization History   Administered Date(s) Administered   • COVID-19 MODERNA VACC 0 5 ML IM 10/21/2022   • COVID-19 PFIZER VACCINE 0 3 ML IM 02/13/2021, 03/06/2021, 10/06/2021   • H1N1, All Formulations 02/24/2010   • INFLUENZA 09/15/2017, 10/11/2022   • Influenza Split High Dose Preservative Free IM 10/15/2014, 10/19/2016, 09/15/2017   • Influenza, high dose seasonal 0 7 mL 09/05/2018, 10/14/2019, 10/22/2020, 11/10/2021   • Influenza, seasonal, injectable 01/01/2007, 06/30/2008, 11/10/2008, 11/24/2009, 10/24/2011, 11/04/2012   • Pneumococcal Conjugate 13-Valent 02/18/2015   • Pneumococcal Polysaccharide PPV23 02/07/2012   • Tdap 10/12/2012   • Zoster 11/18/2013   • Zoster Vaccine Recombinant 12/27/2019, 06/24/2020      Health Maintenance:         Topic Date Due   • Colorectal Cancer Screening  06/27/2027   • Hepatitis C Screening  Completed     There are no preventive care reminders to display for this patient  Medicare Screening Tests and Risk Assessments:     Armin Atwood is here for his Subsequent Wellness visit  Last Medicare Wellness visit information reviewed, patient interviewed, no change since last AWV  Health Risk Assessment:   Patient rates overall health as very good  Patient feels that their physical health rating is same  Patient is satisfied with their life  Eyesight was rated as same  Hearing was rated as same  Patient feels that their emotional and mental health rating is same  Patients states they are never, rarely angry  Patient states they are never, rarely unusually tired/fatigued  Pain experienced in the last 7 days has been none  Patient states that he has experienced no weight loss or gain in last 6 months  Depression Screening:   PHQ-2 Score: 0      Fall Risk Screening: In the past year, patient has experienced: no history of falling in past year      Home Safety:  Patient does not have trouble with stairs inside or outside of their home   Patient has working smoke alarms and has no working carbon monoxide detector  Home safety hazards include: none  Nutrition:   Current diet is Low Saturated Fat and No Added Salt  Medications:   Patient is not currently taking any over-the-counter supplements  Patient is able to manage medications  Activities of Daily Living (ADLs)/Instrumental Activities of Daily Living (IADLs):   Walk and transfer into and out of bed and chair?: Yes  Dress and groom yourself?: Yes    Bathe or shower yourself?: Yes    Feed yourself? Yes  Do your laundry/housekeeping?: Yes  Manage your money, pay your bills and track your expenses?: Yes  Make your own meals?: Yes    Do your own shopping?: Yes    Durable Medical Equipment Suppliers  N/A    Previous Hospitalizations:   Any hospitalizations or ED visits within the last 12 months?: Yes    How many hospitalizations have you had in the last year?: 1-2    Hospitalization Comments: Reviewed other health providers     Advance Care Planning:   Living will: No    Durable POA for healthcare: No    Advanced directive: No      Comments: Has papers and we reviewed the issues     Cognitive Screening:   Provider or family/friend/caregiver concerned regarding cognition?: No    PREVENTIVE SCREENINGS      Cardiovascular Screening:    General: Screening Not Indicated and History Lipid Disorder      Diabetes Screening:     General: Screening Current      Colorectal Cancer Screening:     General: Screening Current      Prostate Cancer Screening:    General: Screening Not Indicated      Abdominal Aortic Aneurysm (AAA) Screening:    Risk factors include: age between 73-69 yo and tobacco use        Lung Cancer Screening:     General: Screening Not Indicated      Hepatitis C Screening:    General: Screening Current    Screening, Brief Intervention, and Referral to Treatment (SBIRT)    Screening  Typical number of drinks in a day: 0  Typical number of drinks in a week: 0  Interpretation: Low risk drinking behavior      AUDIT-C Screenin) How often did you have a drink containing alcohol in the past year? never  2) How many drinks did you have on a typical day when you were drinking in the past year? 0  3) How often did you have 6 or more drinks on one occasion in the past year? never    AUDIT-C Score: 0  Interpretation: Score 0-3 (male): Negative screen for alcohol misuse    Single Item Drug Screening:  How often have you used an illegal drug (including marijuana) or a prescription medication for non-medical reasons in the past year? never    Single Item Drug Screen Score: 0  Interpretation: Negative screen for possible drug use disorder    Brief Intervention  Alcohol & drug use screenings were reviewed  No concerns regarding substance use disorder identified       No exam data present     Physical Exam:     /80 (BP Location: Left arm, Patient Position: Sitting, Cuff Size: Standard)   Pulse (!) 53   Temp (!) 96 1 °F (35 6 °C) (Temporal)   Ht 5' 8 25" (1 734 m)   Wt 83 3 kg (183 lb 9 6 oz)   SpO2 99%   BMI 27 71 kg/m²     Physical Exam     Lillie Membreno MD

## 2022-11-23 DIAGNOSIS — I10 ESSENTIAL HYPERTENSION: ICD-10-CM

## 2022-11-23 DIAGNOSIS — E78.00 HIGH CHOLESTEROL: ICD-10-CM

## 2022-11-23 RX ORDER — SIMVASTATIN 40 MG
TABLET ORAL
Qty: 90 TABLET | Refills: 3 | Status: SHIPPED | OUTPATIENT
Start: 2022-11-23

## 2022-11-23 RX ORDER — ATENOLOL 100 MG/1
TABLET ORAL
Qty: 90 TABLET | Refills: 3 | Status: SHIPPED | OUTPATIENT
Start: 2022-11-23

## 2022-12-03 ENCOUNTER — APPOINTMENT (OUTPATIENT)
Dept: LAB | Facility: CLINIC | Age: 77
End: 2022-12-03

## 2022-12-03 DIAGNOSIS — I48.0 PAROXYSMAL ATRIAL FIBRILLATION (HCC): ICD-10-CM

## 2022-12-03 DIAGNOSIS — E78.49 FAMILIAL COMBINED HYPERLIPIDEMIA: ICD-10-CM

## 2022-12-03 DIAGNOSIS — I10 ESSENTIAL HYPERTENSION, MALIGNANT: ICD-10-CM

## 2022-12-03 LAB
ALBUMIN SERPL BCP-MCNC: 3.6 G/DL (ref 3.5–5)
ALP SERPL-CCNC: 75 U/L (ref 46–116)
ALT SERPL W P-5'-P-CCNC: 30 U/L (ref 12–78)
ANION GAP SERPL CALCULATED.3IONS-SCNC: 5 MMOL/L (ref 4–13)
AST SERPL W P-5'-P-CCNC: 22 U/L (ref 5–45)
BILIRUB SERPL-MCNC: 1.29 MG/DL (ref 0.2–1)
BUN SERPL-MCNC: 12 MG/DL (ref 5–25)
CALCIUM SERPL-MCNC: 9.3 MG/DL (ref 8.3–10.1)
CHLORIDE SERPL-SCNC: 103 MMOL/L (ref 96–108)
CHOLEST SERPL-MCNC: 123 MG/DL
CO2 SERPL-SCNC: 26 MMOL/L (ref 21–32)
CREAT SERPL-MCNC: 0.86 MG/DL (ref 0.6–1.3)
ERYTHROCYTE [DISTWIDTH] IN BLOOD BY AUTOMATED COUNT: 13.6 % (ref 11.6–15.1)
GFR SERPL CREATININE-BSD FRML MDRD: 84 ML/MIN/1.73SQ M
GLUCOSE P FAST SERPL-MCNC: 104 MG/DL (ref 65–99)
HCT VFR BLD AUTO: 51.4 % (ref 36.5–49.3)
HDLC SERPL-MCNC: 45 MG/DL
HGB BLD-MCNC: 16.2 G/DL (ref 12–17)
LDLC SERPL CALC-MCNC: 58 MG/DL (ref 0–100)
MCH RBC QN AUTO: 28.3 PG (ref 26.8–34.3)
MCHC RBC AUTO-ENTMCNC: 31.5 G/DL (ref 31.4–37.4)
MCV RBC AUTO: 90 FL (ref 82–98)
NONHDLC SERPL-MCNC: 78 MG/DL
PLATELET # BLD AUTO: 205 THOUSANDS/UL (ref 149–390)
PMV BLD AUTO: 10.4 FL (ref 8.9–12.7)
POTASSIUM SERPL-SCNC: 4 MMOL/L (ref 3.5–5.3)
PROT SERPL-MCNC: 7.9 G/DL (ref 6.4–8.4)
RBC # BLD AUTO: 5.72 MILLION/UL (ref 3.88–5.62)
SODIUM SERPL-SCNC: 134 MMOL/L (ref 135–147)
T4 FREE SERPL-MCNC: 1.04 NG/DL (ref 0.76–1.46)
TRIGL SERPL-MCNC: 101 MG/DL
TSH SERPL DL<=0.05 MIU/L-ACNC: 1.65 UIU/ML (ref 0.45–4.5)
WBC # BLD AUTO: 5.98 THOUSAND/UL (ref 4.31–10.16)

## 2022-12-30 DIAGNOSIS — N40.0 BENIGN PROSTATIC HYPERPLASIA WITHOUT LOWER URINARY TRACT SYMPTOMS: ICD-10-CM

## 2023-01-01 RX ORDER — TAMSULOSIN HYDROCHLORIDE 0.4 MG/1
CAPSULE ORAL
Qty: 90 CAPSULE | Refills: 3 | Status: SHIPPED | OUTPATIENT
Start: 2023-01-01

## 2023-01-06 ENCOUNTER — OFFICE VISIT (OUTPATIENT)
Dept: UROLOGY | Facility: MEDICAL CENTER | Age: 78
End: 2023-01-06

## 2023-01-06 VITALS
DIASTOLIC BLOOD PRESSURE: 72 MMHG | HEIGHT: 68 IN | HEART RATE: 57 BPM | OXYGEN SATURATION: 97 % | BODY MASS INDEX: 28.64 KG/M2 | SYSTOLIC BLOOD PRESSURE: 120 MMHG | WEIGHT: 189 LBS

## 2023-01-06 DIAGNOSIS — N13.8 BPH WITH OBSTRUCTION/LOWER URINARY TRACT SYMPTOMS: Primary | ICD-10-CM

## 2023-01-06 DIAGNOSIS — N40.1 BPH WITH OBSTRUCTION/LOWER URINARY TRACT SYMPTOMS: Primary | ICD-10-CM

## 2023-01-06 DIAGNOSIS — R97.20 ELEVATED PSA: ICD-10-CM

## 2023-01-06 DIAGNOSIS — N52.9 MALE ERECTILE DISORDER: ICD-10-CM

## 2023-01-06 LAB
POST-VOID RESIDUAL VOLUME, ML POC: 114 ML
SL AMB  POCT GLUCOSE, UA: ABNORMAL
SL AMB LEUKOCYTE ESTERASE,UA: ABNORMAL
SL AMB POCT BILIRUBIN,UA: ABNORMAL
SL AMB POCT BLOOD,UA: ABNORMAL
SL AMB POCT CLARITY,UA: CLEAR
SL AMB POCT COLOR,UA: YELLOW
SL AMB POCT KETONES,UA: ABNORMAL
SL AMB POCT NITRITE,UA: ABNORMAL
SL AMB POCT PH,UA: 6
SL AMB POCT SPECIFIC GRAVITY,UA: 1.01
SL AMB POCT URINE PROTEIN: ABNORMAL
SL AMB POCT UROBILINOGEN: 0.2

## 2023-01-06 RX ORDER — SILDENAFIL 50 MG/1
50 TABLET, FILM COATED ORAL DAILY PRN
Qty: 20 TABLET | Refills: 6 | Status: SHIPPED | OUTPATIENT
Start: 2023-01-06

## 2023-01-06 RX ORDER — SILDENAFIL 50 MG/1
50 TABLET, FILM COATED ORAL DAILY PRN
Qty: 10 TABLET | Refills: 6 | Status: SHIPPED | OUTPATIENT
Start: 2023-01-06 | End: 2023-01-06

## 2023-01-06 NOTE — PROGRESS NOTES
1/6/2023      Chief Complaint   Patient presents with   • Follow-up     Annual follow up/ PSA Screen 4 9   • Elevated PSA     4 9   • Benign Prostatic Hypertrophy     Assessment and Plan    78 y o  male managed by Dr Churchill Lillian    1  Benign prostatic hyperplasia with lower urinary tract symptoms  ? continue tamsulosin  ?  discussed the need to maintain adequate hydration avoiding bladder irritating foods beverages to decrease irritative symptoms  ?  will continue to monitor for worsening/progression of urinary symptoms  ? bladder scan  ml  ? Urine dip- in office unremakable   ?  follow up in the office on 1 with bladder scan PVR     2   Elevated PSA  ? PSA performed 11/7/2022 result of 4 9  ? JUAN LUIS-prostate approximate 40 to 45 g with no nodules  Smooth, symmetrical   Nontender  ?  will repeat PSA and JUAN LUIS in 1 year with follow-up in the office at that time    3  Erectile Dysfunction   · Sildenafil-prescription refill provided      History of Present Illness  Xenia Alejadnre  is a 78 y o  male here for follow up evaluation of  urinary symptoms secondary to benign prostatic hyperplasia   Patient is also here for evaluation of prostate cancer screening   Patient has been managed on tamsulosin with good results   He denies side effects of medication   He currently reports continued control of urinary symptoms with no complaints   He reports his urinary stream to be full and strong   On rare occasion in move the night he does note a weak urinary stream   Patient reports a recent diagnosis of urinary tract infection 07/24/2021  Michelle Sanchez was treated with p o  antibiotics with complete resolution of urinary tract symptoms   He denies a family history of prostate cancer        Component       PSA, Total   Latest Ref Rng & Units       0 0 - 4 0 ng/mL   4/30/2018      11:55 AM 4 2 (H)   5/14/2019      11:54 AM 6 8 (H)   7/9/2019      8:48 AM 4 2 (H)   7/14/2020      8:50 AM 4 0   7/24/2021      8:42 AM 18 0 (H) 11/6/2021      7:50 AM 4 2 (H)   11/7/2022      8:10 AM 4 9 (H)       Review of Systems   Constitutional: Negative for chills and fever  Respiratory: Negative for cough and shortness of breath  Cardiovascular: Negative for chest pain  Gastrointestinal: Negative for abdominal distention, abdominal pain, blood in stool, nausea and vomiting  Genitourinary: Negative for difficulty urinating, dysuria, enuresis, flank pain, frequency, hematuria and urgency  Skin: Negative for rash           AUA SYMPTOM SCORE    Flowsheet Row Most Recent Value   AUA SYMPTOM SCORE    How often have you had a sensation of not emptying your bladder completely after you finished urinating? 1 (P)     How often have you had to urinate again less than two hours after you finished urinating? 1 (P)     How often have you found you stopped and started again several times when you urinate? 1 (P)     How often have you found it difficult to postpone urination? 0 (P)     How often have you had a weak urinary stream? 1 (P)     How often have you had to push or strain to begin urination? 0 (P)     How many times did you most typically get up to urinate from the time you went to bed at night until the time you got up in the morning? 3 (P)     Quality of Life: If you were to spend the rest of your life with your urinary condition just the way it is now, how would you feel about that? 2 (P)     AUA SYMPTOM SCORE 7 (P)              Past Medical History  Past Medical History:   Diagnosis Date   • Acute MI ([facility] [address] ) 06/2002   • Arthritis    • Atypical chest pain 03/05/2008   • Basal cell carcinoma 2009   • Coronary artery disease    • Diverticulosis 2008   • Hematuria, microscopic    • History of varicose veins 2008   • Hyperlipidemia    • Hypertension    • Myocardial infarction [facility])    • Nocturia    • 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    • Urinary tract infection        Past Social History  Past Surgical History:   Procedure Laterality Date   • COLONOSCOPY     • CORONARY ANGIOPLASTY      right CA x 3 vessels: redo PTCA-LAD 10/04   • CORONARY STENT PLACEMENT  2002   • JOINT REPLACEMENT      R hip   • [place] COLONOSCOPY FLX DX W/COLLJ SPEC WHEN PFRMD N/A 2017    Procedure: COLONOSCOPY;  Surgeon: Zeb Payne MD;  Location:  GI LAB; Service: Colorectal   • TOTAL HIP ARTHROPLASTY Right    • TOTAL HIP ARTHROPLASTY Left 2019     Social History     Tobacco Use   Smoking Status Former   • Packs/day: 0 50   • Years: 5 00   • Pack years: 2 50   • Types: Cigarettes   • Start date: 1967   • Quit date: 1970   • Years since quittin 0   Smokeless Tobacco Former   Tobacco Comments    quit    0 5 packs/2 00 pack years       Past Family History  Family History   Problem Relation Age of Onset   • Aneurysm Father         post op AAA repair   • Hypertension Sister    • Hypertension Brother    • Hypertension Mother    • Diabetes Mother        Past Social history  Social History     Socioeconomic History   • Marital status: /Civil Union     Spouse name: Not on file   • Number of children: Not on file   • Years of education: Not on file   • Highest education level: Not on file   Occupational History   • Not on file   Tobacco Use   • Smoking status: Former     Packs/day: 0 50     Years: 5 00     Pack years: 2 50     Types: Cigarettes     Start date: 1967     Quit date: 1970     Years since quittin 0   • Smokeless tobacco: Former   • Tobacco comments:     quit    0 5 packs/2 00 pack years   Vaping Use   • Vaping Use: Never used   Substance and Sexual Activity   • Alcohol use: No   • Drug use: No   • Sexual activity: Yes     Partners: Female     Birth control/protection: None   Other Topics Concern   • Not on file   Social History Narrative   • Not on file     Social Determinants of Health     Financial Resource Strain: Low Risk    • Difficulty of Paying Living Expenses: Not hard at all   Food Insecurity: Not on file   Transportation Needs: No Transportation Needs   • Lack of Transportation (Medical): No   • Lack of Transportation (Non-Medical):  No   Physical Activity: Not on file   Stress: Not on file   Social Connections: Not on file   Intimate Partner Violence: Not on file   Housing Stability: Unknown   • Unable to Pay for Housing in the Last Year: Patient refused   • Number of Places Lived in the Last Year: Not on file   • Unstable Housing in the Last Year: Patient refused       Current Medications  Current Outpatient Medications   Medication Sig Dispense Refill   • amLODIPine (NORVASC) 10 mg tablet TAKE 1 TABLET BY MOUTH EVERY DAY 90 tablet 3   • ascorbic acid (VITAMIN C) 1000 MG tablet Take 1,000 mg by mouth daily     • aspirin (ECOTRIN LOW STRENGTH) 81 mg EC tablet Take 81 mg by mouth daily     • atenolol (TENORMIN) 100 mg tablet TAKE 1 TABLET BY MOUTH EVERY DAY 90 tablet 3   • B Complex Vitamins (B COMPLEX 100 PO) Take by mouth     • benazepril (LOTENSIN) 20 mg tablet TAKE 1 TABLET BY MOUTH EVERY DAY 90 tablet 3   • calcium carbonate (OS-LUCA) 600 MG tablet Take 600 mg by mouth daily      • calcium carbonate-vitamin D 500 mg-5 mcg per tablet Take 1 tablet by mouth daily     • magnesium oxide (MAG-OX) 400 mg tablet Take 400 mg by mouth daily     • multivitamin (THERAGRAN) TABS Take 1 tablet by mouth     • niacin (NIASPAN) 500 mg CR tablet Take 1 tablet (500 mg total) by mouth daily at bedtime 90 tablet 3   • Omega-3 Fatty Acids (FISH OIL PO) Take by mouth daily      • rivaroxaban (Xarelto) 20 mg tablet Take 1 tablet (20 mg total) by mouth daily with breakfast 90 tablet 3   • sildenafil (VIAGRA) 50 MG tablet Take 1 tablet (50 mg total) by mouth daily as needed for erectile dysfunction 20 tablet 6   • simvastatin (ZOCOR) 40 mg tablet TAKE 1 TABLET BY MOUTH EVERYDAY AT BEDTIME 90 tablet 3   • tamsulosin (FLOMAX) 0 4 mg TAKE 1 CAPSULE BY MOUTH EVERY DAY WITH DINNER 90 capsule 3     No current facility-administered medications for this visit  Allergies  Allergies   Allergen Reactions   • No Active Allergies    • Other          The following portions of the patient's history were reviewed and updated as appropriate: allergies, current medications, past medical history, past social history, past surgical history and problem list       Vitals  Vitals:    01/06/23 1542   BP: 120/72   BP Location: Left arm   Patient Position: Sitting   Pulse: 57   SpO2: 97%   Weight: 85 7 kg (189 lb)   Height: 5' 8 25" (1 734 m)           Physical Exam  Physical Exam  Vitals reviewed  Constitutional:       General: He is not in acute distress  Appearance: Normal appearance  He is normal weight  HENT:      Head: Normocephalic  Cardiovascular:      Rate and Rhythm: Normal rate  Pulmonary:      Effort: No respiratory distress  Breath sounds: Normal breath sounds  Genitourinary:     Comments: JUAN LUIS-prostate 40 g with no nodules  Smooth, symmetrical   Nontender  Skin:     General: Skin is warm and dry  Neurological:      General: No focal deficit present  Mental Status: He is alert and oriented to person, place, and time     Psychiatric:         Mood and Affect: Mood normal          Behavior: Behavior normal        Results  Recent Results (from the past 1 hour(s))   POCT Measure PVR    Collection Time: 01/06/23  3:49 PM   Result Value Ref Range    POST-VOID RESIDUAL VOLUME, ML  mL   POCT urine dip auto non-scope    Collection Time: 01/06/23  3:50 PM   Result Value Ref Range     COLOR,UA Yellow     CLARITY,UA clear     SPECIFIC GRAVITY,UA 1 015      PH,UA 6 0     LEUKOCYTE ESTERASE,UA neg     NITRITE,UA neg     GLUCOSE, UA neg     KETONES,UA trace     BILIRUBIN,UA neg     BLOOD,UA neg     POCT URINE PROTEIN neg     SL AMB POCT UROBILINOGEN 0 2    ]  Lab Results   Component Value Date    PSA 4 9 (H) 11/07/2022    PSA 4 2 (H) 11/06/2021    PSA 18 0 (H) 07/24/2021     Lab Results   Component Value Date    CALCIUM 9 3 12/03/2022    K 4 0 12/03/2022    CO2 26 12/03/2022     12/03/2022    BUN 12 12/03/2022    CREATININE 0 86 12/03/2022     Lab Results   Component Value Date    WBC 5 98 12/03/2022    HGB 16 2 12/03/2022    HCT 51 4 (H) 12/03/2022    MCV 90 12/03/2022     12/03/2022     Orders  Orders Placed This Encounter   Procedures   • PSA Total, Diagnostic     Standing Status:   Future     Standing Expiration Date:   1/6/2024   • POCT urine dip auto non-scope   • POCT Measure PVR       GLYNN Hung

## 2023-02-28 DIAGNOSIS — I10 ESSENTIAL HYPERTENSION, BENIGN: ICD-10-CM

## 2023-02-28 RX ORDER — BENAZEPRIL HYDROCHLORIDE 20 MG/1
TABLET ORAL
Qty: 90 TABLET | Refills: 3 | Status: SHIPPED | OUTPATIENT
Start: 2023-02-28

## 2023-02-28 RX ORDER — AMLODIPINE BESYLATE 10 MG/1
TABLET ORAL
Qty: 90 TABLET | Refills: 3 | Status: SHIPPED | OUTPATIENT
Start: 2023-02-28

## 2023-03-07 ENCOUNTER — APPOINTMENT (OUTPATIENT)
Dept: LAB | Facility: CLINIC | Age: 78
End: 2023-03-07

## 2023-03-07 ENCOUNTER — TELEPHONE (OUTPATIENT)
Dept: UROLOGY | Facility: MEDICAL CENTER | Age: 78
End: 2023-03-07

## 2023-03-07 DIAGNOSIS — R39.9 UTI SYMPTOMS: Primary | ICD-10-CM

## 2023-03-07 DIAGNOSIS — R39.9 UTI SYMPTOMS: ICD-10-CM

## 2023-03-07 LAB
BACTERIA UR QL AUTO: ABNORMAL /HPF
BILIRUB UR QL STRIP: NEGATIVE
CLARITY UR: ABNORMAL
COLOR UR: YELLOW
GLUCOSE UR STRIP-MCNC: NEGATIVE MG/DL
HGB UR QL STRIP.AUTO: NEGATIVE
KETONES UR STRIP-MCNC: NEGATIVE MG/DL
LEUKOCYTE ESTERASE UR QL STRIP: ABNORMAL
NITRITE UR QL STRIP: POSITIVE
NON-SQ EPI CELLS URNS QL MICRO: ABNORMAL /HPF
PH UR STRIP.AUTO: 7.5 [PH]
PROT UR STRIP-MCNC: NEGATIVE MG/DL
RBC #/AREA URNS AUTO: ABNORMAL /HPF
SP GR UR STRIP.AUTO: 1.01 (ref 1–1.03)
UROBILINOGEN UR STRIP-ACNC: <2 MG/DL
WBC #/AREA URNS AUTO: ABNORMAL /HPF
WBC CLUMPS # UR AUTO: PRESENT /UL

## 2023-03-07 NOTE — TELEPHONE ENCOUNTER
Called and spoke with patientt, reports seem like symptoms started last night  Reports was up several times through out the night but only a little each time  Also having some burning and chills  No nausea/vomting, no blood in his urine  Orders placed for UA/UC, pt advised increase hydration and ED precautions reviewed  Pt will go today for testing

## 2023-03-07 NOTE — TELEPHONE ENCOUNTER
Patient seen by Juan F Ayala in [place]    Patient called stating he is having symptoms of a uti  He is having frequency,urgency,burning and he has a low grade fever  Patient would like to know how to proceed        Patient can be reached at [phone]

## 2023-03-08 RX ORDER — SULFAMETHOXAZOLE AND TRIMETHOPRIM 800; 160 MG/1; MG/1
1 TABLET ORAL EVERY 12 HOURS SCHEDULED
Qty: 10 TABLET | Refills: 0 | Status: SHIPPED | OUTPATIENT
Start: 2023-03-08 | End: 2023-03-13

## 2023-03-08 NOTE — TELEPHONE ENCOUNTER
Based on UA results and symptoms, prescription for Bactim was sent to his pharmacy after reviewing prior urine cultures  Office will call if he requires a different antibiotic based on final culture and sensitivity

## 2023-03-08 NOTE — TELEPHONE ENCOUNTER
Pt went for testing yesterday and he wanted to know if the results were back      Pt can be reached at [phone]

## 2023-03-09 LAB — BACTERIA UR CULT: ABNORMAL

## 2023-03-09 NOTE — TELEPHONE ENCOUNTER
Called and spoke with wife to advise above  She verbally agreed and understood  Advise to remain hydrated

## 2023-05-08 ENCOUNTER — RA CDI HCC (OUTPATIENT)
Dept: OTHER | Facility: HOSPITAL | Age: 78
End: 2023-05-08

## 2023-05-12 RX ORDER — CHLORHEXIDINE GLUCONATE 0.12 MG/ML
RINSE ORAL
COMMUNITY
Start: 2023-02-09

## 2023-05-15 ENCOUNTER — OFFICE VISIT (OUTPATIENT)
Dept: FAMILY MEDICINE CLINIC | Facility: CLINIC | Age: 78
End: 2023-05-15

## 2023-05-15 VITALS
OXYGEN SATURATION: 96 % | HEART RATE: 49 BPM | DIASTOLIC BLOOD PRESSURE: 80 MMHG | WEIGHT: 179.6 LBS | SYSTOLIC BLOOD PRESSURE: 122 MMHG | BODY MASS INDEX: 27.11 KG/M2 | TEMPERATURE: 97.2 F

## 2023-05-15 DIAGNOSIS — I48.91 NEW ONSET A-FIB (HCC): ICD-10-CM

## 2023-05-15 DIAGNOSIS — C44.519 BASAL CELL CARCINOMA (BCC) OF SKIN OF OTHER PART OF TORSO: Chronic | ICD-10-CM

## 2023-05-15 DIAGNOSIS — I25.709 ATHEROSCLEROSIS OF CORONARY ARTERY BYPASS GRAFT OF NATIVE HEART WITH ANGINA PECTORIS (HCC): ICD-10-CM

## 2023-05-15 DIAGNOSIS — R73.03 PREDIABETES: ICD-10-CM

## 2023-05-15 DIAGNOSIS — E78.49 OTHER HYPERLIPIDEMIA: ICD-10-CM

## 2023-05-15 DIAGNOSIS — M16.11 PRIMARY OSTEOARTHRITIS OF RIGHT HIP: ICD-10-CM

## 2023-05-15 DIAGNOSIS — R91.1 LUNG NODULE SEEN ON IMAGING STUDY: ICD-10-CM

## 2023-05-15 DIAGNOSIS — R97.20 ELEVATED PSA: ICD-10-CM

## 2023-05-15 DIAGNOSIS — I10 BENIGN ESSENTIAL HYPERTENSION: Primary | ICD-10-CM

## 2023-05-15 PROBLEM — C44.91 BASAL CELL CARCINOMA: Chronic | Status: ACTIVE | Noted: 2023-05-15

## 2023-05-15 LAB — SL AMB POCT HEMOGLOBIN AIC: 5.6 (ref ?–6.5)

## 2023-05-15 NOTE — PATIENT INSTRUCTIONS
I do recommend updating your tetanus shot with an adult DTaP  This can be secured at your local pharmacy  Consider updating your COVID booster per recent FDA and CDC recommendations

## 2023-07-11 ENCOUNTER — RA CDI HCC (OUTPATIENT)
Dept: OTHER | Facility: HOSPITAL | Age: 78
End: 2023-07-11

## 2023-07-11 NOTE — PROGRESS NOTES
[address] [facility] coding opportunities       Chart reviewed, no opportunity found: CHART REVIEWED, NO OPPORTUNITY FOUND        Patients Insurance     Medicare Insurance: Medicare

## 2023-07-18 ENCOUNTER — OFFICE VISIT (OUTPATIENT)
Dept: FAMILY MEDICINE CLINIC | Facility: CLINIC | Age: 78
End: 2023-07-18
Payer: MEDICARE

## 2023-07-18 VITALS
RESPIRATION RATE: 20 BRPM | OXYGEN SATURATION: 97 % | BODY MASS INDEX: 27.11 KG/M2 | SYSTOLIC BLOOD PRESSURE: 110 MMHG | TEMPERATURE: 98.2 F | HEART RATE: 68 BPM | DIASTOLIC BLOOD PRESSURE: 78 MMHG | WEIGHT: 179.6 LBS

## 2023-07-18 DIAGNOSIS — L82.1 SEBORRHEIC KERATOSIS: Primary | ICD-10-CM

## 2023-07-18 PROCEDURE — 99212 OFFICE O/P EST SF 10 MIN: CPT | Performed by: FAMILY MEDICINE

## 2023-07-31 ENCOUNTER — APPOINTMENT (EMERGENCY)
Dept: RADIOLOGY | Facility: HOSPITAL | Age: 78
End: 2023-07-31
Payer: MEDICARE

## 2023-07-31 ENCOUNTER — HOSPITAL ENCOUNTER (EMERGENCY)
Facility: HOSPITAL | Age: 78
Discharge: HOME | End: 2023-07-31
Attending: EMERGENCY MEDICINE
Payer: MEDICARE

## 2023-07-31 VITALS
TEMPERATURE: 97.6 F | DIASTOLIC BLOOD PRESSURE: 81 MMHG | OXYGEN SATURATION: 96 % | HEART RATE: 65 BPM | SYSTOLIC BLOOD PRESSURE: 136 MMHG | RESPIRATION RATE: 16 BRPM

## 2023-07-31 DIAGNOSIS — M77.02 MEDIAL EPICONDYLITIS OF ELBOW, LEFT: Primary | ICD-10-CM

## 2023-07-31 PROCEDURE — 99283 EMERGENCY DEPT VISIT LOW MDM: CPT

## 2023-07-31 PROCEDURE — 73080 X-RAY EXAM OF ELBOW: CPT

## 2023-07-31 PROCEDURE — 99284 EMERGENCY DEPT VISIT MOD MDM: CPT | Performed by: EMERGENCY MEDICINE

## 2023-07-31 NOTE — ED PROVIDER NOTES
History  Chief Complaint   Patient presents with   • Arm Pain     Pt c/o left elbow pain x3 days, unknown injury. 70-year-old male presents to emergency room for evaluation of a left medial elbow pain. Onset 3 nights ago. States 3 days ago he did his normal routine set of push-ups and mowed the lawn with his push mower. Denies injury. States he is very active and did not do any activity out of his norm. States he noticed it to feel stiff Friday night when he tried to bend it. He also notes it to be a swollen in the location of pain. Denies redness. Denies fever, N/V. He does admit to leaning the left elbow on a armrest at the [employer] game Friday night, but states he thought he switched elbows frequently. Patient is left arm dominant. Denies prior injury or surgeries to the left elbow. Patient took Tylenol this morning which helped      History provided by:  Patient  Arm Pain  Associated symptoms: no chest pain, no fever, no rash and no shortness of breath        Prior to Admission Medications   Prescriptions Last Dose Informant Patient Reported? Taking?    B Complex Vitamins (B COMPLEX 100 PO)  Self Yes No   Sig: Take by mouth   Omega-3 Fatty Acids (FISH OIL PO)  Self Yes No   Sig: Take by mouth daily    amLODIPine (NORVASC) 10 mg tablet  Self No No   Sig: TAKE 1 TABLET BY MOUTH EVERY DAY   ascorbic acid (VITAMIN C) 1000 MG tablet  Self Yes No   Sig: Take 1,000 mg by mouth daily   aspirin (ECOTRIN LOW STRENGTH) 81 mg EC tablet  Self Yes No   Sig: Take 81 mg by mouth daily Twice a week   atenolol (TENORMIN) 100 mg tablet  Self No No   Sig: TAKE 1 TABLET BY MOUTH EVERY DAY   benazepril (LOTENSIN) 20 mg tablet  Self No No   Sig: TAKE 1 TABLET BY MOUTH EVERY DAY   calcium carbonate (OS-LUCA) 600 MG tablet  Self Yes No   Sig: Take 600 mg by mouth daily    calcium carbonate-vitamin D 500 mg-5 mcg per tablet  Self Yes No   Sig: Take 1 tablet by mouth daily   chlorhexidine (PERIDEX) 0.12 % solution  Self Yes No Sig: RINSE MOUTH WITH 15ML (1 CAPFUL) FOR 30 SECONDS IN MORNING AND EVENING AFTER BRUSHING, THEN SPIT   Patient not taking: Reported on 7/18/2023   magnesium oxide (MAG-OX) 400 mg tablet  Self Yes No   Sig: Take 400 mg by mouth daily   multivitamin (THERAGRAN) TABS  Self Yes No   Sig: Take 1 tablet by mouth   niacin (NIASPAN) 500 mg CR tablet  Self No No   Sig: Take 1 tablet (500 mg total) by mouth daily at bedtime   rivaroxaban (Xarelto) 20 mg tablet  Self No No   Sig: Take 1 tablet (20 mg total) by mouth daily with breakfast   sildenafil (VIAGRA) 50 MG tablet  Self No No   Sig: Take 1 tablet (50 mg total) by mouth daily as needed for erectile dysfunction   simvastatin (ZOCOR) 40 mg tablet  Self No No   Sig: TAKE 1 TABLET BY MOUTH EVERYDAY AT BEDTIME   tamsulosin (FLOMAX) 0.4 mg  Self No No   Sig: TAKE 1 CAPSULE BY MOUTH EVERY DAY WITH DINNER      Facility-Administered Medications: None       Past Medical History:   Diagnosis Date   • Acute MI ([address]) 06/2002   • Arthritis    • Atypical chest pain 03/05/2008   • Basal cell carcinoma 2009   • Coronary artery disease    • Diverticulosis 2008   • Hematuria, microscopic    • History of varicose veins 2008   • Hyperlipidemia    • Hypertension    • Myocardial infarction [facility])    • Nocturia    • 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    • Urinary tract infection        Past Surgical History:   Procedure Laterality Date   • COLONOSCOPY     • CORONARY ANGIOPLASTY      right CA x 3 vessels: redo PTCA-LAD 10/04   • CORONARY STENT PLACEMENT  01/01/2002   • JOINT REPLACEMENT      R hip   • OR COLONOSCOPY FLX DX W/COLLJ SPEC WHEN PFRMD N/A 6/27/2017    Procedure: COLONOSCOPY;  Surgeon: Main Cunningham MD;  Location: [facility];   Service: Colorectal   • TOTAL HIP ARTHROPLASTY Right    • TOTAL HIP ARTHROPLASTY Left 04/30/2019       Family History   Problem Relation Age of Onset   • Aneurysm Father         post op AAA repair   • Hypertension Sister    • Hypertension Brother    • Hypertension Mother    • Diabetes Mother      I have reviewed and agree with the history as documented. E-Cigarette/Vaping   • E-Cigarette Use Never User      E-Cigarette/Vaping Substances   • Nicotine No    • THC No    • CBD No    • Flavoring No    • Other No    • Unknown No      Social History     Tobacco Use   • Smoking status: Former     Packs/day: 0.50     Years: 5.00     Total pack years: 2.50     Types: Cigarettes     Start date: 1967     Quit date: 1970     Years since quittin.6   • Smokeless tobacco: Former   • Tobacco comments:     quit . ..0.5 packs/2.00 pack years   Vaping Use   • Vaping Use: Never used   Substance Use Topics   • Alcohol use: No   • Drug use: No       Review of Systems   Constitutional: Negative for chills and fever. Respiratory: Negative for shortness of breath. Cardiovascular: Negative for chest pain and leg swelling. Musculoskeletal: Positive for joint swelling. Skin: Negative for rash and wound. Neurological: Negative for weakness and numbness. Physical Exam  Physical Exam  Vitals and nursing note reviewed. Constitutional:       Appearance: Normal appearance. He is well-developed. HENT:      Head: Atraumatic. Eyes:      Conjunctiva/sclera: Conjunctivae normal.   Cardiovascular:      Pulses: Normal pulses. Musculoskeletal:      Left upper arm: Normal. No edema. Left elbow: Swelling (mild, medial) present. No deformity, effusion or lacerations. Normal range of motion. Tenderness present in medial epicondyle. Left forearm: Normal.      Left wrist: Normal.      Left hand: Normal.        Arms:    Skin:     General: Skin is warm and dry. Capillary Refill: Capillary refill takes less than 2 seconds. Neurological:      Mental Status: He is alert and oriented to person, place, and time.    Psychiatric:         Mood and Affect: Mood normal.         Vital Signs  ED Triage Vitals [23 1035]   Temperature Pulse Respirations Blood Pressure SpO2   97.6 °F (36.4 °C) 65 16 136/81 96 %      Temp Source Heart Rate Source Patient Position - Orthostatic VS BP Location FiO2 (%)   Temporal Monitor Sitting Right arm --      Pain Score       6           Vitals:    07/31/23 1035   BP: 136/81   Pulse: 65   Patient Position - Orthostatic VS: Sitting         Visual Acuity      ED Medications  Medications - No data to display    Diagnostic Studies  Results Reviewed     None                 XR elbow 3+ views LEFT    (Results Pending)              Procedures  Procedures         ED Course                               SBIRT 20yo+    Flowsheet Row Most Recent Value   Initial Alcohol Screen: US AUDIT-C     1. How often do you have a drink containing alcohol? 0 Filed at: 07/31/2023 1037   2. How many drinks containing alcohol do you have on a typical day you are drinking? 0 Filed at: 07/31/2023 1037   3a. Male UNDER 65: How often do you have five or more drinks on one occasion? 0 Filed at: 07/31/2023 1037   3b. FEMALE Any Age, or MALE 65+: How often do you have 4 or more drinks on one occassion? 0 Filed at: 07/31/2023 1037   Audit-C Score 0 Filed at: 07/31/2023 1037   DANILO: How many times in the past year have you. .. Used an illegal drug or used a prescription medication for non-medical reasons? Never Filed at: 07/31/2023 1037                    Medical Decision Making  Differential diagnosis includes but is not limited to: Medial epicondylitis, bursitis, osteoarthritis, less likely Fracture, no signs of cellulitis or infection on exam at this time    Medial epicondylitis of elbow, left: acute illness or injury  Amount and/or Complexity of Data Reviewed  Radiology: ordered.           Disposition  Final diagnoses:   Medial epicondylitis of elbow, left     Time reflects when diagnosis was documented in both MDM as applicable and the Disposition within this note     Time User Action Codes Description Comment    7/31/2023 12:22 PM Dean Sanchez Add [M77.02] Medial epicondylitis of elbow, left       ED Disposition     ED Disposition   Discharge    Condition   Stable    Date/Time   Mon Jul 31, 2023 12:24 PM    Comment   Glorious Moose. discharge to home/self care.                Follow-up Information     Follow up With Specialties Details Why Contact Info Additional [address] Orthopedic Surgery In 3 days  [address] [id number]  [phone] [facility], [address] [place], [place], [address] [place] [address] Av  Use Entrance A     [address] [place] [address] Emergency Department Emergency Medicine  If symptoms worsen [address] [id number]  [facility] Emergency Department, [address] [place], [place], [facility]          Discharge Medication List as of 7/31/2023 12:24 PM      START taking these medications    Details   Diclofenac Sodium (VOLTAREN) 1 % Apply 2 g topically 4 (four) times a day for 10 days Left elbow, Starting Mon 7/31/2023, Until Thu 8/10/2023, Normal         CONTINUE these medications which have NOT CHANGED    Details   amLODIPine (NORVASC) 10 mg tablet TAKE 1 TABLET BY MOUTH EVERY DAY, Normal      ascorbic acid (VITAMIN C) 1000 MG tablet Take 1,000 mg by mouth daily, Historical Med      aspirin (ECOTRIN LOW STRENGTH) 81 mg EC tablet Take 81 mg by mouth daily Twice a week, Historical Med      atenolol (TENORMIN) 100 mg tablet TAKE 1 TABLET BY MOUTH EVERY DAY, Normal      B Complex Vitamins (B COMPLEX 100 PO) Take by mouth, Historical Med      benazepril (LOTENSIN) 20 mg tablet TAKE 1 TABLET BY MOUTH EVERY DAY, Normal      calcium carbonate (OS-LUCA) 600 MG tablet Take 600 mg by mouth daily , Historical Med      calcium carbonate-vitamin D 500 mg-5 mcg per tablet Take 1 tablet by mouth daily, Starting Fri 9/23/2022, Historical Med chlorhexidine (PERIDEX) 0.12 % solution RINSE MOUTH WITH 15ML (1 CAPFUL) FOR 30 SECONDS IN MORNING AND EVENING AFTER BRUSHING, THEN SPIT, Historical Med      magnesium oxide (MAG-OX) 400 mg tablet Take 400 mg by mouth daily, Historical Med      multivitamin (THERAGRAN) TABS Take 1 tablet by mouth, Starting Wed 12/12/2012, Historical Med      niacin (NIASPAN) 500 mg CR tablet Take 1 tablet (500 mg total) by mouth daily at bedtime, Starting Tue 10/26/2021, Print      Omega-3 Fatty Acids (FISH OIL PO) Take by mouth daily , Historical Med      rivaroxaban (Xarelto) 20 mg tablet Take 1 tablet (20 mg total) by mouth daily with breakfast, Starting Thu 9/15/2022, Until Fri 1/13/2023, Normal      sildenafil (VIAGRA) 50 MG tablet Take 1 tablet (50 mg total) by mouth daily as needed for erectile dysfunction, Starting Fri 1/6/2023, Print      simvastatin (ZOCOR) 40 mg tablet TAKE 1 TABLET BY MOUTH EVERYDAY AT BEDTIME, Normal      tamsulosin (FLOMAX) 0.4 mg TAKE 1 CAPSULE BY MOUTH EVERY DAY WITH DINNER, Normal             No discharge procedures on file.     PDMP Review     None          ED Provider  Electronically Signed by           Harvey Gonzalez PA-C  07/31/23 1577

## 2023-08-12 ENCOUNTER — OFFICE VISIT (OUTPATIENT)
Dept: URGENT CARE | Age: 78
End: 2023-08-12
Payer: MEDICARE

## 2023-08-12 VITALS
SYSTOLIC BLOOD PRESSURE: 131 MMHG | HEART RATE: 95 BPM | TEMPERATURE: 97.5 F | OXYGEN SATURATION: 96 % | DIASTOLIC BLOOD PRESSURE: 76 MMHG | RESPIRATION RATE: 18 BRPM

## 2023-08-12 DIAGNOSIS — R39.9 UTI SYMPTOMS: Primary | ICD-10-CM

## 2023-08-12 LAB
SL AMB  POCT GLUCOSE, UA: ABNORMAL
SL AMB LEUKOCYTE ESTERASE,UA: ABNORMAL
SL AMB POCT BILIRUBIN,UA: ABNORMAL
SL AMB POCT BLOOD,UA: ABNORMAL
SL AMB POCT CLARITY,UA: ABNORMAL
SL AMB POCT COLOR,UA: YELLOW
SL AMB POCT KETONES,UA: ABNORMAL
SL AMB POCT NITRITE,UA: ABNORMAL
SL AMB POCT PH,UA: 7.5
SL AMB POCT SPECIFIC GRAVITY,UA: 1.01
SL AMB POCT URINE PROTEIN: ABNORMAL
SL AMB POCT UROBILINOGEN: 0.2

## 2023-08-12 PROCEDURE — 99213 OFFICE O/P EST LOW 20 MIN: CPT | Performed by: PHYSICIAN ASSISTANT

## 2023-08-12 PROCEDURE — 87147 CULTURE TYPE IMMUNOLOGIC: CPT | Performed by: PHYSICIAN ASSISTANT

## 2023-08-12 PROCEDURE — 87077 CULTURE AEROBIC IDENTIFY: CPT | Performed by: PHYSICIAN ASSISTANT

## 2023-08-12 PROCEDURE — 87086 URINE CULTURE/COLONY COUNT: CPT | Performed by: PHYSICIAN ASSISTANT

## 2023-08-12 PROCEDURE — G0463 HOSPITAL OUTPT CLINIC VISIT: HCPCS | Performed by: PHYSICIAN ASSISTANT

## 2023-08-12 PROCEDURE — 81002 URINALYSIS NONAUTO W/O SCOPE: CPT | Performed by: PHYSICIAN ASSISTANT

## 2023-08-12 PROCEDURE — 87186 SC STD MICRODIL/AGAR DIL: CPT | Performed by: PHYSICIAN ASSISTANT

## 2023-08-12 RX ORDER — NITROFURANTOIN 25; 75 MG/1; MG/1
100 CAPSULE ORAL 2 TIMES DAILY
Qty: 10 CAPSULE | Refills: 0 | Status: SHIPPED | OUTPATIENT
Start: 2023-08-12 | End: 2023-08-17

## 2023-08-12 NOTE — PATIENT INSTRUCTIONS
Dysuria   WHAT YOU NEED TO KNOW:   Dysuria is difficulty urinating, or pain, burning, or discomfort with urination. Dysuria is usually a symptom of another problem. DISCHARGE INSTRUCTIONS:   Return to the emergency department if:   You have severe back, side, or abdominal pain. You have fever and shaking chills. You vomit several times in a row. Contact your healthcare provider if:   Your symptoms do not go away, even after treatment. You have questions or concerns about your condition or care. Medicines:   Medicines  may be given to help treat a bacterial infection or help decrease bladder spasms. Take your medicine as directed. Contact your healthcare provider if you think your medicine is not helping or if you have side effects. Tell your provider if you are allergic to any medicine. Keep a list of the medicines, vitamins, and herbs you take. Include the amounts, and when and why you take them. Bring the list or the pill bottles to follow-up visits. Carry your medicine list with you in case of an emergency. Follow up with your healthcare provider as directed: Your healthcare provider may also refer you to a urologist or nephrologist to have additional testing. Write down your questions so you remember to ask them during your visits. Manage your dysuria:   Drink more liquids. Liquids help flush out bacteria that may be causing an infection. Ask your healthcare provider how much liquid to drink each day and which liquids are best for you. Take sitz baths as directed. Fill a bathtub with 4 to 6 inches of warm water. You may also use a sitz bath pan that fits over a toilet. Sit in the sitz bath for 20 minutes. Do this 2 to 3 times a day, or as directed. The warm water can help decrease pain and swelling. © Copyright [employer] 2022 Information is for End User's use only and may not be sold, redistributed or otherwise used for commercial purposes.   The above information is an  only. It is not intended as medical advice for individual conditions or treatments. Talk to your doctor, nurse or pharmacist before following any medical regimen to see if it is safe and effective for you.

## 2023-08-12 NOTE — PROGRESS NOTES
[facility]        NAME: Brianna Rainey is a 70 y.o. male  : 1945    MRN: [id number]  DATE: 2023  TIME: 8:46 AM    Assessment and Plan   UTI symptoms [R39.9]  1. UTI symptoms  Urine culture    POCT urine dip            Patient Instructions     Take antibiotic as directed. Eat yogurt to avoid GI upset. Culture sent. Follow up with urologist.   Take antibiotic as directed. Eat yogurt to avoid GI upset. Increase fluid intake. Avoid holding bladder for prolonged periods. Urinate after intercourse. Go to the ER for worsening symptoms: flank pain, fever/chills, nausea/vomiting, lower back pain or any other concerning symptoms. Follow up with PCP in 3-5 days. Proceed to  ER if symptoms worsen. Chief Complaint     Chief Complaint   Patient presents with   • Possible UTI     Patient states he is having burning and urgency with urination. It has been going on since Tuesday and he has been having symptoms intermittently          History of Present Illness       Urinary Tract Infection   This is a new problem. Episode onset: x 5 days. The problem occurs intermittently. The quality of the pain is described as aching and burning. The pain is at a severity of 5/10. The pain is moderate. There has been no fever. He is not sexually active. There is no history of pyelonephritis. Associated symptoms include frequency, hesitancy and urgency. Pertinent negatives include no chills, discharge, flank pain, hematuria, nausea, sweats or vomiting. He has tried nothing for the symptoms. Hx of UTI x year ago. Review of Systems   Review of Systems   Constitutional: Negative for chills, fatigue and fever. HENT: Negative for congestion, ear pain, hearing loss, postnasal drip, sinus pressure, sinus pain and sore throat. Eyes: Negative for pain and discharge. Respiratory: Negative for chest tightness and shortness of breath. Cardiovascular: Negative for chest pain.    Gastrointestinal: Negative for abdominal pain, constipation, nausea and vomiting. Genitourinary: Positive for frequency, hesitancy and urgency. Negative for difficulty urinating, flank pain and hematuria. Musculoskeletal: Negative for arthralgias and myalgias. Skin: Negative for rash. Neurological: Negative for dizziness and headaches. Psychiatric/Behavioral: Negative for behavioral problems.          Current Medications       Current Outpatient Medications:   •  amLODIPine (NORVASC) 10 mg tablet, TAKE 1 TABLET BY MOUTH EVERY DAY, Disp: 90 tablet, Rfl: 3  •  ascorbic acid (VITAMIN C) 1000 MG tablet, Take 1,000 mg by mouth daily, Disp: , Rfl:   •  aspirin (ECOTRIN LOW STRENGTH) 81 mg EC tablet, Take 81 mg by mouth daily Twice a week, Disp: , Rfl:   •  atenolol (TENORMIN) 100 mg tablet, TAKE 1 TABLET BY MOUTH EVERY DAY, Disp: 90 tablet, Rfl: 3  •  B Complex Vitamins (B COMPLEX 100 PO), Take by mouth, Disp: , Rfl:   •  benazepril (LOTENSIN) 20 mg tablet, TAKE 1 TABLET BY MOUTH EVERY DAY, Disp: 90 tablet, Rfl: 3  •  calcium carbonate (OS-LUCA) 600 MG tablet, Take 600 mg by mouth daily , Disp: , Rfl:   •  calcium carbonate-vitamin D 500 mg-5 mcg per tablet, Take 1 tablet by mouth daily, Disp: , Rfl:   •  magnesium oxide (MAG-OX) 400 mg tablet, Take 400 mg by mouth daily, Disp: , Rfl:   •  multivitamin (THERAGRAN) TABS, Take 1 tablet by mouth, Disp: , Rfl:   •  niacin (NIASPAN) 500 mg CR tablet, Take 1 tablet (500 mg total) by mouth daily at bedtime, Disp: 90 tablet, Rfl: 3  •  Omega-3 Fatty Acids (FISH OIL PO), Take by mouth daily , Disp: , Rfl:   •  sildenafil (VIAGRA) 50 MG tablet, Take 1 tablet (50 mg total) by mouth daily as needed for erectile dysfunction, Disp: 20 tablet, Rfl: 6  •  simvastatin (ZOCOR) 40 mg tablet, TAKE 1 TABLET BY MOUTH EVERYDAY AT BEDTIME, Disp: 90 tablet, Rfl: 3  •  tamsulosin (FLOMAX) 0.4 mg, TAKE 1 CAPSULE BY MOUTH EVERY DAY WITH DINNER, Disp: 90 capsule, Rfl: 3  •  chlorhexidine (PERIDEX) 0.12 % solution, RINSE MOUTH WITH 15ML (1 CAPFUL) FOR 30 SECONDS IN MORNING AND EVENING AFTER BRUSHING, THEN SPIT (Patient not taking: Reported on 7/18/2023), Disp: , Rfl:   •  Diclofenac Sodium (VOLTAREN) 1 %, Apply 2 g topically 4 (four) times a day for 10 days Left elbow, Disp: 100 g, Rfl: 0  •  rivaroxaban (Xarelto) 20 mg tablet, Take 1 tablet (20 mg total) by mouth daily with breakfast, Disp: 90 tablet, Rfl: 3    Current Allergies     Allergies as of 08/12/2023 - Reviewed 08/12/2023   Allergen Reaction Noted   • No active allergies  05/19/2018   • Other  04/20/2018            The following portions of the patient's history were reviewed and updated as appropriate: allergies, current medications, past family history, past medical history, past social history, past surgical history and problem list.     Past Medical History:   Diagnosis Date   • Acute MI ([address]) 06/2002   • Arthritis    • Atypical chest pain 03/05/2008   • Basal cell carcinoma 2009   • Coronary artery disease    • Diverticulosis 2008   • Hematuria, microscopic    • History of varicose veins 2008   • Hyperlipidemia    • Hypertension    • Myocardial infarction [facility])    • Nocturia    • 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    • Urinary tract infection        Past Surgical History:   Procedure Laterality Date   • COLONOSCOPY     • CORONARY ANGIOPLASTY      right CA x 3 vessels: redo PTCA-LAD 10/04   • CORONARY STENT PLACEMENT  01/01/2002   • JOINT REPLACEMENT      R hip   • [facility] COLONOSCOPY FLX DX W/COLLJ SPEC WHEN PFRMD N/A 6/27/2017    Procedure: COLONOSCOPY;  Surgeon: Larisa Newton MD;  Location: [facility];   Service: Colorectal   • TOTAL HIP ARTHROPLASTY Right    • TOTAL HIP ARTHROPLASTY Left 04/30/2019       Family History   Problem Relation Age of Onset   • Aneurysm Father         post op AAA repair   • Hypertension Sister    • Hypertension Brother    • Hypertension Mother    • Diabetes Mother          Medications have been verified. Objective   /76   Pulse 95   Temp 97.5 °F (36.4 °C)   Resp 18   SpO2 96%   No LMP for male patient. Physical Exam     Physical Exam  Vitals and nursing note reviewed. Constitutional:       Appearance: Normal appearance. He is well-developed. Cardiovascular:      Rate and Rhythm: Normal rate and regular rhythm. Heart sounds: Normal heart sounds. No murmur heard. Pulmonary:      Effort: Pulmonary effort is normal. No respiratory distress. Breath sounds: Normal breath sounds. Abdominal:      General: Bowel sounds are normal. There is no distension. Palpations: Abdomen is soft. Abdomen is not rigid. Tenderness: There is no abdominal tenderness. There is no guarding or rebound. Negative signs include Medrano's sign and McBurney's sign. Neurological:      Mental Status: He is alert and oriented to person, place, and time.

## 2023-08-14 LAB — BACTERIA UR CULT: ABNORMAL

## 2023-08-18 ENCOUNTER — OFFICE VISIT (OUTPATIENT)
Dept: UROLOGY | Facility: MEDICAL CENTER | Age: 78
End: 2023-08-18
Payer: MEDICARE

## 2023-08-18 VITALS
HEART RATE: 94 BPM | SYSTOLIC BLOOD PRESSURE: 130 MMHG | BODY MASS INDEX: 27.02 KG/M2 | WEIGHT: 179 LBS | DIASTOLIC BLOOD PRESSURE: 76 MMHG

## 2023-08-18 DIAGNOSIS — N40.1 BPH WITH OBSTRUCTION/LOWER URINARY TRACT SYMPTOMS: ICD-10-CM

## 2023-08-18 DIAGNOSIS — N13.8 BPH WITH OBSTRUCTION/LOWER URINARY TRACT SYMPTOMS: ICD-10-CM

## 2023-08-18 DIAGNOSIS — R97.20 ELEVATED PSA: ICD-10-CM

## 2023-08-18 DIAGNOSIS — Z87.440 HISTORY OF UTI: Primary | ICD-10-CM

## 2023-08-18 LAB

## 2023-08-18 PROCEDURE — 81002 URINALYSIS NONAUTO W/O SCOPE: CPT | Performed by: UROLOGY

## 2023-08-18 PROCEDURE — 99214 OFFICE O/P EST MOD 30 MIN: CPT | Performed by: UROLOGY

## 2023-08-18 PROCEDURE — 51798 US URINE CAPACITY MEASURE: CPT | Performed by: UROLOGY

## 2023-08-18 NOTE — ASSESSMENT & PLAN NOTE
UTI symptoms have resolved and urinalysis is negative. Postvoid residual satisfactory at 67 cc. Options were discussed and I did recommend that we screen the upper tracts and bladder with a renal and bladder ultrasound.

## 2023-08-18 NOTE — PROGRESS NOTES
Assessment/Plan:    History of UTI  UTI symptoms have resolved and urinalysis is negative. Postvoid residual satisfactory at 67 cc. Options were discussed and I did recommend that we screen the upper tracts and bladder with a renal and bladder ultrasound. BPH with obstruction/lower urinary tract symptoms  At the present time he is satisfied with his voiding pattern on Flomax. We will continue to follow now that it appears his infection has resolved. Elevated PSA  PSA was 4.9 in November 2022. We will plan to recheck PSA prior to next visit. Diagnoses and all orders for this visit:    History of UTI  -     POCT urine dip  -     Urinalysis with microscopic; Future  -     US kidney and bladder; Future  -     POCT Measure PVR    BPH with obstruction/lower urinary tract symptoms  -     Urinalysis with microscopic; Future  -     POCT Measure PVR    Elevated PSA  -     PSA, total and free; Future          Subjective:      Patient ID: Rob Skinner. is a 70 y.o. male. Chief complaint: Urinary tract infection    HPI: 56-year-old male who noted the onset of dysuria earlier in August.  The symptoms were intermittent but did persist.  There is no fever or flank pain. He presented to the urgent care and had a urine culture which was positive for infection. He was placed on antibiotic but that symptoms were slow to disappear. Today he noted he was finally voiding well. At the present time he has no dysuria and he is asymptomatic. He slept through the night last night. There is no dysuria, frequency or urgency at this time. He is seen today for follow-up. The following portions of the patient's history were reviewed and updated as appropriate: allergies, current medications, past family history, past medical history, past social history, past surgical history and problem list.    Review of Systems   Constitutional: Negative for chills, diaphoresis, fatigue and fever. HENT: Negative.     Eyes: Negative. Respiratory: Negative. Cardiovascular: Negative. Gastrointestinal: Negative. Endocrine: Negative. Genitourinary:        See HPI   Musculoskeletal: Negative. Skin: Negative. Allergic/Immunologic: Negative. Neurological: Negative. Hematological: Negative. Psychiatric/Behavioral: Negative. AUA SYMPTOM SCORE    Flowsheet Row Most Recent Value   AUA SYMPTOM SCORE    How often have you had a sensation of not emptying your bladder completely after you finished urinating? 3 (P)     How often have you had to urinate again less than two hours after you finished urinating? 3 (P)     How often have you found you stopped and started again several times when you urinate? 3 (P)     How often have you found it difficult to postpone urination? 3 (P)     How often have you had a weak urinary stream? 3 (P)     How often have you had to push or strain to begin urination? 0 (P)     How many times did you most typically get up to urinate from the time you went to bed at night until the time you got up in the morning? 3 (P)     Quality of Life: If you were to spend the rest of your life with your urinary condition just the way it is now, how would you feel about that? 3 (P)     AUA SYMPTOM SCORE 18 (P)         Objective:      /76   Pulse 94   Wt 81.2 kg (179 lb)   BMI 27.02 kg/m²          Physical Exam  Vitals reviewed. Constitutional:       General: He is not in acute distress. Appearance: Normal appearance. He is well-developed and normal weight. He is not ill-appearing, toxic-appearing or diaphoretic. HENT:      Head: Normocephalic and atraumatic. Eyes:      General: No scleral icterus. Conjunctiva/sclera: Conjunctivae normal.   Cardiovascular:      Rate and Rhythm: Normal rate. Pulmonary:      Effort: Pulmonary effort is normal.   Abdominal:      General: Abdomen is flat. Bowel sounds are normal. There is no distension. Palpations: Abdomen is soft.  There is no mass. Tenderness: There is no abdominal tenderness. There is no right CVA tenderness, left CVA tenderness, guarding or rebound. Hernia: No hernia is present. Genitourinary:     Penis: Normal. No phimosis or hypospadias. Testes: Normal.         Right: Mass not present. Left: Mass not present. Rectum: Normal.      Comments: Prostate moderately enlarged, nontender and palpably benign. Musculoskeletal:         General: Normal range of motion. Cervical back: Neck supple. Skin:     General: Skin is warm and dry. Neurological:      Mental Status: He is alert and oriented to person, place, and time. Psychiatric:         Mood and Affect: Mood normal.         Behavior: Behavior normal.         Thought Content:  Thought content normal.         Judgment: Judgment normal.

## 2023-08-18 NOTE — ASSESSMENT & PLAN NOTE
At the present time he is satisfied with his voiding pattern on Flomax. We will continue to follow now that it appears his infection has resolved.

## 2023-08-25 ENCOUNTER — HOSPITAL ENCOUNTER (OUTPATIENT)
Dept: RADIOLOGY | Facility: HOSPITAL | Age: 78
Discharge: HOME | End: 2023-08-25
Attending: UROLOGY
Payer: MEDICARE

## 2023-08-25 DIAGNOSIS — Z87.440 HISTORY OF UTI: ICD-10-CM

## 2023-08-25 PROCEDURE — 76775 US EXAM ABDO BACK WALL LIM: CPT

## 2023-09-02 ENCOUNTER — APPOINTMENT (OUTPATIENT)
Dept: LAB | Facility: CLINIC | Age: 78
End: 2023-09-02
Payer: MEDICARE

## 2023-09-02 ENCOUNTER — TRANSCRIBE ORDERS (OUTPATIENT)
Dept: LAB | Facility: CLINIC | Age: 78
End: 2023-09-02

## 2023-09-02 DIAGNOSIS — I48.0 PAROXYSMAL ATRIAL FIBRILLATION (HCC): ICD-10-CM

## 2023-09-02 DIAGNOSIS — E78.49 FAMILIAL COMBINED HYPERLIPIDEMIA: Primary | ICD-10-CM

## 2023-09-02 DIAGNOSIS — E78.49 FAMILIAL COMBINED HYPERLIPIDEMIA: ICD-10-CM

## 2023-09-02 DIAGNOSIS — R97.20 ELEVATED PSA: ICD-10-CM

## 2023-09-02 DIAGNOSIS — I10 ESSENTIAL HYPERTENSION, MALIGNANT: ICD-10-CM

## 2023-09-02 LAB
ALBUMIN SERPL BCP-MCNC: 4.4 G/DL (ref 3.5–5)
ALP SERPL-CCNC: 64 U/L (ref 34–104)
ALT SERPL W P-5'-P-CCNC: 21 U/L (ref 7–52)
ANION GAP SERPL CALCULATED.3IONS-SCNC: 6 MMOL/L
AST SERPL W P-5'-P-CCNC: 25 U/L (ref 13–39)
BILIRUB SERPL-MCNC: 1.5 MG/DL (ref 0.2–1)
BUN SERPL-MCNC: 15 MG/DL (ref 5–25)
CALCIUM SERPL-MCNC: 9.4 MG/DL (ref 8.4–10.2)
CHLORIDE SERPL-SCNC: 99 MMOL/L (ref 96–108)
CHOLEST SERPL-MCNC: 130 MG/DL
CO2 SERPL-SCNC: 27 MMOL/L (ref 21–32)
CREAT SERPL-MCNC: 0.84 MG/DL (ref 0.6–1.3)
ERYTHROCYTE [DISTWIDTH] IN BLOOD BY AUTOMATED COUNT: 13.2 % (ref 11.6–15.1)
GFR SERPL CREATININE-BSD FRML MDRD: 85 ML/MIN/1.73SQ M
GLUCOSE P FAST SERPL-MCNC: 99 MG/DL (ref 65–99)
HCT VFR BLD AUTO: 50.6 % (ref 36.5–49.3)
HDLC SERPL-MCNC: 47 MG/DL
HGB BLD-MCNC: 16.6 G/DL (ref 12–17)
LDLC SERPL CALC-MCNC: 65 MG/DL (ref 0–100)
MCH RBC QN AUTO: 29.7 PG (ref 26.8–34.3)
MCHC RBC AUTO-ENTMCNC: 32.8 G/DL (ref 31.4–37.4)
MCV RBC AUTO: 91 FL (ref 82–98)
NONHDLC SERPL-MCNC: 83 MG/DL
PLATELET # BLD AUTO: 210 THOUSANDS/UL (ref 149–390)
PMV BLD AUTO: 10.9 FL (ref 8.9–12.7)
POTASSIUM SERPL-SCNC: 3.8 MMOL/L (ref 3.5–5.3)
PROT SERPL-MCNC: 7.4 G/DL (ref 6.4–8.4)
RBC # BLD AUTO: 5.58 MILLION/UL (ref 3.88–5.62)
SODIUM SERPL-SCNC: 132 MMOL/L (ref 135–147)
TRIGL SERPL-MCNC: 88 MG/DL
WBC # BLD AUTO: 5.5 THOUSAND/UL (ref 4.31–10.16)

## 2023-09-02 PROCEDURE — 80053 COMPREHEN METABOLIC PANEL: CPT

## 2023-09-02 PROCEDURE — 85027 COMPLETE CBC AUTOMATED: CPT

## 2023-09-02 PROCEDURE — 36415 COLL VENOUS BLD VENIPUNCTURE: CPT

## 2023-09-02 PROCEDURE — 80061 LIPID PANEL: CPT

## 2023-09-05 ENCOUNTER — TELEPHONE (OUTPATIENT)
Age: 78
End: 2023-09-05

## 2023-09-05 NOTE — TELEPHONE ENCOUNTER
Radiology Test Results - Radiology Calling with report update    Pt under care of: Dr Minor Files in [facility] Completed: US Kidney and bladder    Significant Findings - Please review

## 2023-09-15 ENCOUNTER — OFFICE VISIT (OUTPATIENT)
Dept: FAMILY MEDICINE CLINIC | Facility: CLINIC | Age: 78
End: 2023-09-15
Payer: MEDICARE

## 2023-09-15 VITALS
HEART RATE: 61 BPM | BODY MASS INDEX: 27.65 KG/M2 | SYSTOLIC BLOOD PRESSURE: 130 MMHG | OXYGEN SATURATION: 98 % | DIASTOLIC BLOOD PRESSURE: 80 MMHG | HEIGHT: 68 IN | WEIGHT: 182.4 LBS | TEMPERATURE: 98 F

## 2023-09-15 DIAGNOSIS — K76.89 LIVER CYST: Primary | ICD-10-CM

## 2023-09-15 PROCEDURE — 99213 OFFICE O/P EST LOW 20 MIN: CPT | Performed by: FAMILY MEDICINE

## 2023-09-15 NOTE — PROGRESS NOTES
Name: Taty Hazel. : 1945      MRN: [id number]  Encounter Provider: Caridad Kelley DO  Encounter Date: 9/15/2023   Encounter department: [facility]     1. Liver cyst  Assessment & Plan:  US kidneys and bladder showed cyst on liver edge  - no acute concerns, is not causing symptoms. - will just monitor and watch/wait  - likely benign cyst, seen on CT of chest in 2019 as well. Subjective      Pt is a 71 yo M who recently recovered from an UTI. He had an ultrasound of his K/B ordered by urology. He states he was called and notified that a liver cyst was seen on US and to discuss this further with PCP. He has no weight changes, abdominal pain or any other symptoms. Review of Systems   Constitutional: Negative for chills and fever. HENT: Negative for ear pain and sore throat. Eyes: Negative for pain and visual disturbance. Respiratory: Negative for cough and shortness of breath. Cardiovascular: Negative for chest pain and palpitations. Gastrointestinal: Negative for abdominal pain and vomiting. Genitourinary: Negative for dysuria and hematuria. Musculoskeletal: Negative for arthralgias and back pain. Skin: Negative for color change and rash. Neurological: Negative for seizures and syncope. All other systems reviewed and are negative.       Current Outpatient Medications on File Prior to Visit   Medication Sig   • amLODIPine (NORVASC) 10 mg tablet TAKE 1 TABLET BY MOUTH EVERY DAY   • ascorbic acid (VITAMIN C) 1000 MG tablet Take 1,000 mg by mouth daily   • aspirin (ECOTRIN LOW STRENGTH) 81 mg EC tablet Take 81 mg by mouth daily Twice a week   • atenolol (TENORMIN) 100 mg tablet TAKE 1 TABLET BY MOUTH EVERY DAY   • B Complex Vitamins (B COMPLEX 100 PO) Take by mouth   • benazepril (LOTENSIN) 20 mg tablet TAKE 1 TABLET BY MOUTH EVERY DAY   • calcium carbonate (OS-LUCA) 600 MG tablet Take 600 mg by mouth daily    • calcium carbonate-vitamin D 500 mg-5 mcg per tablet Take 1 tablet by mouth daily   • magnesium oxide (MAG-OX) 400 mg tablet Take 400 mg by mouth daily   • multivitamin (THERAGRAN) TABS Take 1 tablet by mouth   • niacin (NIASPAN) 500 mg CR tablet Take 1 tablet (500 mg total) by mouth daily at bedtime   • Omega-3 Fatty Acids (FISH OIL PO) Take by mouth daily    • sildenafil (VIAGRA) 50 MG tablet Take 1 tablet (50 mg total) by mouth daily as needed for erectile dysfunction   • simvastatin (ZOCOR) 40 mg tablet TAKE 1 TABLET BY MOUTH EVERYDAY AT BEDTIME   • tamsulosin (FLOMAX) 0.4 mg TAKE 1 CAPSULE BY MOUTH EVERY DAY WITH DINNER   • chlorhexidine (PERIDEX) 0.12 % solution RINSE MOUTH WITH 15ML (1 CAPFUL) FOR 30 SECONDS IN MORNING AND EVENING AFTER BRUSHING, THEN SPIT (Patient not taking: Reported on 7/18/2023)   • Diclofenac Sodium (VOLTAREN) 1 % Apply 2 g topically 4 (four) times a day for 10 days Left elbow   • rivaroxaban (Xarelto) 20 mg tablet Take 1 tablet (20 mg total) by mouth daily with breakfast   • [DISCONTINUED] apixaban (ELIQUIS) 5 mg Take 1 tablet (5 mg total) by mouth in the morning and 1 tablet (5 mg total) in the evening. Objective     /80 (BP Location: Left arm, Patient Position: Sitting, Cuff Size: Standard)   Pulse 61   Temp 98 °F (36.7 °C) (Temporal)   Ht 5' 8.25" (1.734 m)   Wt 82.7 kg (182 lb 6.4 oz)   SpO2 98%   BMI 27.53 kg/m²     Physical Exam  Vitals reviewed. Constitutional:       General: He is not in acute distress. Appearance: Normal appearance. He is normal weight. He is not ill-appearing. HENT:      Head: Normocephalic and atraumatic. Nose: Nose normal. No congestion. Eyes:      General: No scleral icterus. Conjunctiva/sclera: Conjunctivae normal.      Pupils: Pupils are equal, round, and reactive to light. Cardiovascular:      Rate and Rhythm: Normal rate and regular rhythm. Pulses: Normal pulses. Heart sounds: Normal heart sounds.  No murmur heard.  Pulmonary:      Effort: Pulmonary effort is normal.      Breath sounds: Normal breath sounds. No wheezing. Chest:      Chest wall: No tenderness. Abdominal:      General: Bowel sounds are normal.      Palpations: There is no mass. Tenderness: There is no abdominal tenderness. Musculoskeletal:         General: No tenderness. Normal range of motion. Cervical back: Normal range of motion. Right lower leg: No edema. Left lower leg: No edema. Skin:     General: Skin is warm. Capillary Refill: Capillary refill takes less than 2 seconds. Findings: No bruising or rash. Neurological:      Mental Status: He is alert and oriented to person, place, and time.    Psychiatric:         Mood and Affect: Mood normal.         Behavior: Behavior normal.       Carmina Valdez DO

## 2023-09-15 NOTE — ASSESSMENT & PLAN NOTE
US kidneys and bladder showed cyst on liver edge  - no acute concerns, is not causing symptoms. - will just monitor and watch/wait  - likely benign cyst, seen on CT of chest in 2019 as well.

## 2023-10-17 PROBLEM — Z87.440 HISTORY OF UTI: Status: RESOLVED | Noted: 2023-08-18 | Resolved: 2023-10-17

## 2023-11-18 ENCOUNTER — APPOINTMENT (OUTPATIENT)
Dept: LAB | Facility: CLINIC | Age: 78
End: 2023-11-18
Payer: MEDICARE

## 2023-11-18 DIAGNOSIS — Z87.440 HISTORY OF UTI: ICD-10-CM

## 2023-11-18 DIAGNOSIS — R97.20 ELEVATED PSA: ICD-10-CM

## 2023-11-18 DIAGNOSIS — N13.8 BPH WITH OBSTRUCTION/LOWER URINARY TRACT SYMPTOMS: ICD-10-CM

## 2023-11-18 DIAGNOSIS — N40.1 BPH WITH OBSTRUCTION/LOWER URINARY TRACT SYMPTOMS: ICD-10-CM

## 2023-11-18 LAB
BACTERIA UR QL AUTO: NORMAL /HPF
BILIRUB UR QL STRIP: NEGATIVE
CLARITY UR: CLEAR
COLOR UR: NORMAL
GLUCOSE UR STRIP-MCNC: NEGATIVE MG/DL
HGB UR QL STRIP.AUTO: NEGATIVE
KETONES UR STRIP-MCNC: NEGATIVE MG/DL
LEUKOCYTE ESTERASE UR QL STRIP: NEGATIVE
NITRITE UR QL STRIP: NEGATIVE
NON-SQ EPI CELLS URNS QL MICRO: NORMAL /HPF
PH UR STRIP.AUTO: 6.5 [PH]
PROT UR STRIP-MCNC: NEGATIVE MG/DL
RBC #/AREA URNS AUTO: NORMAL /HPF
SP GR UR STRIP.AUTO: 1.02 (ref 1–1.03)
UROBILINOGEN UR STRIP-ACNC: <2 MG/DL
WBC #/AREA URNS AUTO: NORMAL /HPF

## 2023-11-18 PROCEDURE — 81001 URINALYSIS AUTO W/SCOPE: CPT

## 2023-11-18 PROCEDURE — 36415 COLL VENOUS BLD VENIPUNCTURE: CPT

## 2023-11-18 PROCEDURE — 84153 ASSAY OF PSA TOTAL: CPT

## 2023-11-18 PROCEDURE — 84154 ASSAY OF PSA FREE: CPT

## 2023-11-19 LAB
PSA FREE MFR SERPL: 17.4 %
PSA FREE SERPL-MCNC: 1.22 NG/ML
PSA SERPL-MCNC: 7 NG/ML (ref 0–4)

## 2023-11-20 ENCOUNTER — OFFICE VISIT (OUTPATIENT)
Dept: FAMILY MEDICINE CLINIC | Facility: CLINIC | Age: 78
End: 2023-11-20
Payer: MEDICARE

## 2023-11-20 VITALS
TEMPERATURE: 97.7 F | WEIGHT: 180.2 LBS | RESPIRATION RATE: 18 BRPM | DIASTOLIC BLOOD PRESSURE: 70 MMHG | HEART RATE: 49 BPM | SYSTOLIC BLOOD PRESSURE: 120 MMHG | BODY MASS INDEX: 27.2 KG/M2 | OXYGEN SATURATION: 93 %

## 2023-11-20 DIAGNOSIS — N13.8 BPH WITH OBSTRUCTION/LOWER URINARY TRACT SYMPTOMS: ICD-10-CM

## 2023-11-20 DIAGNOSIS — I10 BENIGN ESSENTIAL HYPERTENSION: ICD-10-CM

## 2023-11-20 DIAGNOSIS — R73.03 PREDIABETES: ICD-10-CM

## 2023-11-20 DIAGNOSIS — Z23 ENCOUNTER FOR IMMUNIZATION: ICD-10-CM

## 2023-11-20 DIAGNOSIS — I25.10 CHRONIC CORONARY ARTERY DISEASE: ICD-10-CM

## 2023-11-20 DIAGNOSIS — E78.49 OTHER HYPERLIPIDEMIA: ICD-10-CM

## 2023-11-20 DIAGNOSIS — I48.91 NEW ONSET A-FIB (HCC): ICD-10-CM

## 2023-11-20 DIAGNOSIS — R97.20 ELEVATED PSA: ICD-10-CM

## 2023-11-20 DIAGNOSIS — R91.1 LUNG NODULE SEEN ON IMAGING STUDY: Primary | ICD-10-CM

## 2023-11-20 DIAGNOSIS — N40.1 BPH WITH OBSTRUCTION/LOWER URINARY TRACT SYMPTOMS: ICD-10-CM

## 2023-11-20 PROCEDURE — 90662 IIV NO PRSV INCREASED AG IM: CPT | Performed by: FAMILY MEDICINE

## 2023-11-20 PROCEDURE — G0008 ADMIN INFLUENZA VIRUS VAC: HCPCS | Performed by: FAMILY MEDICINE

## 2023-11-20 PROCEDURE — G0439 PPPS, SUBSEQ VISIT: HCPCS | Performed by: FAMILY MEDICINE

## 2023-11-20 PROCEDURE — 99214 OFFICE O/P EST MOD 30 MIN: CPT | Performed by: FAMILY MEDICINE

## 2023-11-20 NOTE — PROGRESS NOTES
Assessment/Plan:    No problem-specific Assessment & Plan notes found for this encounter. Subjective:      Patient ID: Corinne Cervantes is a 70 y.o. male. PATIENT RETURNS FOR FOLLOW-UP OF CHRONIC MEDICAL CONDITIONS. ANY HOSPITAL VISITS, EMERGENCY VISITS AND OTHER PROVIDER VISITS SINCE LAST TIME WERE REVIEWED. MEDS WERE REVIEWED AND NO SIDE EFFECTS. NO NEW ISSUES  UNLESS NOTED BELOW. NO NEW MEDICAL PROVIDER REPORTED. THE CHRONIC DISEASES LISTED ABOVE ARE STABLE AND UNCHANGED/ THE PLAN OF CARE FOR THOSE WILL REMAIN UNCHANGED UNLESS NOTED BELOW. AWV/sub     Still sees [place] [facility]        The following portions of the patient's history were reviewed and updated as appropriate: allergies, current medications, past family history, past medical history, past social history, past surgical history and problem list.    Review of Systems   Constitutional:  Negative for activity change, appetite change, fatigue, fever and unexpected weight change. HENT:  Negative for congestion, dental problem and sneezing. Eyes:  Negative for discharge and visual disturbance. Respiratory:  Negative for cough and wheezing. Gastrointestinal:  Negative for abdominal pain, constipation, diarrhea, nausea and vomiting. Endocrine: Negative for polydipsia and polyuria. Genitourinary:  Negative for dysuria and frequency. Musculoskeletal:  Negative for arthralgias. Skin:  Negative for rash. Allergic/Immunologic: Negative for environmental allergies and food allergies. Neurological:  Negative for headaches. Hematological:  Negative for adenopathy. Psychiatric/Behavioral:  Negative for behavioral problems and sleep disturbance.           Objective:  Vitals:    11/20/23 0940   BP: 120/70   BP Location: Left arm   Patient Position: Sitting   Cuff Size: Standard   Pulse: (!) 49   Resp: 18   Temp: 97.7 °F (36.5 °C)   SpO2: 93%   Weight: 81.7 kg (180 lb 3.2 oz)      Physical Exam  Constitutional:       Appearance: Normal appearance. HENT:      Head: Normocephalic and atraumatic. Right Ear: Tympanic membrane and ear canal normal.      Left Ear: Tympanic membrane and ear canal normal.      Mouth/Throat:      Mouth: Mucous membranes are moist.      Pharynx: Oropharynx is clear. Eyes:      Conjunctiva/sclera: Conjunctivae normal.   Cardiovascular:      Rate and Rhythm: Normal rate and regular rhythm. Pulses: Normal pulses. Heart sounds: Normal heart sounds. No murmur heard. Pulmonary:      Effort: Pulmonary effort is normal. No respiratory distress. Breath sounds: Normal breath sounds. Musculoskeletal:      Cervical back: Neck supple. Lymphadenopathy:      Cervical: No cervical adenopathy. Neurological:      Mental Status: He is alert. Mental status is at baseline. Psychiatric:         Mood and Affect: Mood normal.         Thought Content: Thought content normal.           Patient's chronic problems that were reviewed today are stable. Recent hospital stays reviewed. Recent labs and imaging reviewed. Recent visits to other providers reviewed. Meds reviewed and no changes made. Appropriate labs and imaging were ordered. Preventive measures appropriate for age and sex were reviewed with patient. Immunizations were updated as appropriate.

## 2023-11-20 NOTE — PROGRESS NOTES
Assessment and Plan:     Problem List Items Addressed This Visit       Benign essential hypertension    Chronic coronary artery disease    Hyperlipidemia    BPH with obstruction/lower urinary tract symptoms    Lung nodule seen on imaging study - Primary    New onset a-fib ([address])    Prediabetes     Other Visit Diagnoses       Encounter for immunization                 Preventive health issues were discussed with patient, and age appropriate screening tests were ordered as noted in patient's After Visit Summary. Personalized health advice and appropriate referrals for health education or preventive services given if needed, as noted in patient's After Visit Summary.      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 ([address])    New onset a-fib ([address])    Prediabetes    Basal cell carcinoma    Liver cyst      Past Medical and Surgical History:     Past Medical History:   Diagnosis Date    Acute MI ([address]) 06/2002    Arthritis     Atypical chest pain 03/05/2008    Basal cell carcinoma 2009    Coronary artery disease     Diverticulosis 2008    Hematuria, microscopic     History of varicose veins 2008    Hyperlipidemia     Hypertension     Myocardial infarction ([address])     Nocturia     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     Urinary tract infection      Past Surgical History:   Procedure Laterality Date    COLONOSCOPY      CORONARY ANGIOPLASTY      right CA x 3 vessels: redo PTCA-LAD 10/04    CORONARY STENT PLACEMENT  2002    JOINT REPLACEMENT      R hip    NV COLONOSCOPY FLX DX W/COLLJ SPEC WHEN PFRMD N/A 2017    Procedure: COLONOSCOPY;  Surgeon: Jo Rico MD;  Location: [facility]; Service: Colorectal    TOTAL HIP ARTHROPLASTY Right     TOTAL HIP ARTHROPLASTY Left 2019      Family History:     Family History   Problem Relation Age of Onset    Aneurysm Father         post op AAA repair    Hypertension Sister     Hypertension Brother     Hypertension Mother     Diabetes Mother       Social History:     Social History     Socioeconomic History    Marital status: /Civil Union     Spouse name: None    Number of children: None    Years of education: None    Highest education level: None   Occupational History    None   Tobacco Use    Smoking status: Former     Packs/day: 0.50     Years: 5.00     Total pack years: 2.50     Types: Cigarettes     Start date: 1967     Quit date: 1970     Years since quittin.9    Smokeless tobacco: Former    Tobacco comments:     quit . ..0.5 packs/2.00 pack years   Vaping Use    Vaping Use: Never used   Substance and Sexual Activity    Alcohol use: No    Drug use: No    Sexual activity: Yes     Partners: Female     Birth control/protection: None   Other Topics Concern    None   Social History Narrative    None     Social Determinants of Health     Financial Resource Strain: Low Risk  (2023)    Overall Financial Resource Strain (CARDIA)     Difficulty of Paying Living Expenses: Not hard at all   Food Insecurity: Not on file   Transportation Needs: No Transportation Needs (2023)    PRAPARE - Transportation     Lack of Transportation (Medical): No     Lack of Transportation (Non-Medical):  No   Physical Activity: Not on file   Stress: Not on file   Social Connections: Not on file   Intimate Partner Violence: Not on file   Housing Stability: Unknown (2022) Housing Stability Vital Sign     Unable to Pay for Housing in the Last Year: Patient refused     Number of Places Lived in the Last Year: Not on file     Unstable Housing in the Last Year: Patient refused      Medications and Allergies:     Current Outpatient Medications   Medication Sig Dispense Refill    amLODIPine (NORVASC) 10 mg tablet TAKE 1 TABLET BY MOUTH EVERY DAY 90 tablet 3    ascorbic acid (VITAMIN C) 1000 MG tablet Take 1,000 mg by mouth daily      aspirin (ECOTRIN LOW STRENGTH) 81 mg EC tablet Take 81 mg by mouth daily Twice a week      atenolol (TENORMIN) 100 mg tablet TAKE 1 TABLET BY MOUTH EVERY DAY 90 tablet 3    B Complex Vitamins (B COMPLEX 100 PO) Take by mouth      benazepril (LOTENSIN) 20 mg tablet TAKE 1 TABLET BY MOUTH EVERY DAY 90 tablet 3    calcium carbonate (OS-LUCA) 600 MG tablet Take 600 mg by mouth daily       calcium carbonate-vitamin D 500 mg-5 mcg per tablet Take 1 tablet by mouth daily      magnesium oxide (MAG-OX) 400 mg tablet Take 400 mg by mouth daily      multivitamin (THERAGRAN) TABS Take 1 tablet by mouth      niacin (NIASPAN) 500 mg CR tablet Take 1 tablet (500 mg total) by mouth daily at bedtime 90 tablet 3    Omega-3 Fatty Acids (FISH OIL PO) Take by mouth daily       sildenafil (VIAGRA) 50 MG tablet Take 1 tablet (50 mg total) by mouth daily as needed for erectile dysfunction 20 tablet 6    simvastatin (ZOCOR) 40 mg tablet TAKE 1 TABLET BY MOUTH EVERYDAY AT BEDTIME 90 tablet 3    tamsulosin (FLOMAX) 0.4 mg TAKE 1 CAPSULE BY MOUTH EVERY DAY WITH DINNER 90 capsule 3    rivaroxaban (Xarelto) 20 mg tablet Take 1 tablet (20 mg total) by mouth daily with breakfast 90 tablet 3     No current facility-administered medications for this visit.      Allergies   Allergen Reactions    No Active Allergies     Other       Immunizations:     Immunization History   Administered Date(s) Administered    COVID-19 MODERNA VACC 0.5 ML IM 10/21/2022    COVID-19 Moderna Vac BIVALENT 12 Yr+ IM 0.5 ML 07/05/2023    COVID-19 PFIZER VACCINE 0.3 ML IM 02/13/2021, 03/06/2021, 10/06/2021    H1N1, All Formulations 02/24/2010    INFLUENZA 09/15/2017, 10/11/2022, 11/10/2022    Influenza Split High Dose Preservative Free IM 10/15/2014, 10/19/2016, 09/15/2017    Influenza, high dose seasonal 0.7 mL 09/05/2018, 10/14/2019, 10/22/2020, 11/10/2021    Influenza, seasonal, injectable 01/01/2007, 06/30/2008, 11/10/2008, 11/24/2009, 10/24/2011, 11/04/2012    Pneumococcal Conjugate 13-Valent 02/18/2015    Pneumococcal Polysaccharide PPV23 02/07/2012    Tdap 10/12/2012, 06/14/2023    Zoster 11/18/2013    Zoster Vaccine Recombinant 12/27/2019, 06/24/2020      Health Maintenance:         Topic Date Due    Hepatitis C Screening  Completed    Colorectal Cancer Screening  Discontinued         Topic Date Due    Influenza Vaccine (1) 09/01/2023    COVID-19 Vaccine (6 - Pfizer series) 11/05/2023      Medicare Screening Tests and Risk Assessments:     Woo Thomas is here for his Subsequent Wellness visit. Last Medicare Wellness visit information reviewed, patient interviewed, no change since last AWV.      Activities of Daily Living (ADLs)/Instrumental Activities of Daily Living (IADLs):       ADL comments: Dental and eyes OK ; recent hearing test oK     Previous Hospitalizations:   Any hospitalizations or ED visits within the last 12 months?: No      Advance Care Planning:   Living will: No    Durable POA for healthcare: No    Advanced directive: No      Comments: Has paper     Cognitive Screening:   Provider or family/friend/caregiver concerned regarding cognition?: No    PREVENTIVE SCREENINGS      Cardiovascular Screening:    General: Screening Not Indicated and History Lipid Disorder      Diabetes Screening:     General: Screening Current      Colorectal Cancer Screening:     General: Screening Current      Prostate Cancer Screening:    General: Screening Not Indicated      Osteoporosis Screening:    General: Screening Not Indicated Abdominal Aortic Aneurysm (AAA) Screening:    Risk factors include: tobacco use        Lung Cancer Screening:     General: Screening Not Indicated      Hepatitis C Screening:    General: Screening Current    Screening, Brief Intervention, and Referral to Treatment (SBIRT)      Brief Intervention  Alcohol & drug use screenings were reviewed. No concerns regarding substance use disorder identified. No results found.      Physical Exam:     /70 (BP Location: Left arm, Patient Position: Sitting, Cuff Size: Standard)   Pulse (!) 49   Temp 97.7 °F (36.5 °C)   Resp 18   Wt 81.7 kg (180 lb 3.2 oz)   SpO2 93%   BMI 27.20 kg/m²     Physical Exam     Juan Jose Nazario MD

## 2023-11-20 NOTE — PATIENT INSTRUCTIONS
I do recommend consideration of the newest COVID booster when you see the [place] doctors. Medicare Preventive Visit Patient Instructions  Thank you for completing your Welcome to Medicare Visit or Medicare Annual Wellness Visit today. Your next wellness visit will be due in one year (11/20/2024). The screening/preventive services that you may require over the next 5-10 years are detailed below. Some tests may not apply to you based off risk factors and/or age. Screening tests ordered at today's visit but not completed yet may show as past due. Also, please note that scanned in results may not display below. Preventive Screenings:  Service Recommendations Previous Testing/Comments   Colorectal Cancer Screening  Colonoscopy    Fecal Occult Blood Test (FOBT)/Fecal Immunochemical Test (FIT)  Fecal DNA/Cologuard Test  Flexible Sigmoidoscopy Age: 43-73 years old   Colonoscopy: every 10 years (May be performed more frequently if at higher risk)  OR  FOBT/FIT: every 1 year  OR  Cologuard: every 3 years  OR  Sigmoidoscopy: every 5 years  Screening may be recommended earlier than age 39 if at higher risk for colorectal cancer. Also, an individualized decision between you and your healthcare provider will decide whether screening between the ages of 77-80 would be appropriate.  Colonoscopy: 06/27/2017  FOBT/FIT: Not on file  Cologuard: Not on file  Sigmoidoscopy: Not on file    Screening Current     Prostate Cancer Screening Individualized decision between patient and health care provider in men between ages of 53-66   Medicare will cover every 12 months beginning on the day after your 50th birthday PSA: 7.0 ng/mL     Screening Not Indicated  Screening Not Indicated     Hepatitis C Screening Once for adults born between 1945 and 1965  More frequently in patients at high risk for Hepatitis C Hep C Antibody: 04/03/2019    Screening Current  Screening Current   Diabetes Screening 1-2 times per year if you're at risk for diabetes or have pre-diabetes Fasting glucose: 99 mg/dL (9/2/2023)  A1C: 5.6 (5/15/2023)  Screening Current  Screening Current   Cholesterol Screening Once every 5 years if you don't have a lipid disorder. May order more often based on risk factors. Lipid panel: 09/02/2023  Screening Not Indicated  History Lipid Disorder  Screening Not Indicated  History Lipid Disorder      Other Preventive Screenings Covered by Medicare:  Abdominal Aortic Aneurysm (AAA) Screening: covered once if your at risk. You're considered to be at risk if you have a family history of AAA or a male between the age of 70-76 who smoking at least 100 cigarettes in your lifetime. Lung Cancer Screening: covers low dose CT scan once per year if you meet all of the following conditions: (1) Age 48-67; (2) No signs or symptoms of lung cancer; (3) Current smoker or have quit smoking within the last 15 years; (4) You have a tobacco smoking history of at least 20 pack years (packs per day x number of years you smoked); (5) You get a written order from a healthcare provider. Glaucoma Screening: covered annually if you're considered high risk: (1) You have diabetes OR (2) Family history of glaucoma OR (3)  aged 48 and older OR (3)  American aged 72 and older  Osteoporosis Screening: covered every 2 years if you meet one of the following conditions: (1) Have a vertebral abnormality; (2) On glucocorticoid therapy for more than 3 months; (3) Have primary hyperparathyroidism; (4) On osteoporosis medications and need to assess response to drug therapy. HIV Screening: covered annually if you're between the age of 14-79. Also covered annually if you are younger than 13 and older than 72 with risk factors for HIV infection. For pregnant patients, it is covered up to 3 times per pregnancy.     Immunizations:  Immunization Recommendations   Influenza Vaccine Annual influenza vaccination during flu season is recommended for all persons aged >= 6 months who do not have contraindications   Pneumococcal Vaccine   * Pneumococcal conjugate vaccine = PCV13 (Prevnar 13), PCV15 (Vaxneuvance), PCV20 (Prevnar 20)  * Pneumococcal polysaccharide vaccine = PPSV23 (Pneumovax) Adults 36-52 yo with certain risk factors or if 69+ yo  If never received any pneumonia vaccine: recommend Prevnar 20 (PCV20)  Give PCV20 if previously received 1 dose of PCV13 or PPSV23   Hepatitis B Vaccine 3 dose series if at intermediate or high risk (ex: diabetes, end stage renal disease, liver disease)   Respiratory syncytial virus (RSV) Vaccine - COVERED BY MEDICARE PART D  * RSVPreF3 (Arexvy) CDC recommends that adults 61years of age and older may receive a single dose of RSV vaccine using shared clinical decision-making (SCDM)   Tetanus (Td) Vaccine - COST NOT COVERED BY MEDICARE PART B Following completion of primary series, a booster dose should be given every 10 years to maintain immunity against tetanus. Td may also be given as tetanus wound prophylaxis. Tdap Vaccine - COST NOT COVERED BY MEDICARE PART B Recommended at least once for all adults. For pregnant patients, recommended with each pregnancy. Shingles Vaccine (Shingrix) - COST NOT COVERED BY MEDICARE PART B  2 shot series recommended in those 19 years and older who have or will have weakened immune systems or those 50 years and older     Health Maintenance Due:      Topic Date Due   • Hepatitis C Screening  Completed   • Colorectal Cancer Screening  Discontinued     Immunizations Due:      Topic Date Due   • Influenza Vaccine (1) 09/01/2023   • COVID-19 Vaccine (6 - Pfizer series) 11/05/2023     Advance Directives   What are advance directives? Advance directives are legal documents that state your wishes and plans for medical care. These plans are made ahead of time in case you lose your ability to make decisions for yourself.  Advance directives can apply to any medical decision, such as the treatments you want, and if you want to donate organs. What are the types of advance directives? There are many types of advance directives, and each state has rules about how to use them. You may choose a combination of any of the following:  Living will: This is a written record of the treatment you want. You can also choose which treatments you do not want, which to limit, and which to stop at a certain time. This includes surgery, medicine, IV fluid, and tube feedings. Durable power of  for healthcare [facility]): This is a written record that states who you want to make healthcare choices for you when you are unable to make them for yourself. This person, called a proxy, is usually a family member or a friend. You may choose more than 1 proxy. Do not resuscitate (DNR) order:  A DNR order is used in case your heart stops beating or you stop breathing. It is a request not to have certain forms of treatment, such as CPR. A DNR order may be included in other types of advance directives. Medical directive: This covers the care that you want if you are in a coma, near death, or unable to make decisions for yourself. You can list the treatments you want for each condition. Treatment may include pain medicine, surgery, blood transfusions, dialysis, IV or tube feedings, and a ventilator (breathing machine). Values history: This document has questions about your views, beliefs, and how you feel and think about life. This information can help others choose the care that you would choose. Why are advance directives important? An advance directive helps you control your care. Although spoken wishes may be used, it is better to have your wishes written down. Spoken wishes can be misunderstood, or not followed. Treatments may be given even if you do not want them. An advance directive may make it easier for your family to make difficult choices about your care.    Weight Management   Why it is important to manage your weight:  Being overweight increases your risk of health conditions such as heart disease, high blood pressure, type 2 diabetes, and certain types of cancer. It can also increase your risk for osteoarthritis, sleep apnea, and other respiratory problems. Aim for a slow, steady weight loss. Even a small amount of weight loss can lower your risk of health problems. How to lose weight safely:  A safe and healthy way to lose weight is to eat fewer calories and get regular exercise. You can lose up about 1 pound a week by decreasing the number of calories you eat by 500 calories each day. Healthy meal plan for weight management:  A healthy meal plan includes a variety of foods, contains fewer calories, and helps you stay healthy. A healthy meal plan includes the following:  Eat whole-grain foods more often. A healthy meal plan should contain fiber. Fiber is the part of grains, fruits, and vegetables that is not broken down by your body. Whole-grain foods are healthy and provide extra fiber in your diet. Some examples of whole-grain foods are whole-wheat breads and pastas, oatmeal, brown rice, and bulgur. Eat a variety of vegetables every day. Include dark, leafy greens such as spinach, kale, gino greens, and mustard greens. Eat yellow and orange vegetables such as carrots, sweet potatoes, and winter squash. Eat a variety of fruits every day. Choose fresh or canned fruit (canned in its own juice or light syrup) instead of juice. Fruit juice has very little or no fiber. Eat low-fat dairy foods. Drink fat-free (skim) milk or 1% milk. Eat fat-free yogurt and low-fat cottage cheese. Try low-fat cheeses such as mozzarella and other reduced-fat cheeses. Choose meat and other protein foods that are low in fat. Choose beans or other legumes such as split peas or lentils. Choose fish, skinless poultry (chicken or turkey), or lean cuts of red meat (beef or pork). Before you cook meat or poultry, cut off any visible fat. Use less fat and oil. Try baking foods instead of frying them. Add less fat, such as margarine, sour cream, regular salad dressing and mayonnaise to foods. Eat fewer high-fat foods. Some examples of high-fat foods include french fries, doughnuts, ice cream, and cakes. Eat fewer sweets. Limit foods and drinks that are high in sugar. This includes candy, cookies, regular soda, and sweetened drinks. Exercise:  Exercise at least 30 minutes per day on most days of the week. Some examples of exercise include walking, biking, dancing, and swimming. You can also fit in more physical activity by taking the stairs instead of the elevator or parking farther away from stores. Ask your healthcare provider about the best exercise plan for you. © Copyright [address] 2018 Information is for End User's use only and may not be sold, redistributed or otherwise used for commercial purposes.  All illustrations and images included in CareNotes® are the copyrighted property of [employer] Inc[employer] or [address] [place] [address]

## 2023-11-20 NOTE — PROGRESS NOTES
Assessment and Plan:     Problem List Items Addressed This Visit    None       Preventive health issues were discussed with patient, and age appropriate screening tests were ordered as noted in patient's After Visit Summary. Personalized health advice and appropriate referrals for health education or preventive services given if needed, as noted in patient's After Visit Summary. 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 ([address])    New onset a-fib ([address])    Prediabetes    Basal cell carcinoma    Liver cyst      Past Medical and Surgical History:     Past Medical History:   Diagnosis Date    Acute MI ([address]) 06/2002    Arthritis     Atypical chest pain 03/05/2008    Basal cell carcinoma 2009    Coronary artery disease     Diverticulosis 2008    Hematuria, microscopic     History of varicose veins 2008    Hyperlipidemia     Hypertension     Myocardial infarction ([address])     Nocturia     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     Urinary tract infection      Past Surgical History:   Procedure Laterality Date    COLONOSCOPY      CORONARY ANGIOPLASTY      right CA x 3 vessels: redo PTCA-LAD 10/04    CORONARY STENT PLACEMENT  01/01/2002    JOINT REPLACEMENT      R hip    [place] COLONOSCOPY FLX DX W/COLLJ SPEC WHEN PFRMD N/A 6/27/2017    Procedure: COLONOSCOPY;  Surgeon: Triston Moran MD;  Location: [facility];   Service: Colorectal TOTAL HIP ARTHROPLASTY Right     TOTAL HIP ARTHROPLASTY Left 2019      Family History:     Family History   Problem Relation Age of Onset    Aneurysm Father         post op AAA repair    Hypertension Sister     Hypertension Brother     Hypertension Mother     Diabetes Mother       Social History:     Social History     Socioeconomic History    Marital status: /Civil Union     Spouse name: Not on file    Number of children: Not on file    Years of education: Not on file    Highest education level: Not on file   Occupational History    Not on file   Tobacco Use    Smoking status: Former     Packs/day: 0.50     Years: 5.00     Total pack years: 2.50     Types: Cigarettes     Start date: 1967     Quit date: 1970     Years since quittin.9    Smokeless tobacco: Former    Tobacco comments:     quit . ..0.5 packs/2.00 pack years   Vaping Use    Vaping Use: Never used   Substance and Sexual Activity    Alcohol use: No    Drug use: No    Sexual activity: Yes     Partners: Female     Birth control/protection: None   Other Topics Concern    Not on file   Social History Narrative    Not on file     Social Determinants of Health     Financial Resource Strain: Low Risk  (2022)    Overall Financial Resource Strain (CARDIA)     Difficulty of Paying Living Expenses: Not hard at all   Food Insecurity: Not on file   Transportation Needs: No Transportation Needs (2022)    PRAPARE - Transportation     Lack of Transportation (Medical): No     Lack of Transportation (Non-Medical):  No   Physical Activity: Not on file   Stress: Not on file   Social Connections: Not on file   Intimate Partner Violence: Not on file   Housing Stability: Unknown (2022)    Housing Stability Vital Sign     Unable to Pay for Housing in the Last Year: Patient refused     Number of Places Lived in the Last Year: Not on file     Unstable Housing in the Last Year: Patient refused      Medications and Allergies: Current Outpatient Medications   Medication Sig Dispense Refill    amLODIPine (NORVASC) 10 mg tablet TAKE 1 TABLET BY MOUTH EVERY DAY 90 tablet 3    ascorbic acid (VITAMIN C) 1000 MG tablet Take 1,000 mg by mouth daily      aspirin (ECOTRIN LOW STRENGTH) 81 mg EC tablet Take 81 mg by mouth daily Twice a week      atenolol (TENORMIN) 100 mg tablet TAKE 1 TABLET BY MOUTH EVERY DAY 90 tablet 3    B Complex Vitamins (B COMPLEX 100 PO) Take by mouth      benazepril (LOTENSIN) 20 mg tablet TAKE 1 TABLET BY MOUTH EVERY DAY 90 tablet 3    calcium carbonate (OS-LUCA) 600 MG tablet Take 600 mg by mouth daily       calcium carbonate-vitamin D 500 mg-5 mcg per tablet Take 1 tablet by mouth daily      magnesium oxide (MAG-OX) 400 mg tablet Take 400 mg by mouth daily      multivitamin (THERAGRAN) TABS Take 1 tablet by mouth      niacin (NIASPAN) 500 mg CR tablet Take 1 tablet (500 mg total) by mouth daily at bedtime 90 tablet 3    Omega-3 Fatty Acids (FISH OIL PO) Take by mouth daily       sildenafil (VIAGRA) 50 MG tablet Take 1 tablet (50 mg total) by mouth daily as needed for erectile dysfunction 20 tablet 6    simvastatin (ZOCOR) 40 mg tablet TAKE 1 TABLET BY MOUTH EVERYDAY AT BEDTIME 90 tablet 3    tamsulosin (FLOMAX) 0.4 mg TAKE 1 CAPSULE BY MOUTH EVERY DAY WITH DINNER 90 capsule 3    rivaroxaban (Xarelto) 20 mg tablet Take 1 tablet (20 mg total) by mouth daily with breakfast 90 tablet 3     No current facility-administered medications for this visit.      Allergies   Allergen Reactions    No Active Allergies     Other       Immunizations:     Immunization History   Administered Date(s) Administered    COVID-19 MODERNA VACC 0.5 ML IM 10/21/2022    COVID-19 Moderna Vac BIVALENT 12 Yr+ IM 0.5 ML 07/05/2023    COVID-19 PFIZER VACCINE 0.3 ML IM 02/13/2021, 03/06/2021, 10/06/2021    H1N1, All Formulations 02/24/2010    INFLUENZA 09/15/2017, 10/11/2022, 11/10/2022    Influenza Split High Dose Preservative Free IM 10/15/2014, 10/19/2016, 09/15/2017    Influenza, high dose seasonal 0.7 mL 09/05/2018, 10/14/2019, 10/22/2020, 11/10/2021    Influenza, seasonal, injectable 01/01/2007, 06/30/2008, 11/10/2008, 11/24/2009, 10/24/2011, 11/04/2012    Pneumococcal Conjugate 13-Valent 02/18/2015    Pneumococcal Polysaccharide PPV23 02/07/2012    Tdap 10/12/2012, 06/14/2023    Zoster 11/18/2013    Zoster Vaccine Recombinant 12/27/2019, 06/24/2020      Health Maintenance:         Topic Date Due    Hepatitis C Screening  Completed    Colorectal Cancer Screening  Discontinued         Topic Date Due    Influenza Vaccine (1) 09/01/2023    COVID-19 Vaccine (6 - Pfizer series) 11/05/2023      Medicare Screening Tests and Risk Assessments:         Health Risk Assessment:   Patient rates overall health as very good. Patient feels that their physical health rating is same. Patient is satisfied with their life. Eyesight was rated as same. Hearing was rated as same. Patient feels that their emotional and mental health rating is slightly better. Patients states they are sometimes angry. Patient states they are sometimes unusually tired/fatigued. Pain experienced in the last 7 days has been none. Patient states that he has experienced no weight loss or gain in last 6 months. Fall Risk Screening: In the past year, patient has experienced: no history of falling in past year      Home Safety:  Patient does not have trouble with stairs inside or outside of their home. Patient has working smoke alarms and has working carbon monoxide detector. Home safety hazards include: none. Nutrition:   Current diet is Regular. Medications:   Patient is currently taking over-the-counter supplements. OTC medications include: see medication list. Patient is able to manage medications.      Activities of Daily Living (ADLs)/Instrumental Activities of Daily Living (IADLs):   Walk and transfer into and out of bed and chair?: Yes  Dress and groom yourself?: Yes    Bathe or shower yourself?: Yes    Feed yourself? Yes  Do your laundry/housekeeping?: Yes  Manage your money, pay your bills and track your expenses?: Yes  Make your own meals?: Yes    Do your own shopping?: Yes    Previous Hospitalizations:   Any hospitalizations or ED visits within the last 12 months?: No      Advance Care Planning:   Living will: Yes    Advanced directive: Yes    Advanced directive counseling given: Yes      PREVENTIVE SCREENINGS      Cardiovascular Screening:    General: Screening Not Indicated and History Lipid Disorder      Diabetes Screening:     General: Screening Current      Prostate Cancer Screening:    General: Screening Not Indicated      Abdominal Aortic Aneurysm (AAA) Screening:    Risk factors include: tobacco use        Lung Cancer Screening:     General: Screening Not Indicated      Hepatitis C Screening:    General: Screening Current    No results found. Physical Exam:     There were no vitals taken for this visit.     Physical Exam     Amanda Heredia MD

## 2023-11-25 DIAGNOSIS — I10 ESSENTIAL HYPERTENSION: ICD-10-CM

## 2023-11-25 DIAGNOSIS — E78.00 HIGH CHOLESTEROL: ICD-10-CM

## 2023-11-27 RX ORDER — SIMVASTATIN 40 MG
TABLET ORAL
Qty: 90 TABLET | Refills: 3 | Status: SHIPPED | OUTPATIENT
Start: 2023-11-27

## 2023-11-27 RX ORDER — ATENOLOL 100 MG/1
TABLET ORAL
Qty: 90 TABLET | Refills: 3 | Status: SHIPPED | OUTPATIENT
Start: 2023-11-27

## 2023-11-28 ENCOUNTER — OFFICE VISIT (OUTPATIENT)
Dept: UROLOGY | Facility: MEDICAL CENTER | Age: 78
End: 2023-11-28
Payer: MEDICARE

## 2023-11-28 VITALS
WEIGHT: 182 LBS | HEIGHT: 68 IN | HEART RATE: 53 BPM | DIASTOLIC BLOOD PRESSURE: 80 MMHG | SYSTOLIC BLOOD PRESSURE: 124 MMHG | BODY MASS INDEX: 27.58 KG/M2 | OXYGEN SATURATION: 98 %

## 2023-11-28 DIAGNOSIS — N40.1 BPH WITH OBSTRUCTION/LOWER URINARY TRACT SYMPTOMS: ICD-10-CM

## 2023-11-28 DIAGNOSIS — R97.20 ELEVATED PSA: Primary | ICD-10-CM

## 2023-11-28 DIAGNOSIS — N13.8 BPH WITH OBSTRUCTION/LOWER URINARY TRACT SYMPTOMS: ICD-10-CM

## 2023-11-28 PROCEDURE — 99214 OFFICE O/P EST MOD 30 MIN: CPT | Performed by: UROLOGY

## 2023-11-28 NOTE — ASSESSMENT & PLAN NOTE
Most recent PSA is 7.0 with 17.4% free PSA. JUAN LUIS was palpably benign at his last visit. Options were discussed and I did recommend that we pursue a multiparametric MRI of the prostate. This will give us an estimate of prostate size and we will be able to better evaluate his risk for significant prostate cancer.

## 2023-11-28 NOTE — ASSESSMENT & PLAN NOTE
AUA symptom score is 12 on tamsulosin. Urinalysis is negative. Options were discussed and we will continue present therapy.

## 2023-11-28 NOTE — PROGRESS NOTES
Assessment/Plan:    Elevated PSA  Most recent PSA is 7.0 with 17.4% free PSA. JUAN LUIS was palpably benign at his last visit. Options were discussed and I did recommend that we pursue a multiparametric MRI of the prostate. This will give us an estimate of prostate size and we will be able to better evaluate his risk for significant prostate cancer. BPH with obstruction/lower urinary tract symptoms  AUA symptom score is 12 on tamsulosin. Urinalysis is negative. Options were discussed and we will continue present therapy. Diagnoses and all orders for this visit:    Elevated PSA  -     MRI prostate multiparametric wo w contrast; Future  -     Basic metabolic panel; Future    BPH with obstruction/lower urinary tract symptoms          Subjective:      Patient ID: Hesham Henriquez. is a 70 y.o. male. Chief complaint: h/o  Urinary tract infection    HPI: He notes all symptoms are better - he is drinking more and  voiding better. No dysuria or fever. No flank pain    Benign Prostatic Hypertrophy  This is a chronic problem. The current episode started more than 1 year ago. The problem is unchanged. Irritative symptoms include nocturia (nocturia 2-3x) and urgency. Obstructive symptoms include a slower stream. Obstructive symptoms do not include dribbling, incomplete emptying, an intermittent stream, straining or a weak stream. Pertinent negatives include no chills, dysuria, hematuria or hesitancy. AUA score is 8-19. His sexual activity is non-contributory to the current illness. Nothing aggravates the symptoms. Past treatments include tamsulosin. The treatment provided moderate relief. The following portions of the patient's history were reviewed and updated as appropriate: allergies, current medications, past family history, past medical history, past social history, past surgical history and problem list.    Review of Systems   Constitutional:  Negative for chills, diaphoresis, fatigue and fever.    HENT: Negative. Eyes: Negative. Respiratory: Negative. Cardiovascular: Negative. Gastrointestinal: Negative. Endocrine: Negative. Genitourinary:  Positive for nocturia (nocturia 2-3x) and urgency. Negative for dysuria, hematuria, hesitancy and incomplete emptying. See HPI   Musculoskeletal: Negative. Skin: Negative. Allergic/Immunologic: Negative. Neurological: Negative. Hematological: Negative. Psychiatric/Behavioral: Negative. AUA SYMPTOM SCORE      Flowsheet Row Most Recent Value   AUA SYMPTOM SCORE    How often have you had a sensation of not emptying your bladder completely after you finished urinating? 1 (P)     How often have you had to urinate again less than two hours after you finished urinating? 2 (P)     How often have you found you stopped and started again several times when you urinate? 2 (P)     How often have you found it difficult to postpone urination? 3 (P)     How often have you had a weak urinary stream? 1 (P)     How often have you had to push or strain to begin urination? 0 (P)     How many times did you most typically get up to urinate from the time you went to bed at night until the time you got up in the morning? 3 (P)     Quality of Life: If you were to spend the rest of your life with your urinary condition just the way it is now, how would you feel about that? 2 (P)     AUA SYMPTOM SCORE 12 (P)              Objective:      /80 (BP Location: Left arm, Patient Position: Sitting, Cuff Size: Standard)   Pulse (!) 53   Ht 5' 8.25" (1.734 m)   Wt 82.6 kg (182 lb)   SpO2 98%   BMI 27.47 kg/m²          Physical Exam  Vitals reviewed. Constitutional:       General: He is not in acute distress. Appearance: Normal appearance. He is well-developed and normal weight. He is not ill-appearing, toxic-appearing or diaphoretic. HENT:      Head: Normocephalic and atraumatic. Eyes:      General: No scleral icterus.      Conjunctiva/sclera: Conjunctivae normal.   Cardiovascular:      Rate and Rhythm: Normal rate. Pulmonary:      Effort: Pulmonary effort is normal.   Abdominal:      General: Abdomen is flat. Bowel sounds are normal. There is no distension. Palpations: Abdomen is soft. There is no mass. Tenderness: There is no abdominal tenderness. There is no right CVA tenderness, left CVA tenderness, guarding or rebound. Hernia: No hernia is present. Genitourinary:     Penis: Normal. No phimosis or hypospadias. Testes: Normal.         Right: Mass not present. Left: Mass not present. Rectum: Normal.   Musculoskeletal:         General: Normal range of motion. Cervical back: Neck supple. Skin:     General: Skin is warm and dry. Neurological:      Mental Status: He is alert and oriented to person, place, and time. Psychiatric:         Mood and Affect: Mood normal.         Behavior: Behavior normal.         Thought Content:  Thought content normal.         Judgment: Judgment normal.

## 2023-12-08 ENCOUNTER — OFFICE VISIT (OUTPATIENT)
Dept: FAMILY MEDICINE CLINIC | Facility: CLINIC | Age: 78
End: 2023-12-08
Payer: MEDICARE

## 2023-12-08 VITALS
OXYGEN SATURATION: 97 % | DIASTOLIC BLOOD PRESSURE: 76 MMHG | HEART RATE: 59 BPM | TEMPERATURE: 97.7 F | HEIGHT: 68 IN | SYSTOLIC BLOOD PRESSURE: 124 MMHG | WEIGHT: 178 LBS | BODY MASS INDEX: 26.98 KG/M2

## 2023-12-08 DIAGNOSIS — T50.B95A ADVERSE REACTION TO COVID-19 VACCINE: Primary | ICD-10-CM

## 2023-12-08 PROCEDURE — 99214 OFFICE O/P EST MOD 30 MIN: CPT | Performed by: FAMILY MEDICINE

## 2023-12-08 RX ORDER — PREDNISONE 20 MG/1
40 TABLET ORAL DAILY
Qty: 6 TABLET | Refills: 0 | Status: SHIPPED | OUTPATIENT
Start: 2023-12-08 | End: 2023-12-11

## 2023-12-08 NOTE — ASSESSMENT & PLAN NOTE
Received booster 1 week ago, localized swelling, pain and erythema on L shoulder at injection site.  Likely "covid arm", hypersensitivity reaction  - data shows fast improvement with steroids but is self limiting in nature  - prednisone 40mg x3 days WM  - f/u next week if no improvement

## 2023-12-08 NOTE — PROGRESS NOTES
Name: Alondra Kelly : 1945      MRN: [id number]  Encounter Provider: Camilo Sarah DO  Encounter Date: 2023   Encounter department: [facility]     1. Adverse reaction to COVID-19 vaccine  Assessment & Plan:  Received booster 1 week ago, localized swelling, pain and erythema on L shoulder at injection site. Likely "covid arm", hypersensitivity reaction  - data shows fast improvement with steroids but is self limiting in nature  - prednisone 40mg x3 days WM  - f/u next week if no improvement    Orders:  -     predniSONE 20 mg tablet; Take 2 tablets (40 mg total) by mouth daily for 3 days      BMI Counseling: Body mass index is 26.87 kg/m². The BMI is above normal. Nutrition recommendations include decreasing portion sizes, decreasing fast food intake, limiting drinks that contain sugar, reducing intake of saturated and trans fat and reducing intake of cholesterol. Exercise recommendations include moderate physical activity 150 minutes/week and exercising 3-5 times per week. Rationale for BMI follow-up plan is due to patient being overweight or obese. Subjective      Patient with adverse reaction to covid booster, L shoulder swelling and pain. Tender on movement, no radiation of pain. No pruritus. Pt has tried tylenol but no relief. First occurrence, never happened in the past with other covid shots. Review of Systems   Constitutional:  Negative for chills and fever. HENT:  Negative for ear pain and sore throat. Eyes:  Negative for pain and visual disturbance. Respiratory:  Negative for cough and shortness of breath. Cardiovascular:  Negative for chest pain and palpitations. Gastrointestinal:  Negative for abdominal pain and vomiting. Genitourinary:  Negative for dysuria and hematuria. Musculoskeletal:  Positive for arthralgias and joint swelling. Negative for back pain. Skin:  Negative for color change and rash. Neurological:  Negative for seizures and syncope. All other systems reviewed and are negative. Current Outpatient Medications on File Prior to Visit   Medication Sig   • amLODIPine (NORVASC) 10 mg tablet TAKE 1 TABLET BY MOUTH EVERY DAY   • ascorbic acid (VITAMIN C) 1000 MG tablet Take 1,000 mg by mouth daily   • aspirin (ECOTRIN LOW STRENGTH) 81 mg EC tablet Take 81 mg by mouth daily Twice a week   • atenolol (TENORMIN) 100 mg tablet TAKE 1 TABLET BY MOUTH EVERY DAY   • B Complex Vitamins (B COMPLEX 100 PO) Take by mouth   • benazepril (LOTENSIN) 20 mg tablet TAKE 1 TABLET BY MOUTH EVERY DAY   • calcium carbonate (OS-LUCA) 600 MG tablet Take 600 mg by mouth daily    • calcium carbonate-vitamin D 500 mg-5 mcg per tablet Take 1 tablet by mouth daily   • magnesium oxide (MAG-OX) 400 mg tablet Take 400 mg by mouth daily   • multivitamin (THERAGRAN) TABS Take 1 tablet by mouth   • niacin (NIASPAN) 500 mg CR tablet Take 1 tablet (500 mg total) by mouth daily at bedtime   • Omega-3 Fatty Acids (FISH OIL PO) Take by mouth daily    • sildenafil (VIAGRA) 50 MG tablet Take 1 tablet (50 mg total) by mouth daily as needed for erectile dysfunction   • simvastatin (ZOCOR) 40 mg tablet TAKE 1 TABLET BY MOUTH EVERYDAY AT BEDTIME   • tamsulosin (FLOMAX) 0.4 mg TAKE 1 CAPSULE BY MOUTH EVERY DAY WITH DINNER   • rivaroxaban (Xarelto) 20 mg tablet Take 1 tablet (20 mg total) by mouth daily with breakfast   • [DISCONTINUED] apixaban (ELIQUIS) 5 mg Take 1 tablet (5 mg total) by mouth in the morning and 1 tablet (5 mg total) in the evening. Objective     /76 (BP Location: Left arm, Patient Position: Sitting, Cuff Size: Adult)   Pulse 59   Temp 97.7 °F (36.5 °C) (Temporal)   Ht 5' 8.25" (1.734 m)   Wt 80.7 kg (178 lb)   SpO2 97%   BMI 26.87 kg/m²     Physical Exam  Vitals reviewed. Constitutional:       General: He is not in acute distress. Appearance: Normal appearance. He is not ill-appearing.    HENT: Head: Normocephalic and atraumatic. Nose: Nose normal. No congestion. Eyes:      General: No scleral icterus. Conjunctiva/sclera: Conjunctivae normal.      Pupils: Pupils are equal, round, and reactive to light. Cardiovascular:      Rate and Rhythm: Normal rate and regular rhythm. Pulses: Normal pulses. Heart sounds: Normal heart sounds. No murmur heard. Pulmonary:      Effort: Pulmonary effort is normal.      Breath sounds: Normal breath sounds. No wheezing. Chest:      Chest wall: No tenderness. Abdominal:      General: Abdomen is flat. Bowel sounds are normal.      Palpations: There is no mass. Tenderness: There is no abdominal tenderness. Musculoskeletal:         General: Swelling and tenderness present. Normal range of motion. Cervical back: Normal range of motion. Right lower leg: No edema. Left lower leg: No edema. Comments: R shoulder swelling and tenderness, near deltoid. Skin:     General: Skin is warm. Capillary Refill: Capillary refill takes less than 2 seconds. Findings: Erythema present. No bruising or rash. Neurological:      Mental Status: He is alert and oriented to person, place, and time.    Psychiatric:         Mood and Affect: Mood normal.         Behavior: Behavior normal.       Mariya Tucker,

## 2023-12-13 RX ORDER — CEPHALEXIN 500 MG/1
500 CAPSULE ORAL ONCE AS NEEDED
COMMUNITY
Start: 2023-12-01

## 2023-12-14 ENCOUNTER — OFFICE VISIT (OUTPATIENT)
Dept: FAMILY MEDICINE CLINIC | Facility: CLINIC | Age: 78
End: 2023-12-14
Payer: MEDICARE

## 2023-12-14 VITALS
OXYGEN SATURATION: 98 % | BODY MASS INDEX: 27.13 KG/M2 | WEIGHT: 179 LBS | DIASTOLIC BLOOD PRESSURE: 72 MMHG | TEMPERATURE: 97.5 F | HEIGHT: 68 IN | SYSTOLIC BLOOD PRESSURE: 130 MMHG | HEART RATE: 52 BPM

## 2023-12-14 DIAGNOSIS — T50.B95A ADVERSE REACTION TO COVID-19 VACCINE: Primary | ICD-10-CM

## 2023-12-14 PROCEDURE — 99214 OFFICE O/P EST MOD 30 MIN: CPT | Performed by: FAMILY MEDICINE

## 2023-12-14 NOTE — ASSESSMENT & PLAN NOTE
Improved with oral steroids, will continue hydrocortisone 2.5% BID per data and research on "covid arm"  - pt improved dramatically from pain symptoms and swelling after covid booster  - continue hydrocortisone for 1 month

## 2023-12-14 NOTE — PROGRESS NOTES
Name: Sophia Suazo. : 1945      MRN: [id number]  Encounter Provider: Marlene Hawkins DO  Encounter Date: 2023   Encounter department: [facility]    Assessment & Plan     1. Adverse reaction to COVID-19 vaccine  Assessment & Plan:  Improved with oral steroids, will continue hydrocortisone 2.5% BID per data and research on "covid arm"  - pt improved dramatically from pain symptoms and swelling after covid booster  - continue hydrocortisone for 1 month    Orders:  -     hydrocortisone 2.5 % ointment; Apply topically 2 (two) times a day           Subjective      R shoulder swelling after covid booster    Shoulder Pain   The pain is present in the right shoulder. This is a recurrent problem. The current episode started 1 to 4 weeks ago. There has been no history of extremity trauma. The problem occurs 2 to 4 times per day. The problem has been gradually improving. The quality of the pain is described as aching. The pain is at a severity of 4/10. The pain is moderate. Associated symptoms include a limited range of motion and stiffness. Pertinent negatives include no fever, inability to bear weight, itching, joint locking, joint swelling, numbness or tingling. The symptoms are aggravated by activity. Treatments tried: steroids. The treatment provided moderate relief. Family history does not include gout or rheumatoid arthritis. Review of Systems   Constitutional:  Negative for chills and fever. HENT:  Negative for ear pain and sore throat. Eyes:  Negative for pain and visual disturbance. Respiratory:  Negative for cough and shortness of breath. Cardiovascular:  Negative for chest pain and palpitations. Gastrointestinal:  Negative for abdominal pain and vomiting. Genitourinary:  Negative for dysuria and hematuria. Musculoskeletal:  Positive for arthralgias and stiffness. Negative for back pain. Skin:  Negative for color change, itching and rash.    Neurological: Negative for tingling, seizures, syncope and numbness. All other systems reviewed and are negative. Current Outpatient Medications on File Prior to Visit   Medication Sig   • amLODIPine (NORVASC) 10 mg tablet TAKE 1 TABLET BY MOUTH EVERY DAY   • ascorbic acid (VITAMIN C) 1000 MG tablet Take 1,000 mg by mouth daily   • aspirin (ECOTRIN LOW STRENGTH) 81 mg EC tablet Take 81 mg by mouth daily Twice a week   • atenolol (TENORMIN) 100 mg tablet TAKE 1 TABLET BY MOUTH EVERY DAY   • B Complex Vitamins (B COMPLEX 100 PO) Take by mouth   • benazepril (LOTENSIN) 20 mg tablet TAKE 1 TABLET BY MOUTH EVERY DAY   • calcium carbonate (OS-LUCA) 600 MG tablet Take 600 mg by mouth daily    • calcium carbonate-vitamin D 500 mg-5 mcg per tablet Take 1 tablet by mouth daily   • cephalexin (KEFLEX) 500 mg capsule Take 500 mg by mouth once as needed (procedure)   • magnesium oxide (MAG-OX) 400 mg tablet Take 400 mg by mouth daily   • multivitamin (THERAGRAN) TABS Take 1 tablet by mouth   • niacin (NIASPAN) 500 mg CR tablet Take 1 tablet (500 mg total) by mouth daily at bedtime   • Omega-3 Fatty Acids (FISH OIL PO) Take by mouth daily    • rivaroxaban (Xarelto) 20 mg tablet Take 1 tablet (20 mg total) by mouth daily with breakfast   • sildenafil (VIAGRA) 50 MG tablet Take 1 tablet (50 mg total) by mouth daily as needed for erectile dysfunction   • simvastatin (ZOCOR) 40 mg tablet TAKE 1 TABLET BY MOUTH EVERYDAY AT BEDTIME   • tamsulosin (FLOMAX) 0.4 mg TAKE 1 CAPSULE BY MOUTH EVERY DAY WITH DINNER   • [DISCONTINUED] apixaban (ELIQUIS) 5 mg Take 1 tablet (5 mg total) by mouth in the morning and 1 tablet (5 mg total) in the evening. Objective     /72 (BP Location: Left arm, Patient Position: Sitting, Cuff Size: Large)   Pulse (!) 52   Temp 97.5 °F (36.4 °C) (Tympanic)   Ht 5' 8.25" (1.734 m)   Wt 81.2 kg (179 lb)   SpO2 98%   BMI 27.02 kg/m²     Physical Exam  Vitals reviewed.    Constitutional:       General: He is not in acute distress. Appearance: Normal appearance. He is not ill-appearing. HENT:      Head: Normocephalic and atraumatic. Nose: Nose normal. No congestion. Eyes:      General: No scleral icterus. Conjunctiva/sclera: Conjunctivae normal.      Pupils: Pupils are equal, round, and reactive to light. Cardiovascular:      Rate and Rhythm: Normal rate and regular rhythm. Pulses: Normal pulses. Heart sounds: Normal heart sounds. No murmur heard. Pulmonary:      Effort: Pulmonary effort is normal.      Breath sounds: Normal breath sounds. No wheezing. Chest:      Chest wall: No tenderness. Abdominal:      General: Abdomen is flat. Bowel sounds are normal.      Palpations: There is no mass. Tenderness: There is no abdominal tenderness. Musculoskeletal:         General: Swelling and tenderness present. Cervical back: Normal range of motion. Right lower leg: No edema. Left lower leg: No edema. Comments: R shoulder swelling and tenderness, near deltoid. IMPROVED from prior. Skin:     General: Skin is warm. Capillary Refill: Capillary refill takes less than 2 seconds. Findings: Erythema present. No bruising or rash. Neurological:      Mental Status: He is alert and oriented to person, place, and time.    Psychiatric:         Mood and Affect: Mood normal.         Behavior: Behavior normal.       Froy Gil,

## 2024-01-04 ENCOUNTER — APPOINTMENT (OUTPATIENT)
Dept: LAB | Facility: CLINIC | Age: 79
End: 2024-01-04
Payer: MEDICARE

## 2024-01-04 DIAGNOSIS — R97.20 ELEVATED PSA: ICD-10-CM

## 2024-01-04 LAB
ANION GAP SERPL CALCULATED.3IONS-SCNC: 6 MMOL/L
BUN SERPL-MCNC: 13 MG/DL (ref 5–25)
CALCIUM SERPL-MCNC: 9.4 MG/DL (ref 8.4–10.2)
CHLORIDE SERPL-SCNC: 98 MMOL/L (ref 96–108)
CO2 SERPL-SCNC: 29 MMOL/L (ref 21–32)
CREAT SERPL-MCNC: 0.79 MG/DL (ref 0.6–1.3)
GFR SERPL CREATININE-BSD FRML MDRD: 87 ML/MIN/1.73SQ M
GLUCOSE P FAST SERPL-MCNC: 96 MG/DL (ref 65–99)
POTASSIUM SERPL-SCNC: 4.1 MMOL/L (ref 3.5–5.3)
SODIUM SERPL-SCNC: 133 MMOL/L (ref 135–147)

## 2024-01-04 PROCEDURE — 80048 BASIC METABOLIC PNL TOTAL CA: CPT

## 2024-01-04 PROCEDURE — 36415 COLL VENOUS BLD VENIPUNCTURE: CPT

## 2024-01-10 ENCOUNTER — HOSPITAL ENCOUNTER (OUTPATIENT)
Dept: RADIOLOGY | Age: 79
Discharge: HOME | End: 2024-01-10
Payer: MEDICARE

## 2024-01-10 DIAGNOSIS — R97.20 ELEVATED PSA: ICD-10-CM

## 2024-01-10 PROCEDURE — G1004 CDSM NDSC: HCPCS

## 2024-01-10 PROCEDURE — A9585 GADOBUTROL INJECTION: HCPCS | Performed by: UROLOGY

## 2024-01-10 PROCEDURE — 76377 3D RENDER W/INTRP POSTPROCES: CPT

## 2024-01-10 PROCEDURE — 72197 MRI PELVIS W/O & W/DYE: CPT

## 2024-01-10 RX ORDER — GADOBUTROL 604.72 MG/ML
8 INJECTION INTRAVENOUS
Status: COMPLETED | OUTPATIENT
Start: 2024-01-10 | End: 2024-01-10

## 2024-01-10 RX ADMIN — GADOBUTROL 8 ML: 604.72 INJECTION INTRAVENOUS at 11:38

## 2024-01-16 ENCOUNTER — TELEPHONE (OUTPATIENT)
Age: 79
End: 2024-01-16

## 2024-01-16 NOTE — TELEPHONE ENCOUNTER
Radiology Test Results - Radiology Calling with report update  Mri of prostate   Pt under care of: Dr Burks    Imaging Completed:01/10/24    Significant Findings - Please review

## 2024-01-16 NOTE — TELEPHONE ENCOUNTER
I called and spoke with Gene reviewed all of his MRI findings concern for a PI-RADS 4 nodule.  Walked him through what it transperineal prostate biopsy with fusion looks like he is amenable to proceed.  He lives in [place]

## 2024-01-26 NOTE — TELEPHONE ENCOUNTER
I spoke to the patient and scheduled his procedure for 3/22/2023 at [facility] with Dr. Lopez.    -instructions given verbally and mailed  -patient aware to be NPO, needs a  and use an enema 1 hour prior to leaving the house morning of procedure  -CBC, CMP, Urine C&S and EKG 2 weeks prior  -Med hold request faxed to Dr. Mcgowan for his Xarelto  -PO/JG 4/2/24

## 2024-02-15 NOTE — ASSESSMENT & PLAN NOTE
Stable  · 5/22/2022: ECHO = wnl, EF 65% no valvular disease  · Has cardiology follow up with Dr Sunil Casper on 6/29/2022  · Currently RRR  · Rate is 66  · Anticoagulated on xarelto 20mg - waiver form provided for patient to fill out  · Denies any abnormal bleeding  · Discussed precautions and answered all patients questions  · Encouraged hydration and continued walking/sexual activity as tolerated  · Follow up at next scheduled appointment unchanged unchanged

## 2024-02-21 DIAGNOSIS — I10 ESSENTIAL HYPERTENSION, BENIGN: ICD-10-CM

## 2024-02-21 RX ORDER — BENAZEPRIL HYDROCHLORIDE 20 MG/1
TABLET ORAL
Qty: 90 TABLET | Refills: 1 | Status: SHIPPED | OUTPATIENT
Start: 2024-02-21

## 2024-02-21 RX ORDER — AMLODIPINE BESYLATE 10 MG/1
TABLET ORAL
Qty: 90 TABLET | Refills: 1 | Status: SHIPPED | OUTPATIENT
Start: 2024-02-21

## 2024-02-28 ENCOUNTER — PREP FOR PROCEDURE (OUTPATIENT)
Dept: UROLOGY | Facility: MEDICAL CENTER | Age: 79
End: 2024-02-28

## 2024-03-08 ENCOUNTER — APPOINTMENT (OUTPATIENT)
Dept: LAB | Facility: CLINIC | Age: 79
End: 2024-03-08
Payer: MEDICARE

## 2024-03-08 DIAGNOSIS — R97.20 ELEVATED PROSTATE SPECIFIC ANTIGEN (PSA): ICD-10-CM

## 2024-03-08 DIAGNOSIS — R39.89 SUSPECTED UTI: ICD-10-CM

## 2024-03-08 DIAGNOSIS — Z01.810 PRE-OPERATIVE CARDIOVASCULAR EXAMINATION: ICD-10-CM

## 2024-03-08 DIAGNOSIS — Z01.812 PRE-OPERATIVE LABORATORY EXAMINATION: ICD-10-CM

## 2024-03-08 LAB
ALBUMIN SERPL BCP-MCNC: 4.4 G/DL (ref 3.5–5)
ALP SERPL-CCNC: 68 U/L (ref 34–104)
ALT SERPL W P-5'-P-CCNC: 18 U/L (ref 7–52)
ANION GAP SERPL CALCULATED.3IONS-SCNC: 6 MMOL/L
AST SERPL W P-5'-P-CCNC: 22 U/L (ref 13–39)
ATRIAL RATE: 58 BPM
BASOPHILS # BLD AUTO: 0.07 THOUSANDS/ÂΜL (ref 0–0.1)
BASOPHILS NFR BLD AUTO: 1 % (ref 0–1)
BILIRUB SERPL-MCNC: 1.32 MG/DL (ref 0.2–1)
BUN SERPL-MCNC: 15 MG/DL (ref 5–25)
CALCIUM SERPL-MCNC: 9.2 MG/DL (ref 8.4–10.2)
CHLORIDE SERPL-SCNC: 100 MMOL/L (ref 96–108)
CO2 SERPL-SCNC: 27 MMOL/L (ref 21–32)
CREAT SERPL-MCNC: 0.8 MG/DL (ref 0.6–1.3)
EOSINOPHIL # BLD AUTO: 0.09 THOUSAND/ÂΜL (ref 0–0.61)
EOSINOPHIL NFR BLD AUTO: 1 % (ref 0–6)
ERYTHROCYTE [DISTWIDTH] IN BLOOD BY AUTOMATED COUNT: 13.6 % (ref 11.6–15.1)
GFR SERPL CREATININE-BSD FRML MDRD: 86 ML/MIN/1.73SQ M
GLUCOSE P FAST SERPL-MCNC: 97 MG/DL (ref 65–99)
HCT VFR BLD AUTO: 51.6 % (ref 36.5–49.3)
HGB BLD-MCNC: 16.8 G/DL (ref 12–17)
IMM GRANULOCYTES # BLD AUTO: 0.02 THOUSAND/UL (ref 0–0.2)
IMM GRANULOCYTES NFR BLD AUTO: 0 % (ref 0–2)
LYMPHOCYTES # BLD AUTO: 1.82 THOUSANDS/ÂΜL (ref 0.6–4.47)
LYMPHOCYTES NFR BLD AUTO: 26 % (ref 14–44)
MCH RBC QN AUTO: 30 PG (ref 26.8–34.3)
MCHC RBC AUTO-ENTMCNC: 32.6 G/DL (ref 31.4–37.4)
MCV RBC AUTO: 92 FL (ref 82–98)
MONOCYTES # BLD AUTO: 0.66 THOUSAND/ÂΜL (ref 0.17–1.22)
MONOCYTES NFR BLD AUTO: 9 % (ref 4–12)
NEUTROPHILS # BLD AUTO: 4.38 THOUSANDS/ÂΜL (ref 1.85–7.62)
NEUTS SEG NFR BLD AUTO: 63 % (ref 43–75)
NRBC BLD AUTO-RTO: 0 /100 WBCS
P AXIS: 31 DEGREES
PLATELET # BLD AUTO: 230 THOUSANDS/UL (ref 149–390)
PMV BLD AUTO: 9.6 FL (ref 8.9–12.7)
POTASSIUM SERPL-SCNC: 4.1 MMOL/L (ref 3.5–5.3)
PR INTERVAL: 196 MS
PROT SERPL-MCNC: 7.4 G/DL (ref 6.4–8.4)
QRS AXIS: 36 DEGREES
QRSD INTERVAL: 96 MS
QT INTERVAL: 436 MS
QTC INTERVAL: 428 MS
RBC # BLD AUTO: 5.6 MILLION/UL (ref 3.88–5.62)
SODIUM SERPL-SCNC: 133 MMOL/L (ref 135–147)
T WAVE AXIS: 11 DEGREES
VENTRICULAR RATE: 58 BPM
WBC # BLD AUTO: 7.04 THOUSAND/UL (ref 4.31–10.16)

## 2024-03-08 PROCEDURE — 36415 COLL VENOUS BLD VENIPUNCTURE: CPT

## 2024-03-08 PROCEDURE — 80053 COMPREHEN METABOLIC PANEL: CPT

## 2024-03-08 PROCEDURE — 85025 COMPLETE CBC W/AUTO DIFF WBC: CPT

## 2024-03-08 PROCEDURE — 87086 URINE CULTURE/COLONY COUNT: CPT

## 2024-03-09 LAB — BACTERIA UR CULT: ABNORMAL

## 2024-03-10 NOTE — H&P
H&P Exam - Urology   Cruz Patricio Jr. 79 y.o. male MRN: [id number]  Unit/Bed#:  Encounter: [id number]    Assessment/Plan     Assessment:  Elevated PSA of 7.0  Increased PSA velocity from 4.9-7.0 in 1 year  PI-RADS 4 lesion of the prostate on multiparametric MRI  Plan:  MR fusion guided transrectal ultrasound-guided transperineal needle biopsies of the prostate    History of Present Illness   HPI:  Cruz Patricio Jr. is a 79 y.o. male who presents with a history of an elevated PSA.  Prompting multiparametric MRI of the prostate which revealed a PI-RADS 4 lesion measuring 1.1 x 0.4 cm located in the posterior lateral right peripheral zone at the apex of the gland.  JUAN LUIS did not identify any prostate nodularity.  The patient now presents for MR fusion guided prostate biopsy.  I discussed the procedure with the patient in the holding area discussing the procedure step-by-step answering all patient questions performing history and physical and discussing potential risks and complications of bleeding infection false negative biopsies urinary retention potential need for Thorpe catheter or operative intervention.  The patient expressed understanding and provided verbal and signed informed consent.    Review of Systems    Historical Information   Past Medical History:   Diagnosis Date    Acute MI (HCC) 06/2002    Arthritis     Atypical chest pain 03/05/2008    Basal cell carcinoma 2009    Coronary artery disease     Diverticulosis 2008    Hematuria, microscopic     History of varicose veins 2008    Hyperlipidemia     Hypertension     Myocardial infarction (HCC)     Nocturia     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     Urinary tract infection      Past Surgical History:   Procedure Laterality Date    COLONOSCOPY      CORONARY ANGIOPLASTY      right CA x 3 vessels: redo PTCA-LAD 10/04    CORONARY STENT PLACEMENT  01/01/2002    JOINT REPLACEMENT      R hip    [place] COLONOSCOPY FLX DX W/COLLJ SPEC WHEN PFRMD N/A  2017    Procedure: COLONOSCOPY;  Surgeon: Scott Last MD;  Location:  GI LAB;  Service: Colorectal    TOTAL HIP ARTHROPLASTY Right     TOTAL HIP ARTHROPLASTY Left 2019     Social History   Social History     Substance and Sexual Activity   Alcohol Use No     Social History     Substance and Sexual Activity   Drug Use No     Social History     Tobacco Use   Smoking Status Former    Current packs/day: 0.00    Average packs/day: 0.5 packs/day for 5.0 years (2.5 ttl pk-yrs)    Types: Cigarettes    Start date: 1967    Quit date: 1970    Years since quittin.2   Smokeless Tobacco Former   Tobacco Comments    quit ...0.5 packs/2.00 pack years     Family History:   Family History   Problem Relation Age of Onset    Aneurysm Father         post op AAA repair    Hypertension Sister     Hypertension Brother     Hypertension Mother     Diabetes Mother        Meds/Allergies   all medications and allergies reviewed  Allergies   Allergen Reactions    No Active Allergies     Other        Objective   Vitals: There were no vitals taken for this visit.    No intake/output data recorded.    Invasive Devices       None                   Physical Exam  Vitals reviewed.   Constitutional:       General: He is not in acute distress.     Appearance: Normal appearance. He is not ill-appearing, toxic-appearing or diaphoretic.   HENT:      Head: Normocephalic and atraumatic.      Nose: Nose normal.      Mouth/Throat:      Mouth: Mucous membranes are moist.   Eyes:      Extraocular Movements: Extraocular movements intact.   Cardiovascular:      Rate and Rhythm: Normal rate and regular rhythm.      Heart sounds: Normal heart sounds.   Pulmonary:      Effort: Pulmonary effort is normal.      Breath sounds: Normal breath sounds.   Abdominal:      General: Bowel sounds are normal. There is no distension.      Palpations: Abdomen is soft.      Tenderness: There is no abdominal tenderness. There is no guarding or  rebound.   Musculoskeletal:         General: Normal range of motion.      Cervical back: Neck supple.   Skin:     General: Skin is dry.   Neurological:      Mental Status: He is alert and oriented to person, place, and time.   Psychiatric:         Mood and Affect: Mood normal.         Behavior: Behavior normal.         Thought Content: Thought content normal.         Lab Results: I have personally reviewed pertinent reports.    Imaging: I have personally reviewed pertinent reports.   and I have personally reviewed pertinent films in PACS  EKG, Pathology, and Other Studies: I have personally reviewed pertinent reports.   and I have personally reviewed pertinent films in PACS  VTE Prophylaxis: Sequential compression device (Venodyne)     Code Status: Prior  Advance Directive and Living Will:      Power of :    POLST:      Counseling / Coordination of Care  Total floor / unit time spent today 30 minutes.  Greater than 50% of total time was spent with the patient and / or family counseling and / or coordination of care.  A description of the counseling / coordination of care:  .

## 2024-03-10 NOTE — DISCHARGE INSTRUCTIONS
Vigorous fluid intake and limited activity for the next 24 to 48 hours.  Report any excessive bleeding difficulty urinating or fever     prostate Biopsy   WHAT YOU NEED TO KNOW:   A prostate biopsy is a procedure to remove samples of tissue from your prostate gland. The prostate is a male sex gland that makes fluid found in semen. It is located just below the bladder. After the samples are removed, they are sent to a lab and tested for cancer.       DISCHARGE INSTRUCTIONS:   Seek care immediately if:   You have heavy bleeding from your rectum.    You urinate very little or not at all.    You have pain from your procedure that gets worse, even after you take pain medicine.    Call your urologist or doctor if:   You have a fever or chills.    You feel pain or burning when you urinate.    Your urine is cloudy or smells bad.    You have questions or concerns about your condition or care.    Medicines:  You may need any of the following:  Antibiotics  help prevent or treat a bacterial infection.    Acetaminophen  decreases pain and fever. It is available without a doctor's order. Ask how much to take and how often to take it. Follow directions. Read the labels of all other medicines you are using to see if they also contain acetaminophen, or ask your doctor or pharmacist. Acetaminophen can cause liver damage if not taken correctly.    Prescription pain medicine  may be given. Ask your healthcare provider how to take this medicine safely. Some prescription pain medicines contain acetaminophen. Do not take other medicines that contain acetaminophen without talking to your healthcare provider. Too much acetaminophen may cause liver damage. Prescription pain medicine may cause constipation. Ask your healthcare provider how to prevent or treat constipation.     Take your medicine as directed.  Contact your healthcare provider if you think your medicine is not helping or if you have side effects. Tell your provider if you are  allergic to any medicine. Keep a list of the medicines, vitamins, and herbs you take. Include the amounts, and when and why you take them. Bring the list or the pill bottles to follow-up visits. Carry your medicine list with you in case of an emergency.    Follow up with your urologist or doctor as directed:  You may need to return for more tests or procedures. Write down your questions so you remember to ask them during your visits.  © Copyright [employer] 2023 Information is for End User's use only and may not be sold, redistributed or otherwise used for commercial purposes.  The above information is an  only. It is not intended as medical advice for individual conditions or treatments. Talk to your doctor, nurse or pharmacist before following any medical regimen to see if it is safe and effective for you.

## 2024-03-11 ENCOUNTER — OFFICE VISIT (OUTPATIENT)
Dept: FAMILY MEDICINE CLINIC | Facility: CLINIC | Age: 79
End: 2024-03-11
Payer: MEDICARE

## 2024-03-11 VITALS
HEIGHT: 68 IN | BODY MASS INDEX: 28.04 KG/M2 | SYSTOLIC BLOOD PRESSURE: 134 MMHG | WEIGHT: 185 LBS | TEMPERATURE: 97.9 F | OXYGEN SATURATION: 96 % | HEART RATE: 97 BPM | DIASTOLIC BLOOD PRESSURE: 80 MMHG

## 2024-03-11 DIAGNOSIS — I48.91 NEW ONSET A-FIB (HCC): ICD-10-CM

## 2024-03-11 DIAGNOSIS — J01.10 ACUTE NON-RECURRENT FRONTAL SINUSITIS: Primary | ICD-10-CM

## 2024-03-11 DIAGNOSIS — I25.709 ATHEROSCLEROSIS OF CORONARY ARTERY BYPASS GRAFT OF NATIVE HEART WITH ANGINA PECTORIS (HCC): ICD-10-CM

## 2024-03-11 PROCEDURE — G2211 COMPLEX E/M VISIT ADD ON: HCPCS | Performed by: FAMILY MEDICINE

## 2024-03-11 PROCEDURE — 99214 OFFICE O/P EST MOD 30 MIN: CPT | Performed by: FAMILY MEDICINE

## 2024-03-11 RX ORDER — AZITHROMYCIN 250 MG/1
TABLET, FILM COATED ORAL
Qty: 6 TABLET | Refills: 0 | Status: SHIPPED | OUTPATIENT
Start: 2024-03-11 | End: 2024-03-15

## 2024-03-11 RX ORDER — PREDNISONE 20 MG/1
40 TABLET ORAL DAILY
Qty: 10 TABLET | Refills: 0 | Status: SHIPPED | OUTPATIENT
Start: 2024-03-11 | End: 2024-03-16

## 2024-03-11 NOTE — ASSESSMENT & PLAN NOTE
Likely viral, but will send empiric abx. Since Saturday, wet cough, congestion and rhinorrhea.  - zpak  - prednisone 40mg x5days  - robitussin 10cc Q12h  - f/u PRN

## 2024-03-11 NOTE — PROGRESS NOTES
Name: Cruz Patricio Jr.      : 1945      MRN: [id number]  Encounter Provider: Nellie Mason DO  Encounter Date: 3/11/2024   Encounter department: [facility]    Assessment & Plan     1. Acute non-recurrent frontal sinusitis  Assessment & Plan:  Likely viral, but will send empiric abx. Since Saturday, wet cough, congestion and rhinorrhea.  - zpak  - prednisone 40mg x5days  - robitussin 10cc Q12h  - f/u PRN    Orders:  -     predniSONE 20 mg tablet; Take 2 tablets (40 mg total) by mouth daily for 5 days  -     azithromycin (ZITHROMAX) 250 mg tablet; Take 2 tablets today then 1 tablet daily x 4 days  -     guaiFENesin (ROBITUSSIN) 100 mg/5 mL syrup; Take 10 mL (200 mg total) by mouth 4 (four) times a day as needed for cough or congestion for up to 10 days    2. New onset a-fib ([facility])  Assessment & Plan:  Stable on GDMT      3. 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 The current episode started in the past 7 days. The problem has been unchanged. The problem occurs hourly. The cough is Productive of brown sputum. Associated symptoms include nasal congestion, postnasal drip and rhinorrhea. Pertinent negatives include no chest pain, chills, ear congestion, ear pain, fever, headaches, heartburn, hemoptysis, myalgias, rash, sore throat, shortness of breath, sweats, weight loss or wheezing. The symptoms are aggravated by lying down.   URI   This is a new problem. The current episode started in the past 7 days. The problem has been unchanged. There has been no fever. Associated symptoms include coughing and rhinorrhea. Pertinent negatives include no abdominal pain, chest pain, dysuria, ear pain, headaches, rash, sore throat, vomiting or wheezing. He has tried increased fluids and sleep for the symptoms. The treatment provided moderate relief.      Review of Systems   Constitutional:  Negative for chills, fever and weight loss.   HENT:  Positive for postnasal drip and rhinorrhea. Negative for ear pain and sore throat.    Eyes:  Negative for pain and visual disturbance.   Respiratory:  Positive for cough. Negative for hemoptysis, shortness of breath and wheezing.    Cardiovascular:  Negative for chest pain and palpitations.   Gastrointestinal:  Negative for abdominal pain, heartburn and vomiting.   Genitourinary:  Negative for dysuria and hematuria.   Musculoskeletal:  Negative for arthralgias, back pain and myalgias.   Skin:  Negative for color change and rash.   Neurological:  Negative for seizures, syncope and headaches.   All other systems reviewed and are negative.      Current Outpatient Medications on File Prior to Visit   Medication Sig   • amLODIPine (NORVASC) 10 mg tablet TAKE 1 TABLET BY MOUTH EVERY DAY   • ascorbic acid (VITAMIN C) 1000 MG tablet Take 1,000 mg by mouth daily   • aspirin (ECOTRIN LOW STRENGTH) 81 mg EC tablet Take 81 mg by mouth daily Twice a week   • atenolol (TENORMIN) 100 mg tablet TAKE 1 TABLET BY MOUTH EVERY DAY   • B Complex Vitamins (B COMPLEX 100 PO) Take by mouth   • benazepril (LOTENSIN) 20 mg tablet TAKE 1 TABLET BY MOUTH EVERY DAY   • calcium carbonate (OS-LUCA) 600 MG tablet Take 600 mg by mouth daily    • calcium carbonate-vitamin D 500 mg-5 mcg per tablet Take 1 tablet by mouth daily   • cephalexin (KEFLEX) 500 mg capsule Take 500 mg by mouth once as needed (procedure)   • magnesium oxide (MAG-OX) 400 mg tablet Take 400 mg by mouth daily   • multivitamin (THERAGRAN) TABS Take 1 tablet by mouth   • niacin (NIASPAN) 500 mg CR tablet Take 1 tablet (500 mg total) by mouth daily at bedtime   • Omega-3 Fatty Acids (FISH OIL PO) Take by mouth daily    • sildenafil (VIAGRA) 50 MG tablet Take 1 tablet (50 mg total) by mouth daily as needed for erectile dysfunction   • simvastatin (ZOCOR) 40 mg tablet TAKE 1 TABLET BY MOUTH  "EVERYDAY AT BEDTIME   • tamsulosin (FLOMAX) 0.4 mg TAKE 1 CAPSULE BY MOUTH EVERY DAY WITH DINNER   • hydrocortisone 2.5 % ointment Apply topically 2 (two) times a day   • rivaroxaban (Xarelto) 20 mg tablet Take 1 tablet (20 mg total) by mouth daily with breakfast   • [DISCONTINUED] apixaban (ELIQUIS) 5 mg Take 1 tablet (5 mg total) by mouth in the morning and 1 tablet (5 mg total) in the evening.       Objective     /80 (BP Location: Left arm, Patient Position: Sitting, Cuff Size: Large)   Pulse 97   Temp 97.9 °F (36.6 °C)   Ht 5' 8.25\" (1.734 m)   Wt 83.9 kg (185 lb)   SpO2 96%   BMI 27.92 kg/m²     Physical Exam  Vitals reviewed.   Constitutional:       General: He is not in acute distress.     Appearance: Normal appearance. He is not ill-appearing.   HENT:      Head: Normocephalic and atraumatic.      Nose: Congestion and rhinorrhea present.      Mouth/Throat:      Mouth: Mucous membranes are moist.      Pharynx: Oropharynx is clear. No oropharyngeal exudate or posterior oropharyngeal erythema.   Eyes:      General: No scleral icterus.     Extraocular Movements: Extraocular movements intact.      Conjunctiva/sclera: Conjunctivae normal.      Pupils: Pupils are equal, round, and reactive to light.   Cardiovascular:      Rate and Rhythm: Normal rate and regular rhythm.      Pulses: Normal pulses.      Heart sounds: Normal heart sounds. No murmur heard.  Pulmonary:      Effort: Pulmonary effort is normal.      Breath sounds: Normal breath sounds. No wheezing.   Chest:      Chest wall: No tenderness.   Musculoskeletal:         General: No tenderness. Normal range of motion.      Cervical back: Normal range of motion.      Right lower leg: No edema.      Left lower leg: No edema.   Skin:     General: Skin is warm.      Capillary Refill: Capillary refill takes less than 2 seconds.      Findings: No bruising or rash.   Neurological:      Mental Status: He is alert and oriented to person, place, and time. "   Psychiatric:         Mood and Affect: Mood normal.         Behavior: Behavior normal.         Thought Content: Thought content normal.         Judgment: Judgment normal.       Nellie Mason, DO

## 2024-03-12 ENCOUNTER — ANESTHESIA EVENT (OUTPATIENT)
Dept: PERIOP | Facility: HOSPITAL | Age: 79
End: 2024-03-12
Payer: MEDICARE

## 2024-03-14 ENCOUNTER — OFFICE VISIT (OUTPATIENT)
Dept: FAMILY MEDICINE CLINIC | Facility: CLINIC | Age: 79
End: 2024-03-14
Payer: MEDICARE

## 2024-03-14 VITALS
SYSTOLIC BLOOD PRESSURE: 112 MMHG | HEIGHT: 68 IN | HEART RATE: 92 BPM | OXYGEN SATURATION: 100 % | TEMPERATURE: 98 F | BODY MASS INDEX: 27.65 KG/M2 | DIASTOLIC BLOOD PRESSURE: 66 MMHG | WEIGHT: 182.4 LBS

## 2024-03-14 DIAGNOSIS — J01.10 ACUTE NON-RECURRENT FRONTAL SINUSITIS: Primary | ICD-10-CM

## 2024-03-14 PROCEDURE — 99214 OFFICE O/P EST MOD 30 MIN: CPT | Performed by: FAMILY MEDICINE

## 2024-03-14 PROCEDURE — G2211 COMPLEX E/M VISIT ADD ON: HCPCS | Performed by: FAMILY MEDICINE

## 2024-03-14 RX ORDER — BENZONATATE 200 MG/1
200 CAPSULE ORAL 3 TIMES DAILY PRN
Qty: 20 CAPSULE | Refills: 0 | Status: SHIPPED | OUTPATIENT
Start: 2024-03-14

## 2024-03-14 NOTE — PROGRESS NOTES
Name: Cruz Patricio Jr.      : 1945      MRN: [id number]  Encounter Provider: Nellie Mason DO  Encounter Date: 3/14/2024   Encounter department: [facility]    Assessment & Plan     1. Acute non-recurrent frontal sinusitis  Assessment & Plan:  Was using guai syrup and prednisone, doing better with cough but still ongoing.  - hasn't started zpak yet, will start today.  - would like tessalon pearles, helped with cough in past  - printed script 200mg TID PRN today    Orders:  -     benzonatate (TESSALON) 200 MG capsule; Take 1 capsule (200 mg total) by mouth 3 (three) times a day as needed for cough           Subjective      Sinusitis f/u w/ cough    Sinusitis  This is a recurrent problem. The current episode started in the past 7 days. The problem has been rapidly improving since onset. There has been no fever. The fever has been present for Less than 1 day. His pain is at a severity of 2/10. The pain is mild. Associated symptoms include congestion, coughing, a hoarse voice and a sore throat. Pertinent negatives include no chills, diaphoresis, ear pain, headaches, neck pain, shortness of breath, sinus pressure, sneezing or swollen glands. Past treatments include oral decongestants, sitting up and acetaminophen. The treatment provided significant relief.   Cough  This is a recurrent problem. The current episode started in the past 7 days. The problem has been gradually improving. The problem occurs every few minutes. The cough is Non-productive. Associated symptoms include a sore throat. Pertinent negatives include no chest pain, chills, ear pain, fever, headaches, rash or shortness of breath.   Sore Throat   This is a recurrent problem. The current episode started in the past 7 days. Associated symptoms include congestion, coughing and a hoarse voice. Pertinent negatives include no abdominal pain, ear pain, headaches, neck pain, shortness of breath, swollen glands or vomiting.     Review of Systems    Constitutional:  Negative for chills, diaphoresis and fever.   HENT:  Positive for congestion, hoarse voice and sore throat. Negative for ear pain, sinus pressure and sneezing.    Eyes:  Negative for pain and visual disturbance.   Respiratory:  Positive for cough. Negative for shortness of breath.    Cardiovascular:  Negative for chest pain and palpitations.   Gastrointestinal:  Negative for abdominal pain and vomiting.   Genitourinary:  Negative for dysuria and hematuria.   Musculoskeletal:  Negative for arthralgias, back pain and neck pain.   Skin:  Negative for color change and rash.   Neurological:  Negative for seizures, syncope and headaches.   All other systems reviewed and are negative.      Current Outpatient Medications on File Prior to Visit   Medication Sig   • amLODIPine (NORVASC) 10 mg tablet TAKE 1 TABLET BY MOUTH EVERY DAY   • ascorbic acid (VITAMIN C) 1000 MG tablet Take 1,000 mg by mouth daily   • aspirin (ECOTRIN LOW STRENGTH) 81 mg EC tablet Take 81 mg by mouth daily Twice a week   • atenolol (TENORMIN) 100 mg tablet TAKE 1 TABLET BY MOUTH EVERY DAY   • B Complex Vitamins (B COMPLEX 100 PO) Take by mouth   • benazepril (LOTENSIN) 20 mg tablet TAKE 1 TABLET BY MOUTH EVERY DAY   • calcium carbonate (OS-LUCA) 600 MG tablet Take 600 mg by mouth daily    • calcium carbonate-vitamin D 500 mg-5 mcg per tablet Take 1 tablet by mouth daily   • cephalexin (KEFLEX) 500 mg capsule Take 500 mg by mouth once as needed (procedure)   • guaiFENesin (ROBITUSSIN) 100 mg/5 mL syrup Take 10 mL (200 mg total) by mouth 4 (four) times a day as needed for cough or congestion for up to 10 days   • magnesium oxide (MAG-OX) 400 mg tablet Take 400 mg by mouth daily   • multivitamin (THERAGRAN) TABS Take 1 tablet by mouth   • niacin (NIASPAN) 500 mg CR tablet Take 1 tablet (500 mg total) by mouth daily at bedtime   • Omega-3 Fatty Acids (FISH OIL PO) Take by mouth daily    • predniSONE 20 mg tablet Take 2 tablets (40 mg  "total) by mouth daily for 5 days   • rivaroxaban (Xarelto) 20 mg tablet Take 1 tablet (20 mg total) by mouth daily with breakfast   • sildenafil (VIAGRA) 50 MG tablet Take 1 tablet (50 mg total) by mouth daily as needed for erectile dysfunction   • simvastatin (ZOCOR) 40 mg tablet TAKE 1 TABLET BY MOUTH EVERYDAY AT BEDTIME   • tamsulosin (FLOMAX) 0.4 mg TAKE 1 CAPSULE BY MOUTH EVERY DAY WITH DINNER   • azithromycin (ZITHROMAX) 250 mg tablet Take 2 tablets today then 1 tablet daily x 4 days (Patient not taking: Reported on 3/14/2024)   • hydrocortisone 2.5 % ointment Apply topically 2 (two) times a day (Patient not taking: Reported on 3/14/2024)   • [DISCONTINUED] apixaban (ELIQUIS) 5 mg Take 1 tablet (5 mg total) by mouth in the morning and 1 tablet (5 mg total) in the evening.       Objective     /66 (BP Location: Left arm, Patient Position: Sitting, Cuff Size: Standard)   Pulse 92   Temp 98 °F (36.7 °C) (Temporal)   Ht 5' 8.25\" (1.734 m)   Wt 82.7 kg (182 lb 6.4 oz)   SpO2 100%   BMI 27.53 kg/m²     Physical Exam  Vitals reviewed.   Constitutional:       General: He is not in acute distress.     Appearance: Normal appearance. He is not ill-appearing.   HENT:      Head: Normocephalic and atraumatic.      Nose: Congestion and rhinorrhea present.      Mouth/Throat:      Mouth: Mucous membranes are moist.      Pharynx: Oropharynx is clear. No oropharyngeal exudate or posterior oropharyngeal erythema.   Eyes:      General: No scleral icterus.     Extraocular Movements: Extraocular movements intact.      Conjunctiva/sclera: Conjunctivae normal.      Pupils: Pupils are equal, round, and reactive to light.   Cardiovascular:      Rate and Rhythm: Normal rate and regular rhythm.      Pulses: Normal pulses.      Heart sounds: Normal heart sounds. No murmur heard.  Pulmonary:      Effort: Pulmonary effort is normal.      Breath sounds: Normal breath sounds. No wheezing.   Chest:      Chest wall: No tenderness. "   Musculoskeletal:         General: No tenderness. Normal range of motion.      Cervical back: Normal range of motion.      Right lower leg: No edema.      Left lower leg: No edema.   Skin:     General: Skin is warm.      Capillary Refill: Capillary refill takes less than 2 seconds.      Findings: No bruising or rash.   Neurological:      Mental Status: He is alert and oriented to person, place, and time.   Psychiatric:         Mood and Affect: Mood normal.         Behavior: Behavior normal.         Thought Content: Thought content normal.         Judgment: Judgment normal.       Nellie Mason DO

## 2024-03-14 NOTE — ASSESSMENT & PLAN NOTE
Was using guai syrup and prednisone, doing better with cough but still ongoing.  - hasn't started zpak yet, will start today.  - would like tessalon pearles, helped with cough in past  - printed script 200mg TID PRN today

## 2024-03-15 NOTE — PRE-PROCEDURE INSTRUCTIONS
Pre-Surgery Instructions:   Medication Instructions    amLODIPine (NORVASC) 10 mg tablet Take day of surgery.    ascorbic acid (VITAMIN C) 1000 MG tablet Stop taking 7 days prior to surgery.    aspirin (ECOTRIN LOW STRENGTH) 81 mg EC tablet Instructions provided by MD-Hold 7 days prior to surgery.    atenolol (TENORMIN) 100 mg tablet Take day of surgery.    azithromycin (ZITHROMAX) 250 mg tablet LD 3/18    B Complex Vitamins (B COMPLEX 100 PO) Stop taking 7 days prior to surgery.    benazepril (LOTENSIN) 20 mg tablet Hold day of surgery.    benzonatate (TESSALON) 200 MG capsule Uses PRN- OK to take day of surgery    calcium carbonate-vitamin D 500 mg-5 mcg per tablet Stop taking 7 days prior to surgery.    magnesium oxide (MAG-OX) 400 mg tablet Stop taking 7 days prior to surgery.    multivitamin (THERAGRAN) TABS Stop taking 7 days prior to surgery.    niacin (NIASPAN) 500 mg CR tablet Stop taking 7 days prior to surgery.    Omega-3 Fatty Acids (FISH OIL PO) Stop taking 7 days prior to surgery.    predniSONE 20 mg tablet LD 3/15    rivaroxaban (Xarelto) 20 mg tablet Instructions provided by MD- 2 day hold of Xarelto    sildenafil (VIAGRA) 50 MG tablet Uses PRN- DO NOT take day of surgery    simvastatin (ZOCOR) 40 mg tablet Take day of surgery.    tamsulosin (FLOMAX) 0.4 mg Take day of surgery.   Bowel Prep written/verbal instructions reviewed. Patient verbalized an understanding. Advised to contact the surgeon's office with any additional questions/concerns.    Medication instructions for day surgery reviewed. Please use only a sip of water to take your instructed medications. Avoid all over the counter vitamins, supplements and NSAIDS for one week prior to surgery per anesthesia guidelines. Tylenol is ok to take as needed.     You will receive a call one business day prior to surgery with an arrival time and hospital directions. If your surgery is scheduled on a Monday, the hospital will be calling you on the  Friday prior to your surgery. If you have not heard from anyone by 8pm, please call the hospital supervisor through the hospital  at [phone]. ([place] [phone] or [place] [phone]).    Do not eat or drink anything after midnight the night before your surgery, including candy, mints, lifesavers, or chewing gum. Do not drink alcohol 24hrs before your surgery. Try not to smoke at least 24hrs before your surgery.       Follow the pre surgery showering instructions as listed in the “My Surgical Experience Booklet” or otherwise provided by your surgeon's office. Do not use a blade to shave the surgical area 1 week before surgery. It is okay to use a clean electric clippers up to 24 hours before surgery. Do not apply any lotions, creams, including makeup, cologne, deodorant, or perfumes after showering on the day of your surgery. Do not use dry shampoo, hair spray, hair gel, or any type of hair products.     No contact lenses, eye make-up, or artificial eyelashes. Remove nail polish, including gel polish, and any artificial, gel, or acrylic nails if possible. Remove all jewelry including rings and body piercing jewelry.     Wear causal clothing that is easy to take on and off. Consider your type of surgery.    Keep any valuables, jewelry, piercings at home. Please bring any specially ordered equipment (sling, braces) if indicated.    Arrange for a responsible person to drive you to and from the hospital on the day of your surgery. Please confirm the visitor policy for the day of your procedure when you receive your phone call with an arrival time.     Call the surgeon's office with any new illnesses, exposures, or additional questions prior to surgery.    Please reference your “My Surgical Experience Booklet” for additional information to prepare for your upcoming surgery.

## 2024-03-20 ENCOUNTER — APPOINTMENT (OUTPATIENT)
Dept: PREADMISSION TESTING | Facility: HOSPITAL | Age: 79
End: 2024-03-20
Payer: MEDICARE

## 2024-03-22 ENCOUNTER — ANESTHESIA (OUTPATIENT)
Dept: PERIOP | Facility: HOSPITAL | Age: 79
End: 2024-03-22
Payer: MEDICARE

## 2024-03-22 ENCOUNTER — HOSPITAL ENCOUNTER (OUTPATIENT)
Facility: HOSPITAL | Age: 79
Setting detail: OUTPATIENT SURGERY
Discharge: HOME | End: 2024-03-22
Attending: UROLOGY | Admitting: UROLOGY
Payer: MEDICARE

## 2024-03-22 VITALS
DIASTOLIC BLOOD PRESSURE: 77 MMHG | WEIGHT: 180.34 LBS | HEIGHT: 69 IN | RESPIRATION RATE: 16 BRPM | TEMPERATURE: 97.7 F | BODY MASS INDEX: 26.71 KG/M2 | OXYGEN SATURATION: 96 % | SYSTOLIC BLOOD PRESSURE: 131 MMHG | HEART RATE: 66 BPM

## 2024-03-22 DIAGNOSIS — R97.20 ELEVATED PROSTATE SPECIFIC ANTIGEN (PSA): ICD-10-CM

## 2024-03-22 PROCEDURE — G0416 PROSTATE BIOPSY, ANY MTHD: HCPCS | Performed by: PATHOLOGY

## 2024-03-22 PROCEDURE — 55700 PR PROSTATE NEEDLE BIOPSY ANY APPROACH: CPT | Performed by: UROLOGY

## 2024-03-22 PROCEDURE — 88344 IMHCHEM/IMCYTCHM EA MLT ANTB: CPT | Performed by: PATHOLOGY

## 2024-03-22 PROCEDURE — NC001 PR NO CHARGE: Performed by: UROLOGY

## 2024-03-22 PROCEDURE — 76942 ECHO GUIDE FOR BIOPSY: CPT | Performed by: UROLOGY

## 2024-03-22 RX ORDER — ONDANSETRON 2 MG/ML
4 INJECTION INTRAMUSCULAR; INTRAVENOUS ONCE AS NEEDED
Status: DISCONTINUED | OUTPATIENT
Start: 2024-03-22 | End: 2024-03-22 | Stop reason: HOSPADM

## 2024-03-22 RX ORDER — LIDOCAINE HYDROCHLORIDE 20 MG/ML
INJECTION, SOLUTION EPIDURAL; INFILTRATION; INTRACAUDAL; PERINEURAL AS NEEDED
Status: DISCONTINUED | OUTPATIENT
Start: 2024-03-22 | End: 2024-03-22

## 2024-03-22 RX ORDER — PROPOFOL 10 MG/ML
INJECTION, EMULSION INTRAVENOUS AS NEEDED
Status: DISCONTINUED | OUTPATIENT
Start: 2024-03-22 | End: 2024-03-22

## 2024-03-22 RX ORDER — CEFAZOLIN SODIUM 1 G/50ML
1000 SOLUTION INTRAVENOUS ONCE
Status: COMPLETED | OUTPATIENT
Start: 2024-03-22 | End: 2024-03-22

## 2024-03-22 RX ORDER — FENTANYL CITRATE 50 UG/ML
INJECTION, SOLUTION INTRAMUSCULAR; INTRAVENOUS AS NEEDED
Status: DISCONTINUED | OUTPATIENT
Start: 2024-03-22 | End: 2024-03-22

## 2024-03-22 RX ORDER — LIDOCAINE HCL/EPINEPHRINE/PF 2%-1:200K
VIAL (ML) INJECTION AS NEEDED
Status: DISCONTINUED | OUTPATIENT
Start: 2024-03-22 | End: 2024-03-22 | Stop reason: HOSPADM

## 2024-03-22 RX ORDER — ONDANSETRON 2 MG/ML
INJECTION INTRAMUSCULAR; INTRAVENOUS AS NEEDED
Status: DISCONTINUED | OUTPATIENT
Start: 2024-03-22 | End: 2024-03-22

## 2024-03-22 RX ORDER — SODIUM CHLORIDE 9 MG/ML
125 INJECTION, SOLUTION INTRAVENOUS CONTINUOUS
Status: DISCONTINUED | OUTPATIENT
Start: 2024-03-22 | End: 2024-03-22 | Stop reason: HOSPADM

## 2024-03-22 RX ORDER — FENTANYL CITRATE/PF 50 MCG/ML
25 SYRINGE (ML) INJECTION
Status: DISCONTINUED | OUTPATIENT
Start: 2024-03-22 | End: 2024-03-22 | Stop reason: HOSPADM

## 2024-03-22 RX ORDER — PROPOFOL 10 MG/ML
INJECTION, EMULSION INTRAVENOUS CONTINUOUS PRN
Status: DISCONTINUED | OUTPATIENT
Start: 2024-03-22 | End: 2024-03-22

## 2024-03-22 RX ADMIN — LIDOCAINE HYDROCHLORIDE 40 MG: 20 INJECTION, SOLUTION EPIDURAL; INFILTRATION; INTRACAUDAL at 09:31

## 2024-03-22 RX ADMIN — FENTANYL CITRATE 50 MCG: 50 INJECTION INTRAMUSCULAR; INTRAVENOUS at 09:25

## 2024-03-22 RX ADMIN — PROPOFOL 50 MG: 10 INJECTION, EMULSION INTRAVENOUS at 09:31

## 2024-03-22 RX ADMIN — PROPOFOL 80 MCG/KG/MIN: 10 INJECTION, EMULSION INTRAVENOUS at 09:31

## 2024-03-22 RX ADMIN — ONDANSETRON 4 MG: 2 INJECTION INTRAMUSCULAR; INTRAVENOUS at 09:31

## 2024-03-22 RX ADMIN — FENTANYL CITRATE 50 MCG: 50 INJECTION INTRAMUSCULAR; INTRAVENOUS at 09:31

## 2024-03-22 RX ADMIN — CEFAZOLIN SODIUM 1000 MG: 1 SOLUTION INTRAVENOUS at 09:25

## 2024-03-22 NOTE — ANESTHESIA POSTPROCEDURE EVALUATION
"Post-Op Assessment Note    CV Status:  Stable    Pain management: adequate       Mental Status:  Alert and awake   Hydration Status:  Euvolemic   PONV Controlled:  Controlled   Airway Patency:  Patent  Two or more mitigation strategies used for obstructive sleep apnea   Post Op Vitals Reviewed: Yes    No anethesia notable event occurred.    Staff: Anesthesiologist               BP      Temp      Pulse     Resp      SpO2      /66   Pulse 58   Temp 98 °F (36.7 °C)   Resp 18   Ht 5' 8.5\" (1.74 m)   Wt 81.8 kg (180 lb 5.4 oz)   SpO2 95%   BMI 27.02 kg/m²     "

## 2024-03-22 NOTE — OP NOTE
OPERATIVE REPORT  PATIENT NAME: Cruz Patricio Jr.    :  1945  MRN: [id number]  Pt Location: [facility] OR ROOM 03    SURGERY DATE: 3/22/2024    Surgeons and Role:     * Gabino Lopez MD - Primary    Preop Diagnosis:  Elevated prostate specific antigen (PSA) [R97.20]  PI-RADS 4 lesion of the prostate on multiparametric MRI    Post-Op Diagnosis Codes:     * Elevated prostate specific antigen (PSA) [R97.20] RADS 4 lesion of the prostate on multiparametric MRI    Procedure(s):  TRANSPERINEAL MRI FUSION BIOPSY PROSTATE-MRI fusion guided transrectal ultrasound-guided transperineal needle biopsies of the prostate-5 core biopsies directed by MR fusion in the region of interest and 24 cores obtained from 12 regions with 2 cores per region total number of cores 29    Specimen(s):  ID Type Source Tests Collected by Time Destination   1 : left trans. zone Tissue Prostate TISSUE EXAM Gabino Lopez MD 3/22/2024 0928    2 : right trans. zone Tissue Prostate TISSUE EXAM Gabino Lopez MD 3/22/2024 0928    3 : left post. base Tissue Prostate TISSUE EXAM Gabino Lopez MD 3/22/2024 0929    4 : right post. base Tissue Prostate TISSUE EXAM Gabino Lopez MD 3/22/2024 0930    5 : RIGHT BASE Tissue Prostate TISSUE EXAM Gabino Lopez MD 3/22/2024 0928    6 : RIGHT POSTERIOR MEDIAL Tissue Prostate TISSUE EXAM Gabino Lopez MD 3/22/2024 0928    7 : LEFT BASE Tissue Prostate TISSUE EXAM Gabino Lopez MD 3/22/2024 0928    8 : LEFT POSTERIOR MEDIAL Tissue Prostate TISSUE EXAM Gabino Lopez MD 3/22/2024 0928    9 : LEFT POSTERIOR LATERAL Tissue Prostate TISSUE EXAM Gabino Lopez MD 3/22/2024 0928    10 : RIGHT ANTERIOR MEDIAL Tissue Prostate TISSUE EXAM Gabino Lopez MD 3/22/2024 0928    11 : RIGHT ANTERIOR LATERAL Tissue Prostate TISSUE EXAM Gabino Lopez MD 3/22/2024 0928    12 : RIGHT POSTERIOR LATERAL Tissue Prostate TISSUE EXAM Gabino Lopez MD 3/22/2024 0928    13 : LEFT  ANTERIOR MEDIAL Tissue Prostate TISSUE EXAM Gabino Lopez MD 3/22/2024 0928    14 : LEFT ANTERIOR LATERAL Tissue Prostate TISSUE EXAM Gabino Lopez MD 3/22/2024 0928    15 : right post. lat. apex Tissue Prostate TISSUE EXAM Gabino Lopez MD 3/22/2024 0933        Estimated Blood Loss:   Minimal    Drains:  * No LDAs found *    Anesthesia Type:   IV Sedation with Anesthesia    Operative Indications:  Elevated prostate specific antigen (PSA) [R97.20]  PI-RADS 4 lesion of the prostate on multiparametric MRI    Operative Findings:  See operative note below    Complications:   None    Procedure and Technique:  I saw the patient in the holding area performed history and physical discussed procedure as proposed step-by-step answered all patient questions discussed potential risks and complications as outlined in the history and physical.  The patient provided verbal and signed informed consent.  He was taken to the operating room identified by the surgeon placed on the operating table in the dorsolithotomy position prepped and draped in the usual sterile fashion after IV sedation was induced and appropriate timeout took place.  A condom cover lubricated transrectal ultrasound probe was placed per anus into the rectal vault with transverse and sagittal imaging of the prostate and seminal vesicle taking place.  The seminal vesicles appeared within normal limits.  The prostate was heterogeneous in echo quality with scattered transitional zone calcifications and no peripheral zone hypoechoic lesions.  Lidocaine 2% with epinephrine was used to anesthetize the skin of the perineum and immediate subcutaneous tissue.  Under ultrasound guidance periapical subcutaneous tissue was also anesthetized.  A transverse ultrasound sweep was provided for MR fusion and after MR fusion was provided the precision point apparatus was used to obtain 5 core biopsies from the region of interest and then 2 cores.  Additional 12 regions  under ultrasound guidance.  At the conclusion of the procedure all equipment was removed from the patient except for the ultrasound probe which was used to hold pressure against the prostate for 2 minutes.  Pressure was released and the patient was transferred to the PACU in good condition.  There were no complications.  The patient will follow-up with his urologist in approximately 1 to 2 weeks for discussion of biopsy results.   I was present for the entire procedure. and I was present for all critical portions of the procedure.    Patient Disposition:  PACU         SIGNATURE: Gabino Lopez MD  DATE: March 22, 2024  TIME: 9:54 AM

## 2024-03-22 NOTE — INTERVAL H&P NOTE
H&P reviewed. After examining the patient I find no changes in the patients condition since the H&P had been written.    Vitals:    03/22/24 0815   BP: 133/77   Pulse: 59   Resp: 16   Temp: 97.6 °F (36.4 °C)   SpO2: 95%

## 2024-03-22 NOTE — ANESTHESIA PREPROCEDURE EVALUATION
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         Additional Monitors:     Airway Plan:            Plan Factors-Exercise tolerance (METS): <4 METS.            Patient is not a current smoker.      Obstructive sleep apnea risk education given perioperatively.        Induction- intravenous.    Postoperative Plan-     Informed Consent- Anesthetic plan and risks discussed with patient.

## 2024-03-23 ENCOUNTER — OFFICE VISIT (OUTPATIENT)
Dept: URGENT CARE | Age: 79
End: 2024-03-23
Payer: MEDICARE

## 2024-03-23 VITALS
DIASTOLIC BLOOD PRESSURE: 76 MMHG | RESPIRATION RATE: 18 BRPM | SYSTOLIC BLOOD PRESSURE: 118 MMHG | HEART RATE: 68 BPM | TEMPERATURE: 98.6 F | OXYGEN SATURATION: 96 %

## 2024-03-23 DIAGNOSIS — J06.9 UPPER RESPIRATORY TRACT INFECTION, UNSPECIFIED TYPE: Primary | ICD-10-CM

## 2024-03-23 PROCEDURE — 99213 OFFICE O/P EST LOW 20 MIN: CPT | Performed by: NURSE PRACTITIONER

## 2024-03-23 PROCEDURE — G0463 HOSPITAL OUTPT CLINIC VISIT: HCPCS | Performed by: NURSE PRACTITIONER

## 2024-03-23 NOTE — PROGRESS NOTES
[facility]        NAME: Cruz Patricio Jr. is a 79 y.o. male  : 1945    MRN: [id number]  DATE: 2024  TIME: 10:47 AM    Assessment and Plan   Upper respiratory tract infection, unspecified type [J06.9]  1. Upper respiratory tract infection, unspecified type  dextromethorphan-guaifenesin (MUCINEX DM)  MG per 12 hr tablet            Patient Instructions     Mucinex DM OTC as needed twice daily  No need for more antibiotics at this time  Need for compliance with treatments  Follow up with PCP in 3-5 days.  Proceed to  ER if symptoms worsen.    If tests have been performed at [facility], our office will contact you with results if changes need to be made to the care plan discussed with you at the visit.  [facility]    Chief Complaint     Chief Complaint   Patient presents with    Cold Like Symptoms    Cough     Patient reports that he has been sick for the week with nasal congestion and cough- the cough is keeping him awake at night- muscus coloration early in am brownish coloration and clears throughout the day         History of Present Illness       HPI  Presents to clinic with complaint of cold symptoms. Includes nasal congestion, cough. Was initially prescribed guaifenesin which he states has been making the mucus drain more and he feels better with nose congestion. However he is having more mucus in his throat which is making him cough more. Was previously prescribed Tessalon by PCP but has not been taking it. States he took it 1 time and he feels like it made him cough more. Took prednisone, prescribed by PCP. That has been completed    Review of Systems   Review of Systems   Constitutional:  Negative for fever.   HENT:  Positive for congestion and rhinorrhea. Negative for ear pain and sore throat.    Respiratory:  Positive for cough. Negative for chest tightness, shortness of breath and wheezing.    Cardiovascular:  Negative for chest  pain.   Gastrointestinal:  Negative for nausea and vomiting.   Neurological:  Negative for light-headedness and headaches.         Current Medications       Current Outpatient Medications:     dextromethorphan-guaifenesin (MUCINEX DM)  MG per 12 hr tablet, Take 1 tablet by mouth every 12 (twelve) hours, Disp: 10 tablet, Rfl: 0    amLODIPine (NORVASC) 10 mg tablet, TAKE 1 TABLET BY MOUTH EVERY DAY, Disp: 90 tablet, Rfl: 1    ascorbic acid (VITAMIN C) 1000 MG tablet, Take 1,000 mg by mouth daily, Disp: , Rfl:     aspirin (ECOTRIN LOW STRENGTH) 81 mg EC tablet, Take 81 mg by mouth daily Twice a week, Disp: , Rfl:     atenolol (TENORMIN) 100 mg tablet, TAKE 1 TABLET BY MOUTH EVERY DAY, Disp: 90 tablet, Rfl: 3    B Complex Vitamins (B COMPLEX 100 PO), Take by mouth, Disp: , Rfl:     benazepril (LOTENSIN) 20 mg tablet, TAKE 1 TABLET BY MOUTH EVERY DAY, Disp: 90 tablet, Rfl: 1    benzonatate (TESSALON) 200 MG capsule, Take 1 capsule (200 mg total) by mouth 3 (three) times a day as needed for cough, Disp: 20 capsule, Rfl: 0    calcium carbonate (OS-LUCA) 600 MG tablet, Take 600 mg by mouth daily  (Patient not taking: Reported on 3/15/2024), Disp: , Rfl:     calcium carbonate-vitamin D 500 mg-5 mcg per tablet, Take 1 tablet by mouth daily, Disp: , Rfl:     cephalexin (KEFLEX) 500 mg capsule, Take 500 mg by mouth once as needed (procedure) (Patient not taking: Reported on 3/15/2024), Disp: , Rfl:     hydrocortisone 2.5 % ointment, Apply topically 2 (two) times a day (Patient not taking: Reported on 3/14/2024), Disp: 28.35 g, Rfl: 1    magnesium oxide (MAG-OX) 400 mg tablet, Take 400 mg by mouth daily, Disp: , Rfl:     multivitamin (THERAGRAN) TABS, Take 1 tablet by mouth, Disp: , Rfl:     niacin (NIASPAN) 500 mg CR tablet, Take 1 tablet (500 mg total) by mouth daily at bedtime, Disp: 90 tablet, Rfl: 3    Omega-3 Fatty Acids (FISH OIL PO), Take by mouth daily , Disp: , Rfl:     rivaroxaban (Xarelto) 20 mg tablet, Take 1  tablet (20 mg total) by mouth daily with breakfast, Disp: 90 tablet, Rfl: 3    sildenafil (VIAGRA) 50 MG tablet, Take 1 tablet (50 mg total) by mouth daily as needed for erectile dysfunction, Disp: 20 tablet, Rfl: 6    simvastatin (ZOCOR) 40 mg tablet, TAKE 1 TABLET BY MOUTH EVERYDAY AT BEDTIME, Disp: 90 tablet, Rfl: 3    tamsulosin (FLOMAX) 0.4 mg, TAKE 1 CAPSULE BY MOUTH EVERY DAY WITH DINNER, Disp: 90 capsule, Rfl: 3  No current facility-administered medications for this visit.    Current Allergies     Allergies as of 03/23/2024 - Reviewed 03/23/2024   Allergen Reaction Noted    No active allergies  05/19/2018            The following portions of the patient's history were reviewed and updated as appropriate: allergies, current medications, past family history, past medical history, past social history, past surgical history and problem list.     Past Medical History:   Diagnosis Date    A-fib (HCC)     Acute MI (HCC) 06/2002    Arthritis     Atypical chest pain 03/05/2008    Basal cell carcinoma 2009    Coronary artery disease     Diverticulosis 2008    Hematuria, microscopic     History of varicose veins 2008    Hyperlipidemia     Hypertension     Myocardial infarction (HCC)     Nocturia     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     Urinary tract infection     Wears glasses        Past Surgical History:   Procedure Laterality Date    COLONOSCOPY      CORONARY ANGIOPLASTY      right CA x 3 vessels: redo PTCA-LAD 10/04    CORONARY STENT PLACEMENT  01/01/2002    JOINT REPLACEMENT      R hip    [facility] COLONOSCOPY FLX DX W/COLLJ SPEC WHEN PFRMD N/A 06/27/2017    Procedure: COLONOSCOPY;  Surgeon: Scott Last MD;  Location: [facility];  Service: Colorectal    TOTAL HIP ARTHROPLASTY Right     TOTAL HIP ARTHROPLASTY Left 04/30/2019    WISDOM TOOTH EXTRACTION         Family History   Problem Relation Age of Onset    Aneurysm Father         post op AAA repair    Hypertension Sister     Hypertension Brother      Hypertension Mother     Diabetes Mother          Medications have been verified.        Objective   /76 (BP Location: Right arm, Patient Position: Sitting, Cuff Size: Standard)   Pulse 68   Temp 98.6 °F (37 °C)   Resp 18   SpO2 96%   No LMP for male patient.       Physical Exam     Physical Exam  Constitutional:       General: He is not in acute distress.     Appearance: He is not ill-appearing or diaphoretic.   HENT:      Right Ear: Tympanic membrane normal.      Left Ear: Tympanic membrane normal.      Ears:      Comments: Nontender with palpation of the sinuses     Nose: Rhinorrhea present.      Mouth/Throat:      Pharynx: No posterior oropharyngeal erythema.      Comments: Mild post nasal drip  Cardiovascular:      Rate and Rhythm: Regular rhythm.      Heart sounds: Normal heart sounds.   Pulmonary:      Effort: Pulmonary effort is normal.      Breath sounds: Normal breath sounds. No wheezing.

## 2024-03-25 ENCOUNTER — NURSE TRIAGE (OUTPATIENT)
Age: 79
End: 2024-03-25

## 2024-03-25 NOTE — TELEPHONE ENCOUNTER
"Answer Assessment - Initial Assessment Questions  1. REASON FOR CALL or QUESTION: \"What is your reason for calling today?\" or \"How can I best help you?\" or \"What question do you have that I can help answer?\"      Pt called in looking for clarification when he should start his blood thinners again. States he was advised to stop for 2-3 days after procedure. He had a prostate biopsy done on 3/22. Reports having some blood in his urine with tiny clots. Advised pt this can be normal post surgery and advised to increase water intake and monitor for worsening symptoms. Please advise.    Protocols used: Information Only Call - No Triage-ADULT-[place]    "

## 2024-03-26 PROCEDURE — G0416 PROSTATE BIOPSY, ANY MTHD: HCPCS | Performed by: PATHOLOGY

## 2024-03-26 PROCEDURE — 88344 IMHCHEM/IMCYTCHM EA MLT ANTB: CPT | Performed by: PATHOLOGY

## 2024-03-26 NOTE — TELEPHONE ENCOUNTER
Call placed to patient and spoke with him. Informed him of the AP recommendations and restarting medication. Pt is aware.

## 2024-04-02 ENCOUNTER — OFFICE VISIT (OUTPATIENT)
Dept: UROLOGY | Facility: MEDICAL CENTER | Age: 79
End: 2024-04-02
Payer: MEDICARE

## 2024-04-02 VITALS
HEART RATE: 59 BPM | DIASTOLIC BLOOD PRESSURE: 70 MMHG | HEIGHT: 69 IN | BODY MASS INDEX: 26.66 KG/M2 | SYSTOLIC BLOOD PRESSURE: 122 MMHG | WEIGHT: 180 LBS | OXYGEN SATURATION: 96 %

## 2024-04-02 DIAGNOSIS — C61 PROSTATE CANCER (HCC): Primary | ICD-10-CM

## 2024-04-02 PROCEDURE — 99214 OFFICE O/P EST MOD 30 MIN: CPT | Performed by: UROLOGY

## 2024-04-02 NOTE — ASSESSMENT & PLAN NOTE
He tolerated transperineal biopsies well.  The biopsies reveal 1 area of [place] 4+3 equal 7 (grade group 3) prostate cancer.  There is cribriform pattern noted.  I reviewed the [facility] nomogram's which reveal an excellent survival rate.  The risk of lymph node metastasis is approximately 7%.  Certainly the identification of lymph node metastasis or extraprostatic disease would greatly affect treatment planning.  I did recommend to the patient that he have a PSMA PET/CT.  I also recommended he discuss radiation therapy with the radiation oncologist after the PET/CT has been obtained.  Referrals were placed for both.

## 2024-04-02 NOTE — PROGRESS NOTES
Assessment/Plan:    Prostate cancer (HCC)  He tolerated transperineal biopsies well.  The biopsies reveal 1 area of [place] 4+3 equal 7 (grade group 3) prostate cancer.  There is cribriform pattern noted.  [facility] which reveal an excellent survival rate.  The risk of lymph node metastasis is approximately 7%.  Certainly the identification of lymph node metastasis or extraprostatic disease would greatly affect treatment planning.  I did recommend to the patient that he have a PSMA PET/CT.  I also recommended he discuss radiation therapy with the radiation oncologist after the PET/CT has been obtained.  Referrals were placed for both.       Diagnoses and all orders for this visit:    Prostate cancer (HCC)  -     NM PET CT skull base to mid thigh; Future  -     Ambulatory Referral to Radiation Oncology; Future          Subjective:      Patient ID: Cruz Patricio Jr. is a 79 y.o. male.    Chief complaint: Prostate cancer    HPI: 79-year-old male seen today to review his prostate biopsy results.  He recently underwent transperineal biopsies for elevated PSA.  On MRI he was found to have a PI-RADS 4 lesion.  He tolerated the biopsies well.  There is no fever.  He is voiding adequately.  He does continue to have some initial mild hematuria.  He is satisfied with his voiding pattern at this time.        The following portions of the patient's history were reviewed and updated as appropriate: allergies, current medications, past family history, past medical history, past social history, past surgical history, and problem list.    Review of Systems   Constitutional:  Negative for chills, diaphoresis, fatigue and fever.   HENT: Negative.     Eyes: Negative.    Respiratory: Negative.     Cardiovascular: Negative.    Gastrointestinal: Negative.    Endocrine: Negative.    Genitourinary:         See HPI   Musculoskeletal: Negative.    Skin: Negative.    Allergic/Immunologic: Negative.   "  Neurological: Negative.    Hematological: Negative.    Psychiatric/Behavioral: Negative.         AUA SYMPTOM SCORE      Flowsheet Row Most Recent Value   AUA SYMPTOM SCORE    How often have you had a sensation of not emptying your bladder completely after you finished urinating? 1 (P)    How often have you had to urinate again less than two hours after you finished urinating? 2 (P)    How often have you found you stopped and started again several times when you urinate? 2 (P)    How often have you found it difficult to postpone urination? 4 (P)    How often have you had a weak urinary stream? 1 (P)    How often have you had to push or strain to begin urination? 0 (P)    How many times did you most typically get up to urinate from the time you went to bed at night until the time you got up in the morning? 4 (P)    Quality of Life: If you were to spend the rest of your life with your urinary condition just the way it is now, how would you feel about that? 3 (P)    AUA SYMPTOM SCORE 14 (P)            Objective:      /70   Pulse 59   Ht 5' 8.5\" (1.74 m)   Wt 81.6 kg (180 lb)   SpO2 96%   BMI 26.97 kg/m²          Physical Exam  Constitutional:       General: He is not in acute distress.     Appearance: Normal appearance. He is well-developed and normal weight. He is not ill-appearing, toxic-appearing or diaphoretic.   HENT:      Head: Normocephalic and atraumatic.   Eyes:      General: No scleral icterus.     Conjunctiva/sclera: Conjunctivae normal.   Cardiovascular:      Rate and Rhythm: Normal rate.   Pulmonary:      Effort: Pulmonary effort is normal.   Abdominal:      General: Abdomen is flat. There is no distension.      Palpations: There is no mass.      Tenderness: There is no abdominal tenderness. There is no right CVA tenderness, left CVA tenderness, guarding or rebound.      Hernia: No hernia is present.   Genitourinary:     Penis: Normal.       Testes: Normal.   Musculoskeletal:      Cervical " back: Neck supple.   Skin:     General: Skin is warm and dry.   Neurological:      Mental Status: He is alert and oriented to person, place, and time.   Psychiatric:         Behavior: Behavior normal.         Thought Content: Thought content normal.         Judgment: Judgment normal.

## 2024-04-24 ENCOUNTER — HOSPITAL ENCOUNTER (OUTPATIENT)
Dept: RADIOLOGY | Age: 79
Discharge: HOME | End: 2024-04-24
Payer: MEDICARE

## 2024-04-24 DIAGNOSIS — R97.20 ELEVATED PSA: ICD-10-CM

## 2024-04-24 DIAGNOSIS — C61 PROSTATE CANCER (HCC): ICD-10-CM

## 2024-04-24 PROCEDURE — A9595 HB PIFLUFOLASTAT F-18, DIAGNOSTIC, 1 MILLICURIE: HCPCS

## 2024-04-24 PROCEDURE — 78815 PET IMAGE W/CT SKULL-THIGH: CPT

## 2024-04-26 ENCOUNTER — RADIATION ONCOLOGY CONSULT (OUTPATIENT)
Dept: RADIATION ONCOLOGY | Facility: CLINIC | Age: 79
End: 2024-04-26
Attending: INTERNAL MEDICINE
Payer: MEDICARE

## 2024-04-26 ENCOUNTER — TELEPHONE (OUTPATIENT)
Dept: RADIATION ONCOLOGY | Facility: CLINIC | Age: 79
End: 2024-04-26

## 2024-04-26 ENCOUNTER — TELEPHONE (OUTPATIENT)
Dept: OTHER | Facility: HOSPITAL | Age: 79
End: 2024-04-26

## 2024-04-26 VITALS
SYSTOLIC BLOOD PRESSURE: 127 MMHG | WEIGHT: 185.4 LBS | DIASTOLIC BLOOD PRESSURE: 75 MMHG | OXYGEN SATURATION: 96 % | TEMPERATURE: 97.6 F | HEART RATE: 58 BPM | HEIGHT: 68 IN | RESPIRATION RATE: 16 BRPM | BODY MASS INDEX: 28.1 KG/M2

## 2024-04-26 DIAGNOSIS — C61 PROSTATE CANCER (HCC): Primary | ICD-10-CM

## 2024-04-26 PROCEDURE — 99211 OFF/OP EST MAY X REQ PHY/QHP: CPT | Performed by: INTERNAL MEDICINE

## 2024-04-26 PROCEDURE — 99205 OFFICE O/P NEW HI 60 MIN: CPT | Performed by: INTERNAL MEDICINE

## 2024-04-26 NOTE — PROGRESS NOTES
Cruz Patricio Jr. 1945 is a 79 y.o. maleWith h/o Santa 7 (4+3) prostate cancer.  Presents for discussion of RT.  Referred by Dr. Burks.  Additional medical problems include HTN, CAD, A-fib, h/o coronary artery bypass. Also has bilateral hip replacement.    PSA TREND:   PSA   Latest Ref Rng 0.0 - 4.0 ng/mL   5/14/2019 6.8 (H)    7/9/2019 4.2 (H)    7/14/2020 4.0    7/24/2021 18.0 (H)    11/6/2021 4.2 (H)    11/7/2022 4.9 (H)    11/18/2023 7.0 (H)       11/18/23  Dr. Burks  PSA has risen.  JUAN LUIS benign at last visit.  Recommend MRI of prostate.  Voiding pattern satisfactory of Flomax    1/10/24  MRI of Prostate  IMPRESSION:  Susceptibility artifact from bilateral hip arthroplasties results in partial degradation of the diffusion-weighted sequences.   1. PI-RADSv2.1 Category 4 - High (clinically significant cancer is likely to be present). Possible 1.1 cm lesion in the posterolateral right peripheral zone at apex.  2. No extraprostatic tumor, seminal vesicle invasion, pelvic lymphadenopathy, or pelvic osseous metastatic disease.  3. Moderate BPH with calculated prostate volume of 59 cc.     3/22/24  Tissue Exam  A. Prostate, left trans. zone:  - Benign prostate glands and stroma with focal acute inflammation.     B. Prostate, right trans. zone:  - Benign prostate glands and stroma with acute and chronic inflammation.      C. Prostate, left post. base:  - Benign prostate glands and stroma.      D. Prostate, right post. base:  - Benign prostate glands and stroma.      E. Prostate, RIGHT POSTERIOR MEDIAL:  - Benign prostate glands and stroma.      F. Prostate, LEFT POSTERIOR MEDIAL:  - Benign prostate glands and stroma.      G. Prostate, LEFT POSTERIOR LATERAL:  - Benign prostate glands and stroma.      H. Prostate, RIGHT ANTERIOR MEDIAL:  - Benign prostate glands and stroma.      I. Prostate, RIGHT ANTERIOR LATERAL:  - Atypical small acinar proliferation (ASAP), suspicious but not diagnostic for malignancy.      J. Prostate, RIGHT POSTERIOR LATERAL:  - Prostatic adenocarcinoma, [place] grade 4+3 = 7 (70% pattern 4 and 30% pattern 3, Grade group 3), involving one of two cores, 70% of the involved core  and 30% of the involved tissue.  - Perineural invasion is absent.     Comment: Foci of cribriform glands are present with the pattern 4 component.  This feature has been associated with adverse clinical outcomes and molecular features typically seen in advanced disease.  (The 2019 Genitourinary Pathology Society (GUPS) White Paper on Contemporary Grading of Prostate Cancer. Arch Pathol Lab Med. 2021;145(4):461-493.)     Immunohistochemistry for prostate triple stain multiplex (,P63,P504s) was performed on blocks J2, supporting the above diagnosis.     Intradepartmental consultation is in agreement.     K. Prostate, LEFT ANTERIOR MEDIAL:  - Benign prostate glands and stroma.       L. Prostate, LEFT ANTERIOR LATERAL:  - Benign prostate stroma.       M. Prostate, ZARIA right post. lat. apex:  - Benign prostate glands and stroma.      24  Dr. Burks  Reviewed pathology  PSMA PET ordered Will see radiation oncology after PET CT    24 PSMA PET  IMPRESSION:  1. PSMA avid lesions in the prostate as above suspicious for malignancy.  2. No PSMA avid pelvic adenopathy.  3. No PSMA avid metastases in the neck, chest, upper abdomen or osseous structures.    Upcomin24  Dr. Burks            Oncology History   Prostate cancer (HCC)   3/22/2024 Initial Diagnosis    Prostate cancer (HCC)     3/22/2024 Biopsy    A. Prostate, left trans. zone:  - Benign prostate glands and stroma with focal acute inflammation.     B. Prostate, right trans. zone:  - Benign prostate glands and stroma with acute and chronic inflammation.      C. Prostate, left post. base:  - Benign prostate glands and stroma.      D. Prostate, right post. base:  - Benign prostate glands and stroma.      E. Prostate, RIGHT POSTERIOR MEDIAL:  - Benign  prostate glands and stroma.      F. Prostate, LEFT POSTERIOR MEDIAL:  - Benign prostate glands and stroma.      G. Prostate, LEFT POSTERIOR LATERAL:  - Benign prostate glands and stroma.      H. Prostate, RIGHT ANTERIOR MEDIAL:  - Benign prostate glands and stroma.      I. Prostate, RIGHT ANTERIOR LATERAL:  - Atypical small acinar proliferation (ASAP), suspicious but not diagnostic for malignancy.     J. Prostate, RIGHT POSTERIOR LATERAL:  - Prostatic adenocarcinoma, [place] grade 4+3 = 7 (70% pattern 4 and 30% pattern 3, Grade group 3), involving one of two cores, 70% of the involved core  and 30% of the involved tissue.  - Perineural invasion is absent.     Comment: Foci of cribriform glands are present with the pattern 4 component.  This feature has been associated with adverse clinical outcomes and molecular features typically seen in advanced disease.  (The 2019 Genitourinary Pathology Society (GUPS) White Paper on Contemporary Grading of Prostate Cancer. Arch Pathol Lab Med. 2021 Apr 1;145(4):461-493.)     Immunohistochemistry for prostate triple stain multiplex (,P63,P504s) was performed on blocks J2, supporting the above diagnosis.     Intradepartmental consultation is in agreement.     K. Prostate, LEFT ANTERIOR MEDIAL:  - Benign prostate glands and stroma.       L. Prostate, LEFT ANTERIOR LATERAL:  - Benign prostate stroma.       M. Prostate, ZARIA right post. lat. apex:  - Benign prostate glands and stroma.              Review of Systems:  Review of Systems   Constitutional:  Negative for appetite change and fatigue.   HENT: Negative.     Eyes: Negative.         Wear glasses   Respiratory: Negative.     Cardiovascular: Negative.    Gastrointestinal: Negative.    Endocrine: Positive for cold intolerance.   Genitourinary:  Positive for frequency and urgency. Negative for dysuria and hematuria.   Musculoskeletal:  Positive for arthralgias.   Skin: Negative.    Allergic/Immunologic: Negative.     Neurological: Negative.    Hematological:  Bruises/bleeds easily.   Psychiatric/Behavioral:  Negative for sleep disturbance.        Clinical Trial: no    IPSS Questionnaire (AUA-7):  Over the past month…    1)  How often have you had a sensation of not emptying your bladder completely after you finish urinating?  1 - Less than 1 time in 5   2)  How often have you had to urinate again less than two hours after you finished urinating? 2 - Less than half the time   3)  How often have you found you stopped and started again several times when you urinated?  1 - Less than 1 time in 5   4) How difficult have you found it to postpone urination?  2 - Less than half the time   5) How often have you had a weak urinary stream?  1 - Less than 1 time in 5   6) How often have you had to push or strain to begin urination?  0 - Not at all   7) How many times did you most typically get up to urinate from the time you went to bed until the time you got up in the morning?  5 - 5+ times   Total Score:  12       Pain assessment: 0    PFT none    Prior Radiation none    Teaching NIH booklet, SIM, Side effects    MST completed     Implantable Devices (Port, pacemaker, pain stimulator) none     Hip Replacement bilateral     Health Maintenance   Topic Date Due    COVID-19 Vaccine (8 - 2023-24 season) 01/26/2024    Fall Risk  11/20/2024    Medicare Annual Wellness Visit (AWV)  11/20/2024    BMI: Followup Plan  12/08/2024    Depression Screening  03/14/2025    BMI: Adult  04/02/2025    Hepatitis C Screening  Completed    Zoster Vaccine  Completed    Pneumococcal Vaccine: 65+ Years  Completed    Influenza Vaccine  Completed    HIB Vaccine  Aged Out    IPV Vaccine  Aged Out    Hepatitis A Vaccine  Aged Out    Meningococcal ACWY Vaccine  Aged Out    HPV Vaccine  Aged Out    Colorectal Cancer Screening  Discontinued       Past Medical History:   Diagnosis Date    A-fib (HCC)     Acute MI (HCC) 06/2002    Arthritis     Atypical chest pain  2008    Basal cell carcinoma 2009    Coronary artery disease     Diverticulosis 2008    Hematuria, microscopic     History of varicose veins     Hyperlipidemia     Hypertension     Myocardial infarction (HCC)     Nocturia     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     Urinary tract infection     Wears glasses        Past Surgical History:   Procedure Laterality Date    COLONOSCOPY      CORONARY ANGIOPLASTY      right CA x 3 vessels: redo PTCA-LAD 10/04    CORONARY STENT PLACEMENT  2002    JOINT REPLACEMENT      R hip    RI BX PROSTATE STRTCTC SATURATION SAMPLING IMG GID N/A 3/22/2024    Procedure: TRANSPERINEAL MRI FUSION BIOPSY PROSTATE;  Surgeon: Gabino Lopez MD;  Location: [facility];  Service: Urology    RI COLONOSCOPY FLX DX W/COLLJ SPEC WHEN PFRMD N/A 2017    Procedure: COLONOSCOPY;  Surgeon: Scott Last MD;  Location: [facility];  Service: Colorectal    TOTAL HIP ARTHROPLASTY Right     TOTAL HIP ARTHROPLASTY Left 2019    WISDOM TOOTH EXTRACTION         Family History   Problem Relation Age of Onset    Aneurysm Father         post op AAA repair    Hypertension Sister     Hypertension Brother     Hypertension Mother     Diabetes Mother        Social History     Tobacco Use    Smoking status: Former     Current packs/day: 0.00     Average packs/day: 0.5 packs/day for 5.0 years (2.5 ttl pk-yrs)     Types: Cigarettes     Start date: 1967     Quit date: 1970     Years since quittin.3    Smokeless tobacco: Former    Tobacco comments:     quit ...0.5 packs/2.00 pack years   Vaping Use    Vaping status: Never Used   Substance Use Topics    Alcohol use: No    Drug use: No          Current Outpatient Medications:     amLODIPine (NORVASC) 10 mg tablet, TAKE 1 TABLET BY MOUTH EVERY DAY, Disp: 90 tablet, Rfl: 1    ascorbic acid (VITAMIN C) 1000 MG tablet, Take 1,000 mg by mouth daily, Disp: , Rfl:     aspirin (ECOTRIN LOW STRENGTH) 81 mg EC tablet, Take  81 mg by mouth daily Twice a week, Disp: , Rfl:     atenolol (TENORMIN) 100 mg tablet, TAKE 1 TABLET BY MOUTH EVERY DAY, Disp: 90 tablet, Rfl: 3    B Complex Vitamins (B COMPLEX 100 PO), Take by mouth, Disp: , Rfl:     benazepril (LOTENSIN) 20 mg tablet, TAKE 1 TABLET BY MOUTH EVERY DAY, Disp: 90 tablet, Rfl: 1    calcium carbonate (OS-LUCA) 600 MG tablet, Take 600 mg by mouth daily  (Patient not taking: Reported on 3/15/2024), Disp: , Rfl:     calcium carbonate-vitamin D 500 mg-5 mcg per tablet, Take 1 tablet by mouth daily, Disp: , Rfl:     cephalexin (KEFLEX) 500 mg capsule, Take 500 mg by mouth once as needed (procedure) (Patient not taking: Reported on 3/15/2024), Disp: , Rfl:     dextromethorphan-guaifenesin (MUCINEX DM)  MG per 12 hr tablet, Take 1 tablet by mouth every 12 (twelve) hours, Disp: 10 tablet, Rfl: 0    magnesium oxide (MAG-OX) 400 mg tablet, Take 400 mg by mouth daily, Disp: , Rfl:     multivitamin (THERAGRAN) TABS, Take 1 tablet by mouth, Disp: , Rfl:     niacin (NIASPAN) 500 mg CR tablet, Take 1 tablet (500 mg total) by mouth daily at bedtime, Disp: 90 tablet, Rfl: 3    Omega-3 Fatty Acids (FISH OIL PO), Take by mouth daily , Disp: , Rfl:     rivaroxaban (Xarelto) 20 mg tablet, Take 1 tablet (20 mg total) by mouth daily with breakfast, Disp: 90 tablet, Rfl: 3    sildenafil (VIAGRA) 50 MG tablet, Take 1 tablet (50 mg total) by mouth daily as needed for erectile dysfunction, Disp: 20 tablet, Rfl: 6    simvastatin (ZOCOR) 40 mg tablet, TAKE 1 TABLET BY MOUTH EVERYDAY AT BEDTIME, Disp: 90 tablet, Rfl: 3    tamsulosin (FLOMAX) 0.4 mg, TAKE 1 CAPSULE BY MOUTH EVERY DAY WITH DINNER, Disp: 90 capsule, Rfl: 3    Allergies   Allergen Reactions    No Active Allergies         There were no vitals filed for this visit.

## 2024-04-26 NOTE — TELEPHONE ENCOUNTER
Patient has been seen for RT consult by Dr. Gibbs.  He is requesting short term ADT hormones, fiducial markers and SpaceOar. Next follow up appointment with  (urology) is 7/12/24.     [facility]  [facility] agreed to proceed with RT.  -Recommend discussion about short term ADT (pt has cardiac history)  -May be candidate for NRG -010, clinical trial staff aware.  -Recommend markers/spaceOAR per standard pathway

## 2024-04-26 NOTE — TELEPHONE ENCOUNTER
Clinical Research Coordinator spoke with patient regarding interest to NRG  trial, introduced during his consult w/ Dr. Gibbs today at the [facility]. Pt expressed interest to participate at this time. Pt had question regarding the tissue required for study, and understood that his previous biopsy tissue would be submitted to [facility] and no new tissue would be needed. Arranged with pt to meet at the [facility] on 4/29/2024 at 11 am to review consent in person. Pt given my contact information should he have any further questions arise prior to meeting.

## 2024-04-26 NOTE — PROGRESS NOTES
Consultation - Radiation Oncology      Patient Name: Cruz Patricio Jr. MRN:[id number] : 1945  Encounter: [id number]  Referring Provider: Jaret Burks MD     Cancer Staging   Prostate cancer (HCC)  Staging form: Prostate, AJCC 8th Edition  - Clinical stage from 2024: Stage IIC (cT1c, cN0, cM0, PSA: 7, Grade Group: 3) - Signed by Dion Gibbs MD on 2024  Prostate specific antigen (PSA) range: Less than 10  Santa score: 7  Histologic grading system: 5 grade system    ASSESSMENT & PLAN  Cruz Patricio is a 79 y.o. 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 MRI on 1/10/24 showed a 58cc gland, with a PI-RADS 4 lesion in the posterolateral right peripheral zone at apex, without EPE, SVI, pelvic lymphadenopathy, or pelvic osseous metastatic disease. PSMA PET/CT on 2024 confirmed PSMA avid lesions in the prostate, without evidence of pelvic adenopathy or distant metastases.. MSKCC nomogram-estimated risk of LN involvement 4% and SVI 2%.     I discussed that the patient has unfavorable intermediate risk disease, and reasonable treatment options include radiation therapy with short term ADT as well as prostatectomy. Per report, surgical resection was not favored. He may also be eligible for NRG-, which is a parallel deintensification and intensification trial for unfavorable intermediate risk prostate cancer. He was open to hearing more about this and clinical trial staff will be updated.      We have discussed moderately hypofractionated external beam radiation therapy with markers and spaceOAR placement (favor against SBRT given bilateral hip replacements and LUTS), and he is aware of the logistics and possible short- and long-term side effects which include, but are not limited to, fatigue, dermatitis, dysuria, urinary frequency/urgency, rectal  urgency, diarrhea, cramping, sexual dysfunction, cystitis, urethral stricture, proctitis, bowel obstruction, fistula, incontinence, urinary retention, and secondary malignancy.     The patient agreed to proceed with radiation therapy, and informed consent was signed. CT simulation to be scheduled at [facility] for MAR protocol with treatment at [place].    Thank you for the opportunity to participate in the care of this patient.    Dion Gibbs MD  Department of Radiation Oncology  [facility]    Orders Placed This Encounter   Procedures   • Radiation Simulation Treatment       1. Prostate cancer (HCC)  Ambulatory Referral to Radiation Oncology    Radiation Simulation Treatment          Total Time Spent  56 minutes spent reviewing EMR in preparation for visit, with the patient, coordination with other providers, and documentation.    CHIEF COMPLAINT  Chief Complaint   Patient presents with   • Consult         History of Present Illness  With h/o Santa 7 (4+3) prostate cancer.  Presents for discussion of RT.  Referred by Dr. Burks.  Additional medical problems include HTN, CAD, A-fib, h/o coronary artery bypass. Also has bilateral hip replacement.    PSA TREND:   PSA   Latest Ref Rng 0.0 - 4.0 ng/mL   5/14/2019 6.8 (H)    7/9/2019 4.2 (H)    7/14/2020 4.0    7/24/2021 18.0 (H) (attributed to UTI)   11/6/2021 4.2 (H)    11/7/2022 4.9 (H)    11/18/2023 7.0 (H)       11/18/23  Dr. Burks  PSA has risen.  JAUN LUIS benign at last visit.  Recommend MRI of prostate.  Voiding pattern satisfactory of Flomax    1/10/24  MRI of Prostate  IMPRESSION:  Susceptibility artifact from bilateral hip arthroplasties results in partial degradation of the diffusion-weighted sequences.   1. PI-RADSv2.1 Category 4 - High (clinically significant cancer is likely to be present). Possible 1.1 cm lesion in the posterolateral right peripheral zone at apex.  2. No extraprostatic tumor, seminal vesicle invasion,  pelvic lymphadenopathy, or pelvic osseous metastatic disease.  3. Moderate BPH with calculated prostate volume of 59 cc.     3/22/24  Tissue Exam  A. Prostate, left trans. zone:  - Benign prostate glands and stroma with focal acute inflammation.     B. Prostate, right trans. zone:  - Benign prostate glands and stroma with acute and chronic inflammation.      C. Prostate, left post. base:  - Benign prostate glands and stroma.      D. Prostate, right post. base:  - Benign prostate glands and stroma.      E. Prostate, RIGHT POSTERIOR MEDIAL:  - Benign prostate glands and stroma.      F. Prostate, LEFT POSTERIOR MEDIAL:  - Benign prostate glands and stroma.      G. Prostate, LEFT POSTERIOR LATERAL:  - Benign prostate glands and stroma.      H. Prostate, RIGHT ANTERIOR MEDIAL:  - Benign prostate glands and stroma.      I. Prostate, RIGHT ANTERIOR LATERAL:  - Atypical small acinar proliferation (ASAP), suspicious but not diagnostic for malignancy.     J. Prostate, RIGHT POSTERIOR LATERAL:  - Prostatic adenocarcinoma, Lapaz grade 4+3 = 7 (70% pattern 4 and 30% pattern 3, Grade group 3), involving one of two cores, 70% of the involved core  and 30% of the involved tissue.  - Perineural invasion is absent.     Comment: Foci of cribriform glands are present with the pattern 4 component.  This feature has been associated with adverse clinical outcomes and molecular features typically seen in advanced disease.  (The 2019 Genitourinary Pathology Society (GUPS) White Paper on Contemporary Grading of Prostate Cancer. Arch Pathol Lab Med. 2021 Apr 1;145(4):461-493.)     Immunohistochemistry for prostate triple stain multiplex (,P63,P504s) was performed on blocks J2, supporting the above diagnosis.     Intradepartmental consultation is in agreement.     K. Prostate, LEFT ANTERIOR MEDIAL:  - Benign prostate glands and stroma.       L. Prostate, LEFT ANTERIOR LATERAL:  - Benign prostate stroma.       M. Prostate, ZARIA right  post. lat. apex:  - Benign prostate glands and stroma.      24  Dr. Burks  Reviewed pathology  PSMA PET ordered Will see radiation oncology after PET CT    24 PSMA PET  IMPRESSION:  1. PSMA avid lesions in the prostate as above suspicious for malignancy.  2. No PSMA avid pelvic adenopathy.  3. No PSMA avid metastases in the neck, chest, upper abdomen or osseous structures.    Upcomin24  Dr. Burks    Today, the patient reports feeling well overall.  He has some urinary symptoms while on Flomax, with an IPSS score of 12, due to incomplete emptying, frequency, intermittency, urgency, and weak stream.  He has nocturia 5+ times per night.    Oncology History   Prostate cancer (HCC)   3/22/2024 Initial Diagnosis    Prostate cancer (HCC)     3/22/2024 Biopsy    A. Prostate, left trans. zone:  - Benign prostate glands and stroma with focal acute inflammation.     B. Prostate, right trans. zone:  - Benign prostate glands and stroma with acute and chronic inflammation.      C. Prostate, left post. base:  - Benign prostate glands and stroma.      D. Prostate, right post. base:  - Benign prostate glands and stroma.      E. Prostate, RIGHT POSTERIOR MEDIAL:  - Benign prostate glands and stroma.      F. Prostate, LEFT POSTERIOR MEDIAL:  - Benign prostate glands and stroma.      G. Prostate, LEFT POSTERIOR LATERAL:  - Benign prostate glands and stroma.      H. Prostate, RIGHT ANTERIOR MEDIAL:  - Benign prostate glands and stroma.      I. Prostate, RIGHT ANTERIOR LATERAL:  - Atypical small acinar proliferation (ASAP), suspicious but not diagnostic for malignancy.     J. Prostate, RIGHT POSTERIOR LATERAL:  - Prostatic adenocarcinoma, Santa grade 4+3 = 7 (70% pattern 4 and 30% pattern 3, Grade group 3), involving one of two cores, 70% of the involved core  and 30% of the involved tissue.  - Perineural invasion is absent.     Comment: Foci of cribriform glands are present with the pattern 4 component.  This  feature has been associated with adverse clinical outcomes and molecular features typically seen in advanced disease.  (The 2019 Genitourinary Pathology Society (GUPS) White Paper on Contemporary Grading of Prostate Cancer. Arch Pathol Lab Med. 2021 Apr 1;145(4):461-493.)     Immunohistochemistry for prostate triple stain multiplex (,P63,P504s) was performed on blocks J2, supporting the above diagnosis.     Intradepartmental consultation is in agreement.     K. Prostate, LEFT ANTERIOR MEDIAL:  - Benign prostate glands and stroma.       L. Prostate, LEFT ANTERIOR LATERAL:  - Benign prostate stroma.       M. Prostate, ZARIA right post. lat. apex:  - Benign prostate glands and stroma.          4/26/2024 -  Cancer Staged    Staging form: Prostate, AJCC 8th Edition  - Clinical stage from 4/26/2024: Stage IIC (cT1c, cN0, cM0, PSA: 7, Grade Group: 3) - Signed by Dion Gibbs MD on 4/26/2024  Prostate specific antigen (PSA) range: Less than 10  St John score: 7  Histologic grading system: 5 grade system         Historical Information   Past Medical History:   Diagnosis Date   • A-fib (HCC)    • Acute MI (HCC) 06/2002   • Arthritis    • Atypical chest pain 03/05/2008   • Basal cell carcinoma 2009   • Coronary artery disease    • Diverticulosis 2008   • Hematuria, microscopic    • History of varicose veins 2008   • Hyperlipidemia    • Hypertension    • Myocardial infarction (HCC)    • Nocturia    • 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    • Prostate cancer (HCC) 3/22/2024   • Urinary tract infection    • Wears glasses      Past Surgical History:   Procedure Laterality Date   • COLONOSCOPY     • CORONARY ANGIOPLASTY      right CA x 3 vessels: redo PTCA-LAD 10/04   • CORONARY STENT PLACEMENT  01/01/2002   • JOINT REPLACEMENT      R hip   • MN BX PROSTATE STRTCTC SATURATION SAMPLING IMG GID N/A 3/22/2024    Procedure: TRANSPERINEAL MRI FUSION BIOPSY PROSTATE;  Surgeon: Gabino Lopez MD;  Location: [facility];  Service: Urology   • NJ COLONOSCOPY FLX DX W/COLLJ SPEC WHEN PFRMD N/A 2017    Procedure: COLONOSCOPY;  Surgeon: Scott Last MD;  Location: [facility];  Service: Colorectal   • TOTAL HIP ARTHROPLASTY Right    • TOTAL HIP ARTHROPLASTY Left 2019   • WISDOM TOOTH EXTRACTION       Family History   Problem Relation Age of Onset   • Aneurysm Father         post op AAA repair   • Hypertension Sister    • Hypertension Brother    • Cancer Brother         Agent Orange   • Hypertension Mother    • Diabetes Mother      Social History   Social History     Substance and Sexual Activity   Alcohol Use No     Social History     Substance and Sexual Activity   Drug Use No     Social History     Tobacco Use   Smoking Status Former   • Current packs/day: 0.00   • Average packs/day: 0.5 packs/day for 5.0 years (2.5 ttl pk-yrs)   • Types: Cigarettes   • Start date: 1967   • Quit date: 1970   • Years since quittin.3   Smokeless Tobacco Never   Tobacco Comments    quit ...0.5 packs/2.00 pack years     Meds/Allergies     Current Outpatient Medications:   •  amLODIPine (NORVASC) 10 mg tablet, TAKE 1 TABLET BY MOUTH EVERY DAY, Disp: 90 tablet, Rfl: 1  •  ascorbic acid (VITAMIN C) 1000 MG tablet, Take 1,000 mg by mouth daily, Disp: , Rfl:   •  aspirin (ECOTRIN LOW STRENGTH) 81 mg EC tablet, Take 81 mg by mouth daily Twice a week, Disp: , Rfl:   •  atenolol (TENORMIN) 100 mg tablet, TAKE 1 TABLET BY MOUTH EVERY DAY, Disp: 90 tablet, Rfl: 3  •  B Complex Vitamins (B COMPLEX 100 PO), Take by mouth, Disp: , Rfl:   •  benazepril (LOTENSIN) 20 mg tablet, TAKE 1 TABLET BY MOUTH EVERY DAY, Disp: 90 tablet, Rfl: 1  •  calcium carbonate-vitamin D 500 mg-5 mcg per tablet, Take 1 tablet by mouth daily, Disp: , Rfl:   •  magnesium oxide (MAG-OX) 400 mg tablet, Take 400 mg by mouth daily, Disp: , Rfl:   •  multivitamin (THERAGRAN) TABS, Take 1 tablet by mouth, Disp: , Rfl:   •  niacin (NIASPAN) 500 mg CR tablet,  "Take 1 tablet (500 mg total) by mouth daily at bedtime, Disp: 90 tablet, Rfl: 3  •  Omega-3 Fatty Acids (FISH OIL PO), Take by mouth daily , Disp: , Rfl:   •  rivaroxaban (Xarelto) 20 mg tablet, Take 1 tablet (20 mg total) by mouth daily with breakfast, Disp: 90 tablet, Rfl: 3  •  sildenafil (VIAGRA) 50 MG tablet, Take 1 tablet (50 mg total) by mouth daily as needed for erectile dysfunction, Disp: 20 tablet, Rfl: 6  •  simvastatin (ZOCOR) 40 mg tablet, TAKE 1 TABLET BY MOUTH EVERYDAY AT BEDTIME, Disp: 90 tablet, Rfl: 3  •  tamsulosin (FLOMAX) 0.4 mg, TAKE 1 CAPSULE BY MOUTH EVERY DAY WITH DINNER, Disp: 90 capsule, Rfl: 3  •  calcium carbonate (OS-LUCA) 600 MG tablet, Take 600 mg by mouth daily  (Patient not taking: Reported on 2024), Disp: , Rfl:   •  cephalexin (KEFLEX) 500 mg capsule, Take 500 mg by mouth once as needed (procedure) (Patient not taking: Reported on 3/15/2024), Disp: , Rfl:   •  dextromethorphan-guaifenesin (MUCINEX DM)  MG per 12 hr tablet, Take 1 tablet by mouth every 12 (twelve) hours, Disp: 10 tablet, Rfl: 0  Allergies   Allergen Reactions   • No Active Allergies        Pathology:  See above.    Review of Systems  Refer to nursing note    OBJECTIVE:   /75 (BP Location: Left arm, Patient Position: Sitting)   Pulse 58   Temp 97.6 °F (36.4 °C) (Temporal)   Resp 16   Ht 5' 8\" (1.727 m)   Wt 84.1 kg (185 lb 6.4 oz)   SpO2 96%   BMI 28.19 kg/m²   Pain Assessment:  0  Performance Status: ECO - Symptomatic but completely ambulatory    Physical Exam  General Appearance:  Alert, cooperative, no distress, appears stated age  Cardiovascular:  Extremities warm and well perfused  Lungs: Respirations unlabored, no cyanosis, able to speak in complete sentences without dyspnea.  JUAN LUIS: Deferred  Skin: No generalized rash or dermatitis  Neurologic: ANOx3, speech and cognition intact.    Portions of the record may have been created with voice recognition software.  Occasional wrong word or " "\"sound a like\" substitutions may have occurred due to the inherent limitations of voice recognition software.  Read the chart carefully and recognize, using context, where substitutions have occurred.  "

## 2024-04-26 NOTE — LETTER
2024     Jaret Burks MD  [address] [place] [address] [place]  [address]  [facility] [address]    Patient: Cruz Patricio Jr.   YOB: 1945   Date of Visit: 2024       Dear Dr. Burks:    Thank you for referring Cruz Patricio to me for evaluation. Below are my notes for this consultation.    He agreed to RT. I recommend discussion regarding short term ADT followed by markers/spaceOAR placement per standard pathway He does have a cardiac history. **He may be eligible for NRG  and is open to hearing about the trial**. Including clinical trials staff and [place] as there may be coordination needed for the ADT start date.    If you have questions, please do not hesitate to call me. I look forward to following your patient along with you.         Sincerely,        Dion Gibbs MD        CC: JACK Carranza RN Bhartesh A Shah, MD  2024 11:00 AM  Sign when Signing Visit  Consultation - Radiation Oncology      Patient Name: Cruz Patricio Jr. MRN:[id number] : 1945  Encounter: [id number]  Referring Provider: Jaret Burks MD     Cancer Staging   Prostate cancer (HCC)  Staging form: Prostate, AJCC 8th Edition  - Clinical stage from 2024: Stage IIC (cT1c, cN0, cM0, PSA: 7, Grade Group: 3) - Signed by Dion Gibbs MD on 2024  Prostate specific antigen (PSA) range: Less than 10  [place] score: 7  Histologic grading system: 5 grade system    ASSESSMENT & PLAN  Cruz Patricio is a 79 y.o. 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 MRI on 1/10/24 showed a 58cc gland, with a PI-RADS 4 lesion in the posterolateral right peripheral zone at apex, without EPE, SVI, pelvic lymphadenopathy, or pelvic osseous metastatic disease. PSMA PET/CT on 2024 confirmed PSMA avid lesions in the prostate, without  evidence of pelvic adenopathy or distant metastases.. MSKCC nomogram-estimated risk of LN involvement 4% and SVI 2%.     I discussed that the patient has unfavorable intermediate risk disease, and reasonable treatment options include radiation therapy with short term ADT as well as prostatectomy. Per report, surgical resection was not favored. He may also be eligible for NRG-, which is a parallel deintensification and intensification trial for unfavorable intermediate risk prostate cancer. He was open to hearing more about this and clinical trial staff will be updated.      We have discussed moderately hypofractionated external beam radiation therapy with markers and spaceOAR placement (favor against SBRT given bilateral hip replacements and LUTS), and he is aware of the logistics and possible short- and long-term side effects which include, but are not limited to, fatigue, dermatitis, dysuria, urinary frequency/urgency, rectal urgency, diarrhea, cramping, sexual dysfunction, cystitis, urethral stricture, proctitis, bowel obstruction, fistula, incontinence, urinary retention, and secondary malignancy.     The patient agreed to proceed with radiation therapy, and informed consent was signed. CT simulation to be scheduled at [facility] for MAR protocol with treatment at [place].    Thank you for the opportunity to participate in the care of this patient.    Dion Gibbs MD  Department of Radiation Oncology  [facility]    Orders Placed This Encounter   Procedures   • Radiation Simulation Treatment       1. Prostate cancer (HCC)  Ambulatory Referral to Radiation Oncology    Radiation Simulation Treatment          Total Time Spent  56 minutes spent reviewing EMR in preparation for visit, with the patient, coordination with other providers, and documentation.    CHIEF COMPLAINT  Chief Complaint   Patient presents with   • Consult         History of Present Illness  With h/o Santa 7  (4+3) prostate cancer.  Presents for discussion of RT.  Referred by Dr. Burks.  Additional medical problems include HTN, CAD, A-fib, h/o coronary artery bypass. Also has bilateral hip replacement.    PSA TREND:   PSA   Latest Ref Rng 0.0 - 4.0 ng/mL   5/14/2019 6.8 (H)    7/9/2019 4.2 (H)    7/14/2020 4.0    7/24/2021 18.0 (H) (attributed to UTI)   11/6/2021 4.2 (H)    11/7/2022 4.9 (H)    11/18/2023 7.0 (H)       11/18/23  Dr. Burks  PSA has risen.  JUAN LUIS benign at last visit.  Recommend MRI of prostate.  Voiding pattern satisfactory of Flomax    1/10/24  MRI of Prostate  IMPRESSION:  Susceptibility artifact from bilateral hip arthroplasties results in partial degradation of the diffusion-weighted sequences.   1. PI-RADSv2.1 Category 4 - High (clinically significant cancer is likely to be present). Possible 1.1 cm lesion in the posterolateral right peripheral zone at apex.  2. No extraprostatic tumor, seminal vesicle invasion, pelvic lymphadenopathy, or pelvic osseous metastatic disease.  3. Moderate BPH with calculated prostate volume of 59 cc.     3/22/24  Tissue Exam  A. Prostate, left trans. zone:  - Benign prostate glands and stroma with focal acute inflammation.     B. Prostate, right trans. zone:  - Benign prostate glands and stroma with acute and chronic inflammation.      C. Prostate, left post. base:  - Benign prostate glands and stroma.      D. Prostate, right post. base:  - Benign prostate glands and stroma.      E. Prostate, RIGHT POSTERIOR MEDIAL:  - Benign prostate glands and stroma.      F. Prostate, LEFT POSTERIOR MEDIAL:  - Benign prostate glands and stroma.      G. Prostate, LEFT POSTERIOR LATERAL:  - Benign prostate glands and stroma.      H. Prostate, RIGHT ANTERIOR MEDIAL:  - Benign prostate glands and stroma.      I. Prostate, RIGHT ANTERIOR LATERAL:  - Atypical small acinar proliferation (ASAP), suspicious but not diagnostic for malignancy.     J. Prostate, RIGHT POSTERIOR LATERAL:  -  Prostatic adenocarcinoma, [place] grade 4+3 = 7 (70% pattern 4 and 30% pattern 3, Grade group 3), involving one of two cores, 70% of the involved core  and 30% of the involved tissue.  - Perineural invasion is absent.     Comment: Foci of cribriform glands are present with the pattern 4 component.  This feature has been associated with adverse clinical outcomes and molecular features typically seen in advanced disease.  (The 2019 Genitourinary Pathology Society (GUPS) White Paper on Contemporary Grading of Prostate Cancer. Arch Pathol Lab Med. 2021;145(4):461-493.)     Immunohistochemistry for prostate triple stain multiplex (,P63,P504s) was performed on blocks J2, supporting the above diagnosis.     Intradepartmental consultation is in agreement.     K. Prostate, LEFT ANTERIOR MEDIAL:  - Benign prostate glands and stroma.       L. Prostate, LEFT ANTERIOR LATERAL:  - Benign prostate stroma.       M. Prostate, ZARIA right post. lat. apex:  - Benign prostate glands and stroma.      24  Dr. Burks  Reviewed pathology  PSMA PET ordered Will see radiation oncology after PET CT    24 PSMA PET  IMPRESSION:  1. PSMA avid lesions in the prostate as above suspicious for malignancy.  2. No PSMA avid pelvic adenopathy.  3. No PSMA avid metastases in the neck, chest, upper abdomen or osseous structures.    Upcomin24  Dr. Burks    Today, the patient reports feeling well overall.  He has some urinary symptoms while on Flomax, with an IPSS score of 12, due to incomplete emptying, frequency, intermittency, urgency, and weak stream.  He has nocturia 5+ times per night.    Oncology History   Prostate cancer (HCC)   3/22/2024 Initial Diagnosis    Prostate cancer (HCC)     3/22/2024 Biopsy    A. Prostate, left trans. zone:  - Benign prostate glands and stroma with focal acute inflammation.     B. Prostate, right trans. zone:  - Benign prostate glands and stroma with acute and chronic inflammation.      C.  Prostate, left post. base:  - Benign prostate glands and stroma.      D. Prostate, right post. base:  - Benign prostate glands and stroma.      E. Prostate, RIGHT POSTERIOR MEDIAL:  - Benign prostate glands and stroma.      F. Prostate, LEFT POSTERIOR MEDIAL:  - Benign prostate glands and stroma.      G. Prostate, LEFT POSTERIOR LATERAL:  - Benign prostate glands and stroma.      H. Prostate, RIGHT ANTERIOR MEDIAL:  - Benign prostate glands and stroma.      I. Prostate, RIGHT ANTERIOR LATERAL:  - Atypical small acinar proliferation (ASAP), suspicious but not diagnostic for malignancy.     J. Prostate, RIGHT POSTERIOR LATERAL:  - Prostatic adenocarcinoma, [place] grade 4+3 = 7 (70% pattern 4 and 30% pattern 3, Grade group 3), involving one of two cores, 70% of the involved core  and 30% of the involved tissue.  - Perineural invasion is absent.     Comment: Foci of cribriform glands are present with the pattern 4 component.  This feature has been associated with adverse clinical outcomes and molecular features typically seen in advanced disease.  (The 2019 Genitourinary Pathology Society (GUPS) White Paper on Contemporary Grading of Prostate Cancer. Arch Pathol Lab Med. 2021 Apr 1;145(4):461-493.)     Immunohistochemistry for prostate triple stain multiplex (,P63,P504s) was performed on blocks J2, supporting the above diagnosis.     Intradepartmental consultation is in agreement.     K. Prostate, LEFT ANTERIOR MEDIAL:  - Benign prostate glands and stroma.       L. Prostate, LEFT ANTERIOR LATERAL:  - Benign prostate stroma.       M. Prostate, ZARIA right post. lat. apex:  - Benign prostate glands and stroma.          4/26/2024 -  Cancer Staged    Staging form: Prostate, AJCC 8th Edition  - Clinical stage from 4/26/2024: Stage IIC (cT1c, cN0, cM0, PSA: 7, Grade Group: 3) - Signed by Dion Gibbs MD on 4/26/2024  Prostate specific antigen (PSA) range: Less than 10  [place] score: 7  Histologic grading system: 5  grade system         Historical Information  Past Medical History:   Diagnosis Date   • A-fib (HCC)    • Acute MI (HCC) 2002   • Arthritis    • Atypical chest pain 2008   • Basal cell carcinoma    • Coronary artery disease    • Diverticulosis    • Hematuria, microscopic    • History of varicose veins    • Hyperlipidemia    • Hypertension    • Myocardial infarction (HCC)    • Nocturia    • 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    • Prostate cancer (HCC) 3/22/2024   • Urinary tract infection    • Wears glasses      Past Surgical History:   Procedure Laterality Date   • COLONOSCOPY     • CORONARY ANGIOPLASTY      right CA x 3 vessels: redo PTCA-LAD 10/04   • CORONARY STENT PLACEMENT  2002   • JOINT REPLACEMENT      R hip   • TX BX PROSTATE STRTCTC SATURATION SAMPLING IMG GID N/A 3/22/2024    Procedure: TRANSPERINEAL MRI FUSION BIOPSY PROSTATE;  Surgeon: Gabino Lopez MD;  Location: [facility];  Service: Urology   • TX COLONOSCOPY FLX DX W/COLLJ SPEC WHEN PFRMD N/A 2017    Procedure: COLONOSCOPY;  Surgeon: Scott Lats MD;  Location: [facility];  Service: Colorectal   • TOTAL HIP ARTHROPLASTY Right    • TOTAL HIP ARTHROPLASTY Left 2019   • WISDOM TOOTH EXTRACTION       Family History   Problem Relation Age of Onset   • Aneurysm Father         post op AAA repair   • Hypertension Sister    • Hypertension Brother    • Cancer Brother         Agent Orange   • Hypertension Mother    • Diabetes Mother      Social History  Social History     Substance and Sexual Activity   Alcohol Use No     Social History     Substance and Sexual Activity   Drug Use No     Social History     Tobacco Use   Smoking Status Former   • Current packs/day: 0.00   • Average packs/day: 0.5 packs/day for 5.0 years (2.5 ttl pk-yrs)   • Types: Cigarettes   • Start date: 1967   • Quit date: 1970   • Years since quittin.3   Smokeless Tobacco Never   Tobacco Comments    quit  1972...0.5 packs/2.00 pack years     Meds/Allergies    Current Outpatient Medications:   •  amLODIPine (NORVASC) 10 mg tablet, TAKE 1 TABLET BY MOUTH EVERY DAY, Disp: 90 tablet, Rfl: 1  •  ascorbic acid (VITAMIN C) 1000 MG tablet, Take 1,000 mg by mouth daily, Disp: , Rfl:   •  aspirin (ECOTRIN LOW STRENGTH) 81 mg EC tablet, Take 81 mg by mouth daily Twice a week, Disp: , Rfl:   •  atenolol (TENORMIN) 100 mg tablet, TAKE 1 TABLET BY MOUTH EVERY DAY, Disp: 90 tablet, Rfl: 3  •  B Complex Vitamins (B COMPLEX 100 PO), Take by mouth, Disp: , Rfl:   •  benazepril (LOTENSIN) 20 mg tablet, TAKE 1 TABLET BY MOUTH EVERY DAY, Disp: 90 tablet, Rfl: 1  •  calcium carbonate-vitamin D 500 mg-5 mcg per tablet, Take 1 tablet by mouth daily, Disp: , Rfl:   •  magnesium oxide (MAG-OX) 400 mg tablet, Take 400 mg by mouth daily, Disp: , Rfl:   •  multivitamin (THERAGRAN) TABS, Take 1 tablet by mouth, Disp: , Rfl:   •  niacin (NIASPAN) 500 mg CR tablet, Take 1 tablet (500 mg total) by mouth daily at bedtime, Disp: 90 tablet, Rfl: 3  •  Omega-3 Fatty Acids (FISH OIL PO), Take by mouth daily , Disp: , Rfl:   •  rivaroxaban (Xarelto) 20 mg tablet, Take 1 tablet (20 mg total) by mouth daily with breakfast, Disp: 90 tablet, Rfl: 3  •  sildenafil (VIAGRA) 50 MG tablet, Take 1 tablet (50 mg total) by mouth daily as needed for erectile dysfunction, Disp: 20 tablet, Rfl: 6  •  simvastatin (ZOCOR) 40 mg tablet, TAKE 1 TABLET BY MOUTH EVERYDAY AT BEDTIME, Disp: 90 tablet, Rfl: 3  •  tamsulosin (FLOMAX) 0.4 mg, TAKE 1 CAPSULE BY MOUTH EVERY DAY WITH DINNER, Disp: 90 capsule, Rfl: 3  •  calcium carbonate (OS-LUCA) 600 MG tablet, Take 600 mg by mouth daily  (Patient not taking: Reported on 4/26/2024), Disp: , Rfl:   •  cephalexin (KEFLEX) 500 mg capsule, Take 500 mg by mouth once as needed (procedure) (Patient not taking: Reported on 3/15/2024), Disp: , Rfl:   •  dextromethorphan-guaifenesin (MUCINEX DM)  MG per 12 hr tablet, Take 1 tablet by  "mouth every 12 (twelve) hours, Disp: 10 tablet, Rfl: 0  Allergies   Allergen Reactions   • No Active Allergies       Pathology:  See above.    Review of Systems  Refer to nursing note    OBJECTIVE:   /75 (BP Location: Left arm, Patient Position: Sitting)   Pulse 58   Temp 97.6 °F (36.4 °C) (Temporal)   Resp 16   Ht 5' 8\" (1.727 m)   Wt 84.1 kg (185 lb 6.4 oz)   SpO2 96%   BMI 28.19 kg/m²   Pain Assessment:  0  Performance Status: ECO - Symptomatic but completely ambulatory    Physical Exam  General Appearance:  Alert, cooperative, no distress, appears stated age  Cardiovascular:  Extremities warm and well perfused  Lungs: Respirations unlabored, no cyanosis, able to speak in complete sentences without dyspnea.  JUAN LUIS: Deferred  Skin: No generalized rash or dermatitis  Neurologic: ANOx3, speech and cognition intact.    Portions of the record may have been created with voice recognition software.  Occasional wrong word or \"sound a like\" substitutions may have occurred due to the inherent limitations of voice recognition software.  Read the chart carefully and recognize, using context, where substitutions have occurred.     "

## 2024-04-29 ENCOUNTER — DOCUMENTATION (OUTPATIENT)
Dept: OTHER | Facility: HOSPITAL | Age: 79
End: 2024-04-29

## 2024-04-29 ENCOUNTER — TELEPHONE (OUTPATIENT)
Dept: OTHER | Facility: HOSPITAL | Age: 79
End: 2024-04-29

## 2024-04-29 NOTE — PROGRESS NOTES
Clinical Research Coordinator met with pt and his wife to day at the [facility] today to review the consent for the [facility]  clinical trial. After review, pt was not decided on participation to the trial and had questions remaining regarding the de-intensification arm and intensification arm treatment modalities. Pt wanted to think about the trial more at this time and was sent home with a copy of the consent form and my business card should they have any further questions. Pt may reach out to his urologist Dr. Burks regarding ADT and Darolutamide options for the trial. Pt care team notified of this discussion and the patient's current interest.

## 2024-04-29 NOTE — TELEPHONE ENCOUNTER
Received call from pt declining participation to the [facility]  trial. Pt had opportunity to review the consent at home with his wife and was not comfortable with the risks in participating. He would like to continue his treatment as discussed with Dr. Gibbs previously and had no further questions at this time.

## 2024-05-01 PROBLEM — J01.10 ACUTE NON-RECURRENT FRONTAL SINUSITIS: Status: RESOLVED | Noted: 2024-03-11 | Resolved: 2024-05-01

## 2024-05-02 ENCOUNTER — DOCUMENTATION (OUTPATIENT)
Dept: HEMATOLOGY ONCOLOGY | Facility: CLINIC | Age: 79
End: 2024-05-02

## 2024-05-02 NOTE — TELEPHONE ENCOUNTER
Patient was returning a call to the office. Per the previous encounter, they were trying to schedule an appt with Dr. Burks to discuss ADT. Patient has a follow up scheduled for 7/12. Tried to see if it could be scheduled sooner. But Dr. Mckee is scheduled out until Nov. Tried to reach out to the office to see if they had maybe a sooner appt they were trying to offer. But no reply.     Can someone please call the patient back to discuss next steps.     Gene  [phone]

## 2024-05-02 NOTE — TELEPHONE ENCOUNTER
Call placed to patient and spoke with him. Pt is scheduled with Dr. Burks on 5-7-2024 to discuss ADT and next steps. Pt confirmed this appointment.

## 2024-05-02 NOTE — TELEPHONE ENCOUNTER
Call placed to patient. He did not answer. LMOM asking for patient to contact the office to schedule FU with Dr. Burks to discuss ADT. Office number was provided for call back and scheduling.

## 2024-05-02 NOTE — PROGRESS NOTES
Patient may be interested in ADT but wants to discuss further. Patient does have cardiac history. Message was sent to urology office to address and schedule ADT discussion. I will follow up on follow up.

## 2024-05-02 NOTE — TELEPHONE ENCOUNTER
"Patient wants to consider ADT, has cardiac history as well per RAD ONC. Please schedule urology follow up ASAP to discuss further.   See clinical trial note from chart below:  \"He had some reservations about the de-intensification arm that would omit ADT and some questions regarding the intensification arm that would add Darolutamide, so I suggested they reach out to you to better address these concerns prior to consenting.\"  "

## 2024-05-07 ENCOUNTER — PATIENT OUTREACH (OUTPATIENT)
Dept: HEMATOLOGY ONCOLOGY | Facility: CLINIC | Age: 79
End: 2024-05-07

## 2024-05-07 ENCOUNTER — OFFICE VISIT (OUTPATIENT)
Dept: UROLOGY | Facility: MEDICAL CENTER | Age: 79
End: 2024-05-07
Payer: MEDICARE

## 2024-05-07 ENCOUNTER — OFFICE VISIT (OUTPATIENT)
Dept: HEMATOLOGY ONCOLOGY | Facility: CLINIC | Age: 79
End: 2024-05-07
Payer: MEDICARE

## 2024-05-07 ENCOUNTER — PATIENT OUTREACH (OUTPATIENT)
Dept: CASE MANAGEMENT | Facility: HOSPITAL | Age: 79
End: 2024-05-07

## 2024-05-07 VITALS
HEART RATE: 57 BPM | RESPIRATION RATE: 18 BRPM | WEIGHT: 183 LBS | TEMPERATURE: 98.1 F | DIASTOLIC BLOOD PRESSURE: 72 MMHG | BODY MASS INDEX: 27.11 KG/M2 | SYSTOLIC BLOOD PRESSURE: 118 MMHG | HEIGHT: 69 IN | OXYGEN SATURATION: 97 %

## 2024-05-07 VITALS
WEIGHT: 183 LBS | OXYGEN SATURATION: 94 % | SYSTOLIC BLOOD PRESSURE: 122 MMHG | DIASTOLIC BLOOD PRESSURE: 76 MMHG | HEART RATE: 57 BPM | BODY MASS INDEX: 27.11 KG/M2 | HEIGHT: 69 IN

## 2024-05-07 DIAGNOSIS — C61 PROSTATE CANCER (HCC): Primary | ICD-10-CM

## 2024-05-07 PROCEDURE — 99214 OFFICE O/P EST MOD 30 MIN: CPT | Performed by: UROLOGY

## 2024-05-07 PROCEDURE — 99205 OFFICE O/P NEW HI 60 MIN: CPT | Performed by: INTERNAL MEDICINE

## 2024-05-07 NOTE — PROGRESS NOTES
OSW received referral for patient. OSW will outreach for assessment and Dt.   
thinking about quitting

## 2024-05-07 NOTE — PROGRESS NOTES
Hematology/Oncology Outpatient Office Note    Date of Service: 2024    [facility] [address]  [place] [address]  [phone]    Reason for Consultation:   Chief Complaint   Patient presents with    Consult       Cancer Stage at diagnosis: IIC    Referral Physician: Jaret Burks MD    Primary Care Physician:  Nellie Mason DO     Nickname: Gene    Spouse: Judith    Original ECO     Today's ECO    Goals and Barriers:  Current Goal: Minimize effects of disease burden, extend life.   Barriers to accomplishing this: None    Patient's Capacity to Self Care:  Patient is able to self care      ASSESSMENT & PLAN      Diagnosis ICD-10-CM Associated Orders   1. Prostate cancer (HCC)  C61 Ambulatory Referral to Hematology / Oncology            This is a 79 y.o. 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 I reviewed relevant office notes, imaging reports and pathology result as well.  I told the patient that this is a case of curable/disease and what this means. We discussed that the goal of anti-cancer therapy is to provide best quality of life, extend overall survival, and progression free survival as shown in clinical trials. We also discussed that there might be a point when the cancer will no longer respond to this anti-neoplastic therapy. A  I explained the risks/benefits of the proposed cancer therapy:  Orgovyx and after discussion including understanding risks of possible life-threatening complications and therapy-related malignancy development,  informed consent for blood products and treatment has been signed and obtained.  TNM/Staging At Diagnosis  Cancer Staging   Prostate cancer (HCC)  Staging form: Prostate, AJCC 8th Edition  - Clinical stage from 4/26/2024: Stage IIC (cT1c, cN0, cM0, PSA: 7, Grade Group: 3) - Signed by Dion Gibbs MD on 4/26/2024  Prostate specific antigen (PSA) range: Less than 10  Santa score: 7  Histologic grading system: 5 grade system    Disease Features/Tumor Markers/Genetics  Tumor Marker: PSA  Notable Path Features: 3/22/2024: R posterior lateral single core, Santa 7 (4+3)  Treatment: Orgovyx   Other Supportive care:   Treatment Team Members  Surgeon: Dr. Burks  Rad Onc: Dr. Gibbs  Palliative  Labs: 3/8/2024: WBC 7k, Hgb 16.8, plt 230k  Diagnostics  4/24/2024 PET/CT:PSMA PSMA avid lesions in the prostate as above suspicious for malignancy. No PSMA avid pelvic adenopathy.  No PSMA avid metastases in the neck, chest, upper abdomen or osseous structures      Discussion of decision making    I personally reviewed the following lab results, the image studies, pathology, other specialty/physicians consult notes and recommendations, and outside medical records. I had a lengthy discussion with the patient and shared the work-up findings. We discussed the diagnosis and management plan as below. I spent 62 minutes reviewing the records (labs, clinician notes, outside records, medical history, ordering medicine/tests/procedures, monitoring of anti-neoplastic toxicities, interpreting the imaging/labs previously done) and coordination of care as well as direct time with the patient today, of which greater than 50% of the time was spent in counseling and coordination of care with the patient/family.      Plan/Labs  Orgovyx 360mg D1 and then 120mg daily thereafter for 6 months  PSA in 6 months (consider next time)  F/u Urology for surveillance   F/u Rad onc for RT        Follow Up: q3 months for 6 months    All questions were  answered to the patient's satisfaction during this encounter. The patient knows the contact information for our office and knows to reach out for any relevant concerns related to this encounter. They are to call for any temperature 100.4 or higher, new symptoms including but not restricted to shaking chills, decreased appetite, nausea, vomiting, diarrhea, increased fatigue, shortness of breath or chest pain, confusion, and not feeling the strength to come to the clinic. For all other listed problems and medical diagnosis in their chart - they are managed by PCP and/or other specialists, which the patient acknowledges. Thank you very much for your consultation and making us a part of this patient's care. We are continuing to follow closely with you. Please do not hesitate to reach out to me with any additional questions or concerns.    Shasta Shelton MD  Hematology & Medical Oncology Staff Physician             Disclaimer: This document was prepared using [employer] Direct technology. If a word or phrase is confusing, or does not make sense, this is likely due to recognition error which was not discovered during this clinician's review. If you believe an error has occurred, please contact me through Identiv service line for ruma?cation.      ONCOLOGY HISTORY OF PRESENT ILLNESS        Oncology History   Prostate cancer (HCC)   3/22/2024 Initial Diagnosis    Prostate cancer (HCC)     3/22/2024 Biopsy    A. Prostate, left trans. zone:  - Benign prostate glands and stroma with focal acute inflammation.     B. Prostate, right trans. zone:  - Benign prostate glands and stroma with acute and chronic inflammation.      C. Prostate, left post. base:  - Benign prostate glands and stroma.      D. Prostate, right post. base:  - Benign prostate glands and stroma.      E. Prostate, RIGHT POSTERIOR MEDIAL:  - Benign prostate glands and stroma.      F. Prostate, LEFT POSTERIOR MEDIAL:  - Benign prostate glands and stroma.       G. Prostate, LEFT POSTERIOR LATERAL:  - Benign prostate glands and stroma.      H. Prostate, RIGHT ANTERIOR MEDIAL:  - Benign prostate glands and stroma.      I. Prostate, RIGHT ANTERIOR LATERAL:  - Atypical small acinar proliferation (ASAP), suspicious but not diagnostic for malignancy.     J. Prostate, RIGHT POSTERIOR LATERAL:  - Prostatic adenocarcinoma, [place] grade 4+3 = 7 (70% pattern 4 and 30% pattern 3, Grade group 3), involving one of two cores, 70% of the involved core  and 30% of the involved tissue.  - Perineural invasion is absent.     Comment: Foci of cribriform glands are present with the pattern 4 component.  This feature has been associated with adverse clinical outcomes and molecular features typically seen in advanced disease.  (The 2019 Genitourinary Pathology Society (GUPS) White Paper on Contemporary Grading of Prostate Cancer. Arch Pathol Lab Med. 2021 Apr 1;145(4):461-493.)     Immunohistochemistry for prostate triple stain multiplex (,P63,P504s) was performed on blocks J2, supporting the above diagnosis.     Intradepartmental consultation is in agreement.     K. Prostate, LEFT ANTERIOR MEDIAL:  - Benign prostate glands and stroma.       L. Prostate, LEFT ANTERIOR LATERAL:  - Benign prostate stroma.       M. Prostate, ZARIA right post. lat. apex:  - Benign prostate glands and stroma.          4/26/2024 -  Cancer Staged    Staging form: Prostate, AJCC 8th Edition  - Clinical stage from 4/26/2024: Stage IIC (cT1c, cN0, cM0, PSA: 7, Grade Group: 3) - Signed by Dion Gibbs MD on 4/26/2024  Prostate specific antigen (PSA) range: Less than 10  [place] score: 7  Histologic grading system: 5 grade system             SUBJECTIVE  (INTERVAL HISTORY)      Clotting History none   Bleeding History none   Cancer History Prostate, Basal Cell   Family Cancer History none   H/O Blood/Plt Transfusion none   Tobacco/etoh/drug abuse 1 PPD x 5 years, quit at age 32 or so, no etoh abuse or rec drug  use           Occupation [place] for 5 years, 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 I have also reviewed allergies and medications for this patient.    Review of Systems  Baseline weight: 180-185 lbs    Denies F/C, N/V, SOB, CP, LH, HA, rash, itching, gen weakness, melena, hematuria, hematochezia, falls, diarrhea, or constipation       A 10-point review of system was performed, pertinent positive and negative were detailed as above. Otherwise, the 10-point review of system was negative.      Past Medical History:   Diagnosis Date    A-fib (HCC)     Acute MI (HCC) 06/2002    Arthritis     Atypical chest pain 03/05/2008    Basal cell carcinoma 2009    Coronary artery disease     Diverticulosis 2008    Hematuria, microscopic     History of varicose veins 2008    Hyperlipidemia     Hypertension     Myocardial infarction (HCC)     Nocturia     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     Prostate cancer (HCC) 3/22/2024    Urinary tract infection     Wears glasses        Past Surgical History:   Procedure Laterality Date    COLONOSCOPY      CORONARY ANGIOPLASTY      right CA x 3 vessels: redo PTCA-LAD 10/04    CORONARY STENT PLACEMENT  01/01/2002    JOINT REPLACEMENT      R hip    MS BX PROSTATE STRTCTC SATURATION SAMPLING IMG GID N/A 3/22/2024    Procedure: TRANSPERINEAL MRI FUSION BIOPSY PROSTATE;  Surgeon: Gabino Lopez MD;  Location: [facility];  Service: Urology    MS COLONOSCOPY FLX DX W/COLLJ SPEC WHEN PFRMD N/A 06/27/2017    Procedure: COLONOSCOPY;  Surgeon: Scott Last MD;  Location: [facility];  Service: Colorectal    TOTAL HIP ARTHROPLASTY Right     TOTAL HIP ARTHROPLASTY Left 04/30/2019    WISDOM TOOTH EXTRACTION         Family History   Problem Relation Age of Onset    Aneurysm Father         post op AAA repair    Hypertension Sister     Hypertension Brother     Cancer Brother          Agent Orange    Hypertension Mother     Diabetes Mother        Social History     Socioeconomic History    Marital status: /Civil Union     Spouse name: Not on file    Number of children: Not on file    Years of education: Not on file    Highest education level: Not on file   Occupational History    Not on file   Tobacco Use    Smoking status: Former     Current packs/day: 0.00     Average packs/day: 0.5 packs/day for 5.0 years (2.5 ttl pk-yrs)     Types: Cigarettes     Start date: 1967     Quit date: 1970     Years since quittin.3    Smokeless tobacco: Never    Tobacco comments:     quit ...0.5 packs/2.00 pack years   Vaping Use    Vaping status: Never Used   Substance and Sexual Activity    Alcohol use: No    Drug use: No    Sexual activity: Yes     Partners: Female     Birth control/protection: None   Other Topics Concern    Not on file   Social History Narrative    Not on file     Social Determinants of Health     Financial Resource Strain: Low Risk  (2023)    Overall Financial Resource Strain (CARDIA)     Difficulty of Paying Living Expenses: Not hard at all   Food Insecurity: Not on file   Transportation Needs: No Transportation Needs (2023)    PRAPARE - Transportation     Lack of Transportation (Medical): No     Lack of Transportation (Non-Medical): No   Physical Activity: Not on file   Stress: Not on file   Social Connections: Not on file   Intimate Partner Violence: Not on file   Housing Stability: Unknown (2022)    Housing Stability Vital Sign     Unable to Pay for Housing in the Last Year: Patient refused     Number of Places Lived in the Last Year: Not on file     Unstable Housing in the Last Year: Patient refused       Allergies   Allergen Reactions    No Active Allergies        Current Outpatient Medications   Medication Sig Dispense Refill    amLODIPine (NORVASC) 10 mg tablet TAKE 1 TABLET BY MOUTH EVERY DAY 90 tablet 1    ascorbic acid (VITAMIN C) 1000 MG  tablet Take 1,000 mg by mouth daily      aspirin (ECOTRIN LOW STRENGTH) 81 mg EC tablet Take 81 mg by mouth daily Twice a week      atenolol (TENORMIN) 100 mg tablet TAKE 1 TABLET BY MOUTH EVERY DAY 90 tablet 3    B Complex Vitamins (B COMPLEX 100 PO) Take by mouth      benazepril (LOTENSIN) 20 mg tablet TAKE 1 TABLET BY MOUTH EVERY DAY 90 tablet 1    calcium carbonate (OS-LUCA) 600 MG tablet Take 600 mg by mouth daily      calcium carbonate-vitamin D 500 mg-5 mcg per tablet Take 1 tablet by mouth daily      magnesium oxide (MAG-OX) 400 mg tablet Take 400 mg by mouth daily      multivitamin (THERAGRAN) TABS Take 1 tablet by mouth      niacin (NIASPAN) 500 mg CR tablet Take 1 tablet (500 mg total) by mouth daily at bedtime 90 tablet 3    Omega-3 Fatty Acids (FISH OIL PO) Take by mouth daily       rivaroxaban (Xarelto) 20 mg tablet Take 1 tablet (20 mg total) by mouth daily with breakfast 90 tablet 3    sildenafil (VIAGRA) 50 MG tablet Take 1 tablet (50 mg total) by mouth daily as needed for erectile dysfunction 20 tablet 6    simvastatin (ZOCOR) 40 mg tablet TAKE 1 TABLET BY MOUTH EVERYDAY AT BEDTIME 90 tablet 3    tamsulosin (FLOMAX) 0.4 mg TAKE 1 CAPSULE BY MOUTH EVERY DAY WITH DINNER 90 capsule 3    cephalexin (KEFLEX) 500 mg capsule Take 500 mg by mouth once as needed (procedure) (Patient not taking: Reported on 3/15/2024)       No current facility-administered medications for this visit.       (Not in a hospital admission)      Objective:     24 Hour Vitals Assessment:     Vitals:    05/07/24 1457   BP: 118/72   Pulse: 57   Resp: 18   Temp: 98.1 °F (36.7 °C)   SpO2: 97%       PHYSICIAN EXAM:    General: Appearance: alert, cooperative, no distress.  HEENT: Normocephalic, atraumatic. No scleral icterus. conjunctivae clear. EOMI.  Chest: No tenderness to palpation. No open wound noted.  Lungs: Clear to auscultation bilaterally, Respirations unlabored.  Cardiac: Borderline Regular rate, +S1and S2  Abdomen: Soft,  non-tender, non-distended. Bowel sounds are normal.   Extremities:  No edema, cyanosis, clubbing.  Skin: Skin color, turgor are normal. No rashes.  Lymphatics: no palpable supra-cervical, axillary, or inguinal adenopathy  Neurologic: Awake, Alert, and oriented, no gross focal deficits noted b/l.       DATA REVIEW:    Pathology Result:    Final Diagnosis   Date Value Ref Range Status   03/22/2024   Final    A. Prostate, left trans. zone:  - Benign prostate glands and stroma with focal acute inflammation.     B. Prostate, right trans. zone:  - Benign prostate glands and stroma with acute and chronic inflammation.     C. Prostate, left post. base:  - Benign prostate glands and stroma.     D. Prostate, right post. base:  - Benign prostate glands and stroma.     E. Prostate, RIGHT POSTERIOR MEDIAL:  - Benign prostate glands and stroma.     F. Prostate, LEFT POSTERIOR MEDIAL:  - Benign prostate glands and stroma.     G. Prostate, LEFT POSTERIOR LATERAL:  - Benign prostate glands and stroma.     H. Prostate, RIGHT ANTERIOR MEDIAL:  - Benign prostate glands and stroma.     I. Prostate, RIGHT ANTERIOR LATERAL:  - Atypical small acinar proliferation (ASAP), suspicious but not diagnostic for malignancy.    J. Prostate, RIGHT POSTERIOR LATERAL:  - Prostatic adenocarcinoma, [place] grade 4+3 = 7 (70% pattern 4 and 30% pattern 3, Grade group 3), involving one of two cores, 70% of the involved core  and 30% of the involved tissue.  - Perineural invasion is absent.    Comment: Foci of cribriform glands are present with the pattern 4 component.  This feature has been associated with adverse clinical outcomes and molecular features typically seen in advanced disease.  (The 2019 Genitourinary Pathology Society (GUPS) White Paper on Contemporary Grading of Prostate Cancer. Arch Pathol Lab Med. 2021 Apr 1;145(4):461-493.)    Immunohistochemistry for prostate triple stain multiplex (,P63,P504s) was performed on blocks J2, supporting  the above diagnosis.    Intradepartmental consultation is in agreement.    K. Prostate, LEFT ANTERIOR MEDIAL:  - Benign prostate glands and stroma.      L. Prostate, LEFT ANTERIOR LATERAL:  - Benign prostate stroma.      M. Prostate, ZARIA right post. lat. apex:  - Benign prostate glands and stroma.              Image Results:   Image result are reviewed and documented in Hematology/Oncology history    NM PET CT skull base to mid thigh  Narrative: PYLARIFY PSMA PET/CT SCAN    INDICATION:  C61: Malignant neoplasm of prostate  R97.20: Elevated prostate specific antigen (PSA)   , initial staging    MODIFIER: PI    COMPARISON: Prostate MRI 1/10/2024 and priors    CELL TYPE:  prostatic adenocarcinoma    TECHNIQUE:   9.5 mCi F-18 Pylarify PSMA administered IV. Multiplanar attenuation corrected and non attenuation corrected PET images were acquired 60 minutes post injection. Contiguous, low dose, axial CT sections were obtained from the vertex through the   femurs .   Intravenous or oral contrast was not utilized.  This examination, like all CT scans performed in the [facility], was performed utilizing techniques to minimize radiation dose exposure, including the use of iterative   reconstruction and automated exposure control.    FINDINGS:    VISUALIZED BRAIN:  No acute abnormalities are seen.    HEAD/NECK:  There is a physiologic distribution of the radiotracer. No PSMA avid cervical adenopathy is seen.  CT images: Small left thyroid calcification. Complete opacification of the right sphenoid sinus.    CHEST:  No PSMA avid soft tissue lesions are seen.  Shotty mediastinal nodes without PSMA activity.  CT images: Heavy coronary artery calcifications. Small hiatal hernia. Scattered small lung nodules are too small for PET evaluation but appear similar to the prior CT, favoring a benign etiology.    ABDOMEN:  No PSMA avid soft tissue lesions are seen.  CT images: Hepatic hypodensities favoring cysts,  "increased in size from the prior CT. Atherosclerotic aorta and branch vessels.    PELVIS:  There is a small PSMA avid lesion in the right peripheral prostate, at about the mid gland level, SUV 6.2, suspicious for malignancy. This measures approximately 1 x 1.2 cm based on the PET images.    There is an additional more subtle small focus of mild PSMA activity in the posterior peripheral prostate in the midline, SUV 3.3. This may represent a second focus of tumor. This measures approximately 0.9 x 0.9 cm based on the PET images. There is   subtle extension into the adjacent periprosthetic fat versus misregistration.    Small PSMA avid foci of activity along the bilateral lower pelvic sidewalls likely represents distal ureteral activity.    No additional PSMA avid soft tissue lesions are seen.  No PSMA avid pelvic adenopathy.    CT images: Significant artifact from bilateral hip arthroplasty hardware.    OSSEOUS STRUCTURES:  No PSMA avid lesions are seen.  CT images: Spine, shoulder degenerative change.  Impression: 1. PSMA avid lesions in the prostate as above suspicious for malignancy.  2. No PSMA avid pelvic adenopathy.  3. No PSMA avid metastases in the neck, chest, upper abdomen or osseous structures.    Workstation performed: [id number]      LABS:  Lab data are reviewed and documented in HemOn history.       Lab Results   Component Value Date    HGB 16.8 03/08/2024    HCT 51.6 (H) 03/08/2024    MCV 92 03/08/2024     03/08/2024    WBC 7.04 03/08/2024    NRBC 0 03/08/2024     Lab Results   Component Value Date    K 4.1 03/08/2024     03/08/2024    CO2 27 03/08/2024    BUN 15 03/08/2024    CREATININE 0.80 03/08/2024    GLUF 97 03/08/2024    CALCIUM 9.2 03/08/2024    AST 22 03/08/2024    ALT 18 03/08/2024    ALKPHOS 68 03/08/2024    EGFR 86 03/08/2024       No results found for: \"IRON\", \"TIBC\", \"FERRITIN\"    No results found for: \"AVATSBLB28\"    No results for input(s): \"WBC\", \"CREAT\", \"PLT\" in the last " 72 hours.    By:  Shasta Shelton MD, 5/7/2024, 3:12 PM

## 2024-05-07 NOTE — PROGRESS NOTES
Outreach made to patient. Introduced myself as oncology care coordinator. I explained my role and how I will be involved in his care.   MED ONC appt scheduled for today 5/7/24 @ 3:00 to discuss Orgovyx per Urology. Patient is aware and voiced understanding. He knows I will be calling him to follow up after MED ONC appt to go over general assessment.

## 2024-05-07 NOTE — PROGRESS NOTES
Assessment/Plan:    Prostate cancer (HCC)  PSMA PET CT scan is negative.  The patient has elected to proceed with radiation therapy.  He does have a cardiac history and he might benefit from the use of Orgovyx as neoadjuvant hormonal therapy.  We will place a consultation with oncology to discuss this.  If they do not feel he is a candidate for Orgovyx he can certainly return for a 6-month depot Lupron injection.  The side effects of hormonal therapy were discussed.  We will begin to make arrangements for marker placement and SpaceOAR.       Diagnoses and all orders for this visit:    Prostate cancer (HCC)  -     Ambulatory Referral to Hematology / Oncology; Future          Subjective:      Patient ID: Cruz Patricio Jr. is a 79 y.o. male.    Chief complaint: Prostate cancer    HPI: 79-year-old male recently diagnosed with unfavorable risk intermediate grade prostate cancer.  He has seen radiation therapy and wishes to proceed.  He is here to discuss neoadjuvant hormonal therapy and the use of markers/SpaceOAR.  He is voiding well.  He has no complaints.  He recently had a PSMA PET/CT.        The following portions of the patient's history were reviewed and updated as appropriate: allergies, current medications, past family history, past medical history, past social history, past surgical history, and problem list.    Review of Systems   Constitutional:  Negative for chills, diaphoresis, fatigue and fever.   HENT: Negative.     Eyes: Negative.    Respiratory: Negative.     Cardiovascular: Negative.    Endocrine: Negative.    Genitourinary:         See HPI   Musculoskeletal: Negative.    Skin: Negative.    Allergic/Immunologic: Negative.    Neurological: Negative.    Hematological: Negative.    Psychiatric/Behavioral: Negative.         AUA SYMPTOM SCORE      Flowsheet Row Most Recent Value   AUA SYMPTOM SCORE    How often have you had a sensation of not emptying your bladder completely after you finished urinating? 1  "(P)    How often have you had to urinate again less than two hours after you finished urinating? 1 (P)    How often have you found you stopped and started again several times when you urinate? 2 (P)    How often have you found it difficult to postpone urination? 0 (P)    How often have you had a weak urinary stream? 1 (P)    How often have you had to push or strain to begin urination? 0 (P)    How many times did you most typically get up to urinate from the time you went to bed at night until the time you got up in the morning? 3 (P)    Quality of Life: If you were to spend the rest of your life with your urinary condition just the way it is now, how would you feel about that? 2 (P)    AUA SYMPTOM SCORE 8 (P)            Objective:      /76 (BP Location: Left arm, Patient Position: Sitting, Cuff Size: Standard)   Pulse 57   Ht 5' 8.5\" (1.74 m)   Wt 83 kg (183 lb)   SpO2 94%   BMI 27.42 kg/m²          Physical Exam  Vitals reviewed.   Constitutional:       General: He is not in acute distress.     Appearance: Normal appearance. He is well-developed and normal weight. He is not ill-appearing, toxic-appearing or diaphoretic.   HENT:      Head: Normocephalic and atraumatic.   Eyes:      General: No scleral icterus.     Conjunctiva/sclera: Conjunctivae normal.   Cardiovascular:      Rate and Rhythm: Normal rate.   Pulmonary:      Effort: Pulmonary effort is normal.   Abdominal:      General: Bowel sounds are normal. There is no distension.      Palpations: Abdomen is soft. There is no mass.      Tenderness: There is no abdominal tenderness. There is no right CVA tenderness, left CVA tenderness, guarding or rebound.   Genitourinary:     Penis: Normal. No phimosis or hypospadias.       Testes: Normal.         Right: Mass not present.         Left: Mass not present.   Musculoskeletal:         General: Normal range of motion.      Cervical back: Neck supple.   Skin:     General: Skin is warm and dry. "   Neurological:      General: No focal deficit present.      Mental Status: He is alert and oriented to person, place, and time.   Psychiatric:         Mood and Affect: Mood normal.         Behavior: Behavior normal.         Thought Content: Thought content normal.         Judgment: Judgment normal.

## 2024-05-07 NOTE — PROGRESS NOTES
Reviewed [facility] printout for orgovyx and reviewed the possible side effects.    Reviewed oral chemotherapy process.  Reviewed office handout with HOPELINE number.    Consent obtained, copy given to patient and uploaded into patient chart.    Email sent to Oral Chemotherapy team for Orgovyx 360mg D1 and then 120mg daily thereafter for 6 months

## 2024-05-07 NOTE — ASSESSMENT & PLAN NOTE
PSMA PET CT scan is negative.  The patient has elected to proceed with radiation therapy.  He does have a cardiac history and he might benefit from the use of Orgovyx as neoadjuvant hormonal therapy.  We will place a consultation with oncology to discuss this.  If they do not feel he is a candidate for Orgovyx he can certainly return for a 6-month depot Lupron injection.  The side effects of hormonal therapy were discussed.  We will begin to make arrangements for marker placement and SpaceOAR.

## 2024-05-07 NOTE — PROGRESS NOTES
"Patient called and asked if the Orgovyx will be better for him vs the Injection with his cardiac history. I explained that per Dr Burks note \"He does have a cardiac history and he might benefit from the use of Orgovyx as neoadjuvant hormonal therapy.\" Patient voiced understanding. I let him know that he may ask further questions at his MED ONC appt today.  "

## 2024-05-09 ENCOUNTER — TELEPHONE (OUTPATIENT)
Dept: HEMATOLOGY ONCOLOGY | Facility: CLINIC | Age: 79
End: 2024-05-09

## 2024-05-09 ENCOUNTER — DOCUMENTATION (OUTPATIENT)
Dept: HEMATOLOGY ONCOLOGY | Facility: CLINIC | Age: 79
End: 2024-05-09

## 2024-05-09 DIAGNOSIS — C61 PROSTATE CANCER (HCC): Primary | ICD-10-CM

## 2024-05-09 NOTE — PROGRESS NOTES
This writer met with the patient and his wife at the [facility] to discuss options for getting access to Orgovyx. Reviewed free drug process versus paying for medication with help from the [employer]. Paying for the medication would help the patient meet his out of pocket cost for Medicare Part D for all his medications for 2024. Reviewed the process for free drug and the steps involved. After reviewing paying for the medication and free drug in detail, the patient and his wife made the decision to pay for the medications with support from [employer]. This writer advised that an email would be sent to [facility] to get prescription processed and medication will ship will ship out within a day or two. Both patient and his wife appreciative of being able to meet in person. They had no further questions at this time and were provided with this writers business card with contact information.

## 2024-05-09 NOTE — TELEPHONE ENCOUNTER
Placed call to patient to review co-payment cost associated with Orgovyx($1001.72). Patient and his wife will be coming to the [facility] office to complete free drug application and review the potential of paying for the medication.

## 2024-05-10 ENCOUNTER — PATIENT OUTREACH (OUTPATIENT)
Dept: HEMATOLOGY ONCOLOGY | Facility: CLINIC | Age: 79
End: 2024-05-10

## 2024-05-10 RX ORDER — RELUGOLIX 120 MG/1
360 TABLET, FILM COATED ORAL DAILY
Qty: 30 TABLET | Refills: 0 | Status: SHIPPED | OUTPATIENT
Start: 2024-05-10

## 2024-05-10 RX ORDER — RELUGOLIX 120 MG/1
120 TABLET, FILM COATED ORAL DAILY
Qty: 30 TABLET | Refills: 4 | Status: SHIPPED | OUTPATIENT
Start: 2024-05-10

## 2024-05-10 NOTE — PROGRESS NOTES
I reached out and spoke with Jack now that consults have been completed with the oncology teams to review for any barriers to care and offer supportive services as needed. I reviewed and updated the members assigned to the care team in [facility].   He knows the members of the care team as well as how and when to contact them with any needs.   He verbalizes managing the schedules well.   They are currently driving themselves and deny any transportation needs.    He denies any uncontrolled symptoms. Discussed role of Palliative Care in symptom and side effect management. Declined referral at this time.  Patients states that they are eating and drinking as per usual with no unintentional weight loss.     Patient does not smoke.   Patient states he is well supported by family and friends.  Community support groups discussed including the Cancer Support Community of the [facility] [address]. Patient declined information at this time.   Patient feels they have adequate insurance coverage and deny any financial concerns at this time.     He knows to call me once get gets his Orgovyx so I am able to send message to urology surgery scheduler to get his SpaceOAR/Markers scheduled.   Discussed the plan for follow-up with the patient. Based on their individual needs I will follow up with them in about 6 weeks after SpaceOAR/Marker procedure.   I have provided my direct contact information to the patient and welcome them to contact me if their needs as discussed above change. They were appreciative for the call.

## 2024-05-14 ENCOUNTER — PATIENT OUTREACH (OUTPATIENT)
Dept: HEMATOLOGY ONCOLOGY | Facility: CLINIC | Age: 79
End: 2024-05-14

## 2024-05-14 NOTE — PROGRESS NOTES
Patient called to make me aware that he started his Orgovyx today (5/14/24). I let him know that I will be sending message to urology surgery scheduler to get SpaceOAR/Markers scheduled. He voiced understanding.

## 2024-05-15 ENCOUNTER — PATIENT OUTREACH (OUTPATIENT)
Dept: CASE MANAGEMENT | Facility: HOSPITAL | Age: 79
End: 2024-05-15

## 2024-05-15 NOTE — PROGRESS NOTES
Biopsychosocial and Barriers Assessment    Cancer Diagnosis: Prostate   Home/Cell Phone: [phone]   Emergency Contact: Judith - spouse  Marital Status:    Interpretation concerns, speaks another language, preferred language: English  Cultural concerns: none  Ability to read or write: yes    Caregiver/Support: Reported that he has a good support system  Children: 2 boys and 4 grandchildren, reside close   Child/Elder care: none    Housing: house  Home Setup: bi-level, no difficulty getting around home  Lives With: Judith   Daily Living Activities: independent   Durable Medical Equipment:none  Ambulation: independent     Preferred Pharmacy: [facility]  High co-pays with insurance: no   High co-pays with medication coverage: no  No medication coverage: has coverage     Primary Care Provider: Dr. Mason  Hx of Home Health Care: none  Hx of Short term rehab:  none  Mental Health Hx: none  Substance Abuse Hx: none  Employment: retired    Status/Location: Yes, linked to [facility]  Ability to pay bills: Yes  POA/LW/AD: Has the paperwork to complete  Transportation Plan/Concerns: no concerns       What do you know about your Cancer Diagnosis    What has your doctor told you about your cancer diagnosis: Prostate     What has your doctor told you about your cancer treatment: Patient started Orgovyx and will be getting SpaceOAR/Markers    What specific concerns do you have about your diagnosis and treatment:  No additional concerns, patient reported that the doctors have been very clear.     Have you been made aware of any hair loss associated with treatment: did not discuss     Additional Comments:  OSW outreached to patient to complete assessment and dt. Patient self rated 0/10 on DT. Patient denies any physical, social, practical, emotional or spiritual concerns. Patient denies any current needs for OSW, OSW will close at this time.

## 2024-05-20 ENCOUNTER — OFFICE VISIT (OUTPATIENT)
Dept: FAMILY MEDICINE CLINIC | Facility: CLINIC | Age: 79
End: 2024-05-20
Payer: MEDICARE

## 2024-05-20 VITALS
BODY MASS INDEX: 27.34 KG/M2 | WEIGHT: 184.6 LBS | TEMPERATURE: 97.7 F | HEIGHT: 69 IN | HEART RATE: 54 BPM | DIASTOLIC BLOOD PRESSURE: 80 MMHG | OXYGEN SATURATION: 95 % | SYSTOLIC BLOOD PRESSURE: 120 MMHG

## 2024-05-20 DIAGNOSIS — I10 BENIGN ESSENTIAL HYPERTENSION: ICD-10-CM

## 2024-05-20 DIAGNOSIS — R73.03 PREDIABETES: ICD-10-CM

## 2024-05-20 DIAGNOSIS — I48.91 NEW ONSET A-FIB (HCC): ICD-10-CM

## 2024-05-20 DIAGNOSIS — E78.49 OTHER HYPERLIPIDEMIA: ICD-10-CM

## 2024-05-20 DIAGNOSIS — C61 PROSTATE CANCER (HCC): Primary | ICD-10-CM

## 2024-05-20 PROBLEM — R97.20 ELEVATED PSA: Status: RESOLVED | Noted: 2017-10-23 | Resolved: 2024-05-20

## 2024-05-20 PROCEDURE — 99214 OFFICE O/P EST MOD 30 MIN: CPT | Performed by: FAMILY MEDICINE

## 2024-05-20 PROCEDURE — G2211 COMPLEX E/M VISIT ADD ON: HCPCS | Performed by: FAMILY MEDICINE

## 2024-05-20 RX ORDER — UBIDECARENONE 200 MG
CAPSULE ORAL
COMMUNITY
Start: 2016-01-01

## 2024-05-20 NOTE — PATIENT INSTRUCTIONS
I would get back to your plastic surgeon about the area on the right temple.  This can wait till later in the summer after the prostate treatments.

## 2024-05-20 NOTE — PROGRESS NOTES
"Assessment/Plan:    No problem-specific Assessment & Plan notes found for this encounter.             Subjective:      Patient ID: Cruz Patricio Jr. is a 79 y.o. male.      PATIENT RETURNS FOR FOLLOW-UP OF CHRONIC MEDICAL CONDITIONS.  ANY HOSPITAL VISITS, EMERGENCY VISITS AND OTHER PROVIDER VISITS SINCE LAST TIME WERE REVIEWED.  MEDS WERE REVIEWED AND NO SIDE EFFECTS.  NO NEW ISSUES  UNLESS NOTED BELOW. NO NEW MEDICAL PROVIDER REPORTED. THE CHRONIC DISEASES LISTED ABOVE ARE STABLE AND UNCHANGED/ THE PLAN OF CARE FOR THOSE WILL REMAIN UNCHANGED UNLESS NOTED BELOW.      Recent prostate cancer treatments : hormones + RT.     Recurrence of skin neoplasm r tempple         The following portions of the patient's history were reviewed and updated as appropriate: allergies, current medications, past family history, past medical history, past social history, past surgical history and problem list.    Review of Systems   Constitutional:  Negative for activity change and appetite change.   HENT:  Negative for trouble swallowing.    Eyes:  Negative for visual disturbance.   Respiratory:  Negative for cough and shortness of breath.    Cardiovascular:  Negative for chest pain, palpitations and leg swelling.   Gastrointestinal:  Negative for abdominal pain and blood in stool.   Endocrine: Positive for polyuria.   Genitourinary:  Negative for difficulty urinating and hematuria.   Skin:  Negative for rash.   Neurological:  Negative for dizziness.   Psychiatric/Behavioral:  Negative for behavioral problems.          Objective:  Vitals:    05/20/24 1058   BP: 120/80   BP Location: Left arm   Patient Position: Sitting   Cuff Size: Standard   Pulse: (!) 54   Temp: 97.7 °F (36.5 °C)   TempSrc: Temporal   SpO2: 95%   Weight: 83.7 kg (184 lb 9.6 oz)   Height: 5' 8.5\" (1.74 m)      Physical Exam  Constitutional:       Appearance: Normal appearance.   HENT:      Head: Normocephalic and atraumatic.      Right Ear: Tympanic membrane and ear " canal normal.      Left Ear: Tympanic membrane and ear canal normal.      Mouth/Throat:      Mouth: Mucous membranes are moist.      Pharynx: Oropharynx is clear.   Eyes:      Conjunctiva/sclera: Conjunctivae normal.   Cardiovascular:      Rate and Rhythm: Normal rate and regular rhythm.      Pulses: Normal pulses.      Heart sounds: Normal heart sounds. No murmur heard.  Pulmonary:      Effort: Pulmonary effort is normal. No respiratory distress.      Breath sounds: Normal breath sounds.   Musculoskeletal:      Cervical back: Neck supple.   Lymphadenopathy:      Cervical: No cervical adenopathy.   Neurological:      Mental Status: He is alert. Mental status is at baseline.   Psychiatric:         Mood and Affect: Mood normal.         Thought Content: Thought content normal.           Patient's chronic problems that were reviewed today are stable. Recent hospital stays reviewed. Recent labs and imaging reviewed. Recent visits to other providers reviewed. Meds reviewed and no changes made. Appropriate labs and imaging were ordered. Preventive measures appropriate for age and sex were reviewed with patient. Immunizations were updated as appropriate.

## 2024-05-28 ENCOUNTER — TELEPHONE (OUTPATIENT)
Dept: HEMATOLOGY ONCOLOGY | Facility: CLINIC | Age: 79
End: 2024-05-28

## 2024-05-28 ENCOUNTER — DOCUMENTATION (OUTPATIENT)
Dept: HEMATOLOGY ONCOLOGY | Facility: CLINIC | Age: 79
End: 2024-05-28

## 2024-05-28 ENCOUNTER — HOSPITAL ENCOUNTER (OUTPATIENT)
Dept: NON INVASIVE DIAGNOSTICS | Facility: HOSPITAL | Age: 79
Discharge: HOME | End: 2024-05-28
Attending: INTERNAL MEDICINE
Payer: MEDICARE

## 2024-05-28 DIAGNOSIS — L53.9 ERYTHEMA OF LOWER EXTREMITY: ICD-10-CM

## 2024-05-28 DIAGNOSIS — Z76.89 ENCOUNTER FOR ASSESSMENT FOR DEEP VEIN THROMBOSIS (DVT): ICD-10-CM

## 2024-05-28 DIAGNOSIS — M79.604 ACUTE PAIN OF RIGHT LOWER EXTREMITY: Primary | ICD-10-CM

## 2024-05-28 DIAGNOSIS — M79.604 ACUTE PAIN OF RIGHT LOWER EXTREMITY: ICD-10-CM

## 2024-05-28 PROCEDURE — 93971 EXTREMITY STUDY: CPT

## 2024-05-28 PROCEDURE — 93971 EXTREMITY STUDY: CPT | Performed by: SURGERY

## 2024-05-28 NOTE — PROGRESS NOTES
Patient is scheduled for SpaceOAR/Fiducial markers on 6/27/24. Message sent to RAD ONC schedule SIM.

## 2024-05-28 NOTE — TELEPHONE ENCOUNTER
"Per Hailey, MR at [place] office  \"Central scheduling left message for  to call patient to get him in today. Called patient and reviewed this with him. Patient acknowledged.\"     "

## 2024-05-29 DIAGNOSIS — R23.9 SKIN CHANGE: Primary | ICD-10-CM

## 2024-05-29 NOTE — TELEPHONE ENCOUNTER
My chart message to patient in regards to negative venous duplex results for clots, per Dr. Shelton. Recommendation to reach out to pcp and also that Dermatology referral placed. Message sent to Dermatology staff to assist with consultation appointment scheduling.

## 2024-05-30 ENCOUNTER — OFFICE VISIT (OUTPATIENT)
Dept: FAMILY MEDICINE CLINIC | Facility: CLINIC | Age: 79
End: 2024-05-30
Payer: MEDICARE

## 2024-05-30 ENCOUNTER — APPOINTMENT (OUTPATIENT)
Dept: LAB | Facility: CLINIC | Age: 79
End: 2024-05-30
Payer: MEDICARE

## 2024-05-30 VITALS
SYSTOLIC BLOOD PRESSURE: 130 MMHG | WEIGHT: 182 LBS | OXYGEN SATURATION: 96 % | DIASTOLIC BLOOD PRESSURE: 74 MMHG | BODY MASS INDEX: 27.27 KG/M2 | HEART RATE: 66 BPM | TEMPERATURE: 98.3 F

## 2024-05-30 DIAGNOSIS — L03.115 CELLULITIS OF RIGHT LOWER EXTREMITY: Primary | ICD-10-CM

## 2024-05-30 DIAGNOSIS — L03.115 CELLULITIS OF RIGHT LOWER EXTREMITY: ICD-10-CM

## 2024-05-30 LAB
ERYTHROCYTE [DISTWIDTH] IN BLOOD BY AUTOMATED COUNT: 13.3 % (ref 11.6–15.1)
HCT VFR BLD AUTO: 43.8 % (ref 36.5–49.3)
HGB BLD-MCNC: 14.9 G/DL (ref 12–17)
MCH RBC QN AUTO: 29.9 PG (ref 26.8–34.3)
MCHC RBC AUTO-ENTMCNC: 34 G/DL (ref 31.4–37.4)
MCV RBC AUTO: 88 FL (ref 82–98)
PLATELET # BLD AUTO: 302 THOUSANDS/UL (ref 149–390)
PMV BLD AUTO: 10.1 FL (ref 8.9–12.7)
RBC # BLD AUTO: 4.99 MILLION/UL (ref 3.88–5.62)
WBC # BLD AUTO: 7.39 THOUSAND/UL (ref 4.31–10.16)

## 2024-05-30 PROCEDURE — 99214 OFFICE O/P EST MOD 30 MIN: CPT | Performed by: FAMILY MEDICINE

## 2024-05-30 PROCEDURE — G2211 COMPLEX E/M VISIT ADD ON: HCPCS | Performed by: FAMILY MEDICINE

## 2024-05-30 PROCEDURE — 36415 COLL VENOUS BLD VENIPUNCTURE: CPT

## 2024-05-30 PROCEDURE — 85027 COMPLETE CBC AUTOMATED: CPT

## 2024-05-30 RX ORDER — SULFAMETHOXAZOLE AND TRIMETHOPRIM 800; 160 MG/1; MG/1
1 TABLET ORAL 2 TIMES DAILY
Qty: 6 TABLET | Refills: 0 | Status: SHIPPED | OUTPATIENT
Start: 2024-05-30 | End: 2024-06-02

## 2024-05-30 NOTE — PROGRESS NOTES
Ambulatory Visit  Name: Cruz Patricio Jr.      : 1945      MRN: [id number]  Encounter Provider: Nellie Mason DO  Encounter Date: 2024   Encounter department: [facility]    Assessment & Plan   1. Cellulitis of right lower extremity  Assessment & Plan:  1 week history of R medial ankle erythema, swelling and mild pain vs L. Also in medial inner thigh similar appearnace. Recently started Orgovyx 2-3 weeks ago. Onc does not think it is due to med side effect. Duplex checked, no clot. Pt already on xarelto.  - appears to be cellulitic in nature  - bactrim BID x3 days  - check CBC for WBC and platelets  - f/u as needed.  Orders:  -     sulfamethoxazole-trimethoprim (BACTRIM DS) 800-160 mg per tablet; Take 1 tablet by mouth 2 (two) times a day for 3 days  -     CBC and Platelet; Future       History of Present Illness     Rash  This is a new problem. The current episode started 1 to 4 weeks ago. The problem has been gradually worsening since onset. The affected locations include the right ankle and right upper leg. The problem is moderate. The rash is characterized by dryness, itchiness, redness, swelling and pain. He was exposed to nothing. The rash first occurred at home. Pertinent negatives include no cough, fever, shortness of breath, sore throat or vomiting. Past treatments include topical steroids and antihistamine. The treatment provided mild relief. There is no history of allergies, asthma, eczema or varicella. There were sick contacts at home.       Review of Systems   Constitutional:  Negative for chills and fever.   HENT:  Negative for ear pain and sore throat.    Eyes:  Negative for pain and visual disturbance.   Respiratory:  Negative for cough and shortness of breath.    Cardiovascular:  Negative for chest pain and palpitations.   Gastrointestinal:  Negative for abdominal pain and vomiting.   Genitourinary:  Negative for dysuria and hematuria.   Musculoskeletal:  Negative for  arthralgias and back pain.   Skin:  Positive for color change and rash.   Neurological:  Negative for seizures and syncope.   All other systems reviewed and are negative.    Medical History Reviewed by provider this encounter:  Tobacco  Allergies  Meds  Problems  Med Hx  Surg Hx  Fam Hx       Current Outpatient Medications on File Prior to Visit   Medication Sig Dispense Refill   • amLODIPine (NORVASC) 10 mg tablet TAKE 1 TABLET BY MOUTH EVERY DAY 90 tablet 1   • ascorbic acid (VITAMIN C) 1000 MG tablet Take 1,000 mg by mouth daily     • aspirin (ECOTRIN LOW STRENGTH) 81 mg EC tablet Take 81 mg by mouth daily Twice a week     • atenolol (TENORMIN) 100 mg tablet TAKE 1 TABLET BY MOUTH EVERY DAY 90 tablet 3   • B Complex Vitamins (B COMPLEX 100 PO) Take by mouth     • benazepril (LOTENSIN) 20 mg tablet TAKE 1 TABLET BY MOUTH EVERY DAY 90 tablet 1   • calcium carbonate (OS-LUCA) 600 MG tablet Take 600 mg by mouth daily     • calcium carbonate-vitamin D 500 mg-5 mcg per tablet Take 1 tablet by mouth daily     • Coenzyme Q10 (CoQ-10) 200 MG CAPS      • magnesium oxide (MAG-OX) 400 mg tablet Take 400 mg by mouth daily     • multivitamin (THERAGRAN) TABS Take 1 tablet by mouth     • niacin (NIASPAN) 500 mg CR tablet Take 1 tablet (500 mg total) by mouth daily at bedtime 90 tablet 3   • Omega-3 Fatty Acids (FISH OIL PO) Take by mouth daily      • Relugolix (Orgovyx) 120 MG TABS Take 360 mg by mouth daily For 1 dose, followed by 120 mg daily thereafter 30 tablet 0   • Relugolix (Orgovyx) 120 MG TABS Take 120 mg by mouth daily 30 tablet 4   • rivaroxaban (Xarelto) 20 mg tablet Take 1 tablet (20 mg total) by mouth daily with breakfast 90 tablet 3   • sildenafil (VIAGRA) 50 MG tablet Take 1 tablet (50 mg total) by mouth daily as needed for erectile dysfunction 20 tablet 6   • simvastatin (ZOCOR) 40 mg tablet TAKE 1 TABLET BY MOUTH EVERYDAY AT BEDTIME 90 tablet 3   • tamsulosin (FLOMAX) 0.4 mg TAKE 1 CAPSULE BY MOUTH EVERY  DAY WITH DINNER 90 capsule 3   • [DISCONTINUED] apixaban (ELIQUIS) 5 mg Take 1 tablet (5 mg total) by mouth in the morning and 1 tablet (5 mg total) in the evening. 60 tablet 0     No current facility-administered medications on file prior to visit.      Social History     Tobacco Use   • Smoking status: Former     Current packs/day: 0.00     Average packs/day: 0.5 packs/day for 5.0 years (2.5 ttl pk-yrs)     Types: Cigarettes     Start date: 1967     Quit date: 1970     Years since quittin.4   • Smokeless tobacco: Never   • Tobacco comments:     quit ...0.5 packs/2.00 pack years   Vaping Use   • Vaping status: Never Used   Substance and Sexual Activity   • Alcohol use: No   • Drug use: No   • Sexual activity: Yes     Partners: Female     Birth control/protection: None     Objective     /74 (BP Location: Left arm, Patient Position: Sitting, Cuff Size: Standard)   Pulse 66   Temp 98.3 °F (36.8 °C) (Temporal)   Wt 82.6 kg (182 lb)   SpO2 96%   BMI 27.27 kg/m²     Physical Exam  Vitals reviewed.   Constitutional:       General: He is not in acute distress.     Appearance: Normal appearance. He is well-developed.   HENT:      Head: Normocephalic and atraumatic.      Nose: No congestion or rhinorrhea.      Mouth/Throat:      Pharynx: No oropharyngeal exudate or posterior oropharyngeal erythema.   Eyes:      Conjunctiva/sclera: Conjunctivae normal.   Cardiovascular:      Rate and Rhythm: Normal rate and regular rhythm.      Pulses: Normal pulses.      Heart sounds: Normal heart sounds. No murmur heard.  Pulmonary:      Effort: Pulmonary effort is normal. No respiratory distress.      Breath sounds: Normal breath sounds.   Abdominal:      Palpations: Abdomen is soft.      Tenderness: There is no abdominal tenderness.   Musculoskeletal:         General: No swelling.      Cervical back: Neck supple.   Skin:     General: Skin is warm.      Capillary Refill: Capillary refill takes less than 2  seconds.      Findings: Erythema and rash present.      Comments: R medial foot swelling, erythema around ankle. No bruising otherwise.    Similar appearance in R inner thigh medially.   Neurological:      Mental Status: He is alert.      Motor: No weakness.      Gait: Gait normal.   Psychiatric:         Mood and Affect: Mood normal.         Behavior: Behavior normal.         Thought Content: Thought content normal.         Judgment: Judgment normal.       Administrative Statements   I have spent a total time of 33 minutes on 05/30/24 In caring for this patient including Diagnostic results, Prognosis, Risks and benefits of tx options, Patient and family education, and Importance of tx compliance.

## 2024-05-30 NOTE — ASSESSMENT & PLAN NOTE
1 week history of R medial ankle erythema, swelling and mild pain vs L. Also in medial inner thigh similar appearnace. Recently started Orgovyx 2-3 weeks ago. Onc does not think it is due to med side effect. Duplex checked, no clot. Pt already on xarelto.  - appears to be cellulitic in nature  - bactrim BID x3 days  - check CBC for WBC and platelets  - f/u as needed.

## 2024-05-31 ENCOUNTER — TELEPHONE (OUTPATIENT)
Dept: HEMATOLOGY ONCOLOGY | Facility: CLINIC | Age: 79
End: 2024-05-31

## 2024-05-31 NOTE — TELEPHONE ENCOUNTER
Patient Call    Who are you speaking with? Patient    If it is not the patient, are they listed on an active communication consent form? Yes   What is the reason for this call? Wants to speak with Lily about the medications he needs listed    Does this require a call back? Yes   If a call back is required, please list best call back number [phone]   If a call back is required, advise that a message will be forwarded to their care team and someone will return their call as soon as possible.   Did you relay this information to the patient? Yes

## 2024-06-04 ENCOUNTER — TELEPHONE (OUTPATIENT)
Dept: HEMATOLOGY ONCOLOGY | Facility: CLINIC | Age: 79
End: 2024-06-04

## 2024-06-04 DIAGNOSIS — R23.9 SKIN CHANGE: Primary | ICD-10-CM

## 2024-06-04 DIAGNOSIS — L27.0 DRUG RASH: ICD-10-CM

## 2024-06-04 NOTE — TELEPHONE ENCOUNTER
Returned call to patient in regards to questions and concerns.     Patient saw PCP and was placed on antibiotic, patient ended it on Wednesday- only 3 day prescription.   Patient stated that the redness has resolved on the leg and reported pruritis. Patient stated that he is having pruritus and reddened rash on right side of stomach.      Patient stated that the area on his abdomen started 4 days ago. Patient declined any benadryl or hydrocortisone cream.   Patient stated that area is closed.     Patient is continued to take Orgoyx.

## 2024-06-04 NOTE — TELEPHONE ENCOUNTER
"Per Dr. Shelton \"The alternative would be to take Lupron even if this is from Orgovyx. Would apply a steroid cream to the abdomen for now and we can consider switching the medicine if he is not happy with the ongoing skin changes\"     Patient stated he would like to continue with the Orgovyx. Patient asked for topical cream to apply to abdomen and right leg. Pending provider review and signature. Encouraged and recommended patient to call if s/s continue or worsen. Call back number reviewed.  "

## 2024-06-04 NOTE — TELEPHONE ENCOUNTER
Patient Call    Who are you speaking with? Patient    If it is not the patient, are they listed on an active communication consent form? N/A   What is the reason for this call? Patient advise that he would like to come in to be seen by Dr. Shelton due to what he believes is a reaction to his medication Orgovyx . Patient advise he has red itchy blotches on right leg and on stomach.  Patient advise that they are no longer painful.Patient would like to know if Dr. Shelton could look at the spots or advise on something he can use.Patient advise that reaction started once he started medication.    Does this require a call back? Yes   If a call back is required, please list best call back number [phone](cell)    If a call back is required, advise that a message will be forwarded to their care team and someone will return their call as soon as possible.   Did you relay this information to the patient? Yes

## 2024-06-06 ENCOUNTER — APPOINTMENT (EMERGENCY)
Dept: RADIOLOGY | Facility: HOSPITAL | Age: 79
DRG: 312 | End: 2024-06-06
Payer: MEDICARE

## 2024-06-06 ENCOUNTER — HOSPITAL ENCOUNTER (INPATIENT)
Facility: HOSPITAL | Age: 79
LOS: 1 days | Discharge: HOME | DRG: 312 | End: 2024-06-08
Attending: EMERGENCY MEDICINE | Admitting: FAMILY MEDICINE
Payer: MEDICARE

## 2024-06-06 DIAGNOSIS — E87.1 HYPONATREMIA: ICD-10-CM

## 2024-06-06 DIAGNOSIS — C61 PROSTATE CANCER (HCC): ICD-10-CM

## 2024-06-06 DIAGNOSIS — R55 SYNCOPE: Primary | ICD-10-CM

## 2024-06-06 PROBLEM — D72.829 LEUKOCYTOSIS: Status: ACTIVE | Noted: 2024-06-06

## 2024-06-06 PROBLEM — R42 POSTURAL DIZZINESS WITH PRESYNCOPE: Status: ACTIVE | Noted: 2024-06-06

## 2024-06-06 LAB
2HR DELTA HS TROPONIN: 0 NG/L
ALBUMIN SERPL BCP-MCNC: 3.5 G/DL (ref 3.5–5)
ALP SERPL-CCNC: 64 U/L (ref 34–104)
ALT SERPL W P-5'-P-CCNC: 20 U/L (ref 7–52)
ANION GAP SERPL CALCULATED.3IONS-SCNC: 8 MMOL/L (ref 4–13)
AST SERPL W P-5'-P-CCNC: 26 U/L (ref 13–39)
ATRIAL RATE: 68 BPM
BACTERIA UR QL AUTO: ABNORMAL /HPF
BASOPHILS # BLD AUTO: 0.07 THOUSANDS/ÂΜL (ref 0–0.1)
BASOPHILS NFR BLD AUTO: 1 % (ref 0–1)
BILIRUB SERPL-MCNC: 1.14 MG/DL (ref 0.2–1)
BILIRUB UR QL STRIP: NEGATIVE
BUN SERPL-MCNC: 10 MG/DL (ref 5–25)
CALCIUM SERPL-MCNC: 8.6 MG/DL (ref 8.4–10.2)
CARDIAC TROPONIN I PNL SERPL HS: 5 NG/L
CARDIAC TROPONIN I PNL SERPL HS: 5 NG/L
CHLORIDE SERPL-SCNC: 92 MMOL/L (ref 96–108)
CLARITY UR: CLEAR
CO2 SERPL-SCNC: 24 MMOL/L (ref 21–32)
COLOR UR: YELLOW
CREAT SERPL-MCNC: 0.76 MG/DL (ref 0.6–1.3)
EOSINOPHIL # BLD AUTO: 0.04 THOUSAND/ÂΜL (ref 0–0.61)
EOSINOPHIL NFR BLD AUTO: 0 % (ref 0–6)
ERYTHROCYTE [DISTWIDTH] IN BLOOD BY AUTOMATED COUNT: 13.5 % (ref 11.6–15.1)
GFR SERPL CREATININE-BSD FRML MDRD: 88 ML/MIN/1.73SQ M
GLUCOSE SERPL-MCNC: 116 MG/DL (ref 65–140)
GLUCOSE UR STRIP-MCNC: NEGATIVE MG/DL
GRAN CASTS #/AREA URNS LPF: ABNORMAL /[LPF]
HCT VFR BLD AUTO: 44.8 % (ref 36.5–49.3)
HGB BLD-MCNC: 15.1 G/DL (ref 12–17)
HGB UR QL STRIP.AUTO: NEGATIVE
HYALINE CASTS #/AREA URNS LPF: ABNORMAL /LPF
IMM GRANULOCYTES # BLD AUTO: 0.07 THOUSAND/UL (ref 0–0.2)
IMM GRANULOCYTES NFR BLD AUTO: 1 % (ref 0–2)
KETONES UR STRIP-MCNC: NEGATIVE MG/DL
LEUKOCYTE ESTERASE UR QL STRIP: NEGATIVE
LYMPHOCYTES # BLD AUTO: 0.97 THOUSANDS/ÂΜL (ref 0.6–4.47)
LYMPHOCYTES NFR BLD AUTO: 8 % (ref 14–44)
MCH RBC QN AUTO: 29.8 PG (ref 26.8–34.3)
MCHC RBC AUTO-ENTMCNC: 33.7 G/DL (ref 31.4–37.4)
MCV RBC AUTO: 88 FL (ref 82–98)
MONOCYTES # BLD AUTO: 0.5 THOUSAND/ÂΜL (ref 0.17–1.22)
MONOCYTES NFR BLD AUTO: 4 % (ref 4–12)
MUCOUS THREADS UR QL AUTO: ABNORMAL
NEUTROPHILS # BLD AUTO: 9.89 THOUSANDS/ÂΜL (ref 1.85–7.62)
NEUTS SEG NFR BLD AUTO: 86 % (ref 43–75)
NITRITE UR QL STRIP: NEGATIVE
NON-SQ EPI CELLS URNS QL MICRO: ABNORMAL /HPF
NRBC BLD AUTO-RTO: 0 /100 WBCS
OSMOLALITY UR/SERPL-RTO: 271 MMOL/KG (ref 282–298)
P AXIS: 45 DEGREES
PH UR STRIP.AUTO: 7 [PH] (ref 4.5–8)
PLATELET # BLD AUTO: 361 THOUSANDS/UL (ref 149–390)
PMV BLD AUTO: 9.1 FL (ref 8.9–12.7)
POTASSIUM SERPL-SCNC: 4.4 MMOL/L (ref 3.5–5.3)
PR INTERVAL: 178 MS
PROT SERPL-MCNC: 6.9 G/DL (ref 6.4–8.4)
PROT UR STRIP-MCNC: ABNORMAL MG/DL
QRS AXIS: 63 DEGREES
QRSD INTERVAL: 100 MS
QT INTERVAL: 426 MS
QTC INTERVAL: 452 MS
RBC # BLD AUTO: 5.07 MILLION/UL (ref 3.88–5.62)
RBC #/AREA URNS AUTO: ABNORMAL /HPF
SODIUM SERPL-SCNC: 124 MMOL/L (ref 135–147)
SP GR UR STRIP.AUTO: 1.02 (ref 1–1.03)
T WAVE AXIS: 10 DEGREES
TSH SERPL DL<=0.05 MIU/L-ACNC: 2.15 UIU/ML (ref 0.45–4.5)
UROBILINOGEN UR QL STRIP.AUTO: 0.2 E.U./DL
VENTRICULAR RATE: 68 BPM
WBC # BLD AUTO: 11.54 THOUSAND/UL (ref 4.31–10.16)
WBC #/AREA URNS AUTO: ABNORMAL /HPF

## 2024-06-06 PROCEDURE — 93005 ELECTROCARDIOGRAM TRACING: CPT

## 2024-06-06 PROCEDURE — 99284 EMERGENCY DEPT VISIT MOD MDM: CPT

## 2024-06-06 PROCEDURE — 83930 ASSAY OF BLOOD OSMOLALITY: CPT | Performed by: INTERNAL MEDICINE

## 2024-06-06 PROCEDURE — 81001 URINALYSIS AUTO W/SCOPE: CPT

## 2024-06-06 PROCEDURE — 99285 EMERGENCY DEPT VISIT HI MDM: CPT | Performed by: EMERGENCY MEDICINE

## 2024-06-06 PROCEDURE — 84484 ASSAY OF TROPONIN QUANT: CPT | Performed by: EMERGENCY MEDICINE

## 2024-06-06 PROCEDURE — 96365 THER/PROPH/DIAG IV INF INIT: CPT

## 2024-06-06 PROCEDURE — 71275 CT ANGIOGRAPHY CHEST: CPT

## 2024-06-06 PROCEDURE — 85025 COMPLETE CBC W/AUTO DIFF WBC: CPT | Performed by: EMERGENCY MEDICINE

## 2024-06-06 PROCEDURE — 99223 1ST HOSP IP/OBS HIGH 75: CPT | Performed by: INTERNAL MEDICINE

## 2024-06-06 PROCEDURE — 36415 COLL VENOUS BLD VENIPUNCTURE: CPT | Performed by: EMERGENCY MEDICINE

## 2024-06-06 PROCEDURE — 93010 ELECTROCARDIOGRAM REPORT: CPT | Performed by: INTERNAL MEDICINE

## 2024-06-06 PROCEDURE — 70450 CT HEAD/BRAIN W/O DYE: CPT

## 2024-06-06 PROCEDURE — 80053 COMPREHEN METABOLIC PANEL: CPT | Performed by: EMERGENCY MEDICINE

## 2024-06-06 PROCEDURE — 84443 ASSAY THYROID STIM HORMONE: CPT | Performed by: EMERGENCY MEDICINE

## 2024-06-06 RX ORDER — SODIUM CHLORIDE, SODIUM GLUCONATE, SODIUM ACETATE, POTASSIUM CHLORIDE, MAGNESIUM CHLORIDE, SODIUM PHOSPHATE, DIBASIC, AND POTASSIUM PHOSPHATE .53; .5; .37; .037; .03; .012; .00082 G/100ML; G/100ML; G/100ML; G/100ML; G/100ML; G/100ML; G/100ML
100 INJECTION, SOLUTION INTRAVENOUS CONTINUOUS
Status: DISCONTINUED | OUTPATIENT
Start: 2024-06-06 | End: 2024-06-08 | Stop reason: HOSPADM

## 2024-06-06 RX ORDER — SODIUM CHLORIDE, SODIUM GLUCONATE, SODIUM ACETATE, POTASSIUM CHLORIDE, MAGNESIUM CHLORIDE, SODIUM PHOSPHATE, DIBASIC, AND POTASSIUM PHOSPHATE .53; .5; .37; .037; .03; .012; .00082 G/100ML; G/100ML; G/100ML; G/100ML; G/100ML; G/100ML; G/100ML
500 INJECTION, SOLUTION INTRAVENOUS ONCE
Status: COMPLETED | OUTPATIENT
Start: 2024-06-06 | End: 2024-06-06

## 2024-06-06 RX ORDER — HEPARIN SODIUM 5000 [USP'U]/ML
5000 INJECTION, SOLUTION INTRAVENOUS; SUBCUTANEOUS EVERY 8 HOURS SCHEDULED
Status: DISCONTINUED | OUTPATIENT
Start: 2024-06-06 | End: 2024-06-06

## 2024-06-06 RX ORDER — ATENOLOL 50 MG/1
100 TABLET ORAL DAILY
Status: DISCONTINUED | OUTPATIENT
Start: 2024-06-07 | End: 2024-06-08 | Stop reason: HOSPADM

## 2024-06-06 RX ORDER — NIACIN 500 MG/1
500 TABLET, EXTENDED RELEASE ORAL
Status: DISCONTINUED | OUTPATIENT
Start: 2024-06-06 | End: 2024-06-07

## 2024-06-06 RX ORDER — SODIUM CHLORIDE 9 MG/ML
100 INJECTION, SOLUTION INTRAVENOUS CONTINUOUS
Status: DISCONTINUED | OUTPATIENT
Start: 2024-06-06 | End: 2024-06-06

## 2024-06-06 RX ORDER — AMLODIPINE BESYLATE 10 MG/1
10 TABLET ORAL DAILY
Status: DISCONTINUED | OUTPATIENT
Start: 2024-06-07 | End: 2024-06-08 | Stop reason: HOSPADM

## 2024-06-06 RX ORDER — SODIUM CHLORIDE, SODIUM GLUCONATE, SODIUM ACETATE, POTASSIUM CHLORIDE, MAGNESIUM CHLORIDE, SODIUM PHOSPHATE, DIBASIC, AND POTASSIUM PHOSPHATE .53; .5; .37; .037; .03; .012; .00082 G/100ML; G/100ML; G/100ML; G/100ML; G/100ML; G/100ML; G/100ML
125 INJECTION, SOLUTION INTRAVENOUS CONTINUOUS
Status: DISCONTINUED | OUTPATIENT
Start: 2024-06-06 | End: 2024-06-06

## 2024-06-06 RX ORDER — ONDANSETRON 2 MG/ML
4 INJECTION INTRAMUSCULAR; INTRAVENOUS EVERY 4 HOURS PRN
Status: DISCONTINUED | OUTPATIENT
Start: 2024-06-06 | End: 2024-06-08 | Stop reason: HOSPADM

## 2024-06-06 RX ORDER — TAMSULOSIN HYDROCHLORIDE 0.4 MG/1
0.4 CAPSULE ORAL
Status: DISCONTINUED | OUTPATIENT
Start: 2024-06-06 | End: 2024-06-08 | Stop reason: HOSPADM

## 2024-06-06 RX ORDER — PRAVASTATIN SODIUM 80 MG/1
80 TABLET ORAL
Status: DISCONTINUED | OUTPATIENT
Start: 2024-06-06 | End: 2024-06-08 | Stop reason: HOSPADM

## 2024-06-06 RX ORDER — CALCIUM CARBONATE 500 MG/1
750 TABLET, CHEWABLE ORAL DAILY
Status: DISCONTINUED | OUTPATIENT
Start: 2024-06-07 | End: 2024-06-08 | Stop reason: HOSPADM

## 2024-06-06 RX ORDER — ACETAMINOPHEN 325 MG/1
650 TABLET ORAL EVERY 6 HOURS PRN
Status: DISCONTINUED | OUTPATIENT
Start: 2024-06-06 | End: 2024-06-08 | Stop reason: HOSPADM

## 2024-06-06 RX ORDER — LANOLIN ALCOHOL/MO/W.PET/CERES
400 CREAM (GRAM) TOPICAL DAILY
Status: DISCONTINUED | OUTPATIENT
Start: 2024-06-07 | End: 2024-06-08 | Stop reason: HOSPADM

## 2024-06-06 RX ORDER — CHLORAL HYDRATE 500 MG
1000 CAPSULE ORAL DAILY
Status: DISCONTINUED | OUTPATIENT
Start: 2024-06-07 | End: 2024-06-08 | Stop reason: HOSPADM

## 2024-06-06 RX ADMIN — SODIUM CHLORIDE, SODIUM GLUCONATE, SODIUM ACETATE, POTASSIUM CHLORIDE, MAGNESIUM CHLORIDE, SODIUM PHOSPHATE, DIBASIC, AND POTASSIUM PHOSPHATE 125 ML/HR: .53; .5; .37; .037; .03; .012; .00082 INJECTION, SOLUTION INTRAVENOUS at 15:51

## 2024-06-06 RX ADMIN — SODIUM CHLORIDE, SODIUM GLUCONATE, SODIUM ACETATE, POTASSIUM CHLORIDE, MAGNESIUM CHLORIDE, SODIUM PHOSPHATE, DIBASIC, AND POTASSIUM PHOSPHATE 500 ML: .53; .5; .37; .037; .03; .012; .00082 INJECTION, SOLUTION INTRAVENOUS at 14:41

## 2024-06-06 RX ADMIN — NIACIN 500 MG: 500 TABLET, EXTENDED RELEASE ORAL at 21:10

## 2024-06-06 RX ADMIN — IOHEXOL 85 ML: 350 INJECTION, SOLUTION INTRAVENOUS at 13:04

## 2024-06-06 RX ADMIN — TAMSULOSIN HYDROCHLORIDE 0.4 MG: 0.4 CAPSULE ORAL at 17:18

## 2024-06-06 RX ADMIN — SODIUM CHLORIDE, SODIUM GLUCONATE, SODIUM ACETATE, POTASSIUM CHLORIDE, MAGNESIUM CHLORIDE, SODIUM PHOSPHATE, DIBASIC, AND POTASSIUM PHOSPHATE 100 ML/HR: .53; .5; .37; .037; .03; .012; .00082 INJECTION, SOLUTION INTRAVENOUS at 16:20

## 2024-06-06 RX ADMIN — PRAVASTATIN SODIUM 80 MG: 80 TABLET ORAL at 17:18

## 2024-06-06 NOTE — ED PROVIDER NOTES
"History  Chief Complaint   Patient presents with    Loss of Consciousness     Patient wife states they were outside, standing on the porch. Patient eyes rolled back in his head, and she guided him to the floor. Patient states he heard her say \"lay down\" but he was unable to respond to her. Patient has prostate cancer and just started a new medication about 3 weeks ago. Upon arrival, patient feels back to baseline.      Patient had a syncopal event.  As per his wife he was outside with her standing on the porch.  She was able to guide him down to the ground.  Patient reportedly rolled his eyes back in his head.  Did not have a head strike.  He does have a history of prostate cancer and is noted to be started on a new medication approximately 3 weeks ago.    In discussing with patient he denies having any headache or focal neurological findings prior to syncopal event.  He denies having chest pain or palpitations.  He did not have any diaphoresis or cold sweats.  He denies nausea, vomiting.  Patient denies recent illness or febrile events.  He states he feels back to his baseline without any deficits.    Patient states that since he started this new medication for his prostate cancer which is hormonal therapy he has felt more fatigued.  He says he just has not felt quite right.  He has not had a syncopal event before.    Patient's wife added to history stating that patient got up this morning he was able to eat breakfast without difficulty.  He went and shower.  He came downstairs and she was trimming his nails.  While he was standing and she was trimming his nails she noticed that he pulled backwards with his left hand moving out of her hand.  She asked why he pulled away.  She then noticed that he had rolled his eyes back in his head and she guided him down to the floor.  Patient recalls most of the event.  He did not strike his head.  He says he felt lightheaded and off-balance.  After resting he was able to get up " and mobilize without difficulty and had no further complaints.    Orgovyx is a medication patient has been started on prior to him developing fatigue and having today's events.  Other issues patient has had some starting the medication includes a rash which is red and patchy and described as itchy.  He had been on Bactrim for approximately 3 days and has since completed.  He is also using topical hydrocortisone cream.        Prior to Admission Medications   Prescriptions Last Dose Informant Patient Reported? Taking?   B Complex Vitamins (B COMPLEX 100 PO)  Self, Spouse/Significant Other Yes No   Sig: Take by mouth   Coenzyme Q10 (CoQ-10) 200 MG CAPS   Yes No   Omega-3 Fatty Acids (FISH OIL PO)  Self, Spouse/Significant Other Yes No   Sig: Take by mouth daily    Relugolix (Orgovyx) 120 MG TABS   No No   Sig: Take 360 mg by mouth daily For 1 dose, followed by 120 mg daily thereafter   Relugolix (Orgovyx) 120 MG TABS   No No   Sig: Take 120 mg by mouth daily   amLODIPine (NORVASC) 10 mg tablet  Spouse/Significant Other, Self No No   Sig: TAKE 1 TABLET BY MOUTH EVERY DAY   ascorbic acid (VITAMIN C) 1000 MG tablet  Self, Spouse/Significant Other Yes No   Sig: Take 1,000 mg by mouth daily   aspirin (ECOTRIN LOW STRENGTH) 81 mg EC tablet  Self, Spouse/Significant Other Yes No   Sig: Take 81 mg by mouth daily Twice a week   atenolol (TENORMIN) 100 mg tablet  Spouse/Significant Other, Self No No   Sig: TAKE 1 TABLET BY MOUTH EVERY DAY   benazepril (LOTENSIN) 20 mg tablet  Spouse/Significant Other, Self No No   Sig: TAKE 1 TABLET BY MOUTH EVERY DAY   calcium carbonate (OS-LUCA) 600 MG tablet  Self, Spouse/Significant Other Yes No   Sig: Take 600 mg by mouth daily   calcium carbonate-vitamin D 500 mg-5 mcg per tablet  Self, Spouse/Significant Other Yes No   Sig: Take 1 tablet by mouth daily   hydrocortisone 2.5 % cream   No No   Sig: Apply topically 4 (four) times a day as needed for rash or irritation   magnesium oxide  (MAG-OX) 400 mg tablet  Self, Spouse/Significant Other Yes No   Sig: Take 400 mg by mouth daily   multivitamin (THERAGRAN) TABS  Self, Spouse/Significant Other Yes No   Sig: Take 1 tablet by mouth   niacin (NIASPAN) 500 mg CR tablet  Self, Spouse/Significant Other No No   Sig: Take 1 tablet (500 mg total) by mouth daily at bedtime   rivaroxaban (Xarelto) 20 mg tablet  Self No No   Sig: Take 1 tablet (20 mg total) by mouth daily with breakfast   sildenafil (VIAGRA) 50 MG tablet  Self, Spouse/Significant Other No No   Sig: Take 1 tablet (50 mg total) by mouth daily as needed for erectile dysfunction   simvastatin (ZOCOR) 40 mg tablet  Spouse/Significant Other, Self No No   Sig: TAKE 1 TABLET BY MOUTH EVERYDAY AT BEDTIME   tamsulosin (FLOMAX) 0.4 mg  Self, Spouse/Significant Other No No   Sig: TAKE 1 CAPSULE BY MOUTH EVERY DAY WITH DINNER      Facility-Administered Medications: None       Past Medical History:   Diagnosis Date    A-fib (HCC)     Acute MI (HCC) 06/2002    Arthritis     Atypical chest pain 03/05/2008    Basal cell carcinoma 2009    Coronary artery disease     Diverticulosis 2008    Hematuria, microscopic     History of varicose veins 2008    Hyperlipidemia     Hypertension     Myocardial infarction (HCC)     Nocturia     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     Prostate cancer (HCC) 3/22/2024    Urinary tract infection     Wears glasses        Past Surgical History:   Procedure Laterality Date    COLONOSCOPY      CORONARY ANGIOPLASTY      right CA x 3 vessels: redo [facility] 10/04    CORONARY STENT PLACEMENT  01/01/2002    JOINT REPLACEMENT      R hip    CO BX PROSTATE STRTCTC SATURATION SAMPLING IMG GID N/A 3/22/2024    Procedure: TRANSPERINEAL MRI FUSION BIOPSY PROSTATE;  Surgeon: Gabino Lopez MD;  Location: [facility];  Service: Urology    CO COLONOSCOPY FLX DX W/COLLJ SPEC WHEN PFRMD N/A 06/27/2017    Procedure: COLONOSCOPY;  Surgeon: Scott Last MD;  Location: [facility];   Service: Colorectal    TOTAL HIP ARTHROPLASTY Right     TOTAL HIP ARTHROPLASTY Left 2019    WISDOM TOOTH EXTRACTION         Family History   Problem Relation Age of Onset    Aneurysm Father         post op AAA repair    Hypertension Sister     Hypertension Brother     Cancer Brother         Agent Orange    Hypertension Mother     Diabetes Mother      I have reviewed and agree with the history as documented.    E-Cigarette/Vaping    E-Cigarette Use Never User      E-Cigarette/Vaping Substances    Nicotine No     THC No     CBD No     Flavoring No     Other No     Unknown No      Social History     Tobacco Use    Smoking status: Former     Current packs/day: 0.00     Average packs/day: 0.5 packs/day for 5.0 years (2.5 ttl pk-yrs)     Types: Cigarettes     Start date: 1967     Quit date: 1970     Years since quittin.4    Smokeless tobacco: Never    Tobacco comments:     quit ...0.5 packs/2.00 pack years   Vaping Use    Vaping status: Never Used   Substance Use Topics    Alcohol use: No    Drug use: No       Review of Systems   Constitutional:  Positive for fatigue. Negative for chills and fever.   HENT:  Negative for sinus pressure and sore throat.    Eyes:  Negative for visual disturbance.   Respiratory:  Negative for cough and shortness of breath.    Cardiovascular:  Negative for chest pain, palpitations and leg swelling.   Gastrointestinal:  Negative for abdominal pain, constipation, diarrhea, nausea and vomiting.   Genitourinary:  Negative for dysuria.   Skin:  Positive for rash.   Neurological:  Positive for syncope. Negative for dizziness, light-headedness and headaches.   Psychiatric/Behavioral:  Negative for agitation and confusion.    All other systems reviewed and are negative.      Physical Exam  Physical Exam  Vitals and nursing note reviewed.   Constitutional:       General: He is not in acute distress.     Appearance: Normal appearance. He is normal weight. He is not ill-appearing,  toxic-appearing or diaphoretic.   HENT:      Head: Normocephalic and atraumatic.      Mouth/Throat:      Mouth: Oropharynx is clear and moist. Mucous membranes are moist.   Eyes:      Extraocular Movements: EOM normal.      Conjunctiva/sclera: Conjunctivae normal.      Pupils: Pupils are equal, round, and reactive to light.   Cardiovascular:      Rate and Rhythm: Normal rate and regular rhythm.      Heart sounds: Normal heart sounds. No murmur heard.  Pulmonary:      Effort: Pulmonary effort is normal. No respiratory distress.      Breath sounds: Normal breath sounds. No rales.   Abdominal:      General: Bowel sounds are normal.      Palpations: Abdomen is soft.      Tenderness: There is no guarding or rebound.   Musculoskeletal:         General: No swelling or tenderness. Normal range of motion.      Cervical back: Normal range of motion and neck supple.   Skin:     General: Skin is warm and dry.      Capillary Refill: Capillary refill takes less than 2 seconds.      Findings: No erythema or rash.      Comments: Red patchy areas that do not appear to be significantly inflamed or erythematous on abdomen as well and is back.  No open wounds.   Neurological:      General: No focal deficit present.      Mental Status: He is alert.      Cranial Nerves: No cranial nerve deficit.   Psychiatric:         Mood and Affect: Mood and affect normal.         Vital Signs  ED Triage Vitals [06/06/24 0944]   Temperature Pulse Respirations Blood Pressure SpO2   98.6 °F (37 °C) 72 18 115/67 100 %      Temp Source Heart Rate Source Patient Position - Orthostatic VS BP Location FiO2 (%)   Temporal -- Sitting Left arm --      Pain Score       --           Vitals:    06/06/24 0944   BP: 115/67   Pulse: 72   Patient Position - Orthostatic VS: Sitting         Visual Acuity      ED Medications  Medications - No data to display    Diagnostic Studies  Results Reviewed       None                   No orders to display               Procedures  Procedures         ED Course                               SBIRT 20yo+      Flowsheet Row Most Recent Value   Initial Alcohol Screen: US AUDIT-C     1. How often do you have a drink containing alcohol? 0 Filed at: 06/06/2024 0946   3a. Male UNDER 65: How often do you have five or more drinks on one occasion? 0 Filed at: 06/06/2024 0946   Audit-C Score 0 Filed at: 06/06/2024 0946   DANILO: How many times in the past year have you...    Used an illegal drug or used a prescription medication for non-medical reasons? Never Filed at: 06/06/2024 0946                      Medical Decision Making  Syncopal event strike.  Patient presently feeling back to his baseline.  Patient's syncopal event may be secondary to medication effects, other possibilities include cardiac etiology versus dehydration/intravascular volume depletion.  Also cannot exclude the fact possible PE or neurologic event.  History is not consistent with true stroke and but because of syncope will do complete workup with laboratory data as well as CT head and CT chest rule out PE.  Urinalysis will also be evaluated.  Orthostatics to be checked.    Amount and/or Complexity of Data Reviewed  Labs: ordered.  Radiology: ordered.             Disposition  Final diagnoses:   None     ED Disposition       None          Follow-up Information    None         Patient's Medications   Discharge Prescriptions    No medications on file       No discharge procedures on file.    PDMP Review       None            ED Provider  Electronically Signed by             Mana Nicholson DO  06/06/24 6169

## 2024-06-06 NOTE — H&P
[facility]  H&P  Name: Cruz Patricio Jr. 79 y.o. male I MRN: [id number]  Unit/Bed#: CW2 215-01 I Date of Admission: 6/6/2024   Date of Service: 6/6/2024 I Hospital Day: 0      Assessment & Plan:    * Postural dizziness with presyncope  Assessment & Plan  Presents with near syncopal episode earlier today while standing upright on his front porch, witnessed without loss of consciousness by wife  No fall or head strike reported  Mild orthostasis noted in the ED -> continue IV fluids and repeat orthostatics tomorrow morning  Check echocardiogram - CT angiogram of chest negative for pulmonary embolism or other acute intrathoracic etiology  CT of head negative for acute intracranial etiology but did reveal chronic microangiopathic changes  Hold Tenormin - only administer Norvasc if holding parameters for BP meds  Will currently discontinue Orgovyx as symptoms of increased weakness/fatigue and progressive dizziness started after initiation of this agent several weeks prior    Hyponatremia  Assessment & Plan  Possibly multifactorial secondary to decreased oral side intake coupled with underlying malignancy  Check urine and serum osmolality - check urine sodium  In light of mild orthostasis, initiated on an IV fluid regimen  If serum sodium paradoxically worsens, should place on fluid restriction  Continue serial serum sodium monitoring    Leukocytosis  Assessment & Plan  Likely reactive due to acute medical issue(s)  Monitor WBC count    Prostate cancer (HCC)  Assessment & Plan  Recently diagnosed a few months ago  Follows outpatient oncology (Dr Shelton) and urology (Dr Burks)   Started on Orgovyx approximately 3 weeks ago around the time of onset of presenting symptoms - currently held as mentioned above  Await oncology input -> patient told an alternate regimen would be Lupron injections, however, currently opposed to this due to his prior cardiac  "issues    Hyperlipidemia  Assessment & Plan  Continue statin/fish oil - also on niacin (w/ ASA)    Essential hypertension  Assessment & Plan  Continue Norvasc (w/ holding parameters for hypotension)  Hold beta-blocker (Tenormin) for now due to presyncopal episode with borderline orthostasis    Atrial fibrillation (HCC)  Assessment & Plan  Rate controlled on Tenormin (currently held due to borderline orthostasis)  Continue Xarelto for anticoagulation      DVT Prophylaxis:  Xarelto    Code Status:  Full code    Discussion with: Patient at bedside    Anticipated Length of Stay:  Patient will be admitted on an Observation basis with an anticipated length of stay of less than 2 midnights.   Justification for Hospital Stay: Single episode mild orthostasis requiring IV fluid hydration, transient discontinuation of suspected offending agent, and hemodynamic monitoring.    Total Time for Visit, including Counseling / Coordination of Care: 75 minutes. More than 50% of total time spent on counseling and coordination of care, on one or more of the following: performing physical exam; counseling and coordination of care; obtaining or reviewing history; documenting in the medical record; reviewing/ordering tests, medications or procedures; communicating with other healthcare professionals and discussing with patient's family/caregivers.      Chief Complaint:  Presyncopal episode      History of Present Illness:    Cruz Patricio Jr. is a 79 y.o. male who presents from home after sustaining a presyncopal episode earlier this afternoon.  Patient states he was standing on his front porch while getting his fingernails cut by his wife, when he started to feel lightheaded and was about to fall backwards.  Fortunately his wife caught him and gently lowered him to the ground, at which point he pushed himself back up to his feet \"within seconds\", per his recollection.  Denies loss of consciousness or actual head strike.  Denies any prior " feelings of anxiety, palpitations, chest pain, shortness of breath, nausea/vomiting, or other constitutional symptoms before or after the event.  Has any prior such episodes in the past.  Of note, he states that over the last 3 weeks he has been increasingly weak and fatigued with mild self-limited episodes of lightheadedness after initiating Orgovyx for prostate cancer.  At the time of my encounter, he denies any new or persisting complaints, and overall remains in pleasant and hopeful spirits.      Review of Systems:    Review of Systems - A thorough 12 point review systems was conducted.  Pertinent positives and negatives are mentioned in the history of present illness.      Past Medical and Surgical History:     Past Medical History:   Diagnosis Date    A-fib (HCC)     Acute MI (HCC) 06/2002    Arthritis     Atypical chest pain 03/05/2008    Basal cell carcinoma 2009    Coronary artery disease     Diverticulosis 2008    Hematuria, microscopic     History of varicose veins 2008    Hyperlipidemia     Hypertension     Myocardial infarction (HCC)     Nocturia     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     Prostate cancer (HCC) 3/22/2024    Urinary tract infection     Wears glasses        Past Surgical History:   Procedure Laterality Date    COLONOSCOPY      CORONARY ANGIOPLASTY      right CA x 3 vessels: redo PTCA-LAD 10/04    CORONARY STENT PLACEMENT  01/01/2002    JOINT REPLACEMENT      R hip    HI BX PROSTATE STRTCTC SATURATION SAMPLING IMG GID N/A 3/22/2024    Procedure: TRANSPERINEAL MRI FUSION BIOPSY PROSTATE;  Surgeon: Gabino Lopez MD;  Location: [facility];  Service: Urology    HI COLONOSCOPY FLX DX W/COLLJ SPEC WHEN PFRMD N/A 06/27/2017    Procedure: COLONOSCOPY;  Surgeon: Scott Last MD;  Location: [facility];  Service: Colorectal    TOTAL HIP ARTHROPLASTY Right     TOTAL HIP ARTHROPLASTY Left 04/30/2019    WISDOM TOOTH EXTRACTION           Medications & Allergies:    Prior to  Admission medications    Medication Sig Start Date End Date Taking? Authorizing Provider   amLODIPine (NORVASC) 10 mg tablet TAKE 1 TABLET BY MOUTH EVERY DAY 2/21/24  Yes Tino Douglas MD   ascorbic acid (VITAMIN C) 1000 MG tablet Take 1,000 mg by mouth daily   Yes Historical Provider, MD   aspirin (ECOTRIN LOW STRENGTH) 81 mg EC tablet Take 81 mg by mouth daily Twice a week   Yes Historical Provider, MD   atenolol (TENORMIN) 100 mg tablet TAKE 1 TABLET BY MOUTH EVERY DAY 11/27/23  Yes Tino Douglas MD   B Complex Vitamins (B COMPLEX 100 PO) Take by mouth   Yes Historical Provider, MD   benazepril (LOTENSIN) 20 mg tablet TAKE 1 TABLET BY MOUTH EVERY DAY 2/21/24  Yes Tino Douglas MD   calcium carbonate (OS-LUCA) 600 MG tablet Take 600 mg by mouth daily   Yes Historical Provider, MD   calcium carbonate-vitamin D 500 mg-5 mcg per tablet Take 1 tablet by mouth daily 9/23/22  Yes Historical Provider, MD   Coenzyme Q10 (CoQ-10) 200 MG CAPS  1/1/16  Yes Historical Provider, MD   magnesium oxide (MAG-OX) 400 mg tablet Take 400 mg by mouth daily   Yes Historical Provider, MD   multivitamin (THERAGRAN) TABS Take 1 tablet by mouth 12/12/12  Yes Historical Provider, MD   niacin (NIASPAN) 500 mg CR tablet Take 1 tablet (500 mg total) by mouth daily at bedtime 10/26/21  Yes Tino Douglas MD   Omega-3 Fatty Acids (FISH OIL PO) Take by mouth daily    Yes Historical Provider, MD   Relugolix (Orgovyx) 120 MG TABS Take 360 mg by mouth daily For 1 dose, followed by 120 mg daily thereafter 5/10/24  Yes Paty Carrasquillo MD   Relugolix (Orgovyx) 120 MG TABS Take 120 mg by mouth daily 5/10/24  Yes Paty Carrasquillo MD   rivaroxaban (Xarelto) 20 mg tablet Take 1 tablet (20 mg total) by mouth daily with breakfast 9/15/22 6/6/24 Yes Fan Mcgowan DO   simvastatin (ZOCOR) 40 mg tablet TAKE 1 TABLET BY MOUTH EVERYDAY AT BEDTIME 11/27/23  Yes Tino Douglas MD   tamsulosin (FLOMAX) 0.4 mg TAKE 1 CAPSULE BY MOUTH EVERY DAY WITH  DINNER 23  Yes Tino Douglas MD   hydrocortisone 2.5 % cream Apply topically 4 (four) times a day as needed for rash or irritation 24   Shasta Shelton MD   sildenafil (VIAGRA) 50 MG tablet Take 1 tablet (50 mg total) by mouth daily as needed for erectile dysfunction 23   GLYNN Love   apixaban (ELIQUIS) 5 mg Take 1 tablet (5 mg total) by mouth in the morning and 1 tablet (5 mg total) in the evening. 22  Kate Matthews MD         Allergies:   Allergies   Allergen Reactions    No Active Allergies          Social History:    Substance Use History:   Social History     Substance and Sexual Activity   Alcohol Use No     Social History     Tobacco Use   Smoking Status Former    Current packs/day: 0.00    Average packs/day: 0.5 packs/day for 5.0 years (2.5 ttl pk-yrs)    Types: Cigarettes    Start date: 1967    Quit date: 1970    Years since quittin.4   Smokeless Tobacco Never   Tobacco Comments    quit ...0.5 packs/2.00 pack years     Social History     Substance and Sexual Activity   Drug Use No         Family History:    Per medical chart, significant for hypertension in mother, sister, and brother, for diabetes mellitus in mother, for AAA in father, and for an unspecified cancer in brother.      Physical Exam:     Vitals:   Blood Pressure: 108/65 (24 1618)  Pulse: 76 (24 1618)  Temperature: 98.6 °F (37 °C) (24 0944)  Temp Source: Temporal (24)  Respirations: 18 (24 0944)  SpO2: (!) 88 % (24 1618)      GENERAL Mildly weak/fatigued but in no current distress   HEAD   Normocephalic - atraumatic   EYES Nonicteric   MOUTH   Mucosa moist   NECK   Supple - full range of motion   CARDIAC Rate controlled   PULMONARY Fairly clear to auscultation without labored respirations currently   ABDOMEN   Soft - nontender/nondistended - active bowel sounds   MUSCULOSKELETAL   Motor strength/range of motion intact   NEUROLOGIC    Alert/oriented at baseline   SKIN   Chronic wrinkles/blemishes    PSYCHIATRIC   Mood/affect stable         Additional Data:     Labs & Recent Cultures:    Results from last 7 days   Lab Units 06/06/24  1042   WBC Thousand/uL 11.54*   HEMOGLOBIN g/dL 15.1   HEMATOCRIT % 44.8   PLATELETS Thousands/uL 361   SEGS PCT % 86*   LYMPHO PCT % 8*   MONO PCT % 4   EOS PCT % 0     Results from last 7 days   Lab Units 06/06/24  1042   SODIUM mmol/L 124*   POTASSIUM mmol/L 4.4   CHLORIDE mmol/L 92*   CO2 mmol/L 24   BUN mg/dL 10   CREATININE mg/dL 0.76   ANION GAP mmol/L 8   CALCIUM mg/dL 8.6   ALBUMIN g/dL 3.5   TOTAL BILIRUBIN mg/dL 1.14*   ALK PHOS U/L 64   ALT U/L 20   AST U/L 26   GLUCOSE RANDOM mg/dL 116                                 Lines/Drains:  Invasive Devices       Peripheral Intravenous Line  Duration             Peripheral IV 06/06/24 Left Antecubital <1 day                      Imaging:     CTA ED chest PE Study    Result Date: 6/6/2024  Narrative: CTA - CHEST WITH IV CONTRAST - PULMONARY ANGIOGRAM INDICATION: syncope and cancer. COMPARISON: PET/CT, 4/24/2024 TECHNIQUE: CTA examination of the chest was performed using angiographic technique according to a protocol specifically tailored to evaluate for pulmonary embolism. Multiplanar 2D reformatted images were created from the source data. In addition, coronal  3D MIP postprocessing was performed on the acquisition scanner. Radiation dose length product (DLP) for this visit: 508.51 mGy-cm . This examination, like all CT scans performed in the [facility], was performed utilizing techniques to minimize radiation dose exposure, including the use of iterative reconstruction and automated exposure control. IV Contrast: 85 mL of iohexol (OMNIPAQUE) FINDINGS: PULMONARY ARTERIAL TREE:  No pulmonary embolus. LUNGS:  6 mm subpleural nodule, apparently present in 2017 but volume averaging previously and more conspicuous currently. Subpleural 6 mm nodule,  left lower lobe, also previously volume averaged. No masses or infiltrates. 6 mm right upper lobe nodule image 70 series 2, unchanged from prior PET/CT. There is no tracheal or endobronchial lesion. PLEURA: Unremarkable. HEART/GREAT VESSELS: Mild cardiomegaly. Coronary artery calcifications.. No thoracic aortic aneurysm. MEDIASTINUM AND ELIEZER: Redemonstrated mildly prominent right inferior paratracheal lymph nodes, 12 mm transverse, not significantly changed. There is a left pretracheal lymph node, 7 mm transverse, image 46 series 2, previously 4-5 mm. Right retrocrural lymph node, 12 mm transverse, previously 9 mm. CHEST WALL AND LOWER NECK: Unremarkable. VISUALIZED STRUCTURES IN THE UPPER ABDOMEN: Questionable mild gallbladder wall thickening. Multiple hepatic cysts. OSSEOUS STRUCTURES: No acute fracture or destructive osseous lesion.     Impression: No pulmonary embolism or other acute intrathoracic pathology. Bilateral subpleural 6 mm nodules, likely chronic but some appearing more conspicuous. Previously, no PET avid pulmonary nodules but they may be beyond PET resolution. Recommend short-term follow-up in 2-3 months. Mild interval increase in size of some of the mediastinal lymph nodes, as above. This that can also be reassessed at follow-up. Alternatively, PET/CT may be performed. Incidental findings, as per the body of the report. Workstation performed: [id number]       CT head without contrast    Result Date: 6/6/2024  Narrative: CT BRAIN - WITHOUT CONTRAST INDICATION:   syncope. History of prostate cancer. COMPARISON: Head CT 4/24/2024. TECHNIQUE:  CT examination of the brain was performed.  Multiplanar 2D reformatted images were created from the source data. Radiation dose length product (DLP) for this visit:  884.42 mGy-cm .  This examination, like all CT scans performed in the [facility], was performed utilizing techniques to minimize radiation dose exposure, including the use of  iterative  reconstruction and automated exposure control. IMAGE QUALITY:  Diagnostic. FINDINGS: PARENCHYMA: Decreased attenuation is noted in periventricular and subcortical white matter demonstrating an appearance that is statistically most likely to represent mild microangiopathic change. No CT signs of acute infarction.  No intracranial mass, mass effect or midline shift.  No acute parenchymal hemorrhage. VENTRICLES AND EXTRA-AXIAL SPACES:  Normal for the patient's age. VISUALIZED ORBITS: Normal visualized orbits. PARANASAL SINUSES: Normal visualized paranasal sinuses. CALVARIUM AND EXTRACRANIAL SOFT TISSUES:  Normal.     Impression: No acute intracranial abnormality. Mild chronic small vessel ischemic changes. Resident: CAL ARANA I, the attending radiologist, have reviewed the images and agree with the final report above. Workstation performed: [id number]                     DHAVAL ESPINOSA MD   Hospitalist - [facility]          ** Please Note: This note is constructed using a voice recognition dictation system.  An occasional wrong word/phrase or “sound-a-like” substitution may have been picked up by dictation device due to the inherent limitations of voice recognition software.  Read the chart carefully and recognize, using reasonable context, where substitutions may have occurred.**

## 2024-06-06 NOTE — ASSESSMENT & PLAN NOTE
Recently diagnosed a few months ago  Follows outpatient oncology (Dr Shelton) and urology (Dr Burks)   Started on Orgovyx approximately 3 weeks ago around the time of onset of presenting symptoms - currently held as mentioned above  Await oncology input -> patient told an alternate regimen would be Lupron injections, however, currently opposed to this due to his prior cardiac issues

## 2024-06-06 NOTE — ASSESSMENT & PLAN NOTE
Rate controlled on Tenormin (currently held due to borderline orthostasis)  Continue Xarelto for anticoagulation

## 2024-06-06 NOTE — ASSESSMENT & PLAN NOTE
Possibly multifactorial secondary to decreased oral side intake coupled with underlying malignancy  Check urine and serum osmolality - check urine sodium  In light of mild orthostasis, initiated on an IV fluid regimen  If serum sodium paradoxically worsens, should place on fluid restriction  Continue serial serum sodium monitoring

## 2024-06-06 NOTE — ASSESSMENT & PLAN NOTE
Presents with near syncopal episode earlier today while standing upright on his front porch, witnessed without loss of consciousness by wife  No fall or head strike reported  Mild orthostasis noted in the ED -> continue IV fluids and repeat orthostatics tomorrow morning  Check echocardiogram - CT angiogram of chest negative for pulmonary embolism or other acute intrathoracic etiology  CT of head negative for acute intracranial etiology but did reveal chronic microangiopathic changes  Hold Tenormin - only administer Norvasc if holding parameters for BP meds  Will currently discontinue Orgovyx as symptoms of increased weakness/fatigue and progressive dizziness started after initiation of this agent several weeks prior

## 2024-06-06 NOTE — CONSULTS
Consultation Note: Medical Oncology:   Cruz Patricio Jr. 79 y.o. male MRN: [id number]  Unit/Bed#: 2 215-01 Encounter: [id number]    Assessment and Plan:  1. Near-Syncopal Event:  2. 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 Prognostic Stage IIC (pT2, pNX, PSA: 7.0, Grade Group: 3):  4. Bilateral Subpleural Pulmonary Nodules (Measuring 6-Millimeters):  Oncologic History: Patient initially presented to Urologic attention in November 2024, following elevation of Prostate Specific Antigen (PSA: 7.0):  MRI Prostate: Lesion in the posterolateral right peripheral zone at apex (Measuring 1.1-centimeters). No extraprostatic tumor, seminal-vesicle invasion, pelvic lymphadenopathy, or osseous metastatic disease.  Transperineal biopsy of the prostate on March 22nd, 2024:  Pathology from Right Posterior Lateral Prostate: Prostatic adenocarcinoma, Grade Group 3 (Moss Score: 4+3=7) involving one of two cores (70% of the involved core and 30% of the involved tissue). Perineural invasion absent. Note: Foci of cribriform glands were present (This feature is associated with adverse clinical outcomes).  PET-CT: PSMA-avid lesion in the right peripheral prostate. No avid pelvic-adenopathy, or distant metastatic disease.  Management: Patient has established with Medical and Radiation Oncology:  Neoadjuvant Relugolix (Orgovyx) 120 mg Daily:   Initiated on May 7th, 2024 (Plan for 6-months of therapy).  Anticipating hypofractionated external beam radiation, thereafter.  Present Hospitalization: Patient presented to [facility] on June 6th, 2024, following a near-syncopal episode:  CT Head: No acute intracranial abnormality.  CTA Chest: No pulmonary embolism, or acute intrathoracic pathology. Bilateral subpleural 6-millimeter nodules (Likely chronic, but some appearing more conspicuous). Mild interval increase in size of  mediastinal lymph nodes (1.2-centimeters).    Plan:  Patient is a 79-year-old male, with an established history of unfavorable intermediate-risk castrate-naive prostate cancer; who presented to [facility] on 2024, following a near-syncopal episode. Orthostatic vital-signs suggest postural-hypotension without true orthostasis (Supine: 116/67 mmHg Seated: 111/70 mmHg Standin/68 mmHg). Laboratory-studies demonstrate moderate hyponatremia (Sodium: 124), without pre-renal azotemia, or acute kidney injury (Creatinine: 0.76 eGFR: 88 Baseline Creatinine: 0.76 to 0.86). EKG was significant for borderline prolongation of QTc (QTc: 452 ms). CTA Chest was unremarkable for acute pulmonary embolism. Echocardiogram was ordered and is pending at the present time. Differential of near-syncope may include: Orthostatic hypotension versus vasovagal-episode (e.g. Due to prolonged standing), electrolyte-derangements, such as moderate hypotonic hyponatremia secondary to SIADH (Note: SIADH has been rarely cited in association with prostate cancer, although this is unlikely in this patient with localized disease. SIADH is not a documented adverse-effect of Relugolix, although this is certainly a possibility. Please note that patient has chronic hyponatremia, dating back to at least 2020), and cardiac-dysrhythmia (e.g. Cardiac arrhythmia, including atrioventricular-block, is cited in less than 1-percent of patients taking Relugolix, and Relugolix is associated with QTc-prolongation). In addition, highly-suspect that patient's fatigue is secondary to Relugolix (Fatigue, including asthenia, has an incidence of approximately 26-percent of patients taking Relugolix). Given the above-mentioned, agree with holding Relugolix at this time, as well as on hospital discharge. Will coordinate for close interval follow-up with Medical Oncology, when medically-stable for discharge. Patient  "expressed understanding and agreement with the above-mentioned plan.   1. Continue to hold Relugolix, while inpatient and on hospital discharge.   2. Will coordinate for close interval follow-up with Medical Oncology.  Note: Patient does not have scheduled follow-up with Medical Oncology (Shasta Shelton M.D.) until August 12th, 2024. He will need sooner follow-up within the next 2-3-weeks. Will coordinate for sooner follow-up accordingly. Please notify Medical Oncology when patient is approaching discharge.    Subjective:  Consult Reason: Medication side-effect.  History of Present Illness: Cruz \"Gene\" CROW Patricio Jr. 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 Historical information was obtained from patient and prior documentation.    Of particular importance, over the last several weeks, patient describes increasing fatigue since initiating Relugolix (e.g. Initiated Relugolix on May 7th, 2024). Simultaneously, he states that he continues to tolerate activities of daily living appropriately, and independently. In fact, patient remains highly-active at baseline, and states that he continues to perform house-chores, and yard-work (e.g. Mowing the lawn), despite the above-mentioned. He characterizes a general \"off\" feeling since initiating treatment. Otherwise, he continues to tolerate oral-intake appropriately, including oral-hydration. Weight has remained stable around the 453-711-grjks range. Patient has not experienced gastrointestinal losses, including nausea, vomiting, or diarrhea. He only reports development of an erythematous, and pruritic rash involving his " proximal and distal right lower extremity and abdomen, following initiation of Relugolix. He was treated with an oral-corticosteroid, as well as a topical steroid, with alleviation of the above. On the day of admission, patient was reportedly standing on his patio, having his nails trimmed by his spouse. While standing, patient's spouse witnessed him become unstable, and appear to begin to lose-consciousness. Given the above-mentioned, she lowered him to the ground. Patient did not sustain traumatic-injury or head-strike. He did not lose consciousness. He quickly stood up, and reportedly returned to baseline. Ultimately, this prompted presentation to [facility] on June 6th, 2024.    Note: Please see Oncologic history, above, for details regarding prostate cancer.    Review of Systems:  All systems reviewed and negative except otherwise listed in the History of Present Illness.    Oncologic History:   Prostate cancer (HCC)   3/22/2024 Initial Diagnosis     Prostate cancer (HCC)      3/22/2024 Biopsy     A. Prostate, left trans. zone:  - Benign prostate glands and stroma with focal acute inflammation.     B. Prostate, right trans. zone:  - Benign prostate glands and stroma with acute and chronic inflammation.      C. Prostate, left post. base:  - Benign prostate glands and stroma.      D. Prostate, right post. base:  - Benign prostate glands and stroma.      E. Prostate, RIGHT POSTERIOR MEDIAL:  - Benign prostate glands and stroma.      F. Prostate, LEFT POSTERIOR MEDIAL:  - Benign prostate glands and stroma.      G. Prostate, LEFT POSTERIOR LATERAL:  - Benign prostate glands and stroma.      H. Prostate, RIGHT ANTERIOR MEDIAL:  - Benign prostate glands and stroma.      I. Prostate, RIGHT ANTERIOR LATERAL:  - Atypical small acinar proliferation (ASAP), suspicious but not diagnostic for malignancy.     J. Prostate, RIGHT POSTERIOR LATERAL:  - Prostatic adenocarcinoma, Portia grade  4+3 = 7 (70% pattern 4 and 30% pattern 3, Grade group 3), involving one of two cores, 70% of the involved core  and 30% of the involved tissue.  - Perineural invasion is absent.     Comment: Foci of cribriform glands are present with the pattern 4 component.  This feature has been associated with adverse clinical outcomes and molecular features typically seen in advanced disease.  (The 2019 Genitourinary Pathology Society (GUPS) White Paper on Contemporary Grading of Prostate Cancer. Arch Pathol Lab Med. 2021 Apr 1;145(4):461-493.)     Immunohistochemistry for prostate triple stain multiplex (,P63,P504s) was performed on blocks J2, supporting the above diagnosis.     Intradepartmental consultation is in agreement.     K. Prostate, LEFT ANTERIOR MEDIAL:  - Benign prostate glands and stroma.       L. Prostate, LEFT ANTERIOR LATERAL:  - Benign prostate stroma.       M. Prostate, ZARIA right post. lat. apex:  - Benign prostate glands and stroma.           4/26/2024 -  Cancer Staged     Staging form: Prostate, AJCC 8th Edition  - Clinical stage from 4/26/2024: Stage IIC (cT1c, cN0, cM0, PSA: 7, Grade Group: 3) - Signed by Dion Gibbs MD on 4/26/2024  Prostate specific antigen (PSA) range: Less than 10  Santa score: 7  Histologic grading system: 5 grade system        Historical Information:  Past Medical History:   Diagnosis Date    A-fib (HCC)     Acute MI (HCC) 06/2002    Arthritis     Atypical chest pain 03/05/2008    Basal cell carcinoma 2009    Coronary artery disease     Diverticulosis 2008    Hematuria, microscopic     History of varicose veins 2008    Hyperlipidemia     Hypertension     Myocardial infarction (HCC)     Nocturia     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     Prostate cancer (HCC) 3/22/2024    Urinary tract infection     Wears glasses      Past Surgical History:   Procedure Laterality Date    COLONOSCOPY      CORONARY ANGIOPLASTY      right CA x 3 vessels: redo PTCA-LAD 10/04     CORONARY STENT PLACEMENT  2002    JOINT REPLACEMENT      R hip    IA BX PROSTATE STRTCTC SATURATION SAMPLING IMG GID N/A 3/22/2024    Procedure: TRANSPERINEAL MRI FUSION BIOPSY PROSTATE;  Surgeon: Gabino Lopez MD;  Location: [facility];  Service: Urology    IA COLONOSCOPY FLX DX W/COLLJ SPEC WHEN PFRMD N/A 2017    Procedure: COLONOSCOPY;  Surgeon: Scott Last MD;  Location: [facility];  Service: Colorectal    TOTAL HIP ARTHROPLASTY Right     TOTAL HIP ARTHROPLASTY Left 2019    WISDOM TOOTH EXTRACTION       Social History:  Social History     Substance and Sexual Activity   Alcohol Use No     Social History     Substance and Sexual Activity   Drug Use No     Social History     Tobacco Use   Smoking Status Former    Current packs/day: 0.00    Average packs/day: 0.5 packs/day for 5.0 years (2.5 ttl pk-yrs)    Types: Cigarettes    Start date: 1967    Quit date: 1970    Years since quittin.4   Smokeless Tobacco Never   Tobacco Comments    quit ...0.5 packs/2.00 pack years     E-Cigarette/Vaping    E-Cigarette Use Never User      E-Cigarette/Vaping Substances    Nicotine No     THC No     CBD No     Flavoring No     Other No     Unknown No      Family History: Non-Contributory.    Medications and Allergies: All Medications Reviewed.    Objective:  Vitals:    24 1438   BP: 117/71   Pulse: 85   Resp:    Temp:    SpO2:      Physical Exam:  General: Alert, and oriented; Pleasant, and conversational; Well-Appearing Male;  Non-Toxic Appearing; In No Apparent Distress  HEENT: Atraumatic, and normocephalic; PERRLA; EOMI; Moist mucosal membranes  Neck: Trachea midline; No neck masses, thyromegaly, or cervical lymphadenopathy present  Cardiovascular: Tachycardic (Regular); No murmurs, rubs, or gallops appreciated; No edema  Respiratory: Clear to auscultation bilaterally; Adequate aeration; No supplemental oxygen   Abdomen: Soft, Non-tender; Non-distended; No organomegaly; Bowel  sounds present  Extremities: No obvious deformities; No peripheral edema; Moves all; Proximal Right Lower Extremity with Mild Erythema (Reportedly Pruritic)  Neurologic: Appropriately alert, and oriented to Person, Place, and Time; No focal neurologic deficits; Ambulating Appropriately Throughout the Room Without Gait-Instability or Ambulatory Dysfunction    Lab Results: I have reviewed all pertinent labs.  Imaging: I have personally reviewed pertinent reports.    EKG, Pathology, and Other Studies: I have personally reviewed pertinent reports.      Patient was seen and discussed with attending physician, Kwabena Will M.D.    Loree Zavala D.O.  Hematology-Oncology Fellow (PGY-4)

## 2024-06-06 NOTE — ED PROCEDURE NOTE
PROCEDURE  ECG 12 Lead Documentation Only    Date/Time: 6/6/2024 10:54 AM    Performed by: Mana Nicholson DO  Authorized by: Mana Nicholson DO    Indications / Diagnosis:  Syncope  ECG reviewed by me, the ED Provider: yes    Patient location:  ED  Previous ECG:     Previous ECG:  Compared to current    Comparison ECG info:  March 8, 2024    Similarity:  Changes noted  Interpretation:     Interpretation: non-specific    Rate:     ECG rate assessment: normal    Rhythm:     Rhythm: sinus rhythm    Ectopy:     Ectopy: none    QRS:     QRS axis:  Normal  Conduction:     Conduction: normal    ST segments:     ST segments:  Normal  T waves:     T waves: normal    Comments:      Incomplete right bundle branch block       Mana Nicholson DO  06/06/24 1056       Mana Nicholson DO  06/06/24 1057

## 2024-06-06 NOTE — ASSESSMENT & PLAN NOTE
Continue Norvasc (w/ holding parameters for hypotension)  Hold beta-blocker (Tenormin) for now due to presyncopal episode with borderline orthostasis

## 2024-06-07 ENCOUNTER — APPOINTMENT (OUTPATIENT)
Dept: NON INVASIVE DIAGNOSTICS | Facility: HOSPITAL | Age: 79
DRG: 312 | End: 2024-06-07
Payer: MEDICARE

## 2024-06-07 LAB
ALBUMIN SERPL BCP-MCNC: 3.3 G/DL (ref 3.5–5)
ALP SERPL-CCNC: 60 U/L (ref 34–104)
ALT SERPL W P-5'-P-CCNC: 20 U/L (ref 7–52)
ANION GAP SERPL CALCULATED.3IONS-SCNC: 9 MMOL/L (ref 4–13)
AORTIC ROOT: 3.2 CM
APICAL FOUR CHAMBER EJECTION FRACTION: 58 %
ASCENDING AORTA: 3.4 CM
AST SERPL W P-5'-P-CCNC: 21 U/L (ref 13–39)
AV PEAK GRADIENT: 10 MMHG
BASOPHILS # BLD AUTO: 0.05 THOUSANDS/ÂΜL (ref 0–0.1)
BASOPHILS NFR BLD AUTO: 1 % (ref 0–1)
BILIRUB SERPL-MCNC: 0.73 MG/DL (ref 0.2–1)
BSA FOR ECHO PROCEDURE: 1.97 M2
BUN SERPL-MCNC: 7 MG/DL (ref 5–25)
CALCIUM ALBUM COR SERPL-MCNC: 8.9 MG/DL (ref 8.3–10.1)
CALCIUM SERPL-MCNC: 8.3 MG/DL (ref 8.4–10.2)
CHLORIDE SERPL-SCNC: 96 MMOL/L (ref 96–108)
CO2 SERPL-SCNC: 22 MMOL/L (ref 21–32)
CREAT SERPL-MCNC: 0.59 MG/DL (ref 0.6–1.3)
E WAVE DECELERATION TIME: 199 MS
E/A RATIO: 0.64
EOSINOPHIL # BLD AUTO: 0.09 THOUSAND/ÂΜL (ref 0–0.61)
EOSINOPHIL NFR BLD AUTO: 1 % (ref 0–6)
ERYTHROCYTE [DISTWIDTH] IN BLOOD BY AUTOMATED COUNT: 13.2 % (ref 11.6–15.1)
FRACTIONAL SHORTENING: 31 (ref 28–44)
GFR SERPL CREATININE-BSD FRML MDRD: 97 ML/MIN/1.73SQ M
GLUCOSE SERPL-MCNC: 202 MG/DL (ref 65–140)
HCT VFR BLD AUTO: 41 % (ref 36.5–49.3)
HGB BLD-MCNC: 13.9 G/DL (ref 12–17)
IMM GRANULOCYTES # BLD AUTO: 0.07 THOUSAND/UL (ref 0–0.2)
IMM GRANULOCYTES NFR BLD AUTO: 1 % (ref 0–2)
INTERVENTRICULAR SEPTUM IN DIASTOLE (PARASTERNAL SHORT AXIS VIEW): 1.1 CM
INTERVENTRICULAR SEPTUM: 1.1 CM (ref 0.6–1.1)
LAAS-AP2: 16.7 CM2
LAAS-AP4: 17.6 CM2
LEFT ATRIUM SIZE: 3.8 CM
LEFT ATRIUM VOLUME (MOD BIPLANE): 50 ML
LEFT ATRIUM VOLUME INDEX (MOD BIPLANE): 25.4 ML/M2
LEFT INTERNAL DIMENSION IN SYSTOLE: 3.1 CM (ref 2.1–4)
LEFT VENTRICULAR INTERNAL DIMENSION IN DIASTOLE: 4.5 CM (ref 3.5–6)
LEFT VENTRICULAR POSTERIOR WALL IN END DIASTOLE: 0.9 CM
LEFT VENTRICULAR STROKE VOLUME: 54 ML
LVSV (TEICH): 54 ML
LYMPHOCYTES # BLD AUTO: 0.75 THOUSANDS/ÂΜL (ref 0.6–4.47)
LYMPHOCYTES NFR BLD AUTO: 8 % (ref 14–44)
MCH RBC QN AUTO: 29.6 PG (ref 26.8–34.3)
MCHC RBC AUTO-ENTMCNC: 33.9 G/DL (ref 31.4–37.4)
MCV RBC AUTO: 87 FL (ref 82–98)
MONOCYTES # BLD AUTO: 0.53 THOUSAND/ÂΜL (ref 0.17–1.22)
MONOCYTES NFR BLD AUTO: 6 % (ref 4–12)
MV E'TISSUE VEL-SEP: 7 CM/S
MV PEAK A VEL: 0.92 M/S
MV PEAK E VEL: 59 CM/S
MV STENOSIS PRESSURE HALF TIME: 58 MS
MV VALVE AREA P 1/2 METHOD: 3.79
NEUTROPHILS # BLD AUTO: 8.06 THOUSANDS/ÂΜL (ref 1.85–7.62)
NEUTS SEG NFR BLD AUTO: 83 % (ref 43–75)
NRBC BLD AUTO-RTO: 0 /100 WBCS
OSMOLALITY UR: 441 MMOL/KG
PLATELET # BLD AUTO: 260 THOUSANDS/UL (ref 149–390)
PMV BLD AUTO: 8.6 FL (ref 8.9–12.7)
POTASSIUM SERPL-SCNC: 3.6 MMOL/L (ref 3.5–5.3)
PROT SERPL-MCNC: 6.7 G/DL (ref 6.4–8.4)
RA PRESSURE ESTIMATED: 5 MMHG
RBC # BLD AUTO: 4.7 MILLION/UL (ref 3.88–5.62)
RIGHT ATRIUM AREA SYSTOLE A4C: 13.4 CM2
RIGHT VENTRICLE ID DIMENSION: 2.9 CM
SL CV LEFT ATRIUM LENGTH A2C: 4.7 CM
SL CV LV EF: 65
SL CV PED ECHO LEFT VENTRICLE DIASTOLIC VOLUME (MOD BIPLANE) 2D: 94 ML
SL CV PED ECHO LEFT VENTRICLE SYSTOLIC VOLUME (MOD BIPLANE) 2D: 39 ML
SODIUM 24H UR-SCNC: 142 MOL/L
SODIUM SERPL-SCNC: 127 MMOL/L (ref 135–147)
TRICUSPID ANNULAR PLANE SYSTOLIC EXCURSION: 2.5 CM
WBC # BLD AUTO: 9.55 THOUSAND/UL (ref 4.31–10.16)

## 2024-06-07 PROCEDURE — 83935 ASSAY OF URINE OSMOLALITY: CPT | Performed by: INTERNAL MEDICINE

## 2024-06-07 PROCEDURE — 80053 COMPREHEN METABOLIC PANEL: CPT | Performed by: FAMILY MEDICINE

## 2024-06-07 PROCEDURE — 99232 SBSQ HOSP IP/OBS MODERATE 35: CPT | Performed by: FAMILY MEDICINE

## 2024-06-07 PROCEDURE — 93306 TTE W/DOPPLER COMPLETE: CPT | Performed by: INTERNAL MEDICINE

## 2024-06-07 PROCEDURE — 84300 ASSAY OF URINE SODIUM: CPT | Performed by: INTERNAL MEDICINE

## 2024-06-07 PROCEDURE — 85025 COMPLETE CBC W/AUTO DIFF WBC: CPT | Performed by: INTERNAL MEDICINE

## 2024-06-07 PROCEDURE — 93306 TTE W/DOPPLER COMPLETE: CPT

## 2024-06-07 PROCEDURE — 99223 1ST HOSP IP/OBS HIGH 75: CPT | Performed by: INTERNAL MEDICINE

## 2024-06-07 RX ADMIN — RIVAROXABAN 20 MG: 20 TABLET, FILM COATED ORAL at 08:31

## 2024-06-07 RX ADMIN — MAGNESIUM OXIDE TAB 400 MG (241.3 MG ELEMENTAL MG) 400 MG: 400 (241.3 MG) TAB at 08:31

## 2024-06-07 RX ADMIN — B-COMPLEX W/ C & FOLIC ACID TAB 1 TABLET: TAB at 08:31

## 2024-06-07 RX ADMIN — PRAVASTATIN SODIUM 80 MG: 80 TABLET ORAL at 17:13

## 2024-06-07 RX ADMIN — TAMSULOSIN HYDROCHLORIDE 0.4 MG: 0.4 CAPSULE ORAL at 17:13

## 2024-06-07 RX ADMIN — ASPIRIN 81 MG: 81 TABLET, COATED ORAL at 08:31

## 2024-06-07 RX ADMIN — OMEGA-3 FATTY ACIDS CAP 1000 MG 1000 MG: 1000 CAP at 08:31

## 2024-06-07 NOTE — PROGRESS NOTES
[facility]  Progress Note  Name: Cruz Landa I  MRN: [id number]  Unit/Bed#: CW2 215-01 I Date of Admission: 6/6/2024   Date of Service: 6/7/2024 I Hospital Day: 0    Assessment & Plan   * Postural dizziness with presyncope  Assessment & Plan  Presents with near syncopal episode earlier today while standing upright on his front porch, witnessed without loss of consciousness by wife  No fall or head strike reported  Mild orthostasis noted in the ED -> continue IV fluids and repeat orthostatics tomorrow morning  Check echocardiogram  CT angiogram of chest negative for pulmonary embolism or other acute intrathoracic etiology  CT of head negative for acute intracranial etiology but did reveal chronic microangiopathic changes  Will currently discontinue Orgovyx as symptoms of increased weakness/fatigue and progressive dizziness started after initiation of this agent several weeks prior  Repeat orthostatic blood pressures normalized    Leukocytosis  Assessment & Plan  Likely reactive due to acute medical issue(s)  Monitor WBC count    Hyponatremia  Assessment & Plan  CMP stat this morning      Atrial fibrillation (HCC)  Assessment & Plan  Rate controlled on Tenormin   Continue Xarelto for anticoagulation    Hyperlipidemia  Assessment & Plan  PT takes at home statin/fish oil - also on niacin (w/ ASA)  Hold Niacin    Essential hypertension  Assessment & Plan  Continue Norvasc (w/ holding parameters for hypotension), and Tenormin  Recheck orthostatics               VTE Pharmacologic Prophylaxis: VTE Score: 3 Moderate Risk (Score 3-4) - Pharmacological DVT Prophylaxis Ordered: rivaroxaban (Xarelto).    Mobility:   Basic Mobility Inpatient Raw Score: 24  JH-HLM Goal: 8: Walk 250 feet or more  JH-HLM Achieved: 7: Walk 25 feet or more  JH-HLM Goal NOT achieved. Continue with multidisciplinary rounding and encourage appropriate mobility to improve upon JH-HLM goals.    Patient Centered  Rounds: I performed bedside rounds with nursing staff today.   Discussions with Specialists or Other Care Team Provider: None    Education and Discussions with Family / Patient: Updated  (wife) at bedside.    Total Time Spent on Date of Encounter in care of patient: 45 mins. This time was spent on one or more of the following: performing physical exam; counseling and coordination of care; obtaining or reviewing history; documenting in the medical record; reviewing/ordering tests, medications or procedures; communicating with other healthcare professionals and discussing with patient's family/caregivers.    Current Length of Stay: 0 day(s)  Current Patient Status: Observation   Certification Statement: The patient will continue to require additional inpatient hospital stay due to monitoring for orthostasis/dizziness  Discharge Plan: Anticipate discharge tomorrow to home.    Code Status: Level 1 - Full Code    Subjective:   Is a very pleasant 79-year-old gentleman who was seen evaluated today at bedside.  Patient denies any acute complaints at this time.    Objective:     Vitals:   Temp (24hrs), Av.3 °F (36.8 °C), Min:98 °F (36.7 °C), Max:98.8 °F (37.1 °C)    Temp:  [98 °F (36.7 °C)-98.8 °F (37.1 °C)] 98 °F (36.7 °C)  HR:  [76-92] 92  Resp:  [16] 16  BP: (103-118)/(64-71) 117/71  SpO2:  [88 %-98 %] 95 %  There is no height or weight on file to calculate BMI.     Input and Output Summary (last 24 hours):     Intake/Output Summary (Last 24 hours) at 2024 1242  Last data filed at 2024 0732  Gross per 24 hour   Intake 940 ml   Output 1850 ml   Net -910 ml       Physical Exam:   Physical Exam  Vitals reviewed.   HENT:      Head: Normocephalic.      Nose: No congestion.      Mouth/Throat:      Pharynx: No oropharyngeal exudate or posterior oropharyngeal erythema.   Eyes:      Conjunctiva/sclera: Conjunctivae normal.   Cardiovascular:      Rate and Rhythm: Normal rate and regular rhythm.      Heart  sounds: No murmur heard.  Pulmonary:      Effort: Pulmonary effort is normal.   Abdominal:      General: Abdomen is flat.      Palpations: Abdomen is soft.   Skin:     General: Skin is warm and dry.   Neurological:      Mental Status: He is alert and oriented to person, place, and time. Mental status is at baseline.          Additional Data:     Labs:  Results from last 7 days   Lab Units 06/07/24  0439   WBC Thousand/uL 9.55   HEMOGLOBIN g/dL 13.9   HEMATOCRIT % 41.0   PLATELETS Thousands/uL 260   SEGS PCT % 83*   LYMPHO PCT % 8*   MONO PCT % 6   EOS PCT % 1     Results from last 7 days   Lab Units 06/07/24  0918   SODIUM mmol/L 127*   POTASSIUM mmol/L 3.6   CHLORIDE mmol/L 96   CO2 mmol/L 22   BUN mg/dL 7   CREATININE mg/dL 0.59*   ANION GAP mmol/L 9   CALCIUM mg/dL 8.3*   ALBUMIN g/dL 3.3*   TOTAL BILIRUBIN mg/dL 0.73   ALK PHOS U/L 60   ALT U/L 20   AST U/L 21   GLUCOSE RANDOM mg/dL 202*                       Lines/Drains:  Invasive Devices       Peripheral Intravenous Line  Duration             Peripheral IV 06/06/24 Left Antecubital 1 day                          Imaging: Reviewed radiology reports from this admission including: chest CT scan and CT head    Recent Cultures (last 7 days):         Last 24 Hours Medication List:   Current Facility-Administered Medications   Medication Dose Route Frequency Provider Last Rate    acetaminophen  650 mg Oral Q6H PRN Indigo Clancy MD      amLODIPine  10 mg Oral Daily Indigo Clancy MD      aspirin  81 mg Oral Daily Indigo Clancy MD      atenolol  100 mg Oral Daily Indigo Clancy MD      calcium carbonate  750 mg Oral Daily Indigo Clancy MD      fish oil  1,000 mg Oral Daily Indigo Clancy MD      hydrocortisone   Topical 4x Daily PRN Indigo Clancy MD      magnesium Oxide  400 mg Oral Daily Indigo Clancy MD      multi-electrolyte  100 mL/hr Intravenous Continuous Indigo Clancy  mL/hr (06/06/24 1620)    multivitamin stress formula  1 tablet Oral Daily Indigo Clancy MD       ondansetron  4 mg Intravenous Q4H PRN Indigo Clancy MD      pravastatin  80 mg Oral Daily With Dinner Indigo Clancy MD      rivaroxaban  20 mg Oral Daily With Breakfast Indgio Clancy MD      tamsulosin  0.4 mg Oral Daily With Dinner Indigo Clancy MD          Today, Patient Was Seen By: Deep Dunham MD    **Please Note: This note may have been constructed using a voice recognition system.**

## 2024-06-07 NOTE — ASSESSMENT & PLAN NOTE
Presents with near syncopal episode earlier today while standing upright on his front porch, witnessed without loss of consciousness by wife  No fall or head strike reported  Mild orthostasis noted in the ED -> continue IV fluids and repeat orthostatics tomorrow morning  Check echocardiogram  CT angiogram of chest negative for pulmonary embolism or other acute intrathoracic etiology  CT of head negative for acute intracranial etiology but did reveal chronic microangiopathic changes  Will currently discontinue Orgovyx as symptoms of increased weakness/fatigue and progressive dizziness started after initiation of this agent several weeks prior  Repeat orthostatic blood pressures normalized

## 2024-06-08 VITALS
HEIGHT: 69 IN | SYSTOLIC BLOOD PRESSURE: 129 MMHG | WEIGHT: 182 LBS | HEART RATE: 98 BPM | OXYGEN SATURATION: 95 % | BODY MASS INDEX: 26.96 KG/M2 | DIASTOLIC BLOOD PRESSURE: 83 MMHG | TEMPERATURE: 97.5 F | RESPIRATION RATE: 17 BRPM

## 2024-06-08 LAB
ALBUMIN SERPL BCP-MCNC: 3.1 G/DL (ref 3.5–5)
ALP SERPL-CCNC: 56 U/L (ref 34–104)
ALT SERPL W P-5'-P-CCNC: 20 U/L (ref 7–52)
ANION GAP SERPL CALCULATED.3IONS-SCNC: 10 MMOL/L (ref 4–13)
ANION GAP SERPL CALCULATED.3IONS-SCNC: 8 MMOL/L (ref 4–13)
ANION GAP SERPL CALCULATED.3IONS-SCNC: 9 MMOL/L (ref 4–13)
AST SERPL W P-5'-P-CCNC: 23 U/L (ref 13–39)
BASOPHILS # BLD AUTO: 0.06 THOUSANDS/ÂΜL (ref 0–0.1)
BASOPHILS NFR BLD AUTO: 1 % (ref 0–1)
BILIRUB SERPL-MCNC: 0.85 MG/DL (ref 0.2–1)
BUN SERPL-MCNC: 5 MG/DL (ref 5–25)
CALCIUM ALBUM COR SERPL-MCNC: 8.8 MG/DL (ref 8.3–10.1)
CALCIUM SERPL-MCNC: 8.1 MG/DL (ref 8.4–10.2)
CALCIUM SERPL-MCNC: 8.1 MG/DL (ref 8.4–10.2)
CALCIUM SERPL-MCNC: 8.3 MG/DL (ref 8.4–10.2)
CHLORIDE SERPL-SCNC: 93 MMOL/L (ref 96–108)
CHLORIDE SERPL-SCNC: 94 MMOL/L (ref 96–108)
CHLORIDE SERPL-SCNC: 95 MMOL/L (ref 96–108)
CO2 SERPL-SCNC: 22 MMOL/L (ref 21–32)
CO2 SERPL-SCNC: 23 MMOL/L (ref 21–32)
CO2 SERPL-SCNC: 25 MMOL/L (ref 21–32)
CORTIS AM PEAK SERPL-MCNC: 16.5 UG/DL (ref 6.7–22.6)
CREAT SERPL-MCNC: 0.44 MG/DL (ref 0.6–1.3)
CREAT SERPL-MCNC: 0.49 MG/DL (ref 0.6–1.3)
CREAT SERPL-MCNC: 0.53 MG/DL (ref 0.6–1.3)
EOSINOPHIL # BLD AUTO: 0.06 THOUSAND/ÂΜL (ref 0–0.61)
EOSINOPHIL NFR BLD AUTO: 1 % (ref 0–6)
ERYTHROCYTE [DISTWIDTH] IN BLOOD BY AUTOMATED COUNT: 13.4 % (ref 11.6–15.1)
GFR SERPL CREATININE-BSD FRML MDRD: 102 ML/MIN/1.73SQ M
GFR SERPL CREATININE-BSD FRML MDRD: 105 ML/MIN/1.73SQ M
GFR SERPL CREATININE-BSD FRML MDRD: 110 ML/MIN/1.73SQ M
GLUCOSE SERPL-MCNC: 106 MG/DL (ref 65–140)
GLUCOSE SERPL-MCNC: 109 MG/DL (ref 65–140)
GLUCOSE SERPL-MCNC: 130 MG/DL (ref 65–140)
HCT VFR BLD AUTO: 44.2 % (ref 36.5–49.3)
HGB BLD-MCNC: 14.8 G/DL (ref 12–17)
IMM GRANULOCYTES # BLD AUTO: 0.05 THOUSAND/UL (ref 0–0.2)
IMM GRANULOCYTES NFR BLD AUTO: 1 % (ref 0–2)
LYMPHOCYTES # BLD AUTO: 0.71 THOUSANDS/ÂΜL (ref 0.6–4.47)
LYMPHOCYTES NFR BLD AUTO: 7 % (ref 14–44)
MAGNESIUM SERPL-MCNC: 1.9 MG/DL (ref 1.9–2.7)
MCH RBC QN AUTO: 29.4 PG (ref 26.8–34.3)
MCHC RBC AUTO-ENTMCNC: 33.5 G/DL (ref 31.4–37.4)
MCV RBC AUTO: 88 FL (ref 82–98)
MONOCYTES # BLD AUTO: 0.49 THOUSAND/ÂΜL (ref 0.17–1.22)
MONOCYTES NFR BLD AUTO: 5 % (ref 4–12)
NEUTROPHILS # BLD AUTO: 8.9 THOUSANDS/ÂΜL (ref 1.85–7.62)
NEUTS SEG NFR BLD AUTO: 85 % (ref 43–75)
NRBC BLD AUTO-RTO: 0 /100 WBCS
PHOSPHATE SERPL-MCNC: 2.6 MG/DL (ref 2.3–4.1)
PLATELET # BLD AUTO: 301 THOUSANDS/UL (ref 149–390)
PMV BLD AUTO: 9.1 FL (ref 8.9–12.7)
POTASSIUM SERPL-SCNC: 3.7 MMOL/L (ref 3.5–5.3)
POTASSIUM SERPL-SCNC: 3.8 MMOL/L (ref 3.5–5.3)
POTASSIUM SERPL-SCNC: 4.1 MMOL/L (ref 3.5–5.3)
PROT SERPL-MCNC: 6.4 G/DL (ref 6.4–8.4)
RBC # BLD AUTO: 5.03 MILLION/UL (ref 3.88–5.62)
SODIUM SERPL-SCNC: 125 MMOL/L (ref 135–147)
SODIUM SERPL-SCNC: 127 MMOL/L (ref 135–147)
SODIUM SERPL-SCNC: 127 MMOL/L (ref 135–147)
WBC # BLD AUTO: 10.27 THOUSAND/UL (ref 4.31–10.16)

## 2024-06-08 PROCEDURE — 83735 ASSAY OF MAGNESIUM: CPT | Performed by: FAMILY MEDICINE

## 2024-06-08 PROCEDURE — 99239 HOSP IP/OBS DSCHRG MGMT >30: CPT | Performed by: FAMILY MEDICINE

## 2024-06-08 PROCEDURE — 85025 COMPLETE CBC W/AUTO DIFF WBC: CPT | Performed by: INTERNAL MEDICINE

## 2024-06-08 PROCEDURE — 84100 ASSAY OF PHOSPHORUS: CPT | Performed by: FAMILY MEDICINE

## 2024-06-08 PROCEDURE — 80048 BASIC METABOLIC PNL TOTAL CA: CPT | Performed by: INTERNAL MEDICINE

## 2024-06-08 PROCEDURE — 80053 COMPREHEN METABOLIC PANEL: CPT | Performed by: FAMILY MEDICINE

## 2024-06-08 PROCEDURE — 82533 TOTAL CORTISOL: CPT | Performed by: INTERNAL MEDICINE

## 2024-06-08 RX ADMIN — CALCIUM CARBONATE (ANTACID) CHEW TAB 500 MG 750 MG: 500 CHEW TAB at 09:08

## 2024-06-08 RX ADMIN — OMEGA-3 FATTY ACIDS CAP 1000 MG 1000 MG: 1000 CAP at 08:08

## 2024-06-08 RX ADMIN — MAGNESIUM OXIDE TAB 400 MG (241.3 MG ELEMENTAL MG) 400 MG: 400 (241.3 MG) TAB at 08:11

## 2024-06-08 RX ADMIN — B-COMPLEX W/ C & FOLIC ACID TAB 1 TABLET: TAB at 08:12

## 2024-06-08 RX ADMIN — RIVAROXABAN 20 MG: 20 TABLET, FILM COATED ORAL at 08:06

## 2024-06-08 RX ADMIN — AMLODIPINE BESYLATE 10 MG: 10 TABLET ORAL at 08:12

## 2024-06-08 RX ADMIN — ATENOLOL 100 MG: 50 TABLET ORAL at 08:08

## 2024-06-08 NOTE — ASSESSMENT & PLAN NOTE
Recently diagnosed a few months ago  Follows outpatient oncology (Dr Shelton) and urology (Dr Burks)   Started on Orgovyx approximately 3 weeks ago around the time of onset of presenting symptoms - currently held as mentioned above  Await oncology input -> patient told an alternate regimen would be Lupron injections, however, currently opposed to this due to his prior cardiac issues  HOLD Relugolix

## 2024-06-08 NOTE — DISCHARGE SUMMARY
[facility]  Discharge- Cruz Patricio Jr. 1945, 79 y.o. male MRN: [id number]  Unit/Bed#: CW2 215-01 Encounter: [id number]  Primary Care Provider: Nellie Mason DO   Date and time admitted to hospital: 6/6/2024  9:50 AM    * Postural dizziness with presyncope  Assessment & Plan  Presents with near syncopal episode earlier today while standing upright on his front porch, witnessed without loss of consciousness by wife  No fall or head strike reported  Mild orthostasis noted in the ED -> continue IV fluids and repeat orthostatics tomorrow morning  Check echocardiogram  CT angiogram of chest negative for pulmonary embolism or other acute intrathoracic etiology  CT of head negative for acute intracranial etiology but did reveal chronic microangiopathic changes  Will currently discontinue Orgovyx as symptoms of increased weakness/fatigue and progressive dizziness started after initiation of this agent several weeks prior  Repeat orthostatic blood pressures normalized    Leukocytosis  Assessment & Plan  Likely reactive due to acute medical issue(s)  Monitor WBC count    Hyponatremia  Assessment & Plan  Stable        Prostate cancer (HCC)  Assessment & Plan  Recently diagnosed a few months ago  Follows outpatient oncology (Dr Shelton) and urology (Dr Burks)   Started on Orgovyx approximately 3 weeks ago around the time of onset of presenting symptoms - currently held as mentioned above  Await oncology input -> patient told an alternate regimen would be Lupron injections, however, currently opposed to this due to his prior cardiac issues  HOLD Relugolix    Atrial fibrillation (HCC)  Assessment & Plan  Rate controlled on Tenormin   Continue Xarelto for anticoagulation    Hyperlipidemia  Assessment & Plan  PT takes at home statin/fish oil - also on niacin (w/ ASA)  May restart Niacin    Essential hypertension  Assessment & Plan  Continue Norvasc (w/ holding parameters for  hypotension), and Tenormin  Recheck orthostatics        Medical Problems       Resolved Problems  Date Reviewed: 6/8/2024   None       Discharging Physician / Practitioner: Deep Dunham MD  PCP: Nellie Mason DO  Admission Date:   Admission Orders (From admission, onward)       Ordered        06/07/24 1455  INPATIENT ADMISSION  Once            06/06/24 1505  Place in Observation  Once                          Discharge Date: 06/08/24    Consultations During Hospital Stay:  None    Procedures Performed:   None    Significant Findings / Test Results:   Positive orthostatic VS  CT head - No acute intracranial abnormality.     Incidental Findings:   CTA chest -No pulmonary embolism or other acute intrathoracic pathology.  Bilateral subpleural 6 mm nodules, likely chronic but some appearing more conspicuous. Previously, no PET avid pulmonary nodules but they may be beyond PET resolution. Recommend short-term follow-up in 2-3 months.  Mild interval increase in size of some of the mediastinal lymph nodes, as above. This that can also be reassessed at follow-up. Alternatively, PET/CT may be performed.  Incidental findings, as per the body of the report.  I reviewed the above mentioned incidental findings with the patient and/or family and they expressed understanding.    Test Results Pending at Discharge (will require follow up):   None     Outpatient Tests Requested:  None    Complications:  None    Reason for Admission: Presyncope    Hospital Course:   Cruz Patricio Jr. is a 79 y.o. male patient who originally presented to the hospital on 6/6/2024 due to presyncope.  Patient was started on Relugolix for his prostate cancer.  Patient has felt symptomatic and dizzy since starting it.  Upon arrival to the emergency department patient had CT head which was unremarkable.  Patient also had a CTA chest which did not identify any pulmonary emboli or acute intrathoracic pathology.  Pleural nodules noted will need follow-up.   "Medication was held repeat prostatic blood pressures done.  And normalized.  Patient is cleared for discharge and will need close follow-up.      Please see above list of diagnoses and related plan for additional information.     Condition at Discharge: stable    Discharge Day Visit / Exam:   Subjective: Is a very pleasant 79-year-old gentleman who was seen evaluated at bedside.  Patient denies any acute complaints at this time.  Vitals: Blood Pressure: 129/83 (06/08/24 0706)  Pulse: 98 (06/08/24 0706)  Temperature: 97.5 °F (36.4 °C) (06/08/24 0706)  Temp Source: Temporal (06/06/24 0944)  Respirations: 17 (06/08/24 0705)  Height: 5' 8.5\" (174 cm) (06/07/24 1438)  Weight - Scale: 82.6 kg (182 lb) (06/07/24 1438)  SpO2: 95 % (06/08/24 0706)  Exam:   Physical Exam  Vitals reviewed.   HENT:      Head: Normocephalic.      Nose: No congestion.      Mouth/Throat:      Pharynx: No oropharyngeal exudate or posterior oropharyngeal erythema.   Eyes:      Conjunctiva/sclera: Conjunctivae normal.   Cardiovascular:      Rate and Rhythm: Normal rate and regular rhythm.      Heart sounds: No murmur heard.  Pulmonary:      Effort: Pulmonary effort is normal.   Abdominal:      General: Abdomen is flat.      Palpations: Abdomen is soft.   Skin:     General: Skin is warm and dry.   Neurological:      Mental Status: He is alert and oriented to person, place, and time. Mental status is at baseline.          Discussion with Family: Updated  (wife) at bedside.    Discharge instructions/Information to patient and family:   See after visit summary for information provided to patient and family.      Provisions for Follow-Up Care:  See after visit summary for information related to follow-up care and any pertinent home health orders.      Mobility at time of Discharge:   Basic Mobility Inpatient Raw Score: 24  JH-HLM Goal: 8: Walk 250 feet or more  JH-HLM Achieved: 7: Walk 25 feet or more  HLM Goal achieved. Continue to " encourage appropriate mobility.     Disposition:   Home    Planned Readmission: None     Discharge Statement:  I spent 45 minutes discharging the patient. This time was spent on the day of discharge. I had direct contact with the patient on the day of discharge. Greater than 50% of the total time was spent examining patient, answering all patient questions, arranging and discussing plan of care with patient as well as directly providing post-discharge instructions.  Additional time then spent on discharge activities.    Discharge Medications:  See after visit summary for reconciled discharge medications provided to patient and/or family.      **Please Note: This note may have been constructed using a voice recognition system**

## 2024-06-10 ENCOUNTER — TELEPHONE (OUTPATIENT)
Dept: HEMATOLOGY ONCOLOGY | Facility: CLINIC | Age: 79
End: 2024-06-10

## 2024-06-10 ENCOUNTER — TRANSITIONAL CARE MANAGEMENT (OUTPATIENT)
Dept: FAMILY MEDICINE CLINIC | Facility: CLINIC | Age: 79
End: 2024-06-10

## 2024-06-10 NOTE — TELEPHONE ENCOUNTER
"Spoke with pt who tells me he is feeling much better and wants to continue to hold Orgovyx, \"I don't know if I'll ever go back on that\". I let him know we will ask Dr. Shelton when he returns to the office if pt should be seen sooner. Pt thanked me for the call.   "

## 2024-06-11 NOTE — PROGRESS NOTES
Spiritual Care Progress Note    2024  Patient: Cruz Patricio Jr. : 1945  Admission Date & Time: 2024 0950  MRN: [id number] CSN: [id number]      Fr Reyna met with the pt and provided prayers and blessings. No further needs were expressed at this time.    Chaplains still remain available.       24 1400   Clinical Encounter Type   Visited With Patient   Scientology Encounters   Scientology Needs Prayer

## 2024-06-13 ENCOUNTER — DOCUMENTATION (OUTPATIENT)
Dept: HEMATOLOGY ONCOLOGY | Facility: CLINIC | Age: 79
End: 2024-06-13

## 2024-06-13 ENCOUNTER — APPOINTMENT (OUTPATIENT)
Dept: LAB | Facility: CLINIC | Age: 79
End: 2024-06-13
Payer: MEDICARE

## 2024-06-13 ENCOUNTER — OFFICE VISIT (OUTPATIENT)
Dept: FAMILY MEDICINE CLINIC | Facility: CLINIC | Age: 79
End: 2024-06-13
Payer: MEDICARE

## 2024-06-13 VITALS
HEART RATE: 60 BPM | BODY MASS INDEX: 26.51 KG/M2 | SYSTOLIC BLOOD PRESSURE: 108 MMHG | OXYGEN SATURATION: 98 % | HEIGHT: 69 IN | TEMPERATURE: 98.3 F | DIASTOLIC BLOOD PRESSURE: 68 MMHG | WEIGHT: 179 LBS

## 2024-06-13 DIAGNOSIS — I10 BENIGN ESSENTIAL HYPERTENSION: ICD-10-CM

## 2024-06-13 DIAGNOSIS — E87.1 HYPONATREMIA: Primary | ICD-10-CM

## 2024-06-13 PROCEDURE — 99495 TRANSJ CARE MGMT MOD F2F 14D: CPT | Performed by: FAMILY MEDICINE

## 2024-06-13 NOTE — ASSESSMENT & PLAN NOTE
Overcontrolled  - DC amlodipine for now, check BP every day.  - checking CMP for Na+ level after hpyonatremia episodes  - continue atenolol and ACEi

## 2024-06-13 NOTE — ASSESSMENT & PLAN NOTE
125 last on 06/08, not on NaCl tablets.  - stopped amlodipine  - gave 1500 cc FR today  - checking CMP today  - may need repeat next week  - pt had dizziness/syncope? Event causing 2 day hospitalization.

## 2024-06-13 NOTE — PROGRESS NOTES
Patient is scheduled for Radiation SIM on 7/8/24. I will make sure patient follows back up with urology 3-6 months after completion of radiation with PSA prior to the visit.

## 2024-06-13 NOTE — PROGRESS NOTES
Ambulatory Visit  Name: Cruz Patricio Jr.      : 1945      MRN: [id number]  Encounter Provider: Nellie Mason DO  Encounter Date: 2024   Encounter department: [facility]    Assessment & Plan   1. Hyponatremia  Assessment & Plan:  125 last on , not on NaCl tablets.  - stopped amlodipine  - gave 1500 cc FR today  - checking CMP today  - may need repeat next week  - pt had dizziness/syncope? Event causing 2 day hospitalization.  2. Essential hypertension  Assessment & Plan:  Overcontrolled  - DC amlodipine for now, check BP every day.  - checking CMP for Na+ level after hpyonatremia episodes  - continue atenolol and ACEi       History of Present Illness     Hyponatremia - last 125 on , still feeling dizziness and lightheadedness.    Hypotensive symptoms - ongoing since hospitalization. Stopping amlodipine     Pt in contact with Urology and Onc as well.        Review of Systems   Constitutional:  Negative for chills and fever.   HENT:  Negative for ear pain and sore throat.    Eyes:  Negative for pain and visual disturbance.   Respiratory:  Negative for cough and shortness of breath.    Cardiovascular:  Negative for chest pain and palpitations.   Gastrointestinal:  Negative for abdominal pain and vomiting.   Genitourinary:  Negative for dysuria and hematuria.   Musculoskeletal:  Negative for arthralgias and back pain.   Skin:  Negative for color change and rash.   Neurological:  Positive for dizziness and light-headedness. Negative for seizures and syncope.   All other systems reviewed and are negative.    Medical History Reviewed by provider this encounter:  Tobacco  Allergies  Meds  Problems  Med Hx  Surg Hx  Fam Hx       Current Outpatient Medications on File Prior to Visit   Medication Sig Dispense Refill   • ascorbic acid (VITAMIN C) 1000 MG tablet Take 1,000 mg by mouth daily     • aspirin (ECOTRIN LOW STRENGTH) 81 mg EC tablet Take 81 mg by mouth daily Twice a week     •  atenolol (TENORMIN) 100 mg tablet TAKE 1 TABLET BY MOUTH EVERY DAY 90 tablet 3   • B Complex Vitamins (B COMPLEX 100 PO) Take by mouth     • benazepril (LOTENSIN) 20 mg tablet TAKE 1 TABLET BY MOUTH EVERY DAY 90 tablet 1   • calcium carbonate (OS-LUCA) 600 MG tablet Take 600 mg by mouth daily     • calcium carbonate-vitamin D 500 mg-5 mcg per tablet Take 1 tablet by mouth daily     • Coenzyme Q10 (CoQ-10) 200 MG CAPS      • hydrocortisone 2.5 % cream Apply topically 4 (four) times a day as needed for rash or irritation 28 g 1   • magnesium oxide (MAG-OX) 400 mg tablet Take 400 mg by mouth daily     • multivitamin (THERAGRAN) TABS Take 1 tablet by mouth     • niacin (NIASPAN) 500 mg CR tablet Take 1 tablet (500 mg total) by mouth daily at bedtime 90 tablet 3   • Omega-3 Fatty Acids (FISH OIL PO) Take by mouth daily      • rivaroxaban (Xarelto) 20 mg tablet Take 1 tablet (20 mg total) by mouth daily with breakfast 90 tablet 3   • sildenafil (VIAGRA) 50 MG tablet Take 1 tablet (50 mg total) by mouth daily as needed for erectile dysfunction 20 tablet 6   • simvastatin (ZOCOR) 40 mg tablet TAKE 1 TABLET BY MOUTH EVERYDAY AT BEDTIME 90 tablet 3   • tamsulosin (FLOMAX) 0.4 mg TAKE 1 CAPSULE BY MOUTH EVERY DAY WITH DINNER 90 capsule 3   • [DISCONTINUED] amLODIPine (NORVASC) 10 mg tablet TAKE 1 TABLET BY MOUTH EVERY DAY 90 tablet 1   • [DISCONTINUED] apixaban (ELIQUIS) 5 mg Take 1 tablet (5 mg total) by mouth in the morning and 1 tablet (5 mg total) in the evening. 60 tablet 0     No current facility-administered medications on file prior to visit.      Social History     Tobacco Use   • Smoking status: Former     Current packs/day: 0.00     Average packs/day: 0.5 packs/day for 5.0 years (2.5 ttl pk-yrs)     Types: Cigarettes     Start date: 1967     Quit date: 1970     Years since quittin.4   • Smokeless tobacco: Never   • Tobacco comments:     quit ...0.5 packs/2.00 pack years   Vaping Use   • Vaping  "status: Never Used   Substance and Sexual Activity   • Alcohol use: No   • Drug use: No   • Sexual activity: Yes     Partners: Female     Birth control/protection: None     Objective     /68 (BP Location: Left arm, Patient Position: Sitting, Cuff Size: Standard) Comment: Manual blood pressure check  Pulse 60   Temp 98.3 °F (36.8 °C) (Temporal)   Ht 5' 8.5\" (1.74 m)   Wt 81.2 kg (179 lb)   SpO2 98%   BMI 26.82 kg/m²     Physical Exam  Vitals reviewed.   Constitutional:       General: He is not in acute distress.     Appearance: Normal appearance. He is well-developed.   HENT:      Head: Normocephalic and atraumatic.   Eyes:      Conjunctiva/sclera: Conjunctivae normal.   Cardiovascular:      Rate and Rhythm: Normal rate and regular rhythm.      Pulses: Normal pulses.      Heart sounds: Normal heart sounds. No murmur heard.  Pulmonary:      Effort: Pulmonary effort is normal. No respiratory distress.      Breath sounds: Normal breath sounds.   Abdominal:      Palpations: Abdomen is soft.      Tenderness: There is no abdominal tenderness.   Musculoskeletal:         General: No swelling.      Cervical back: Neck supple.   Skin:     General: Skin is warm and dry.      Capillary Refill: Capillary refill takes less than 2 seconds.   Neurological:      Mental Status: He is alert.   Psychiatric:         Mood and Affect: Mood normal.       Administrative Statements   I have spent a total time of 38 minutes on 06/13/24 In caring for this patient including Diagnostic results, Prognosis, Patient and family education, Importance of tx compliance, Risk factor reductions, Impressions, Counseling / Coordination of care, and Documenting in the medical record.        "

## 2024-06-17 ENCOUNTER — ANESTHESIA EVENT (OUTPATIENT)
Dept: PERIOP | Facility: HOSPITAL | Age: 79
End: 2024-06-17
Payer: MEDICARE

## 2024-06-18 NOTE — PRE-PROCEDURE INSTRUCTIONS
Pre-Surgery Instructions:   Medication Instructions    ascorbic acid (VITAMIN C) 1000 MG tablet Stop taking 7 days prior to surgery.    aspirin (ECOTRIN LOW STRENGTH) 81 mg EC tablet Instructions provided by MD    atenolol (TENORMIN) 100 mg tablet Take day of surgery.    B Complex Vitamins (B COMPLEX 100 PO) Stop taking 7 days prior to surgery.    benazepril (LOTENSIN) 20 mg tablet Hold day of surgery.    calcium carbonate (OS-LUCA) 600 MG tablet Hold day of surgery.    Coenzyme Q10 (CoQ-10) 200 MG CAPS Stop taking 7 days prior to surgery.    magnesium oxide (MAG-OX) 400 mg tablet Stop taking 7 days prior to surgery.    multivitamin (THERAGRAN) TABS Stop taking 7 days prior to surgery.    niacin (NIASPAN) 500 mg CR tablet Take night before surgery    Omega-3 Fatty Acids (FISH OIL PO) Stop taking 7 days prior to surgery.    rivaroxaban (Xarelto) 20 mg tablet Instructions provided by MD    sildenafil (VIAGRA) 50 MG tablet Uses PRN- DO NOT take day of surgery    simvastatin (ZOCOR) 40 mg tablet Take night before surgery    tamsulosin (FLOMAX) 0.4 mg Take day of surgery.   Bowel prep written/verbal instructions provided by surgeon's office.Pt advised to contact surgeon's office with any questions/concerns.    Medication instructions for day surgery reviewed. Please use only a sip of water to take your instructed medications. Avoid all over the counter vitamins, supplements and NSAIDS for one week prior to surgery per anesthesia guidelines. Tylenol is ok to take as needed.     You will receive a call one business day prior to surgery with an arrival time and hospital directions. If your surgery is scheduled on a Monday, the hospital will be calling you on the Friday prior to your surgery. If you have not heard from anyone by 8pm, please call the hospital supervisor through the hospital  at [phone]. ([place] [phone] or [place] [phone]).    Do not eat or drink anything after midnight the night  before your surgery, including candy, mints, lifesavers, or chewing gum. Do not drink alcohol 24hrs before your surgery. Try not to smoke at least 24hrs before your surgery.       Follow the pre surgery showering instructions as listed in the “My Surgical Experience Booklet” or otherwise provided by your surgeon's office. Do not use a blade to shave the surgical area 1 week before surgery. It is okay to use a clean electric clippers up to 24 hours before surgery. Do not apply any lotions, creams, including makeup, cologne, deodorant, or perfumes after showering on the day of your surgery. Do not use dry shampoo, hair spray, hair gel, or any type of hair products.     No contact lenses, eye make-up, or artificial eyelashes. Remove nail polish, including gel polish, and any artificial, gel, or acrylic nails if possible. Remove all jewelry including rings and body piercing jewelry.     Wear causal clothing that is easy to take on and off. Consider your type of surgery.    Keep any valuables, jewelry, piercings at home. Please bring any specially ordered equipment (sling, braces) if indicated.    Arrange for a responsible person to drive you to and from the hospital on the day of your surgery. Please confirm the visitor policy for the day of your procedure when you receive your phone call with an arrival time.     Call the surgeon's office with any new illnesses, exposures, or additional questions prior to surgery.    Please reference your “My Surgical Experience Booklet” for additional information to prepare for your upcoming surgery.

## 2024-06-25 NOTE — H&P
HISTORY AND PHYSICAL  ?  ?  Patient Name: Cruz Patricio Jr.  Patient MRN: [id number]  Attending Provider: Jaret Burks MD  Service: Urology  Chief Complaint    Prostate cancer    HPI   Cruz Patricio Jr. is a 79 y.o. male with prostate cancer.  He has elected radiation therapy.  I plan insertion of fiducial markers and SpaceOAR at the request of radiation oncology.  Potential risks and complications discussed, and informed consent was given by the patient.  Medications  Meds/Allergies   lactated ringers, 125 mL/hr        lidocaine (PF)  Prior to Admission Medications   Prescriptions Last Dose Informant Patient Reported? Taking?   B Complex Vitamins (B COMPLEX 100 PO) Past Week Self, Spouse/Significant Other Yes Yes   Sig: Take by mouth   Coenzyme Q10 (CoQ-10) 200 MG CAPS Past Week  Yes Yes   Omega-3 Fatty Acids (FISH OIL PO) Past Week Self, Spouse/Significant Other Yes Yes   Sig: Take by mouth daily    ascorbic acid (VITAMIN C) 1000 MG tablet Past Week Self, Spouse/Significant Other Yes Yes   Sig: Take 1,000 mg by mouth daily   aspirin (ECOTRIN LOW STRENGTH) 81 mg EC tablet Past Week Self, Spouse/Significant Other Yes Yes   Sig: Take 81 mg by mouth daily Twice a week   atenolol (TENORMIN) 100 mg tablet 6/27/2024 Spouse/Significant Other, Self No Yes   Sig: TAKE 1 TABLET BY MOUTH EVERY DAY   benazepril (LOTENSIN) 20 mg tablet 6/26/2024 Spouse/Significant Other, Self No Yes   Sig: TAKE 1 TABLET BY MOUTH EVERY DAY   calcium carbonate (OS-LUCA) 600 MG tablet Past Week Self, Spouse/Significant Other Yes Yes   Sig: Take 600 mg by mouth daily   hydrocortisone 2.5 % cream Not Taking  No No   Sig: Apply topically 4 (four) times a day as needed for rash or irritation   Patient not taking: Reported on 6/18/2024   magnesium oxide (MAG-OX) 400 mg tablet Past Week Self, Spouse/Significant Other Yes Yes   Sig: Take 400 mg by mouth daily   multivitamin (THERAGRAN) TABS Past Week Self, Spouse/Significant Other Yes Yes   Sig: Take  1 tablet by mouth   niacin (NIASPAN) 500 mg CR tablet 6/26/2024 Self, Spouse/Significant Other No Yes   Sig: Take 1 tablet (500 mg total) by mouth daily at bedtime   rivaroxaban (Xarelto) 20 mg tablet 6/24/2024 Self No Yes   Sig: Take 1 tablet (20 mg total) by mouth daily with breakfast   sildenafil (VIAGRA) 50 MG tablet Unknown Self, Spouse/Significant Other No No   Sig: Take 1 tablet (50 mg total) by mouth daily as needed for erectile dysfunction   simvastatin (ZOCOR) 40 mg tablet 6/26/2024 Spouse/Significant Other, Self No Yes   Sig: TAKE 1 TABLET BY MOUTH EVERYDAY AT BEDTIME   tamsulosin (FLOMAX) 0.4 mg 6/26/2024 Self, Spouse/Significant Other No Yes   Sig: TAKE 1 CAPSULE BY MOUTH EVERY DAY WITH DINNER      Facility-Administered Medications: None       Current Facility-Administered Medications:     ceFAZolin (ANCEF) IVPB (premix in dextrose) 2,000 mg 50 mL, 2,000 mg, Intravenous, Once, Jaret Burks MD    lactated ringers infusion, 125 mL/hr, Intravenous, Continuous, Nain Heart MD    lidocaine (PF) (XYLOCAINE-MPF) 1 % injection 0.5 mL, 0.5 mL, Infiltration, Once PRN, Nain Heart MD  Review of Systems  10 point review of systems negative except as noted in HPI  Allergies  Allergies   Allergen Reactions    No Active Allergies      PMH  Past Medical History:   Diagnosis Date    A-fib (HCC)     Acute MI (HCC) 06/2002    Arthritis     Atypical chest pain 03/05/2008    Basal cell carcinoma 2009    Coronary artery disease     Diverticulosis 2008    Hematuria, microscopic     History of varicose veins 2008    Hyperlipidemia     Hypertension     Myocardial infarction (HCC)     Nocturia     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     Prostate cancer (HCC) 3/22/2024    Urinary tract infection     Wears glasses      Past surgical history  Past Surgical History:   Procedure Laterality Date    COLONOSCOPY      CORONARY ANGIOPLASTY      right CA x 3 vessels: redo PTCA-LAD 10/04    CORONARY STENT  "PLACEMENT  2002    JOINT REPLACEMENT      R hip    RI BX PROSTATE STRTCTC SATURATION SAMPLING IMG GID N/A 3/22/2024    Procedure: TRANSPERINEAL MRI FUSION BIOPSY PROSTATE;  Surgeon: Gabino Lopez MD;  Location: [facility];  Service: Urology    RI COLONOSCOPY FLX DX W/COLLJ SPEC WHEN PFRMD N/A 2017    Procedure: COLONOSCOPY;  Surgeon: Scott Last MD;  Location: [facility];  Service: Colorectal    TOTAL HIP ARTHROPLASTY Right     TOTAL HIP ARTHROPLASTY Left 2019    WISDOM TOOTH EXTRACTION       Social history  Social History     Tobacco Use    Smoking status: Former     Current packs/day: 0.00     Average packs/day: 0.5 packs/day for 5.0 years (2.5 ttl pk-yrs)     Types: Cigarettes     Start date: 1967     Quit date: 1970     Years since quittin.5    Smokeless tobacco: Never    Tobacco comments:     quit ...0.5 packs/2.00 pack years   Vaping Use    Vaping status: Never Used   Substance Use Topics    Alcohol use: No    Drug use: No     ?  Physical Exam      /85 (BP Location: Left arm)   Pulse 61   Temp (!) 96.7 °F (35.9 °C) (Temporal)   Resp 18   Ht 5' 8.5\" (1.74 m)   Wt 78.9 kg (174 lb)   SpO2 97%   BMI 26.07 kg/m²   General appearance: alert and oriented, in no acute distress  Neck: no JVD and supple, symmetrical, trachea midline  Lungs: clear to auscultation bilaterally  Heart: regular rate and rhythm  Abdomen: soft, non-tender; bowel sounds normal; no masses,  no organomegaly  Extremities: extremities normal, warm and well-perfused; no cyanosis, clubbing, or edema  Neurologic: Grossly normal    Jaret Burks MD   "

## 2024-06-27 ENCOUNTER — ANESTHESIA (OUTPATIENT)
Dept: PERIOP | Facility: HOSPITAL | Age: 79
End: 2024-06-27
Payer: MEDICARE

## 2024-06-27 ENCOUNTER — HOSPITAL ENCOUNTER (OUTPATIENT)
Facility: HOSPITAL | Age: 79
Setting detail: OUTPATIENT SURGERY
Discharge: HOME | End: 2024-06-27
Attending: UROLOGY | Admitting: UROLOGY
Payer: MEDICARE

## 2024-06-27 VITALS
HEIGHT: 69 IN | HEART RATE: 58 BPM | OXYGEN SATURATION: 95 % | WEIGHT: 174 LBS | TEMPERATURE: 97.6 F | BODY MASS INDEX: 25.77 KG/M2 | RESPIRATION RATE: 16 BRPM | SYSTOLIC BLOOD PRESSURE: 132 MMHG | DIASTOLIC BLOOD PRESSURE: 75 MMHG

## 2024-06-27 PROCEDURE — NC001 PR NO CHARGE: Performed by: UROLOGY

## 2024-06-27 PROCEDURE — A4648 IMPLANTABLE TISSUE MARKER: HCPCS | Performed by: UROLOGY

## 2024-06-27 PROCEDURE — 55876 PLACE RT DEVICE/MARKER PROS: CPT | Performed by: UROLOGY

## 2024-06-27 PROCEDURE — C1889 IMPLANT/INSERT DEVICE, NOC: HCPCS | Performed by: UROLOGY

## 2024-06-27 PROCEDURE — 55874 TPRNL PLMT BIODEGRDABL MATRL: CPT | Performed by: UROLOGY

## 2024-06-27 DEVICE — STERILE PLACEMENT NEEDLES (17GA ETW X 20CM) WITH BONE WAX AND (1.2 X 3MM) SOFT TISSUE GOLD MARKER [3]
Type: IMPLANTABLE DEVICE | Site: PROSTATE | Status: FUNCTIONAL
Brand: FIDUCIAL MARKER KIT

## 2024-06-27 DEVICE — SPACEOAR SYSTEMS
Type: IMPLANTABLE DEVICE | Site: PROSTATE | Status: FUNCTIONAL
Brand: SPACEOAR VUE™ SYSTEM - 10ML

## 2024-06-27 RX ORDER — SODIUM CHLORIDE, SODIUM LACTATE, POTASSIUM CHLORIDE, CALCIUM CHLORIDE 600; 310; 30; 20 MG/100ML; MG/100ML; MG/100ML; MG/100ML
125 INJECTION, SOLUTION INTRAVENOUS CONTINUOUS
Status: DISCONTINUED | OUTPATIENT
Start: 2024-06-27 | End: 2024-06-27 | Stop reason: HOSPADM

## 2024-06-27 RX ORDER — CEFAZOLIN SODIUM 2 G/50ML
2000 SOLUTION INTRAVENOUS ONCE
Status: COMPLETED | OUTPATIENT
Start: 2024-06-27 | End: 2024-06-27

## 2024-06-27 RX ORDER — FENTANYL CITRATE 50 UG/ML
INJECTION, SOLUTION INTRAMUSCULAR; INTRAVENOUS AS NEEDED
Status: DISCONTINUED | OUTPATIENT
Start: 2024-06-27 | End: 2024-06-27

## 2024-06-27 RX ORDER — PROPOFOL 10 MG/ML
INJECTION, EMULSION INTRAVENOUS CONTINUOUS PRN
Status: DISCONTINUED | OUTPATIENT
Start: 2024-06-27 | End: 2024-06-27

## 2024-06-27 RX ORDER — LIDOCAINE HYDROCHLORIDE 10 MG/ML
INJECTION, SOLUTION EPIDURAL; INFILTRATION; INTRACAUDAL; PERINEURAL AS NEEDED
Status: DISCONTINUED | OUTPATIENT
Start: 2024-06-27 | End: 2024-06-27

## 2024-06-27 RX ORDER — MAGNESIUM HYDROXIDE 1200 MG/15ML
LIQUID ORAL AS NEEDED
Status: DISCONTINUED | OUTPATIENT
Start: 2024-06-27 | End: 2024-06-27 | Stop reason: HOSPADM

## 2024-06-27 RX ORDER — FENTANYL CITRATE/PF 50 MCG/ML
50 SYRINGE (ML) INJECTION
Status: DISCONTINUED | OUTPATIENT
Start: 2024-06-27 | End: 2024-06-27 | Stop reason: HOSPADM

## 2024-06-27 RX ORDER — ACETAMINOPHEN 325 MG/1
975 TABLET ORAL ONCE
Status: DISCONTINUED | OUTPATIENT
Start: 2024-06-27 | End: 2024-06-27 | Stop reason: HOSPADM

## 2024-06-27 RX ORDER — LIDOCAINE HYDROCHLORIDE 10 MG/ML
0.5 INJECTION, SOLUTION EPIDURAL; INFILTRATION; INTRACAUDAL; PERINEURAL ONCE AS NEEDED
Status: DISCONTINUED | OUTPATIENT
Start: 2024-06-27 | End: 2024-06-27 | Stop reason: HOSPADM

## 2024-06-27 RX ORDER — PROPOFOL 10 MG/ML
INJECTION, EMULSION INTRAVENOUS AS NEEDED
Status: DISCONTINUED | OUTPATIENT
Start: 2024-06-27 | End: 2024-06-27

## 2024-06-27 RX ADMIN — CEFAZOLIN SODIUM 2000 MG: 2 SOLUTION INTRAVENOUS at 13:25

## 2024-06-27 RX ADMIN — LIDOCAINE HYDROCHLORIDE 50 MG: 10 INJECTION, SOLUTION EPIDURAL; INFILTRATION; INTRACAUDAL; PERINEURAL at 13:31

## 2024-06-27 RX ADMIN — FENTANYL CITRATE 50 MCG: 50 INJECTION, SOLUTION INTRAMUSCULAR; INTRAVENOUS at 13:34

## 2024-06-27 RX ADMIN — FENTANYL CITRATE 50 MCG: 50 INJECTION, SOLUTION INTRAMUSCULAR; INTRAVENOUS at 13:31

## 2024-06-27 RX ADMIN — PROPOFOL 30 MG: 10 INJECTION, EMULSION INTRAVENOUS at 13:31

## 2024-06-27 RX ADMIN — SODIUM CHLORIDE, SODIUM LACTATE, POTASSIUM CHLORIDE, AND CALCIUM CHLORIDE: .6; .31; .03; .02 INJECTION, SOLUTION INTRAVENOUS at 13:20

## 2024-06-27 RX ADMIN — PROPOFOL 100 MCG/KG/MIN: 10 INJECTION, EMULSION INTRAVENOUS at 13:31

## 2024-06-27 NOTE — OP NOTE
OPERATIVE REPORT  PATIENT NAME: Cruz Patricio Jr.    :  1945  MRN: [id number]  Pt Location:  OR ROOM 10    SURGERY DATE: 2024    Surgeons and Role:     * Jaret Burks MD - Primary    Preop Diagnosis:  Malignant neoplasm of prostate (HCC) [C61]    Post-Op Diagnosis Codes:     * Malignant neoplasm of prostate (HCC) [C61]    Procedure(s):  INSERTION OF FIDUCIAL MARKER . SPACEOAR    Specimen(s):  * No specimens in log *    Estimated Blood Loss:   Minimal    Drains:  * No LDAs found *    Anesthesia Type:   IV Sedation with Anesthesia    Operative Indications:  Malignant neoplasm of prostate (HCC) [C61]      Operative Findings:  Successful placement of fiducial markers and SpaceOar-Julio      Complications:   None    Procedure and Technique:  The patient was brought to the operating room properly identified.  Intravenous sedation was administered.  He was placed in lithotomy position prepped and draped in usual sterile fashion.    Intravenous antibiotic was administered by Anesthesia.  An appropriate time-out was performed.  The patient was prepped and draped.  The scrotum was taped up to the anterior abdominal wall to allow access to the perineum.  Digital rectal examination was then performed.  Transrectal ultrasound probe was then placed into the rectum and the prostate visualized. Local anesthesia was then administered to the perineal skin and subcutaneous tissue.  Under ultrasound guidance gold fiducials were then placed into the prostate in the right base, left base and right apex of the gland.      SpaceOAR - Julio Hydrogel was then prepared as described in the manufacture's instructions for use.  With the patient maintained  in dorsal lithotomy position, the transrectal ultrasound probe was positioned  to enable visual guidance of the needle into the space between the prostate and the rectum.  Under transrectal ultrasound guidance, the 15 cm 18 gauge needle was  inserted through the skin,  subcutaneous tissue and rectal urethralis muscle and the needle tip advanced into the perirectal fat inferior to the prostate all by using a transperineal approach and with side fire transrectal ultrasound guidance.  The needle position was confirmed in both sagittal and axial fields.  Saline was used to dissect the space between Denonvilliers' fascia and the anterior rectal wall.  In this way a space was created with hydro dissection.  With the needle tip at mid gland, the axial field was used to  confirm the needle was not in the rectal wall.  While maintaining  desired position aspiration was done to ensure that the needle was not in a vascular space.  The assembled SpaceOAR  delivery system was then attached to the 18 gauge needle.  Under ultrasound guidance in the sagittal plane, smooth continuous  injection technique was used to dispense the SpaceOAR  Hydrogel into the space between the prostate and rectum.  The entire syringe contents (10 mL) was injected without stopping.  Optimal visualization of the needle during Hydrogel administration was maintained at all times.  No suspected penetration or compromise of the rectal wall occurred.    The patient tolerated procedure well.  The needle was removed.  The patient was taken out of lithotomy position and awakened from anesthesia and taken to the recovery room in satisfactory condition.   I was present for the entire procedure.    Patient Disposition:  PACU         SIGNATURE: Jaret Burks MD  DATE: June 27, 2024  TIME: 1:53 PM

## 2024-06-27 NOTE — ANESTHESIA POSTPROCEDURE EVALUATION
Post-Op Assessment Note    CV Status:  Stable  Pain Score: 0    Pain management: adequate       Mental Status:  Alert   Hydration Status:  Stable   PONV Controlled:  Controlled   Airway Patency:  Patent     Post Op Vitals Reviewed: Yes    No anethesia notable event occurred.    Staff: CRNA               BP 98/69 (06/27/24 1354)    Temp (!) 97.4 °F (36.3 °C) (06/27/24 1354)    Pulse 59 (06/27/24 1354)   Resp 16 (06/27/24 1354)    SpO2 92 % (06/27/24 1354)

## 2024-06-27 NOTE — DISCHARGE INSTR - AVS FIRST PAGE
Rest and drink plenty of fluids. It is normal to have blood in the urine and some rectal bleeding. Blood in the semen will occur when sexually active- this can last several months depending on how sexually active you are. Use tylenol or ibuprofen for pain. An ice pack to the perineum can also be helpful. Call the office or seek medical attention for fever, heavy urinary or rectal bleeding, or difficulty voiding. If you are on anticoagulation this can be resumed once urine has cleared or upon the instruction of your cardiologist or internist.

## 2024-06-27 NOTE — INTERVAL H&P NOTE
H&P reviewed. After examining the patient I find no changes in the patients condition since the H&P had been written.    Vitals:    06/27/24 1136   BP: 127/85   Pulse: 61   Resp: 18   Temp: (!) 96.7 °F (35.9 °C)   SpO2: 97%   Procedure and risks discussed including risk of rectal injury with spaceoar. Consent signed.

## 2024-06-27 NOTE — ANESTHESIA PREPROCEDURE EVALUATION
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)   (+) Atrial fibrillation (HCC)   (+) Chronic coronary artery disease   (+) Essential hypertension   (+) Hyperlipidemia      GI/HEPATIC   (+) Liver cyst      /RENAL   (+) BPH with obstruction/lower urinary tract symptoms   (+) Prostate cancer (HCC)      MUSCULOSKELETAL   (+) Primary osteoarthritis of right hip        Physical Exam    Airway    Mallampati score: II  TM Distance: >3 FB  Neck ROM: full     Dental   No notable dental hx     Cardiovascular  Rhythm: regular, Rate: normal, Cardiovascular exam normal    Pulmonary  Pulmonary exam normal Breath sounds clear to auscultation    Other Findings        Anesthesia Plan  ASA Score- 2     Anesthesia Type- IV sedation with anesthesia with ASA Monitors.         Additional Monitors:     Airway Plan:     Comment: Discussed risks/benefits, including medication reactions, awareness, aspiration, and serious/life threatening complications.    Plan to maintain native airway with IVGA, monitored with EtCO2.       Plan Factors-Exercise tolerance (METS): >4 METS.    Chart reviewed.    Patient summary reviewed.      Patient instructed to abstain from smoking on day of procedure. Patient did not smoke on day of surgery.            Induction- intravenous.    Postoperative Plan-         Informed Consent- Anesthetic plan and risks discussed with patient.  I personally reviewed this patient with the CRNA. Discussed and agreed on the Anesthesia Plan with the CRNA..

## 2024-07-02 NOTE — PROGRESS NOTES
Hematology/Oncology Outpatient Office Note    Date of Service: 2024    [facility] [address]  [place] [id number]  [phone]    Reason for Consultation:   Chief Complaint   Patient presents with    Follow-up       Cancer Stage at diagnosis: IIC    Referral Physician: No ref. provider found    Primary Care Physician:  Nellie Mason DO     Nickname: Gene    Spouse: Judith Goetz ECO     Today's ECO    Goals and Barriers:  Current Goal: Minimize effects of disease burden, extend life.   Barriers to accomplishing this: None    Patient's Capacity to Self Care:  Patient is able to self care      ASSESSMENT & PLAN      Diagnosis ICD-10-CM Associated Orders   1. Prostate cancer (HCC)  C61             This is a 79 y.o. 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 I reviewed relevant office notes, imaging reports and pathology result as well.  I told the patient that this is a case of curable/disease and what this means. We discussed that the goal of anti-cancer therapy is to provide best quality of life, extend overall survival, and progression free survival as shown in clinical trials. We also discussed that there might be a point when the cancer will no longer respond to this anti-neoplastic therapy. A  I explained the risks/benefits of the proposed cancer therapy:  Orgovyx and after discussion including understanding risks of possible life-threatening complications and therapy-related malignancy development, informed consent for blood products and  treatment has been signed and obtained.  TNM/Staging At Diagnosis  Cancer Staging   Prostate cancer (HCC)  Staging form: Prostate, AJCC 8th Edition  - Clinical stage from 4/26/2024: Stage IIC (cT1c, cN0, cM0, PSA: 7, Grade Group: 3) - Signed by Dion Gibbs MD on 4/26/2024  Prostate specific antigen (PSA) range: Less than 10  [place] score: 7  Histologic grading system: 5 grade system    Disease Features/Tumor Markers/Genetics  Tumor Marker: PSA  Notable Path Features: 3/22/2024: R posterior lateral single core, [place] 7 (4+3)  Treatment: Orgovyx   Other Supportive care:   Treatment Team Members  Surgeon: Dr. Burks  Rad Onc: Dr. Gibbs  Palliative  Labs: 3/8/2024: WBC 7k, Hgb 16.8, plt 230k  Diagnostics  4/24/2024 PET/CT: PSMA PSMA avid lesions in the prostate as above suspicious for malignancy. No PSMA avid pelvic adenopathy.  No PSMA avid metastases in the neck, chest, upper abdomen or osseous structures      Discussion of decision making    I personally reviewed the following lab results, the image studies, pathology, other specialty/physicians consult notes and recommendations, and outside medical records. I had a lengthy discussion with the patient and shared the work-up findings. We discussed the diagnosis and management plan as below. I spent 41 minutes reviewing the records (labs, clinician notes, outside records, medical history, ordering medicine/tests/procedures, monitoring of anti-neoplastic toxicities, interpreting the imaging/labs previously done) and coordination of care as well as direct time with the patient today, of which greater than 50% of the time was spent in counseling and coordination of care with the patient/family.      Plan/Labs  We are permanently stopping Orgovyx 120 mg daily due to his severe LH, pre-syncopal episode  I asked him to discuss Firmagon with Urology to complete 6 months of ADT  PSA ordered  F/u Urology for surveillance   F/u Rad onc for RT        Follow Up: 6  months    All questions were answered to the patient's satisfaction during this encounter. The patient knows the contact information for our office and knows to reach out for any relevant concerns related to this encounter. They are to call for any temperature 100.4 or higher, new symptoms including but not restricted to shaking chills, decreased appetite, nausea, vomiting, diarrhea, increased fatigue, shortness of breath or chest pain, confusion, and not feeling the strength to come to the clinic. For all other listed problems and medical diagnosis in their chart - they are managed by PCP and/or other specialists, which the patient acknowledges. Thank you very much for your consultation and making us a part of this patient's care. We are continuing to follow closely with you. Please do not hesitate to reach out to me with any additional questions or concerns.    Shasta Shelton MD  Hematology & Medical Oncology Staff Physician             Disclaimer: This document was prepared using [employer] technology. If a word or phrase is confusing, or does not make sense, this is likely due to recognition error which was not discovered during this clinician's review. If you believe an error has occurred, please contact me through [employer] service line for ruma?cation.      ONCOLOGY HISTORY OF PRESENT ILLNESS        Oncology History   Prostate cancer (HCC)   3/22/2024 Initial Diagnosis    Prostate cancer (HCC)     3/22/2024 Biopsy    A. Prostate, left trans. zone:  - Benign prostate glands and stroma with focal acute inflammation.     B. Prostate, right trans. zone:  - Benign prostate glands and stroma with acute and chronic inflammation.      C. Prostate, left post. base:  - Benign prostate glands and stroma.      D. Prostate, right post. base:  - Benign prostate glands and stroma.      E. Prostate, RIGHT POSTERIOR MEDIAL:  - Benign prostate glands and stroma.      F. Prostate, LEFT POSTERIOR MEDIAL:  - Benign  prostate glands and stroma.      G. Prostate, LEFT POSTERIOR LATERAL:  - Benign prostate glands and stroma.      H. Prostate, RIGHT ANTERIOR MEDIAL:  - Benign prostate glands and stroma.      I. Prostate, RIGHT ANTERIOR LATERAL:  - Atypical small acinar proliferation (ASAP), suspicious but not diagnostic for malignancy.     J. Prostate, RIGHT POSTERIOR LATERAL:  - Prostatic adenocarcinoma, [place] grade 4+3 = 7 (70% pattern 4 and 30% pattern 3, Grade group 3), involving one of two cores, 70% of the involved core  and 30% of the involved tissue.  - Perineural invasion is absent.     Comment: Foci of cribriform glands are present with the pattern 4 component.  This feature has been associated with adverse clinical outcomes and molecular features typically seen in advanced disease.  (The 2019 Genitourinary Pathology Society (GUPS) White Paper on Contemporary Grading of Prostate Cancer. Arch Pathol Lab Med. 2021 Apr 1;145(4):461-493.)     Immunohistochemistry for prostate triple stain multiplex (,P63,P504s) was performed on blocks J2, supporting the above diagnosis.     Intradepartmental consultation is in agreement.     K. Prostate, LEFT ANTERIOR MEDIAL:  - Benign prostate glands and stroma.       L. Prostate, LEFT ANTERIOR LATERAL:  - Benign prostate stroma.       M. Prostate, ZARIA right post. lat. apex:  - Benign prostate glands and stroma.          4/26/2024 -  Cancer Staged    Staging form: Prostate, AJCC 8th Edition  - Clinical stage from 4/26/2024: Stage IIC (cT1c, cN0, cM0, PSA: 7, Grade Group: 3) - Signed by Dion Gibbs MD on 4/26/2024  Prostate specific antigen (PSA) range: Less than 10  [place] score: 7  Histologic grading system: 5 grade system         6/4/2024: pruritus and reddened rash on right side of stomach, cellulitis tx initiated on Bactrim that cleared it    SUBJECTIVE  (INTERVAL HISTORY)      Clotting History none   Bleeding History none   Cancer History Prostate, Basal Cell   Family  Cancer History none   H/O Blood/Plt Transfusion none   Tobacco/etoh/drug abuse 1 PPD x 5 years, quit at age 32 or so, no etoh abuse or rec drug use           Occupation [place] for 5 years, 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      I have reviewed the relevant past medical, surgical, social and family history. I have also reviewed allergies and medications for this patient.    Review of Systems  Baseline weight: 180-185 lbs    Denies F/C, N/V, SOB, CP, LH, HA, rash, itching, gen weakness, melena, hematuria, hematochezia, falls, diarrhea, or constipation       A 10-point review of system was performed, pertinent positive and negative were detailed as above. Otherwise, the 10-point review of system was negative.      Past Medical History:   Diagnosis Date    A-fib (HCC)     Acute MI (HCC) 06/2002    Arthritis     Atypical chest pain 03/05/2008    Basal cell carcinoma 2009    Coronary artery disease     Diverticulosis 2008    Hematuria, microscopic     History of varicose veins 2008    Hyperlipidemia     Hypertension     Myocardial infarction (HCC)     Nocturia     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     Prostate cancer (HCC) 3/22/2024    Urinary tract infection     Wears glasses        Past Surgical History:   Procedure Laterality Date    COLONOSCOPY      CORONARY ANGIOPLASTY      right CA x 3 vessels: redo PTCA-LAD 10/04    CORONARY STENT PLACEMENT  01/01/2002    INSERTION OF FIDUCIAL MARKER (TRANSRECTAL ULTRASOUND GUIDANCE) N/A 6/27/2024    Procedure: INSERTION OF FIDUCIAL MARKER , SPACEOAR;  Surgeon: Jaret Burks MD;  Location:  MAIN OR;  Service: Urology    JOINT REPLACEMENT      R hip    AZ BX PROSTATE STRTCTC SATURATION SAMPLING IMG GID N/A 3/22/2024    Procedure: TRANSPERINEAL MRI FUSION BIOPSY PROSTATE;  Surgeon: Gabino Lopez MD;  Location: [facility];  Service:  Urology    VT COLONOSCOPY FLX DX W/COLLJ SPEC WHEN PFRMD N/A 2017    Procedure: COLONOSCOPY;  Surgeon: Scott Last MD;  Location: [facility];  Service: Colorectal    TOTAL HIP ARTHROPLASTY Right     TOTAL HIP ARTHROPLASTY Left 2019    WISDOM TOOTH EXTRACTION         Family History   Problem Relation Age of Onset    Aneurysm Father         post op AAA repair    Hypertension Sister     Hypertension Brother     Cancer Brother         Agent Orange    Hypertension Mother     Diabetes Mother        Social History     Socioeconomic History    Marital status: /Civil Union     Spouse name: Not on file    Number of children: Not on file    Years of education: Not on file    Highest education level: Not on file   Occupational History    Not on file   Tobacco Use    Smoking status: Former     Current packs/day: 0.00     Average packs/day: 0.5 packs/day for 5.0 years (2.5 ttl pk-yrs)     Types: Cigarettes     Start date: 1967     Quit date: 1970     Years since quittin.5     Passive exposure: Past    Smokeless tobacco: Never    Tobacco comments:     quit ...0.5 packs/2.00 pack years   Vaping Use    Vaping status: Never Used   Substance and Sexual Activity    Alcohol use: No    Drug use: No    Sexual activity: Yes     Partners: Female     Birth control/protection: None   Other Topics Concern    Not on file   Social History Narrative    Not on file     Social Determinants of Health     Financial Resource Strain: Low Risk  (2023)    Overall Financial Resource Strain (CARDIA)     Difficulty of Paying Living Expenses: Not hard at all   Food Insecurity: Not on file   Transportation Needs: No Transportation Needs (2023)    PRAPARE - Transportation     Lack of Transportation (Medical): No     Lack of Transportation (Non-Medical): No   Physical Activity: Not on file   Stress: Not on file   Social Connections: Not on file   Intimate Partner Violence: Not on file   Housing Stability:  Unknown (5/22/2022)    Housing Stability Vital Sign     Unable to Pay for Housing in the Last Year: Patient refused     Number of Places Lived in the Last Year: Not on file     Unstable Housing in the Last Year: Patient refused       Allergies   Allergen Reactions    No Active Allergies        Current Outpatient Medications   Medication Sig Dispense Refill    ascorbic acid (VITAMIN C) 1000 MG tablet Take 1,000 mg by mouth daily      aspirin (ECOTRIN LOW STRENGTH) 81 mg EC tablet Take 81 mg by mouth daily Twice a week      atenolol (TENORMIN) 100 mg tablet TAKE 1 TABLET BY MOUTH EVERY DAY 90 tablet 3    B Complex Vitamins (B COMPLEX 100 PO) Take by mouth      benazepril (LOTENSIN) 20 mg tablet TAKE 1 TABLET BY MOUTH EVERY DAY 90 tablet 1    calcium carbonate (OS-LUCA) 600 MG tablet Take 600 mg by mouth daily      Coenzyme Q10 (CoQ-10) 200 MG CAPS       magnesium oxide (MAG-OX) 400 mg tablet Take 400 mg by mouth daily      multivitamin (THERAGRAN) TABS Take 1 tablet by mouth      niacin (NIASPAN) 500 mg CR tablet Take 1 tablet (500 mg total) by mouth daily at bedtime 90 tablet 3    Omega-3 Fatty Acids (FISH OIL PO) Take by mouth daily       sildenafil (VIAGRA) 50 MG tablet Take 1 tablet (50 mg total) by mouth daily as needed for erectile dysfunction 20 tablet 6    simvastatin (ZOCOR) 40 mg tablet TAKE 1 TABLET BY MOUTH EVERYDAY AT BEDTIME 90 tablet 3    tamsulosin (FLOMAX) 0.4 mg TAKE 1 CAPSULE BY MOUTH EVERY DAY WITH DINNER 90 capsule 3    rivaroxaban (Xarelto) 20 mg tablet Take 1 tablet (20 mg total) by mouth daily with breakfast 90 tablet 3     No current facility-administered medications for this visit.       (Not in a hospital admission)      Objective:     24 Hour Vitals Assessment:     Vitals:    07/05/24 0926   BP: 128/60   Pulse: 67   Resp: 16   Temp: 97.7 °F (36.5 °C)   SpO2: 99%         PHYSICIAN EXAM:    General: Appearance: alert, cooperative, no distress.  HEENT: Normocephalic, atraumatic. No scleral  icterus. conjunctivae clear. EOMI.  Chest: No tenderness to palpation. No open wound noted.  Lungs: Clear to auscultation bilaterally, Respirations unlabored.  Cardiac: Borderline Regular rate, +S1and S2  Abdomen: Soft, non-tender, non-distended. Bowel sounds are normal.   Extremities:  No edema, cyanosis, clubbing.  Skin: Skin color, turgor are normal. No rashes.  Lymphatics: no palpable supra-cervical, axillary, or inguinal adenopathy  Neurologic: Awake, Alert, and oriented, no gross focal deficits noted b/l.       DATA REVIEW:    Pathology Result:    Final Diagnosis   Date Value Ref Range Status   03/22/2024   Final    A. Prostate, left trans. zone:  - Benign prostate glands and stroma with focal acute inflammation.     B. Prostate, right trans. zone:  - Benign prostate glands and stroma with acute and chronic inflammation.     C. Prostate, left post. base:  - Benign prostate glands and stroma.     D. Prostate, right post. base:  - Benign prostate glands and stroma.     E. Prostate, RIGHT POSTERIOR MEDIAL:  - Benign prostate glands and stroma.     F. Prostate, LEFT POSTERIOR MEDIAL:  - Benign prostate glands and stroma.     G. Prostate, LEFT POSTERIOR LATERAL:  - Benign prostate glands and stroma.     H. Prostate, RIGHT ANTERIOR MEDIAL:  - Benign prostate glands and stroma.     I. Prostate, RIGHT ANTERIOR LATERAL:  - Atypical small acinar proliferation (ASAP), suspicious but not diagnostic for malignancy.    J. Prostate, RIGHT POSTERIOR LATERAL:  - Prostatic adenocarcinoma, [place] grade 4+3 = 7 (70% pattern 4 and 30% pattern 3, Grade group 3), involving one of two cores, 70% of the involved core  and 30% of the involved tissue.  - Perineural invasion is absent.    Comment: Foci of cribriform glands are present with the pattern 4 component.  This feature has been associated with adverse clinical outcomes and molecular features typically seen in advanced disease.  (The 2019 Genitourinary Pathology Society (GUPS)  "White Paper on Contemporary Grading of Prostate Cancer. Arch Pathol Lab Med. 2021 Apr 1;145(4):461-493.)    Immunohistochemistry for prostate triple stain multiplex (,P63,P504s) was performed on blocks J2, supporting the above diagnosis.    Intradepartmental consultation is in agreement.    K. Prostate, LEFT ANTERIOR MEDIAL:  - Benign prostate glands and stroma.      L. Prostate, LEFT ANTERIOR LATERAL:  - Benign prostate stroma.      M. Prostate, ZARIA right post. lat. apex:  - Benign prostate glands and stroma.              Image Results:   Image result are reviewed and documented in Hematology/Oncology history    Echo complete w/ contrast if indicated    Left Ventricle: Left ventricular cavity size is normal. Wall thickness   is normal. The left ventricular ejection fraction is 65%. Systolic   function is vigorous. Wall motion is normal. Diastolic function is mildly   abnormal, consistent with grade I (abnormal) relaxation.    Right Ventricle: Right ventricular cavity size is normal. Systolic   function is normal.    Aortic Valve: There is aortic valve sclerosis.    Strain was performed to quantify interventricular dyssynchrony and   evaluate components of myocardial function due to chemotherapy. Results   from the utilization of Strain Analysis are listed in the report below.      LABS:  Lab data are reviewed and documented in HemOn history.       Lab Results   Component Value Date    HGB 15.0 06/13/2024    HCT 46.1 06/13/2024    MCV 90 06/13/2024     06/13/2024    WBC 10.22 (H) 06/13/2024    NRBC 0 06/13/2024     Lab Results   Component Value Date    K 4.3 06/13/2024    CL 98 06/13/2024    CO2 26 06/13/2024    BUN 9 06/13/2024    CREATININE 0.71 06/13/2024    GLUF 95 06/13/2024    CALCIUM 8.8 06/13/2024    CORRECTEDCA 9.4 06/13/2024    AST 23 06/13/2024    ALT 21 06/13/2024    ALKPHOS 60 06/13/2024    EGFR 90 06/13/2024       No results found for: \"IRON\", \"TIBC\", \"FERRITIN\"    No results found for: " "\"QOUONSSJ20\"    No results for input(s): \"WBC\", \"CREAT\", \"PLT\" in the last 72 hours.    By:  Shasta Shelton MD, 7/5/2024, 9:33 AM                                  "

## 2024-07-03 ENCOUNTER — APPOINTMENT (OUTPATIENT)
Dept: RADIATION ONCOLOGY | Facility: CLINIC | Age: 79
End: 2024-07-03
Attending: INTERNAL MEDICINE
Payer: MEDICARE

## 2024-07-03 ENCOUNTER — TELEPHONE (OUTPATIENT)
Dept: UROLOGY | Facility: MEDICAL CENTER | Age: 79
End: 2024-07-03

## 2024-07-03 NOTE — TELEPHONE ENCOUNTER
Called patient - LMOM that we were cancelling his appointment on 07/12/24 with Dr. Burks and we will reschedule this once he is finished with his radiation therapy.  He is asked to call back if he has any further questions.  Appointment cancelled.

## 2024-07-05 ENCOUNTER — OFFICE VISIT (OUTPATIENT)
Dept: HEMATOLOGY ONCOLOGY | Facility: CLINIC | Age: 79
End: 2024-07-05
Payer: MEDICARE

## 2024-07-05 VITALS
DIASTOLIC BLOOD PRESSURE: 60 MMHG | RESPIRATION RATE: 16 BRPM | TEMPERATURE: 97.7 F | OXYGEN SATURATION: 99 % | SYSTOLIC BLOOD PRESSURE: 128 MMHG | WEIGHT: 173 LBS | HEART RATE: 67 BPM | BODY MASS INDEX: 25.62 KG/M2 | HEIGHT: 69 IN

## 2024-07-05 DIAGNOSIS — C61 PROSTATE CANCER (HCC): Primary | ICD-10-CM

## 2024-07-05 PROCEDURE — G2211 COMPLEX E/M VISIT ADD ON: HCPCS | Performed by: INTERNAL MEDICINE

## 2024-07-05 PROCEDURE — 99215 OFFICE O/P EST HI 40 MIN: CPT | Performed by: INTERNAL MEDICINE

## 2024-07-05 NOTE — Clinical Note
Gene Vieyra had a very bad reaction to Orgovyx. DO you think your office could schedule to see him to discuss Firmagon as an alternative?  Heradhar

## 2024-07-08 ENCOUNTER — APPOINTMENT (OUTPATIENT)
Facility: HOSPITAL | Age: 79
End: 2024-07-08
Attending: INTERNAL MEDICINE
Payer: MEDICARE

## 2024-07-08 PROCEDURE — 77334 RADIATION TREATMENT AID(S): CPT | Performed by: RADIOLOGY

## 2024-07-09 ENCOUNTER — TELEPHONE (OUTPATIENT)
Dept: UROLOGY | Facility: MEDICAL CENTER | Age: 79
End: 2024-07-09

## 2024-07-09 DIAGNOSIS — C61 PROSTATE CANCER (HCC): Primary | ICD-10-CM

## 2024-07-09 NOTE — TELEPHONE ENCOUNTER
Call placed to patient. Spoke with him. He is scheduled on 7- for OV discussion and Firmagon injection.

## 2024-07-09 NOTE — TELEPHONE ENCOUNTER
----- Message from Jaret Burks MD sent at 7/8/2024  6:20 PM EDT -----  Regarding: FW:  Can we get pt in to office ASAP  to discuss and administer Firmagon- initial dose.  Thanks   Fazal  ----- Message -----  From: Shasta Shelton MD  Sent: 7/5/2024   9:50 AM EDT  To: Jaret Burks MD; MD Meet Rogers Jack had a very bad reaction to Orgovyx. DO you think your office could schedule to see him to discuss Firmagon as an alternative?    Shasta

## 2024-07-12 ENCOUNTER — APPOINTMENT (OUTPATIENT)
Dept: LAB | Facility: CLINIC | Age: 79
End: 2024-07-12
Payer: MEDICARE

## 2024-07-12 ENCOUNTER — TRANSCRIBE ORDERS (OUTPATIENT)
Dept: LAB | Facility: CLINIC | Age: 79
End: 2024-07-12

## 2024-07-12 DIAGNOSIS — I48.0 PAROXYSMAL ATRIAL FIBRILLATION (HCC): Primary | ICD-10-CM

## 2024-07-12 DIAGNOSIS — E78.49 FAMILIAL COMBINED HYPERLIPIDEMIA: ICD-10-CM

## 2024-07-12 DIAGNOSIS — I10 ESSENTIAL HYPERTENSION, MALIGNANT: ICD-10-CM

## 2024-07-12 DIAGNOSIS — C61 PROSTATE CANCER (HCC): ICD-10-CM

## 2024-07-12 LAB — PSA SERPL-MCNC: 3.37 NG/ML (ref 0–4)

## 2024-07-12 PROCEDURE — 84153 ASSAY OF PSA TOTAL: CPT

## 2024-07-12 PROCEDURE — 36415 COLL VENOUS BLD VENIPUNCTURE: CPT

## 2024-07-12 NOTE — TELEPHONE ENCOUNTER
Patient of Dr. Burks,     Last PSA 11/18/23 6/27/24 INSERTION OF FIDUCIAL MARKER , SPACEOAR (Urethra)     Patient is at the lab to get a PSA, please advise if it is an appropriate time for this and/or when his next PSA should be

## 2024-07-12 NOTE — TELEPHONE ENCOUNTER
Call placed to patient and spoke with him. Informed him that PSA level can be drawn prior to his appointment.   He is aware and will have it drawn this weekend.

## 2024-07-12 NOTE — TELEPHONE ENCOUNTER
Patient is currently at the lab and needs a PSA order placed under the ordering provider's name.    Lab doesn't want to use the one ordered by different physician.    Call [phone].

## 2024-07-15 ENCOUNTER — OFFICE VISIT (OUTPATIENT)
Dept: FAMILY MEDICINE CLINIC | Facility: CLINIC | Age: 79
End: 2024-07-15
Payer: MEDICARE

## 2024-07-15 VITALS
HEIGHT: 69 IN | HEART RATE: 79 BPM | SYSTOLIC BLOOD PRESSURE: 128 MMHG | WEIGHT: 173.6 LBS | TEMPERATURE: 97.6 F | OXYGEN SATURATION: 98 % | BODY MASS INDEX: 25.71 KG/M2 | DIASTOLIC BLOOD PRESSURE: 80 MMHG

## 2024-07-15 DIAGNOSIS — K13.0 DRY LIPS: Primary | ICD-10-CM

## 2024-07-15 DIAGNOSIS — E78.49 OTHER HYPERLIPIDEMIA: ICD-10-CM

## 2024-07-15 PROCEDURE — 99214 OFFICE O/P EST MOD 30 MIN: CPT | Performed by: FAMILY MEDICINE

## 2024-07-15 PROCEDURE — G2211 COMPLEX E/M VISIT ADD ON: HCPCS | Performed by: FAMILY MEDICINE

## 2024-07-15 NOTE — ASSESSMENT & PLAN NOTE
Dry cracking lips x1 week. Likely just dry skin related.  - improving overall  - no signs of blistering or lesions.  - use chapstick + lamont bees

## 2024-07-15 NOTE — PROGRESS NOTES
Ambulatory Visit  Name: Cruz Patricio Jr.      : 1945      MRN: [id number]  Encounter Provider: Nellie Mason DO  Encounter Date: 7/15/2024   Encounter department: [facility]    Assessment & Plan   1. Dry lips  Assessment & Plan:  Dry cracking lips x1 week. Likely just dry skin related.  - improving overall  - no signs of blistering or lesions.  - use chapstick + lamont bees  2. Other hyperlipidemia  Assessment & Plan:  Stop niacin. Not needed at this time.         History of Present Illness     Mouth Lesions   The current episode started 5 to 7 days ago. The onset was sudden. The problem occurs rarely. The problem has been rapidly improving. The problem is mild. The symptoms are relieved by one or more OTC medications. Nothing aggravates the symptoms. Associated symptoms include mouth sores. Pertinent negatives include no fever, no abdominal pain, no vomiting, no ear pain, no sore throat, no cough, no rash and no eye pain.       Review of Systems   Constitutional:  Negative for chills and fever.   HENT:  Positive for mouth sores. Negative for ear pain and sore throat.    Eyes:  Negative for pain and visual disturbance.   Respiratory:  Negative for cough and shortness of breath.    Cardiovascular:  Negative for chest pain and palpitations.   Gastrointestinal:  Negative for abdominal pain and vomiting.   Genitourinary:  Negative for dysuria and hematuria.   Musculoskeletal:  Negative for arthralgias and back pain.   Skin:  Negative for color change and rash.   Neurological:  Negative for seizures and syncope.   All other systems reviewed and are negative.    Medical History Reviewed by provider this encounter:  Problems       Current Outpatient Medications on File Prior to Visit   Medication Sig Dispense Refill   • ascorbic acid (VITAMIN C) 1000 MG tablet Take 1,000 mg by mouth daily     • aspirin (ECOTRIN LOW STRENGTH) 81 mg EC tablet Take 81 mg by mouth daily Twice a week     •  atenolol (TENORMIN) 100 mg tablet TAKE 1 TABLET BY MOUTH EVERY DAY 90 tablet 3   • B Complex Vitamins (B COMPLEX 100 PO) Take by mouth     • benazepril (LOTENSIN) 20 mg tablet TAKE 1 TABLET BY MOUTH EVERY DAY 90 tablet 1   • calcium carbonate (OS-LUCA) 600 MG tablet Take 600 mg by mouth daily     • Coenzyme Q10 (CoQ-10) 200 MG CAPS      • magnesium oxide (MAG-OX) 400 mg tablet Take 400 mg by mouth daily     • multivitamin (THERAGRAN) TABS Take 1 tablet by mouth     • Omega-3 Fatty Acids (FISH OIL PO) Take by mouth daily      • rivaroxaban (Xarelto) 20 mg tablet Take 1 tablet (20 mg total) by mouth daily with breakfast 90 tablet 3   • sildenafil (VIAGRA) 50 MG tablet Take 1 tablet (50 mg total) by mouth daily as needed for erectile dysfunction 20 tablet 6   • simvastatin (ZOCOR) 40 mg tablet TAKE 1 TABLET BY MOUTH EVERYDAY AT BEDTIME 90 tablet 3   • tamsulosin (FLOMAX) 0.4 mg TAKE 1 CAPSULE BY MOUTH EVERY DAY WITH DINNER 90 capsule 3   • [DISCONTINUED] niacin (NIASPAN) 500 mg CR tablet Take 1 tablet (500 mg total) by mouth daily at bedtime 90 tablet 3   • [DISCONTINUED] apixaban (ELIQUIS) 5 mg Take 1 tablet (5 mg total) by mouth in the morning and 1 tablet (5 mg total) in the evening. 60 tablet 0     No current facility-administered medications on file prior to visit.      Social History     Tobacco Use   • Smoking status: Former     Current packs/day: 0.00     Average packs/day: 0.5 packs/day for 5.0 years (2.5 ttl pk-yrs)     Types: Cigarettes     Start date: 1967     Quit date: 1970     Years since quittin.5     Passive exposure: Past   • Smokeless tobacco: Never   • Tobacco comments:     quit ...0.5 packs/2.00 pack years   Vaping Use   • Vaping status: Never Used   Substance and Sexual Activity   • Alcohol use: No   • Drug use: No   • Sexual activity: Yes     Partners: Female     Birth control/protection: None     Objective     /80 (BP Location: Left arm, Patient Position: Sitting, Cuff  "Size: Adult)   Pulse 79   Temp 97.6 °F (36.4 °C) (Temporal)   Ht 5' 8.5\" (1.74 m)   Wt 78.7 kg (173 lb 9.6 oz)   SpO2 98%   BMI 26.01 kg/m²     Physical Exam  Vitals reviewed.   Constitutional:       General: He is not in acute distress.     Appearance: Normal appearance. He is well-developed.   HENT:      Head: Normocephalic and atraumatic.      Nose: No congestion or rhinorrhea.      Mouth/Throat:      Pharynx: No oropharyngeal exudate or posterior oropharyngeal erythema.   Eyes:      Extraocular Movements: Extraocular movements intact.      Conjunctiva/sclera: Conjunctivae normal.      Pupils: Pupils are equal, round, and reactive to light.   Cardiovascular:      Rate and Rhythm: Normal rate and regular rhythm.      Pulses: Normal pulses.      Heart sounds: Normal heart sounds. No murmur heard.  Pulmonary:      Effort: Pulmonary effort is normal. No respiratory distress.      Breath sounds: Normal breath sounds.   Abdominal:      Palpations: Abdomen is soft.      Tenderness: There is no abdominal tenderness.   Musculoskeletal:         General: No swelling.      Cervical back: Neck supple.   Skin:     General: Skin is warm and dry.      Capillary Refill: Capillary refill takes less than 2 seconds.   Neurological:      Mental Status: He is alert.   Psychiatric:         Mood and Affect: Mood normal.       Administrative Statements   I have spent a total time of 37 minutes in caring for this patient on the day of the visit/encounter including Diagnostic results, Prognosis, Risks and benefits of tx options, Instructions for management, Patient and family education, Importance of tx compliance, Impressions, Counseling / Coordination of care, Documenting in the medical record, Reviewing / ordering tests, medicine, procedures  , and Obtaining or reviewing history  .        "

## 2024-07-16 PROCEDURE — 77338 DESIGN MLC DEVICE FOR IMRT: CPT | Performed by: INTERNAL MEDICINE

## 2024-07-16 PROCEDURE — 77300 RADIATION THERAPY DOSE PLAN: CPT | Performed by: INTERNAL MEDICINE

## 2024-07-16 PROCEDURE — 77301 RADIOTHERAPY DOSE PLAN IMRT: CPT | Performed by: INTERNAL MEDICINE

## 2024-07-19 ENCOUNTER — CLINICAL SUPPORT (OUTPATIENT)
Dept: UROLOGY | Facility: MEDICAL CENTER | Age: 79
End: 2024-07-19
Payer: MEDICARE

## 2024-07-19 VITALS
SYSTOLIC BLOOD PRESSURE: 124 MMHG | WEIGHT: 175 LBS | HEIGHT: 69 IN | OXYGEN SATURATION: 98 % | DIASTOLIC BLOOD PRESSURE: 74 MMHG | BODY MASS INDEX: 25.92 KG/M2 | HEART RATE: 62 BPM

## 2024-07-19 DIAGNOSIS — I25.709 ATHEROSCLEROSIS OF CORONARY ARTERY BYPASS GRAFT OF NATIVE HEART WITH ANGINA PECTORIS (HCC): ICD-10-CM

## 2024-07-19 DIAGNOSIS — I25.10 CHRONIC CORONARY ARTERY DISEASE: ICD-10-CM

## 2024-07-19 DIAGNOSIS — C61 PROSTATE CANCER (HCC): Primary | ICD-10-CM

## 2024-07-19 PROCEDURE — 99214 OFFICE O/P EST MOD 30 MIN: CPT | Performed by: UROLOGY

## 2024-07-19 PROCEDURE — 96402 CHEMO HORMON ANTINEOPL SQ/IM: CPT

## 2024-07-19 NOTE — PROGRESS NOTES
Ambulatory Visit  Name: Cruz Patricio Jr.      : 1945      MRN: [id number]  Encounter Provider: Jaret Burks MD  Encounter Date: 2024   Encounter department: [facility]OLOGY [place]    Assessment & Plan   Prostate Cancer- Risks and benefits of Firmagon discussed. Medication administered today. He will be beginning IMRT next week.    History of Present Illness     Cruz Patricio Jr. is a 79 y.o. 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 He developed a rash and passed out from Orgovyx. Voiding well.     Review of Systems   Constitutional:  Negative for chills, diaphoresis, fatigue and fever.   HENT: Negative.     Eyes: Negative.    Respiratory: Negative.     Cardiovascular: Negative.    Endocrine: Negative.    Genitourinary:         See HPI   Musculoskeletal: Negative.    Skin: Negative.    Allergic/Immunologic: Negative.    Neurological: Negative.    Hematological: Negative.    Psychiatric/Behavioral: Negative.         AUA SYMPTOM SCORE      Flowsheet Row Most Recent Value   AUA SYMPTOM SCORE    How often have you had a sensation of not emptying your bladder completely after you finished urinating? 1 (P)     How often have you had to urinate again less than two hours after you finished urinating? 1 (P)     How often have you found you stopped and started again several times when you urinate? 2 (P)     How often have you found it difficult to postpone urination? 3 (P)     How often have you had a weak urinary stream? 3 (P)     How often have you had to push or strain to begin urination? 0 (P)     How many times did you most typically get up to urinate from the time you went to bed at night until the time you got up in the morning? 4 (P)     Quality of Life: If you were to spend the rest of your life with your urinary condition just the way it is now, how would you feel about that? 3 (P)     AUA SYMPTOM SCORE 14 (P)         "    Objective     /74 (BP Location: Right arm, Patient Position: Sitting, Cuff Size: Adult)   Pulse 62   Ht 5' 8.5\" (1.74 m)   Wt 79.4 kg (175 lb)   SpO2 98%   BMI 26.22 kg/m²   Physical Exam  Vitals reviewed.   Constitutional:       General: He is not in acute distress.     Appearance: He is well-developed. He is not ill-appearing, toxic-appearing or diaphoretic.   HENT:      Head: Normocephalic and atraumatic.   Eyes:      General: No scleral icterus.     Conjunctiva/sclera: Conjunctivae normal.   Cardiovascular:      Rate and Rhythm: Normal rate.   Pulmonary:      Effort: Pulmonary effort is normal.   Abdominal:      General: Bowel sounds are normal. There is no distension.      Palpations: Abdomen is soft. There is no mass.      Tenderness: There is no abdominal tenderness. There is no right CVA tenderness, left CVA tenderness, guarding or rebound.   Genitourinary:     Penis: Normal. No phimosis or hypospadias.       Testes:         Right: Mass not present.         Left: Mass not present.      Rectum: Normal.   Musculoskeletal:         General: Normal range of motion.      Cervical back: Neck supple.   Skin:     General: Skin is warm and dry.   Neurological:      General: No focal deficit present.      Mental Status: He is alert and oriented to person, place, and time.   Psychiatric:         Mood and Affect: Mood normal.         Behavior: Behavior normal.         Thought Content: Thought content normal.         Judgment: Judgment normal.       Results  Lab Results   Component Value Date    PSA 3.367 07/12/2024    PSA 7.0 (H) 11/18/2023    PSA 4.9 (H) 11/07/2022     Lab Results   Component Value Date    CALCIUM 8.8 06/13/2024    K 4.3 06/13/2024    CO2 26 06/13/2024    CL 98 06/13/2024    BUN 9 06/13/2024    CREATININE 0.71 06/13/2024     Lab Results   Component Value Date    WBC 10.22 (H) 06/13/2024    HGB 15.0 06/13/2024    HCT 46.1 06/13/2024    MCV 90 06/13/2024     06/13/2024       Office " Urine Dip  No results found for this or any previous visit (from the past 1 hour(s)).]    Administrative Statements

## 2024-07-19 NOTE — PROGRESS NOTES
"7/19/2024  Cruz Patricio Jr. is a 79 y.o. male  [id number]    Diagnosis:  Chief Complaint    Prostate Cancer         Patient presents for loading dose of Firmagon managed by Dr. Burks    Plan:  Return to the office in 1 month for Firmagon injection       Medication administration:    240mg of Firmagon administered in left and right abdominal subcutaneous tissue. Pt tolerated well. Band aid applied to both areas. Pt is scheduled to return in 1 month for injection.     Administrations This Visit       degarelix acetate (FIRMAGON) injection 240 mg       Admin Date  07/19/2024 Action  Given Dose  240 mg Route  Subcutaneous Documented By  Sasha Ferreira RN                    Vitals:    07/19/24 1419   BP: 124/74   BP Location: Right arm   Patient Position: Sitting   Cuff Size: Adult   Pulse: 62   SpO2: 98%   Weight: 79.4 kg (175 lb)   Height: 5' 8.5\" (1.74 m)         Sasha Ferreira RN   "

## 2024-07-19 NOTE — PATIENT INSTRUCTIONS
Patient Education     Prostate Cancer Diet   About this topic   Prostate cancer is very common worldwide. You are at a higher risk based on:  Your age. The older you are, the higher your risk.  Your race.  A family history of prostate cancer.  Your weight.  Your diet. Your risk is higher if you eat a high fat diet.  What will the results be?   With this food plan you may lower your risk of prostate cancer. The food you eat can also have an effect on your survival outcomes. Be sure to talk with your doctor before you start any kind of diet.  What lifestyle changes are needed?   If you are overweight, your doctor may want you to lose weight. Ask your doctor if a drug may help you lose weight. A healthy weight loss goal is 1 to 2 pounds (0.5 to 1 kg) per week. It takes 3500 calories to lose 1 pound (0.5 kg). That means cutting out 500 calories each day for 7 days. To do this, you may want to eat less. You can also exercise to use more calories. Some people do both to try and lose weight. You can lose these 500 calories per day by eating fewer calories, by burning them through exercise, or both.  Examples of easy ways to cut calories:  Choose baked versions of snacks.  Limit certain sauces and condiments that can add calories.  Choose nonfat or low fat milk products over higher fat.  Limit or avoid eating animal fats.  Eat smaller portions.  Eat more fruits and vegetables.  Drink more water, and less juice or soda. Limit or stop drinking alcohol.  Will physical activity be limited?   Being active and exercising is good for your health. Talk with your doctor about the right amount of activity for you.  When is this diet used?   Your doctor may suggest this diet if you are at risk for getting prostate cancer. It is also helpful for those who have a diagnosis of prostate cancer.  Your doctor may suggest a few things if you have prostate cancer. These are important when you are going through treatment.  Drink lots of  fluids. Try to drink at least 8 cups (1920 mL) each day.  Keep a healthy weight through treatment. Eat 3 balanced meals a day. You may also want to eat healthy, high protein snacks between meals. Talk to your doctor or dietitian about the best meal pattern for you to keep a healthy weight.  Take a multivitamin each day.  What foods are good to eat?   When you are trying to prevent prostate cancer or a recurrence:  Vegetables and fruits are full of antioxidants and vitamins. The antioxidants help protect your body against cancer. Try to get at least 5 or more servings of different fruits and vegetables each day. A serving size for a fruit or vegetable is equal to 1/2 cup (120 grams). A medium piece of fruit, like an apple or an orange, counts as one serving. Greens, like spinach or lettuce, have a serving size equal to 1 cup (240 grams). Aim to make half of your plate fruits and vegetables.  Good fruits to eat are blueberries, cranberries, blackberries, raspberries, and strawberries. Red delicious and Granny Palacios apples, sweet cherries, red grapes, and plums are also good to eat.  Dark, green leafy vegetables are good for you to eat. These are spinach, kale, gino greens, and turnip greens. Red beans, kidney beans, and black beans are also high in antioxidants.  Brussel sprouts, radishes, cabbage, bokchoy, cauliflower, and broccoli are other good vegetables.  Vitamin C acts as an antioxidant. Good sources of vitamin C are kiwi, oranges, cantaloupe, guava, brussel sprouts, and red and green sweet peppers.  Choose lean sources of protein. It is best to eat the white meat of chicken or turkey without the skin. Fish like salmon, mackerel, and trout are high in omega-3 fatty acids. Try to add 3.5 ounces (105 grams) of fish into your diet 2 to 3 times each week. Other good sources of protein are beans, soy, walnuts, and almonds. Avoid or limit red meat. Try to eat protein with every meal. Protein helps make new cells.  This may help you heal.  Dietary fiber may help reduce cancer risk. Fiber is very good for colon health.  Men 50 and under should aim for 38 grams each day. Men 50 or over should aim for 30 grams each day.  Read food labels to see how much fiber you are getting in your foods. Switch to whole grain products. Try legumes like beans, peas, and lentils. Fresh fruits and vegetables are also good sources of fiber.  Choose soy milk, 1% milk, or skim milk to replace whole and 2% milk.  What foods should be limited or avoided?   Limit the amount of animal fat in your diet. This means to eat red meat less often. High fat meats increase your risk factors for cancer.  Don’t eat full fat butter, milk, or cheese.  Avoid trans fatty acids. This means you will need to limit fried foods and some commercially-baked foods.  Limit how often you eat fast foods.  If your goal includes losing weight, stay away from sugary foods. These include foods such as candies, ice cream, table sugar, and high-fructose corn syrup in sodas and juice drinks.  Will there be any other care needed?   Your doctor may also treat prostate cancer with drugs or radiation. See your doctor for any other care that may be needed.  What can be done to prevent this health problem?   Some nutrients found in foods may help lower your risk of prostate cancer. It is better to get these from the food you eat, rather than from taking a pill. Lycopene is found in foods like tomatoes and tomato products. It can also be found in watermelon, pink grapefruit, and papaya.  Be active. Walk, garden, or do something active for 30 minutes or more on most days of the week.  Try to stop smoking. Talk with your doctor if you need help to quit.  When do I need to call the doctor?   Signs of fluid loss. These include dark-colored urine or no urine for more than 8 hours, dry mouth and tongue, dry skin, sunken eyes, lack of energy, feeling faint, or passing out.  Fever of 100.4°F (38°C) or  "higher, chills  Feeling weak  Fast heartbeat  Upset stomach and throwing up  Health problem is not better or you are feeling worse  Helpful tips   When you go to the grocery, have a list or a meal plan. Do not shop when you are hungry to avoid cravings for foods.  Read food labels with care. They will show you what is included in a serving. This amount is given as a percentage of the total amount you need each day. A \"good source\" of an item is 10% to 19%. An \"excellent source\" is more than 20%. Reading the labels will help you make healthy food choices. Talk to your dietitian if you need help learning how to read a food label.  The food label amounts are based on one serving. Be sure to know how many servings are in the package, and how many you are eating. If you eat two servings, you need to double the number of calories and nutrients listed on the label.  Last Reviewed Date   2024-05-03    "

## 2024-07-22 ENCOUNTER — APPOINTMENT (OUTPATIENT)
Dept: RADIATION ONCOLOGY | Facility: CLINIC | Age: 79
End: 2024-07-22
Payer: MEDICARE

## 2024-07-22 PROCEDURE — 77014 CHG CT GUIDANCE RADIATION THERAPY FLDS PLACEMENT: CPT | Performed by: RADIOLOGY

## 2024-07-22 PROCEDURE — 77385 HB NTSTY MODUL RAD TX DLVR SMPL: CPT | Performed by: RADIOLOGY

## 2024-07-22 PROCEDURE — 77427 RADIATION TX MANAGEMENT X5: CPT | Performed by: INTERNAL MEDICINE

## 2024-07-23 ENCOUNTER — APPOINTMENT (OUTPATIENT)
Dept: RADIATION ONCOLOGY | Facility: CLINIC | Age: 79
End: 2024-07-23
Attending: INTERNAL MEDICINE
Payer: MEDICARE

## 2024-07-23 PROCEDURE — 77385 HB NTSTY MODUL RAD TX DLVR SMPL: CPT | Performed by: RADIOLOGY

## 2024-07-23 PROCEDURE — 77014 CHG CT GUIDANCE RADIATION THERAPY FLDS PLACEMENT: CPT | Performed by: RADIOLOGY

## 2024-07-24 ENCOUNTER — APPOINTMENT (OUTPATIENT)
Dept: RADIATION ONCOLOGY | Facility: CLINIC | Age: 79
End: 2024-07-24
Attending: INTERNAL MEDICINE
Payer: MEDICARE

## 2024-07-24 PROCEDURE — 77385 HB NTSTY MODUL RAD TX DLVR SMPL: CPT | Performed by: INTERNAL MEDICINE

## 2024-07-24 PROCEDURE — 77014 CHG CT GUIDANCE RADIATION THERAPY FLDS PLACEMENT: CPT | Performed by: INTERNAL MEDICINE

## 2024-07-25 ENCOUNTER — APPOINTMENT (OUTPATIENT)
Dept: RADIATION ONCOLOGY | Facility: CLINIC | Age: 79
End: 2024-07-25
Attending: INTERNAL MEDICINE
Payer: MEDICARE

## 2024-07-25 PROCEDURE — 77014 CHG CT GUIDANCE RADIATION THERAPY FLDS PLACEMENT: CPT | Performed by: RADIOLOGY

## 2024-07-25 PROCEDURE — 77385 HB NTSTY MODUL RAD TX DLVR SMPL: CPT | Performed by: RADIOLOGY

## 2024-07-26 ENCOUNTER — APPOINTMENT (OUTPATIENT)
Dept: RADIATION ONCOLOGY | Facility: CLINIC | Age: 79
End: 2024-07-26
Attending: INTERNAL MEDICINE
Payer: MEDICARE

## 2024-07-26 PROCEDURE — 77336 RADIATION PHYSICS CONSULT: CPT | Performed by: INTERNAL MEDICINE

## 2024-07-26 PROCEDURE — 77385 HB NTSTY MODUL RAD TX DLVR SMPL: CPT | Performed by: INTERNAL MEDICINE

## 2024-07-26 PROCEDURE — 77014 CHG CT GUIDANCE RADIATION THERAPY FLDS PLACEMENT: CPT | Performed by: INTERNAL MEDICINE

## 2024-07-27 ENCOUNTER — APPOINTMENT (OUTPATIENT)
Dept: LAB | Facility: CLINIC | Age: 79
End: 2024-07-27
Payer: MEDICARE

## 2024-07-27 DIAGNOSIS — I10 ESSENTIAL HYPERTENSION, MALIGNANT: ICD-10-CM

## 2024-07-27 DIAGNOSIS — I48.0 PAROXYSMAL ATRIAL FIBRILLATION (HCC): ICD-10-CM

## 2024-07-27 DIAGNOSIS — E78.49 FAMILIAL COMBINED HYPERLIPIDEMIA: ICD-10-CM

## 2024-07-27 LAB
ALBUMIN SERPL BCG-MCNC: 3.9 G/DL (ref 3.5–5)
ALP SERPL-CCNC: 68 U/L (ref 34–104)
ALT SERPL W P-5'-P-CCNC: 15 U/L (ref 7–52)
ANION GAP SERPL CALCULATED.3IONS-SCNC: 8 MMOL/L (ref 4–13)
AST SERPL W P-5'-P-CCNC: 25 U/L (ref 13–39)
BASOPHILS # BLD AUTO: 0.05 THOUSANDS/ÂΜL (ref 0–0.1)
BASOPHILS NFR BLD AUTO: 1 % (ref 0–1)
BILIRUB SERPL-MCNC: 1.15 MG/DL (ref 0.2–1)
BUN SERPL-MCNC: 10 MG/DL (ref 5–25)
CALCIUM SERPL-MCNC: 9.6 MG/DL (ref 8.4–10.2)
CHLORIDE SERPL-SCNC: 95 MMOL/L (ref 96–108)
CHOLEST SERPL-MCNC: 129 MG/DL
CO2 SERPL-SCNC: 27 MMOL/L (ref 21–32)
CREAT SERPL-MCNC: 0.69 MG/DL (ref 0.6–1.3)
EOSINOPHIL # BLD AUTO: 0.12 THOUSAND/ÂΜL (ref 0–0.61)
EOSINOPHIL NFR BLD AUTO: 2 % (ref 0–6)
ERYTHROCYTE [DISTWIDTH] IN BLOOD BY AUTOMATED COUNT: 13.9 % (ref 11.6–15.1)
GFR SERPL CREATININE-BSD FRML MDRD: 91 ML/MIN/1.73SQ M
GLUCOSE P FAST SERPL-MCNC: 94 MG/DL (ref 65–99)
HCT VFR BLD AUTO: 50.5 % (ref 36.5–49.3)
HDLC SERPL-MCNC: 46 MG/DL
HGB BLD-MCNC: 16.2 G/DL (ref 12–17)
IMM GRANULOCYTES # BLD AUTO: 0.03 THOUSAND/UL (ref 0–0.2)
IMM GRANULOCYTES NFR BLD AUTO: 1 % (ref 0–2)
LDLC SERPL CALC-MCNC: 64 MG/DL (ref 0–100)
LYMPHOCYTES # BLD AUTO: 1.13 THOUSANDS/ÂΜL (ref 0.6–4.47)
LYMPHOCYTES NFR BLD AUTO: 22 % (ref 14–44)
MCH RBC QN AUTO: 28.6 PG (ref 26.8–34.3)
MCHC RBC AUTO-ENTMCNC: 32.1 G/DL (ref 31.4–37.4)
MCV RBC AUTO: 89 FL (ref 82–98)
MONOCYTES # BLD AUTO: 0.49 THOUSAND/ÂΜL (ref 0.17–1.22)
MONOCYTES NFR BLD AUTO: 9 % (ref 4–12)
NEUTROPHILS # BLD AUTO: 3.4 THOUSANDS/ÂΜL (ref 1.85–7.62)
NEUTS SEG NFR BLD AUTO: 65 % (ref 43–75)
NONHDLC SERPL-MCNC: 83 MG/DL
NRBC BLD AUTO-RTO: 0 /100 WBCS
PLATELET # BLD AUTO: 212 THOUSANDS/UL (ref 149–390)
PMV BLD AUTO: 10.2 FL (ref 8.9–12.7)
POTASSIUM SERPL-SCNC: 4.6 MMOL/L (ref 3.5–5.3)
PROT SERPL-MCNC: 7.5 G/DL (ref 6.4–8.4)
PSA SERPL-MCNC: 5.14 NG/ML (ref 0–4)
RBC # BLD AUTO: 5.66 MILLION/UL (ref 3.88–5.62)
SODIUM SERPL-SCNC: 130 MMOL/L (ref 135–147)
TRIGL SERPL-MCNC: 93 MG/DL
WBC # BLD AUTO: 5.22 THOUSAND/UL (ref 4.31–10.16)

## 2024-07-27 PROCEDURE — 36415 COLL VENOUS BLD VENIPUNCTURE: CPT

## 2024-07-27 PROCEDURE — 85025 COMPLETE CBC W/AUTO DIFF WBC: CPT

## 2024-07-27 PROCEDURE — 80053 COMPREHEN METABOLIC PANEL: CPT

## 2024-07-27 PROCEDURE — 84153 ASSAY OF PSA TOTAL: CPT

## 2024-07-27 PROCEDURE — 80061 LIPID PANEL: CPT

## 2024-07-29 ENCOUNTER — APPOINTMENT (OUTPATIENT)
Dept: RADIATION ONCOLOGY | Facility: CLINIC | Age: 79
End: 2024-07-29
Attending: INTERNAL MEDICINE
Payer: MEDICARE

## 2024-07-29 PROCEDURE — 77014 CHG CT GUIDANCE RADIATION THERAPY FLDS PLACEMENT: CPT | Performed by: RADIOLOGY

## 2024-07-29 PROCEDURE — 77427 RADIATION TX MANAGEMENT X5: CPT | Performed by: INTERNAL MEDICINE

## 2024-07-29 PROCEDURE — 77385 HB NTSTY MODUL RAD TX DLVR SMPL: CPT | Performed by: RADIOLOGY

## 2024-07-30 ENCOUNTER — APPOINTMENT (OUTPATIENT)
Dept: RADIATION ONCOLOGY | Facility: CLINIC | Age: 79
End: 2024-07-30
Attending: INTERNAL MEDICINE
Payer: MEDICARE

## 2024-07-30 PROCEDURE — 77014 CHG CT GUIDANCE RADIATION THERAPY FLDS PLACEMENT: CPT | Performed by: RADIOLOGY

## 2024-07-30 PROCEDURE — 77385 HB NTSTY MODUL RAD TX DLVR SMPL: CPT | Performed by: RADIOLOGY

## 2024-07-31 ENCOUNTER — APPOINTMENT (OUTPATIENT)
Dept: RADIATION ONCOLOGY | Facility: CLINIC | Age: 79
End: 2024-07-31
Attending: INTERNAL MEDICINE
Payer: MEDICARE

## 2024-07-31 PROCEDURE — 77014 CHG CT GUIDANCE RADIATION THERAPY FLDS PLACEMENT: CPT | Performed by: INTERNAL MEDICINE

## 2024-07-31 PROCEDURE — 77385 HB NTSTY MODUL RAD TX DLVR SMPL: CPT | Performed by: INTERNAL MEDICINE

## 2024-08-01 ENCOUNTER — APPOINTMENT (OUTPATIENT)
Dept: RADIATION ONCOLOGY | Facility: CLINIC | Age: 79
End: 2024-08-01
Attending: INTERNAL MEDICINE
Payer: MEDICARE

## 2024-08-01 PROCEDURE — 77385 HB NTSTY MODUL RAD TX DLVR SMPL: CPT | Performed by: RADIOLOGY

## 2024-08-01 PROCEDURE — 77014 CHG CT GUIDANCE RADIATION THERAPY FLDS PLACEMENT: CPT | Performed by: RADIOLOGY

## 2024-08-02 ENCOUNTER — APPOINTMENT (OUTPATIENT)
Dept: RADIATION ONCOLOGY | Facility: CLINIC | Age: 79
End: 2024-08-02
Attending: INTERNAL MEDICINE
Payer: MEDICARE

## 2024-08-02 PROCEDURE — 77385 HB NTSTY MODUL RAD TX DLVR SMPL: CPT | Performed by: INTERNAL MEDICINE

## 2024-08-02 PROCEDURE — 77336 RADIATION PHYSICS CONSULT: CPT | Performed by: INTERNAL MEDICINE

## 2024-08-02 PROCEDURE — 77014 CHG CT GUIDANCE RADIATION THERAPY FLDS PLACEMENT: CPT | Performed by: INTERNAL MEDICINE

## 2024-08-05 ENCOUNTER — APPOINTMENT (OUTPATIENT)
Dept: RADIATION ONCOLOGY | Facility: CLINIC | Age: 79
End: 2024-08-05
Attending: INTERNAL MEDICINE
Payer: MEDICARE

## 2024-08-05 PROCEDURE — 77014 CHG CT GUIDANCE RADIATION THERAPY FLDS PLACEMENT: CPT | Performed by: RADIOLOGY

## 2024-08-05 PROCEDURE — 77427 RADIATION TX MANAGEMENT X5: CPT | Performed by: INTERNAL MEDICINE

## 2024-08-05 PROCEDURE — 77385 HB NTSTY MODUL RAD TX DLVR SMPL: CPT | Performed by: RADIOLOGY

## 2024-08-06 ENCOUNTER — APPOINTMENT (OUTPATIENT)
Dept: RADIATION ONCOLOGY | Facility: CLINIC | Age: 79
End: 2024-08-06
Attending: INTERNAL MEDICINE
Payer: MEDICARE

## 2024-08-06 PROCEDURE — 77014 CHG CT GUIDANCE RADIATION THERAPY FLDS PLACEMENT: CPT | Performed by: RADIOLOGY

## 2024-08-06 PROCEDURE — 77385 HB NTSTY MODUL RAD TX DLVR SMPL: CPT | Performed by: RADIOLOGY

## 2024-08-07 ENCOUNTER — APPOINTMENT (OUTPATIENT)
Dept: RADIATION ONCOLOGY | Facility: CLINIC | Age: 79
End: 2024-08-07
Attending: INTERNAL MEDICINE
Payer: MEDICARE

## 2024-08-07 PROCEDURE — 77014 CHG CT GUIDANCE RADIATION THERAPY FLDS PLACEMENT: CPT | Performed by: INTERNAL MEDICINE

## 2024-08-07 PROCEDURE — 77385 HB NTSTY MODUL RAD TX DLVR SMPL: CPT | Performed by: INTERNAL MEDICINE

## 2024-08-08 ENCOUNTER — APPOINTMENT (OUTPATIENT)
Dept: RADIATION ONCOLOGY | Facility: CLINIC | Age: 79
End: 2024-08-08
Payer: MEDICARE

## 2024-08-08 ENCOUNTER — APPOINTMENT (OUTPATIENT)
Dept: RADIATION ONCOLOGY | Facility: CLINIC | Age: 79
End: 2024-08-08
Attending: INTERNAL MEDICINE
Payer: MEDICARE

## 2024-08-08 PROCEDURE — 77014 CHG CT GUIDANCE RADIATION THERAPY FLDS PLACEMENT: CPT | Performed by: INTERNAL MEDICINE

## 2024-08-08 PROCEDURE — 77385 HB NTSTY MODUL RAD TX DLVR SMPL: CPT | Performed by: INTERNAL MEDICINE

## 2024-08-09 ENCOUNTER — APPOINTMENT (OUTPATIENT)
Dept: RADIATION ONCOLOGY | Facility: CLINIC | Age: 79
End: 2024-08-09
Attending: INTERNAL MEDICINE
Payer: MEDICARE

## 2024-08-09 PROCEDURE — 77385 HB NTSTY MODUL RAD TX DLVR SMPL: CPT | Performed by: INTERNAL MEDICINE

## 2024-08-09 PROCEDURE — 77336 RADIATION PHYSICS CONSULT: CPT | Performed by: INTERNAL MEDICINE

## 2024-08-09 PROCEDURE — 77014 CHG CT GUIDANCE RADIATION THERAPY FLDS PLACEMENT: CPT | Performed by: INTERNAL MEDICINE

## 2024-08-12 ENCOUNTER — APPOINTMENT (OUTPATIENT)
Dept: RADIATION ONCOLOGY | Facility: CLINIC | Age: 79
End: 2024-08-12
Attending: INTERNAL MEDICINE
Payer: MEDICARE

## 2024-08-12 PROCEDURE — 77427 RADIATION TX MANAGEMENT X5: CPT | Performed by: INTERNAL MEDICINE

## 2024-08-12 PROCEDURE — 77385 HB NTSTY MODUL RAD TX DLVR SMPL: CPT | Performed by: RADIOLOGY

## 2024-08-12 PROCEDURE — 77014 CHG CT GUIDANCE RADIATION THERAPY FLDS PLACEMENT: CPT | Performed by: RADIOLOGY

## 2024-08-13 ENCOUNTER — APPOINTMENT (OUTPATIENT)
Dept: RADIATION ONCOLOGY | Facility: CLINIC | Age: 79
End: 2024-08-13
Attending: INTERNAL MEDICINE
Payer: MEDICARE

## 2024-08-13 PROCEDURE — 77014 CHG CT GUIDANCE RADIATION THERAPY FLDS PLACEMENT: CPT | Performed by: RADIOLOGY

## 2024-08-13 PROCEDURE — 77385 HB NTSTY MODUL RAD TX DLVR SMPL: CPT | Performed by: RADIOLOGY

## 2024-08-14 ENCOUNTER — APPOINTMENT (OUTPATIENT)
Dept: RADIATION ONCOLOGY | Facility: CLINIC | Age: 79
End: 2024-08-14
Attending: INTERNAL MEDICINE
Payer: MEDICARE

## 2024-08-14 PROCEDURE — 77385 HB NTSTY MODUL RAD TX DLVR SMPL: CPT | Performed by: INTERNAL MEDICINE

## 2024-08-14 PROCEDURE — 77014 CHG CT GUIDANCE RADIATION THERAPY FLDS PLACEMENT: CPT | Performed by: INTERNAL MEDICINE

## 2024-08-15 ENCOUNTER — APPOINTMENT (OUTPATIENT)
Dept: RADIATION ONCOLOGY | Facility: CLINIC | Age: 79
End: 2024-08-15
Attending: INTERNAL MEDICINE
Payer: MEDICARE

## 2024-08-15 PROCEDURE — 77014 CHG CT GUIDANCE RADIATION THERAPY FLDS PLACEMENT: CPT | Performed by: INTERNAL MEDICINE

## 2024-08-15 PROCEDURE — 77385 HB NTSTY MODUL RAD TX DLVR SMPL: CPT | Performed by: INTERNAL MEDICINE

## 2024-08-16 ENCOUNTER — APPOINTMENT (OUTPATIENT)
Dept: RADIATION ONCOLOGY | Facility: CLINIC | Age: 79
End: 2024-08-16
Attending: INTERNAL MEDICINE
Payer: MEDICARE

## 2024-08-16 ENCOUNTER — DOCUMENTATION (OUTPATIENT)
Dept: HEMATOLOGY ONCOLOGY | Facility: CLINIC | Age: 79
End: 2024-08-16

## 2024-08-16 PROCEDURE — 77014 CHG CT GUIDANCE RADIATION THERAPY FLDS PLACEMENT: CPT | Performed by: RADIOLOGY

## 2024-08-16 PROCEDURE — 77336 RADIATION PHYSICS CONSULT: CPT | Performed by: INTERNAL MEDICINE

## 2024-08-16 PROCEDURE — 77385 HB NTSTY MODUL RAD TX DLVR SMPL: CPT | Performed by: RADIOLOGY

## 2024-08-16 NOTE — PROGRESS NOTES
Patient is scheduled to complete RT on 8/28/24. Message sent to urology office to schedule 3 month follow up post RT with PSA PTV. I will follow up on date.

## 2024-08-17 DIAGNOSIS — I10 ESSENTIAL HYPERTENSION, BENIGN: ICD-10-CM

## 2024-08-17 RX ORDER — BENAZEPRIL HYDROCHLORIDE 20 MG/1
TABLET ORAL
Qty: 90 TABLET | Refills: 1 | Status: SHIPPED | OUTPATIENT
Start: 2024-08-17

## 2024-08-19 ENCOUNTER — TELEPHONE (OUTPATIENT)
Dept: UROLOGY | Facility: MEDICAL CENTER | Age: 79
End: 2024-08-19

## 2024-08-19 ENCOUNTER — APPOINTMENT (OUTPATIENT)
Dept: RADIATION ONCOLOGY | Facility: CLINIC | Age: 79
End: 2024-08-19
Attending: INTERNAL MEDICINE
Payer: MEDICARE

## 2024-08-19 DIAGNOSIS — C61 PROSTATE CANCER (HCC): Primary | ICD-10-CM

## 2024-08-19 PROCEDURE — 77427 RADIATION TX MANAGEMENT X5: CPT | Performed by: RADIOLOGY

## 2024-08-19 PROCEDURE — 77385 HB NTSTY MODUL RAD TX DLVR SMPL: CPT | Performed by: RADIOLOGY

## 2024-08-19 PROCEDURE — 77014 CHG CT GUIDANCE RADIATION THERAPY FLDS PLACEMENT: CPT | Performed by: RADIOLOGY

## 2024-08-19 RX ORDER — ROSUVASTATIN CALCIUM 20 MG/1
20 TABLET, COATED ORAL DAILY
COMMUNITY
Start: 2024-08-08 | End: 2025-08-08

## 2024-08-20 ENCOUNTER — APPOINTMENT (OUTPATIENT)
Dept: RADIATION ONCOLOGY | Facility: CLINIC | Age: 79
End: 2024-08-20
Attending: INTERNAL MEDICINE
Payer: MEDICARE

## 2024-08-20 ENCOUNTER — PROCEDURE VISIT (OUTPATIENT)
Dept: UROLOGY | Facility: MEDICAL CENTER | Age: 79
End: 2024-08-20
Payer: MEDICARE

## 2024-08-20 VITALS
HEIGHT: 68 IN | BODY MASS INDEX: 26.22 KG/M2 | SYSTOLIC BLOOD PRESSURE: 128 MMHG | DIASTOLIC BLOOD PRESSURE: 74 MMHG | WEIGHT: 173 LBS

## 2024-08-20 DIAGNOSIS — C61 PROSTATE CANCER (HCC): Primary | ICD-10-CM

## 2024-08-20 PROCEDURE — 77014 CHG CT GUIDANCE RADIATION THERAPY FLDS PLACEMENT: CPT | Performed by: RADIOLOGY

## 2024-08-20 PROCEDURE — 96402 CHEMO HORMON ANTINEOPL SQ/IM: CPT

## 2024-08-20 PROCEDURE — 77385 HB NTSTY MODUL RAD TX DLVR SMPL: CPT | Performed by: RADIOLOGY

## 2024-08-20 NOTE — PROGRESS NOTES
"8/20/2024  Cruz Patricio Jr. is a 79 y.o. male  [id number]    Diagnosis:  Chief Complaint    Prostate Cancer         Patient presents for 1 month Firmagon injection managed by Dr. Burks    Plan:  Return to the office in 1 month for injection with the nurse.       Medication administration:    80mg Firmagon administered in left abdominal subcutaneous tissue. Pt tolerated well. Band Aid applied.     Administrations This Visit       degarelix acetate (FIRMAGON) injection 80 mg       Admin Date  08/20/2024 Action  Given Dose  80 mg Route  Subcutaneous Documented By  Sasha Ferreira RN                    Vitals:    08/20/24 1139   BP: 128/74   BP Location: Left arm   Patient Position: Sitting   Cuff Size: Standard   Weight: 78.5 kg (173 lb)   Height: 5' 8\" (1.727 m)         Sasha Ferreira RN   "

## 2024-08-21 ENCOUNTER — APPOINTMENT (OUTPATIENT)
Dept: RADIATION ONCOLOGY | Facility: CLINIC | Age: 79
End: 2024-08-21
Attending: INTERNAL MEDICINE
Payer: MEDICARE

## 2024-08-21 PROCEDURE — 77014 CHG CT GUIDANCE RADIATION THERAPY FLDS PLACEMENT: CPT | Performed by: RADIOLOGY

## 2024-08-21 PROCEDURE — 77385 HB NTSTY MODUL RAD TX DLVR SMPL: CPT | Performed by: RADIOLOGY

## 2024-08-22 ENCOUNTER — APPOINTMENT (OUTPATIENT)
Dept: RADIATION ONCOLOGY | Facility: CLINIC | Age: 79
End: 2024-08-22
Attending: INTERNAL MEDICINE
Payer: MEDICARE

## 2024-08-22 PROCEDURE — 77385 HB NTSTY MODUL RAD TX DLVR SMPL: CPT | Performed by: RADIOLOGY

## 2024-08-22 PROCEDURE — 77014 CHG CT GUIDANCE RADIATION THERAPY FLDS PLACEMENT: CPT | Performed by: RADIOLOGY

## 2024-08-23 ENCOUNTER — APPOINTMENT (OUTPATIENT)
Dept: RADIATION ONCOLOGY | Facility: CLINIC | Age: 79
End: 2024-08-23
Attending: INTERNAL MEDICINE
Payer: MEDICARE

## 2024-08-23 PROCEDURE — 77385 HB NTSTY MODUL RAD TX DLVR SMPL: CPT | Performed by: RADIOLOGY

## 2024-08-23 PROCEDURE — 77336 RADIATION PHYSICS CONSULT: CPT | Performed by: RADIOLOGY

## 2024-08-23 PROCEDURE — 77014 CHG CT GUIDANCE RADIATION THERAPY FLDS PLACEMENT: CPT | Performed by: RADIOLOGY

## 2024-08-26 ENCOUNTER — APPOINTMENT (OUTPATIENT)
Dept: RADIATION ONCOLOGY | Facility: CLINIC | Age: 79
End: 2024-08-26
Attending: INTERNAL MEDICINE
Payer: MEDICARE

## 2024-08-26 PROCEDURE — 77385 HB NTSTY MODUL RAD TX DLVR SMPL: CPT | Performed by: RADIOLOGY

## 2024-08-26 PROCEDURE — 77014 CHG CT GUIDANCE RADIATION THERAPY FLDS PLACEMENT: CPT | Performed by: RADIOLOGY

## 2024-08-26 PROCEDURE — 77427 RADIATION TX MANAGEMENT X5: CPT | Performed by: RADIOLOGY

## 2024-08-27 ENCOUNTER — APPOINTMENT (OUTPATIENT)
Dept: RADIATION ONCOLOGY | Facility: CLINIC | Age: 79
End: 2024-08-27
Attending: INTERNAL MEDICINE
Payer: MEDICARE

## 2024-08-27 PROCEDURE — 77385 HB NTSTY MODUL RAD TX DLVR SMPL: CPT | Performed by: RADIOLOGY

## 2024-08-27 PROCEDURE — 77014 CHG CT GUIDANCE RADIATION THERAPY FLDS PLACEMENT: CPT | Performed by: RADIOLOGY

## 2024-08-28 ENCOUNTER — APPOINTMENT (OUTPATIENT)
Dept: RADIATION ONCOLOGY | Facility: CLINIC | Age: 79
End: 2024-08-28
Attending: INTERNAL MEDICINE
Payer: MEDICARE

## 2024-08-28 PROCEDURE — 77385 HB NTSTY MODUL RAD TX DLVR SMPL: CPT | Performed by: RADIOLOGY

## 2024-08-28 PROCEDURE — 77336 RADIATION PHYSICS CONSULT: CPT | Performed by: RADIOLOGY

## 2024-08-28 PROCEDURE — 77014 CHG CT GUIDANCE RADIATION THERAPY FLDS PLACEMENT: CPT | Performed by: RADIOLOGY

## 2024-09-11 ENCOUNTER — PATIENT OUTREACH (OUTPATIENT)
Dept: HEMATOLOGY ONCOLOGY | Facility: CLINIC | Age: 79
End: 2024-09-11

## 2024-09-11 ENCOUNTER — TELEPHONE (OUTPATIENT)
Age: 79
End: 2024-09-11

## 2024-09-11 NOTE — PROGRESS NOTES
I reached out and spoke with Jack to follow up and to review for any changes in barriers to care and offer supportive services as needed.    Barriers noted previously; None.     Current barriers and interventions provided: None    Increased urination urgency, he is on flomax.  Patient is not on Orgovyx any longer, patient wants to know if 1/2025 appt is needed since. Patient will be expecting call with outcome.    I have provided my direct contact information and welcome them to contact me if their needs as discussed above change. They were appreciative for the call.

## 2024-09-11 NOTE — TELEPHONE ENCOUNTER
Called patient in regards to below message received from patient navigation:  Increased urination urgency, he is on flomax.  Patient is not on Orgovyx any longer, patient wants to know if 1/2025 appt is needed since. Patient will be expecting call with outcome.    Spoke with patient. Informed him that he should proceed with follow up with Dr. Shelton as scheduled in January but continue to see Urology in the meantime. Also recommended he discuss his changes in urinary symptoms with their office. He verbalized an understanding and states he will touch base with them. All questions answered.

## 2024-09-20 ENCOUNTER — PROCEDURE VISIT (OUTPATIENT)
Dept: UROLOGY | Facility: MEDICAL CENTER | Age: 79
End: 2024-09-20
Payer: MEDICARE

## 2024-09-20 VITALS
WEIGHT: 170 LBS | DIASTOLIC BLOOD PRESSURE: 78 MMHG | HEART RATE: 65 BPM | BODY MASS INDEX: 25.76 KG/M2 | SYSTOLIC BLOOD PRESSURE: 112 MMHG | HEIGHT: 68 IN

## 2024-09-20 DIAGNOSIS — C61 PROSTATE CANCER (HCC): Primary | ICD-10-CM

## 2024-09-20 PROCEDURE — 96402 CHEMO HORMON ANTINEOPL SQ/IM: CPT

## 2024-09-20 NOTE — PROGRESS NOTES
"9/20/2024  Cruz Patricio Jr. is a 79 y.o. male  [id number]    Diagnosis:  Chief Complaint    Prostate Cancer         Patient presents for Firmagon Monthly 80 mg injection # 3 of 5 managed by Dr. Burks    Plan:  Return in one month for Firmagon 80 mg injection # 4 of 5      Medication administration:  One month 80 mg Firmagon Injection administered into pt's right upper outer quadrant of abdomen.  Pt tolerated well.  Bandaid applied.     Administrations This Visit       degarelix acetate (FIRMAGON) injection 80 mg       Admin Date  09/20/2024 Action  Given Dose  80 mg Route  Subcutaneous Documented By  Maribel Monreal RN                    Vitals:    09/20/24 1047   BP: 112/78   Pulse: 65   Weight: 77.1 kg (170 lb)   Height: 5' 8\" (1.727 m)         Maribel Monreal RN   "

## 2024-10-18 ENCOUNTER — TELEPHONE (OUTPATIENT)
Dept: UROLOGY | Facility: MEDICAL CENTER | Age: 79
End: 2024-10-18

## 2024-10-18 DIAGNOSIS — C61 PROSTATE CANCER (HCC): Primary | ICD-10-CM

## 2024-10-19 ENCOUNTER — APPOINTMENT (OUTPATIENT)
Dept: LAB | Facility: CLINIC | Age: 79
End: 2024-10-19
Payer: MEDICARE

## 2024-10-19 DIAGNOSIS — C61 PROSTATE CANCER (HCC): ICD-10-CM

## 2024-10-19 LAB — PSA SERPL-MCNC: 0.45 NG/ML (ref 0–4)

## 2024-10-19 PROCEDURE — 36415 COLL VENOUS BLD VENIPUNCTURE: CPT

## 2024-10-19 PROCEDURE — 84153 ASSAY OF PSA TOTAL: CPT

## 2024-10-21 ENCOUNTER — PROCEDURE VISIT (OUTPATIENT)
Dept: UROLOGY | Facility: MEDICAL CENTER | Age: 79
End: 2024-10-21
Payer: MEDICARE

## 2024-10-21 VITALS
DIASTOLIC BLOOD PRESSURE: 78 MMHG | BODY MASS INDEX: 26.15 KG/M2 | WEIGHT: 172 LBS | SYSTOLIC BLOOD PRESSURE: 140 MMHG | HEART RATE: 55 BPM

## 2024-10-21 DIAGNOSIS — C61 PROSTATE CANCER (HCC): Primary | ICD-10-CM

## 2024-10-21 PROCEDURE — 96402 CHEMO HORMON ANTINEOPL SQ/IM: CPT

## 2024-10-21 NOTE — PROGRESS NOTES
10/21/2024  Cruz Patricio Jr. is a 79 y.o. male  [id number]    Diagnosis:  Chief Complaint    Prostate Cancer         Patient presents for Firmagon 80 mg one month injection #4 managed by Dr. Burks.  Loading dose given 7/19/24    Plan:  Return in one month for Firmagon 80 mg one month injection #5.  Original plan from Rad Onc was for 6 months of therapy.  Need to follow up regarding plan for future injections past #6      Medication administration:    Firmagon one month 80 mg Injection administered into pt's left abdomen.  Pt tolerated well.  Bandaid applied.     Administrations This Visit       degarelix acetate (FIRMAGON) injection 80 mg       Admin Date  10/21/2024 Action  Given Dose  80 mg Route  Subcutaneous Documented By  Maribel Monreal RN                    Vitals:    10/21/24 1036   BP: 140/78   Pulse: 55   Weight: 78 kg (172 lb)         Maribel Monreal RN

## 2024-10-23 ENCOUNTER — OFFICE VISIT (OUTPATIENT)
Dept: RADIATION ONCOLOGY | Facility: CLINIC | Age: 79
End: 2024-10-23
Attending: INTERNAL MEDICINE

## 2024-10-23 DIAGNOSIS — C61 PROSTATE CANCER (HCC): Primary | ICD-10-CM

## 2024-10-23 PROCEDURE — 99024 POSTOP FOLLOW-UP VISIT: CPT | Performed by: INTERNAL MEDICINE

## 2024-10-23 NOTE — PROGRESS NOTES
Cora Velasquez is a 43 year old female presenting with   Chief Complaint   Patient presents with   • Imm/Inj     DEPO      Patient came in for her depo injection. Gave the patient her depo injection along with her next due dates Dec 26 - Jan 9 patient understood and verbalized.          Virtual Brief Visit - Radiation Oncology   Cruz Patricio Jr. 1945 79 y.o. male [id number]      REQUIRED DOCUMENTATION:     1. This service was provided via Telemedicine.  2. Provider located at   [facility] RADIATION ONCOLOGY  [address]  [facility] [address]  3. TeleMed provider: Dion Gibbs MD.  4. Identify all parties in room with patient during tele consult:  None  5.Patient was then informed that this was a Telemedicine visit and that the exam was being conducted confidentially over secure lines. My office door was closed. No one else was in the room.  Patient acknowledged consent and understanding of privacy and security of the Telemedicine visit, and, if applicable, gave us permission to have an assistant stay in the room in order to assist with the history and to conduct the exam.  I informed the patient that I have reviewed their record in Epic and presented the opportunity for them to ask any questions regarding the visit today.  The patient agreed to participate.       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  He underwent short-term ADT and definitive radiation therapy completing on 8/28/2024.    He presents for routine 1-1.5 month end of treatment follow-up visit. He has recovered from usual expected side effects of therapy. He notes good bowel and urinary function during the day. At night he used to have   Reviewed posttreatment PSA and  urology follow-up scheduled 3 months after completion of treatment.  24  Dr. Burks  25  Dr. Shelton  RTC in 6 months.    Dion Gibbs MD  Department of Radiation Oncology  [facility]    No orders of the defined types were placed in this encounter.       Total Time Spent  15 minutes spent reviewing EMR in preparation for visit, with the patient, coordination with other providers, and documentation.    History of Present Illness   Interval History:  With h/o unfavorable intermediate risk Santa 7 (4+3), PSA 7.0 prostate cancer . He is currently receiving Firmagon with urology to complete 6 months of ADT.   He completed his RT on 24.  Today's visit is an EOT phone follow-up    Upcomin24  Dr. Burks  25  Dr. Shelton    Bowel habits are good. Urination during the day is predictable, at night he used to have 3-4x nocturia at baseline. Now the timing is more unpredictable and he wears a depends because he can't predict the time. But he feels that the sleeping better without any awakenings so he is okay with this.    Historical Information   Oncology History   Prostate cancer (HCC)   3/22/2024 Initial Diagnosis    Prostate cancer (HCC)     3/22/2024 Biopsy    A. Prostate, left trans. zone:  - Benign prostate glands and stroma with focal acute inflammation.     B. Prostate, right trans. zone:  - Benign prostate glands and stroma with acute and chronic inflammation.      C. Prostate, left post. base:  - Benign prostate glands and stroma.      D. Prostate, right post. base:  - Benign prostate glands and stroma.      E. Prostate, RIGHT POSTERIOR MEDIAL:  - Benign prostate glands and stroma.      F. Prostate, LEFT POSTERIOR MEDIAL:  - Benign prostate glands and stroma.      G. Prostate, LEFT POSTERIOR LATERAL:  - Benign prostate glands and stroma.      H. Prostate, RIGHT ANTERIOR MEDIAL:  - Benign prostate glands and stroma.      I. Prostate, RIGHT ANTERIOR LATERAL:  -  Atypical small acinar proliferation (ASAP), suspicious but not diagnostic for malignancy.     J. Prostate, RIGHT POSTERIOR LATERAL:  - Prostatic adenocarcinoma, [place] grade 4+3 = 7 (70% pattern 4 and 30% pattern 3, Grade group 3), involving one of two cores, 70% of the involved core  and 30% of the involved tissue.  - Perineural invasion is absent.     Comment: Foci of cribriform glands are present with the pattern 4 component.  This feature has been associated with adverse clinical outcomes and molecular features typically seen in advanced disease.  (The 2019 Genitourinary Pathology Society (GUPS) White Paper on Contemporary Grading of Prostate Cancer. Arch Pathol Lab Med. 2021 Apr 1;145(4):461-493.)     Immunohistochemistry for prostate triple stain multiplex (,P63,[id number]) was performed on blocks J2, supporting the above diagnosis.     Intradepartmental consultation is in agreement.     K. Prostate, LEFT ANTERIOR MEDIAL:  - Benign prostate glands and stroma.       L. Prostate, LEFT ANTERIOR LATERAL:  - Benign prostate stroma.       M. Prostate, ZARIA right post. lat. apex:  - Benign prostate glands and stroma.          4/26/2024 -  Cancer Staged    Staging form: Prostate, AJCC 8th Edition  - Clinical stage from 4/26/2024: Stage IIC (cT1c, cN0, cM0, PSA: 7, Grade Group: 3) - Signed by Dion Gibbs MD on 4/26/2024  Prostate specific antigen (PSA) range: Less than 10  Santa score: 7  Histologic grading system: 5 grade system       7/22/2024 - 8/28/2024 Radiation        Plan ID Energy Fractions Dose per Fraction (cGy) Dose Correction (cGy) Total Dose Delivered (cGy) Elapsed Days   PROSTATE pSV 6X-FFF 28 / 28 250 0 7,000 37      Treatment dates:  C1: 7/22/2024 - 8/28/2024           Past Medical History:   Diagnosis Date    A-fib (HCC)     Acute MI (HCC) 06/2002    Arthritis     Atypical chest pain 03/05/2008    Basal cell carcinoma 2009    Coronary artery disease     Diverticulosis 2008    Hematuria,  microscopic     History of varicose veins     Hyperlipidemia     Hypertension     Myocardial infarction (HCC)     Nocturia     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     Prostate cancer (HCC) 3/22/2024    Urinary tract infection     Wears glasses      Past Surgical History:   Procedure Laterality Date    COLONOSCOPY      CORONARY ANGIOPLASTY      right CA x 3 vessels: redo PTCA-LAD 10/04    CORONARY STENT PLACEMENT  2002    INSERTION OF FIDUCIAL MARKER (TRANSRECTAL ULTRASOUND GUIDANCE) N/A 2024    Procedure: INSERTION OF FIDUCIAL MARKER , SPACEOAR;  Surgeon: Jaret Burks MD;  Location: [facility];  Service: Urology    JOINT REPLACEMENT      R hip    AK BX PROSTATE STRTCTC SATURATION SAMPLING IMG GID N/A 3/22/2024    Procedure: TRANSPERINEAL MRI FUSION BIOPSY PROSTATE;  Surgeon: Gabino Lopez MD;  Location: [facility];  Service: Urology    AK COLONOSCOPY FLX DX W/COLLJ SPEC WHEN PFRMD N/A 2017    Procedure: COLONOSCOPY;  Surgeon: Scott Last MD;  Location: [facility];  Service: Colorectal    TOTAL HIP ARTHROPLASTY Right     TOTAL HIP ARTHROPLASTY Left 2019    WISDOM TOOTH EXTRACTION       Social History   Social History     Substance and Sexual Activity   Alcohol Use No     Social History     Substance and Sexual Activity   Drug Use No     Social History     Tobacco Use   Smoking Status Former    Current packs/day: 0.00    Average packs/day: 0.5 packs/day for 5.0 years (2.5 ttl pk-yrs)    Types: Cigarettes    Start date: 1967    Quit date: 1970    Years since quittin.8    Passive exposure: Past   Smokeless Tobacco Never   Tobacco Comments    quit ...0.5 packs/2.00 pack years     Meds/Allergies     Current Outpatient Medications:     ascorbic acid (VITAMIN C) 1000 MG tablet, Take 1,000 mg by mouth daily, Disp: , Rfl:     aspirin (ECOTRIN LOW STRENGTH) 81 mg EC tablet, Take 81 mg by mouth daily Twice a week, Disp: , Rfl:     atenolol (TENORMIN) 100  "mg tablet, TAKE 1 TABLET BY MOUTH EVERY DAY, Disp: 90 tablet, Rfl: 3    B Complex Vitamins (B COMPLEX 100 PO), Take by mouth, Disp: , Rfl:     benazepril (LOTENSIN) 20 mg tablet, TAKE 1 TABLET BY MOUTH EVERY DAY, Disp: 90 tablet, Rfl: 1    calcium carbonate (OS-LUCA) 600 MG tablet, Take 600 mg by mouth daily, Disp: , Rfl:     Coenzyme Q10 (CoQ-10) 200 MG CAPS, , Disp: , Rfl:     magnesium oxide (MAG-OX) 400 mg tablet, Take 400 mg by mouth daily, Disp: , Rfl:     multivitamin (THERAGRAN) TABS, Take 1 tablet by mouth, Disp: , Rfl:     Omega-3 Fatty Acids (FISH OIL PO), Take by mouth daily , Disp: , Rfl:     rivaroxaban (Xarelto) 20 mg tablet, Take 1 tablet (20 mg total) by mouth daily with breakfast, Disp: 90 tablet, Rfl: 3    rosuvastatin (CRESTOR) 20 MG tablet, Take 20 mg by mouth daily, Disp: , Rfl:     sildenafil (VIAGRA) 50 MG tablet, Take 1 tablet (50 mg total) by mouth daily as needed for erectile dysfunction, Disp: 20 tablet, Rfl: 6    tamsulosin (FLOMAX) 0.4 mg, TAKE 1 CAPSULE BY MOUTH EVERY DAY WITH DINNER, Disp: 90 capsule, Rfl: 3  Allergies   Allergen Reactions    No Active Allergies     Orgovyx [Relugolix] Confusion     LH, pre-syncope       OBJECTIVE:   Physical exam limited over telephone encounter.    Dion Gibbs MD  10/23/2024,10:45 AM    VIRTUAL VISIT DISCLAIMER  Patient verbally agrees to participate in Virtual Care Services. Pt is aware that Virtual Care Services could be limited without vital signs or the ability to perform a full hands-on physical exam. Patient understands that the patient or the provider may request at any time to terminate the video visit and request the patient to seek care or treatment in person.    Portions of the record may have been created with voice recognition software.  Occasional wrong word or \"sound a like\" substitutions may have occurred due to the inherent limitations of voice recognition software.  Read the chart carefully and recognize, using context, where " substitutions have occurred.

## 2024-10-23 NOTE — ASSESSMENT & PLAN NOTE
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  He underwent short-term ADT and definitive radiation therapy completing on 8/28/2024.    He presents for routine 1-1.5 month end of treatment follow-up visit. He has recovered from usual expected side effects of therapy. He notes good bowel and urinary function during the day. At night he used to have   Reviewed posttreatment PSA and urology follow-up scheduled 3 months after completion of treatment.  12/6/24  Dr. Burks  1/6/25  Dr. Shelton  RTC in 6 months.

## 2024-11-14 ENCOUNTER — TELEPHONE (OUTPATIENT)
Dept: UROLOGY | Facility: MEDICAL CENTER | Age: 79
End: 2024-11-14

## 2024-11-14 DIAGNOSIS — C61 PROSTATE CANCER (HCC): Primary | ICD-10-CM

## 2024-11-18 ENCOUNTER — PROCEDURE VISIT (OUTPATIENT)
Dept: UROLOGY | Facility: MEDICAL CENTER | Age: 79
End: 2024-11-18
Payer: MEDICARE

## 2024-11-18 ENCOUNTER — OFFICE VISIT (OUTPATIENT)
Dept: FAMILY MEDICINE CLINIC | Facility: CLINIC | Age: 79
End: 2024-11-18
Payer: MEDICARE

## 2024-11-18 VITALS
HEIGHT: 68 IN | OXYGEN SATURATION: 95 % | TEMPERATURE: 98.4 F | WEIGHT: 175.8 LBS | HEART RATE: 72 BPM | SYSTOLIC BLOOD PRESSURE: 120 MMHG | BODY MASS INDEX: 26.64 KG/M2 | DIASTOLIC BLOOD PRESSURE: 80 MMHG

## 2024-11-18 VITALS
WEIGHT: 173.8 LBS | SYSTOLIC BLOOD PRESSURE: 128 MMHG | DIASTOLIC BLOOD PRESSURE: 76 MMHG | HEART RATE: 60 BPM | HEIGHT: 68 IN | OXYGEN SATURATION: 98 % | BODY MASS INDEX: 26.34 KG/M2

## 2024-11-18 DIAGNOSIS — N39.0 URINARY TRACT INFECTION WITHOUT HEMATURIA, SITE UNSPECIFIED: Primary | ICD-10-CM

## 2024-11-18 DIAGNOSIS — C61 PROSTATE CANCER (HCC): Primary | ICD-10-CM

## 2024-11-18 LAB
SL AMB  POCT GLUCOSE, UA: ABNORMAL
SL AMB LEUKOCYTE ESTERASE,UA: 125
SL AMB POCT BILIRUBIN,UA: ABNORMAL
SL AMB POCT BLOOD,UA: ABNORMAL
SL AMB POCT CLARITY,UA: ABNORMAL
SL AMB POCT COLOR,UA: YELLOW
SL AMB POCT KETONES,UA: ABNORMAL
SL AMB POCT NITRITE,UA: ABNORMAL
SL AMB POCT PH,UA: 6.5
SL AMB POCT SPECIFIC GRAVITY,UA: 1.01
SL AMB POCT URINE PROTEIN: 15
SL AMB POCT UROBILINOGEN: 3.5

## 2024-11-18 PROCEDURE — 81002 URINALYSIS NONAUTO W/O SCOPE: CPT | Performed by: FAMILY MEDICINE

## 2024-11-18 PROCEDURE — 96402 CHEMO HORMON ANTINEOPL SQ/IM: CPT

## 2024-11-18 PROCEDURE — G2211 COMPLEX E/M VISIT ADD ON: HCPCS | Performed by: FAMILY MEDICINE

## 2024-11-18 PROCEDURE — 99214 OFFICE O/P EST MOD 30 MIN: CPT | Performed by: FAMILY MEDICINE

## 2024-11-18 RX ORDER — SULFAMETHOXAZOLE AND TRIMETHOPRIM 800; 160 MG/1; MG/1
1 TABLET ORAL 2 TIMES DAILY
Qty: 6 TABLET | Refills: 0 | Status: SHIPPED | OUTPATIENT
Start: 2024-11-18 | End: 2024-11-21

## 2024-11-18 NOTE — PROGRESS NOTES
Name: Cruz Patricio Jr.      : 1945      MRN: [id number]  Encounter Provider: Nellie Mason DO  Encounter Date: 2024   Encounter department: [facility]  :  Assessment & Plan  Urinary tract infection without hematuria, site unspecified  Day 3 of dysuria, cloudiness, burning, freq.  UA very +, has nitrites.  Bactirm BID x3 days  Sending for culture/sensitivites.    Orders:    POCT urine dip    sulfamethoxazole-trimethoprim (BACTRIM DS) 800-160 mg per tablet; Take 1 tablet by mouth 2 (two) times a day for 3 days    UA w Reflex to Microscopic w Reflex to Culture; Future           History of Present Illness     Difficulty Urinating   This is a recurrent problem. The current episode started in the past 7 days. The problem occurs every urination. The problem has been waxing and waning. The quality of the pain is described as burning. The pain is at a severity of 3/10. The pain is mild. There has been no fever. He is Not sexually active. There is No history of pyelonephritis. Associated symptoms include frequency, hesitancy and urgency. Pertinent negatives include no chills, discharge, flank pain, hematuria, possible pregnancy, sweats or vomiting. He has tried increased fluids for the symptoms. The treatment provided mild relief. His past medical history is significant for recurrent UTIs and a urological procedure.     Review of Systems   Constitutional:  Negative for chills and fever.   HENT:  Negative for ear pain and sore throat.    Eyes:  Negative for pain and visual disturbance.   Respiratory:  Negative for cough and shortness of breath.    Cardiovascular:  Negative for chest pain and palpitations.   Gastrointestinal:  Negative for abdominal pain and vomiting.   Genitourinary:  Positive for dysuria, frequency, hesitancy and urgency. Negative for flank pain and hematuria.   Musculoskeletal:  Negative for arthralgias and back pain.   Skin:  Negative for color change and rash.    Neurological:  Negative for seizures and syncope.   All other systems reviewed and are negative.    Medical History Reviewed by provider this encounter:  Tobacco  Allergies  Meds  Problems  Med Hx  Surg Hx  Fam Hx     .  Current Outpatient Medications on File Prior to Visit   Medication Sig Dispense Refill    ascorbic acid (VITAMIN C) 1000 MG tablet Take 1,000 mg by mouth daily      aspirin (ECOTRIN LOW STRENGTH) 81 mg EC tablet Take 81 mg by mouth daily Twice a week      atenolol (TENORMIN) 100 mg tablet TAKE 1 TABLET BY MOUTH EVERY DAY 90 tablet 3    B Complex Vitamins (B COMPLEX 100 PO) Take by mouth      benazepril (LOTENSIN) 20 mg tablet TAKE 1 TABLET BY MOUTH EVERY DAY 90 tablet 1    calcium carbonate (OS-LUCA) 600 MG tablet Take 600 mg by mouth daily      Coenzyme Q10 (CoQ-10) 200 MG CAPS       magnesium oxide (MAG-OX) 400 mg tablet Take 400 mg by mouth daily      multivitamin (THERAGRAN) TABS Take 1 tablet by mouth      Omega-3 Fatty Acids (FISH OIL PO) Take by mouth daily       rivaroxaban (Xarelto) 20 mg tablet Take 1 tablet (20 mg total) by mouth daily with breakfast 90 tablet 3    rosuvastatin (CRESTOR) 20 MG tablet Take 20 mg by mouth daily      sildenafil (VIAGRA) 50 MG tablet Take 1 tablet (50 mg total) by mouth daily as needed for erectile dysfunction 20 tablet 6    tamsulosin (FLOMAX) 0.4 mg TAKE 1 CAPSULE BY MOUTH EVERY DAY WITH DINNER 90 capsule 3    [DISCONTINUED] apixaban (ELIQUIS) 5 mg Take 1 tablet (5 mg total) by mouth in the morning and 1 tablet (5 mg total) in the evening. 60 tablet 0     Current Facility-Administered Medications on File Prior to Visit   Medication Dose Route Frequency Provider Last Rate Last Admin    [COMPLETED] degarelix acetate (FIRMAGON) injection 80 mg  80 mg Subcutaneous Once    80 mg at 11/18/24 1150      Social History     Tobacco Use    Smoking status: Former     Current packs/day: 0.00     Average packs/day: 0.5 packs/day for 5.0 years (2.5 ttl pk-yrs)      "Types: Cigarettes     Start date: 1967     Quit date: 1970     Years since quittin.9     Passive exposure: Past    Smokeless tobacco: Never    Tobacco comments:     quit ...0.5 packs/2.00 pack years   Vaping Use    Vaping status: Never Used   Substance and Sexual Activity    Alcohol use: No    Drug use: No    Sexual activity: Yes     Partners: Female     Birth control/protection: None        Objective   /80 (BP Location: Left arm, Patient Position: Sitting, Cuff Size: Standard)   Pulse 72   Temp 98.4 °F (36.9 °C) (Temporal)   Ht 5' 8\" (1.727 m)   Wt 79.7 kg (175 lb 12.8 oz)   SpO2 95%   BMI 26.73 kg/m²      Physical Exam  Vitals reviewed.   Constitutional:       General: He is not in acute distress.     Appearance: Normal appearance. He is well-developed.   HENT:      Head: Normocephalic and atraumatic.   Eyes:      Conjunctiva/sclera: Conjunctivae normal.   Cardiovascular:      Rate and Rhythm: Normal rate and regular rhythm.      Pulses: Normal pulses.      Heart sounds: Normal heart sounds. No murmur heard.  Pulmonary:      Effort: Pulmonary effort is normal. No respiratory distress.      Breath sounds: Normal breath sounds.   Abdominal:      Palpations: Abdomen is soft.      Tenderness: There is no abdominal tenderness.   Musculoskeletal:         General: No swelling.      Cervical back: Neck supple.   Skin:     General: Skin is warm and dry.      Capillary Refill: Capillary refill takes less than 2 seconds.   Neurological:      Mental Status: He is alert.   Psychiatric:         Mood and Affect: Mood normal.       Administrative Statements   I have spent a total time of 33 minutes in caring for this patient on the day of the visit/encounter including Diagnostic results, Prognosis, Risks and benefits of tx options, Instructions for management, Impressions, Counseling / Coordination of care, Documenting in the medical record, Reviewing / ordering tests, medicine, procedures  , and " Obtaining or reviewing history  .

## 2024-11-18 NOTE — PROGRESS NOTES
"11/18/2024  Cruz Patricio Jr. is a 79 y.o. male  [id number]    Diagnosis:      Patient presents for 1 month Firmagon injection managed by Dr. Burks    Plan:  Return to the office as scheduled.       Medication administration:    80mg of Firmagon administered in right upper abdominal quadrant. Pt tolerated well. Band Aid applied.     Administrations This Visit       degarelix acetate (FIRMAGON) injection 80 mg       Admin Date  11/18/2024 Action  Given Dose  80 mg Route  Subcutaneous Documented By  Sasha Ferreira RN                    Vitals:    11/18/24 1138   BP: 128/76   BP Location: Left arm   Patient Position: Sitting   Cuff Size: Adult   Pulse: 60   SpO2: 98%   Weight: 78.8 kg (173 lb 12.8 oz)   Height: 5' 8\" (1.727 m)         Sasha Ferreira RN   "

## 2024-11-18 NOTE — ASSESSMENT & PLAN NOTE
Day 3 of dysuria, cloudiness, burning, freq.  UA very +, has nitrites.  Bactirm BID x3 days  Sending for culture/sensitivites.    Orders:    POCT urine dip    sulfamethoxazole-trimethoprim (BACTRIM DS) 800-160 mg per tablet; Take 1 tablet by mouth 2 (two) times a day for 3 days    UA w Reflex to Microscopic w Reflex to Culture; Future

## 2024-11-19 ENCOUNTER — TELEPHONE (OUTPATIENT)
Age: 79
End: 2024-11-19

## 2024-11-19 NOTE — TELEPHONE ENCOUNTER
Parul with [facility] lab called in and stated that the urine sample that was collected  spilled out in the bag. The patient we need to come back and provide another urine sample.    Please contact Gene at .    Thank you

## 2024-11-19 NOTE — TELEPHONE ENCOUNTER
Incoming call received, we where notified from lab that the urine was spilled.     After pcp was made aware , and outgoing call was placed to pt .     As per pcp : As of now pt should continue the course of anti biotics , and will reassess pt after completion of course. If symptoms are not better please contact office then.    Pt verbalized understanding

## 2024-11-21 ENCOUNTER — OFFICE VISIT (OUTPATIENT)
Dept: FAMILY MEDICINE CLINIC | Facility: CLINIC | Age: 79
End: 2024-11-21

## 2024-11-21 ENCOUNTER — CONSULT (OUTPATIENT)
Dept: DERMATOLOGY | Facility: CLINIC | Age: 79
End: 2024-11-21

## 2024-11-21 VITALS
SYSTOLIC BLOOD PRESSURE: 118 MMHG | RESPIRATION RATE: 18 BRPM | OXYGEN SATURATION: 98 % | HEIGHT: 68 IN | TEMPERATURE: 98 F | BODY MASS INDEX: 26.67 KG/M2 | HEART RATE: 65 BPM | WEIGHT: 176 LBS | DIASTOLIC BLOOD PRESSURE: 76 MMHG

## 2024-11-21 VITALS — TEMPERATURE: 98.5 F | WEIGHT: 176.5 LBS | BODY MASS INDEX: 26.84 KG/M2

## 2024-11-21 DIAGNOSIS — D48.9 NEOPLASM OF UNCERTAIN BEHAVIOR: Primary | ICD-10-CM

## 2024-11-21 DIAGNOSIS — Z00.00 MEDICARE ANNUAL WELLNESS VISIT, SUBSEQUENT: Primary | ICD-10-CM

## 2024-11-21 DIAGNOSIS — C44.519 BASAL CELL CARCINOMA (BCC) OF SKIN OF OTHER PART OF TORSO: ICD-10-CM

## 2024-11-21 DIAGNOSIS — N30.00 ACUTE CYSTITIS WITHOUT HEMATURIA: ICD-10-CM

## 2024-11-21 DIAGNOSIS — Z85.828 HISTORY OF BASAL CELL CARCINOMA: ICD-10-CM

## 2024-11-21 DIAGNOSIS — L57.0 KERATOSIS, ACTINIC: ICD-10-CM

## 2024-11-21 PROCEDURE — 88342 IMHCHEM/IMCYTCHM 1ST ANTB: CPT | Performed by: DERMATOLOGY

## 2024-11-21 PROCEDURE — 88305 TISSUE EXAM BY PATHOLOGIST: CPT | Performed by: DERMATOLOGY

## 2024-11-21 NOTE — ASSESSMENT & PLAN NOTE
Resolved after 3 days bactrim BID.  If patient gets dysuria, cludiness, burning, he is to call and start bactrim BID

## 2024-11-21 NOTE — PROGRESS NOTES
Name: Cruz Patricio Jr.      : 1945      MRN: [id number]  Encounter Provider: Nellie Mason DO  Encounter Date: 2024   Encounter department: [facility]    Assessment & Plan  Medicare annual wellness visit, subsequent         Acute cystitis without hematuria  Resolved after 3 days bactrim BID.  If patient gets dysuria, cludiness, burning, he is to call and start bactrim BID       Basal cell carcinoma (BCC) of skin of other part of torso    Orders:    Ambulatory Referral to Dermatology; Future       Preventive health issues were discussed with patient, and age appropriate screening tests were ordered as noted in patient's After Visit Summary. Personalized health advice and appropriate referrals for health education or preventive services given if needed, as noted in patient's After Visit Summary.    History of Present Illness     Hypertension  This is a chronic problem. The current episode started more than 1 year ago. The problem has been resolved since onset. The problem is controlled. Pertinent negatives include no chest pain, palpitations or shortness of breath.   Hyperlipidemia  Pertinent negatives include no chest pain or shortness of breath.      Patient Care Team:  Nellie Mason DO as PCP - General (Family Medicine)  Scott Last MD as Endoscopist  Jaret Burks MD (Urology)  Dion Gibbs MD (Radiation Oncology)  Shasta Shelton MD (Hematology and Oncology)  Gerard Malone MD (Cardiology)    Review of Systems   Constitutional:  Negative for chills and fever.   HENT:  Negative for ear pain and sore throat.    Eyes:  Negative for pain and visual disturbance.   Respiratory:  Negative for cough and shortness of breath.    Cardiovascular:  Negative for chest pain and palpitations.   Gastrointestinal:  Negative for abdominal pain and vomiting.   Genitourinary:  Negative for dysuria and hematuria.   Musculoskeletal:  Negative for arthralgias and back pain.   Skin:  Negative  for color change and rash.   Neurological:  Negative for seizures and syncope.   All other systems reviewed and are negative.    Medical History Reviewed by provider this encounter:  Tobacco  Allergies  Meds  Problems  Med Hx  Surg Hx  Fam Hx       Annual Wellness Visit Questionnaire   Cruz is here for his Subsequent Wellness visit.     Health Risk Assessment:   Patient rates overall health as good. Patient feels that their physical health rating is same. Patient is satisfied with their life. Eyesight was rated as same. Hearing was rated as same. Patient feels that their emotional and mental health rating is same. Patients states they are never, rarely angry. Patient states they are never, rarely unusually tired/fatigued. Pain experienced in the last 7 days has been none.     Depression Screening:   PHQ-2 Score: 0      Fall Risk Screening:   In the past year, patient has experienced: no history of falling in past year      Home Safety:  Patient does not have trouble with stairs inside or outside of their home. Patient has working smoke alarms and has working carbon monoxide detector. Home safety hazards include: none.     Nutrition:   Current diet is Regular.     Medications:   Patient is currently taking over-the-counter supplements. OTC medications include: see medication list. Patient is able to manage medications.     Activities of Daily Living (ADLs)/Instrumental Activities of Daily Living (IADLs):   Walk and transfer into and out of bed and chair?: Yes  Dress and groom yourself?: Yes    Bathe or shower yourself?: Yes    Do your laundry/housekeeping?: Yes  Manage your money, pay your bills and track your expenses?: Yes  Make your own meals?: Yes    Do your own shopping?: Yes    Previous Hospitalizations:   Any hospitalizations or ED visits within the last 12 months?: No      Advance Care Planning:   Living will: No    Durable POA for healthcare: No    Advanced directive: No    Advanced directive  counseling given: Yes    ACP document given: Yes      Cognitive Screening:   Provider or family/friend/caregiver concerned regarding cognition?: No    PREVENTIVE SCREENINGS      Cardiovascular Screening:    General: Screening Not Indicated and History Lipid Disorder      Diabetes Screening:     General: Screening Current      Colorectal Cancer Screening:     General: Screening Current      Prostate Cancer Screening:    General: History Prostate Cancer and Screening Not Indicated      Osteoporosis Screening:    General: Screening Not Indicated      Abdominal Aortic Aneurysm (AAA) Screening:    Risk factors include: tobacco use        Lung Cancer Screening:     General: Screening Not Indicated      Hepatitis C Screening:    General: Screening Current    Screening, Brief Intervention, and Referral to Treatment (SBIRT)    Screening  Typical number of drinks in a day: 0  Typical number of drinks in a week: 0  Interpretation: Low risk drinking behavior.    AUDIT-C Screenin) How often did you have a drink containing alcohol in the past year? never  2) How many drinks did you have on a typical day when you were drinking in the past year? 0  3) How often did you have 6 or more drinks on one occasion in the past year? never    AUDIT-C Score: 0  Interpretation: Score 0-3 (male): Negative screen for alcohol misuse    Single Item Drug Screening:  How often have you used an illegal drug (including marijuana) or a prescription medication for non-medical reasons in the past year? never    Single Item Drug Screen Score: 0  Interpretation: Negative screen for possible drug use disorder    Other Counseling Topics:   Car/seat belt/driving safety, skin self-exam, sunscreen and calcium and vitamin D intake and regular weightbearing exercise.     Social Drivers of Health     Financial Resource Strain: Low Risk  (2023)    Overall Financial Resource Strain (CARDIA)     Difficulty of Paying Living Expenses: Not hard at all   Food  "Insecurity: No Food Insecurity (11/21/2024)    Hunger Vital Sign     Worried About Running Out of Food in the Last Year: Never true     Ran Out of Food in the Last Year: Never true   Transportation Needs: No Transportation Needs (11/21/2024)    PRAPARE - Transportation     Lack of Transportation (Medical): No     Lack of Transportation (Non-Medical): No   Housing Stability: Unknown (11/21/2024)    Housing Stability Vital Sign     Unable to Pay for Housing in the Last Year: No     Homeless in the Last Year: No   Utilities: Not At Risk (11/21/2024)    [facility] Utilities     Threatened with loss of utilities: No     No results found.    Objective   /76 (BP Location: Left arm, Patient Position: Sitting, Cuff Size: Standard)   Pulse 65   Temp 98 °F (36.7 °C) (Temporal)   Resp 18   Ht 5' 8\" (1.727 m)   Wt 79.8 kg (176 lb)   SpO2 98%   BMI 26.76 kg/m²     Physical Exam  Vitals reviewed.   Constitutional:       General: He is not in acute distress.     Appearance: Normal appearance. He is well-developed.   HENT:      Head: Normocephalic and atraumatic.   Eyes:      Conjunctiva/sclera: Conjunctivae normal.   Cardiovascular:      Rate and Rhythm: Normal rate and regular rhythm.      Pulses: Normal pulses.      Heart sounds: Normal heart sounds. No murmur heard.  Pulmonary:      Effort: Pulmonary effort is normal. No respiratory distress.      Breath sounds: Normal breath sounds.   Abdominal:      Palpations: Abdomen is soft.      Tenderness: There is no abdominal tenderness.   Musculoskeletal:         General: No swelling.      Cervical back: Neck supple.   Skin:     General: Skin is warm and dry.      Capillary Refill: Capillary refill takes less than 2 seconds.   Neurological:      Mental Status: He is alert.   Psychiatric:         Mood and Affect: Mood normal.       Administrative Statements   I have spent a total time of 45 minutes in caring for this patient on the day of the visit/encounter including Diagnostic " results, Prognosis, Risks and benefits of tx options, Instructions for management, Impressions, Counseling / Coordination of care, Documenting in the medical record, Reviewing / ordering tests, medicine, procedures  , and Obtaining or reviewing history  .

## 2024-11-21 NOTE — PROGRESS NOTES
"[facility] Dermatology Clinic Note     Patient Name: Cruz Patricio Jr.  Encounter Date: 11/21/24     Have you been cared for by a [facility] Dermatologist in the last 3 years and, if so, which description applies to you?    NO.   I am considered a \"new\" patient and must complete all patient intake questions. I am MALE/not capable of bearing children.    REVIEW OF SYSTEMS:  Have you recently had or currently have any of the following? Recent fever or chills? No  Any non-healing wound? YES, right temple   PAST MEDICAL HISTORY:  Have you personally ever had or currently have any of the following?  If \"YES,\" then please provide more detail. Skin cancer (such as Melanoma, Basal Cell Carcinoma, Squamous Cell Carcinoma?  YES, basal cell carcinoma  Tuberculosis, HIV/AIDS, Hepatitis B or C: No  Radiation Treatment YES, for prostate cancer   HISTORY OF IMMUNOSUPPRESSION:   Do you have a history of any of the following:  Systemic Immunosuppression such as Diabetes, Biologic or Immunotherapy, Chemotherapy, Organ Transplantation, Bone Marrow Transplantation or Prednisone?  YES, receiving Firmagon injection (chemotherapy) for prostate cancer     Answering \"YES\" requires the addition of the dotphrase \"IMMUNOSUPPRESSED\" as the first diagnosis of the patient's visit.   FAMILY HISTORY:  Any \"first degree relatives\" (parent, brother, sister, or child) with the following?    Skin Cancer, Pancreatic or Other Cancer? No   PATIENT EXPERIENCE:    Do you want the Dermatologist to perform a COMPLETE skin exam today including a clinical examination under the \"bra and underwear\" areas?  NO  If necessary, do we have your permission to call and leave a detailed message on your Preferred Phone number that includes your specific medical information?  Yes      Allergies   Allergen Reactions    No Active Allergies     Orgovyx [Relugolix] Confusion     LH, pre-syncope      Current Outpatient Medications:     ascorbic acid (VITAMIN C) 1000 MG tablet, " Take 1,000 mg by mouth daily, Disp: , Rfl:     aspirin (ECOTRIN LOW STRENGTH) 81 mg EC tablet, Take 81 mg by mouth daily Twice a week, Disp: , Rfl:     atenolol (TENORMIN) 100 mg tablet, TAKE 1 TABLET BY MOUTH EVERY DAY, Disp: 90 tablet, Rfl: 3    B Complex Vitamins (B COMPLEX 100 PO), Take by mouth, Disp: , Rfl:     benazepril (LOTENSIN) 20 mg tablet, TAKE 1 TABLET BY MOUTH EVERY DAY, Disp: 90 tablet, Rfl: 1    calcium carbonate (OS-LUCA) 600 MG tablet, Take 600 mg by mouth daily, Disp: , Rfl:     Coenzyme Q10 (CoQ-10) 200 MG CAPS, , Disp: , Rfl:     magnesium oxide (MAG-OX) 400 mg tablet, Take 400 mg by mouth daily, Disp: , Rfl:     multivitamin (THERAGRAN) TABS, Take 1 tablet by mouth, Disp: , Rfl:     Omega-3 Fatty Acids (FISH OIL PO), Take by mouth daily , Disp: , Rfl:     rivaroxaban (Xarelto) 20 mg tablet, Take 1 tablet (20 mg total) by mouth daily with breakfast, Disp: 90 tablet, Rfl: 3    rosuvastatin (CRESTOR) 20 MG tablet, Take 20 mg by mouth daily, Disp: , Rfl:     sildenafil (VIAGRA) 50 MG tablet, Take 1 tablet (50 mg total) by mouth daily as needed for erectile dysfunction, Disp: 20 tablet, Rfl: 6    sulfamethoxazole-trimethoprim (BACTRIM DS) 800-160 mg per tablet, Take 1 tablet by mouth 2 (two) times a day for 3 days, Disp: 6 tablet, Rfl: 0    tamsulosin (FLOMAX) 0.4 mg, TAKE 1 CAPSULE BY MOUTH EVERY DAY WITH DINNER, Disp: 90 capsule, Rfl: 3          Whom besides the patient is providing clinical information about today's encounter?   NO ADDITIONAL HISTORIAN (patient alone provided history)  Here today with wife    Physical Exam and Assessment/Plan by Diagnosis:  HISTORY OF BASAL CELL CARCINOMA    Physical Exam:  Anatomic Location Affected:  right temple  Morphological Description of scar:  erythematous pink plaque with central ulceration  Suspected Recurrence: Yes  Pertinent Positives:  Pertinent Negatives:      Additional History of Present Condition:  History of basal cell carcinoma with signs of  recurrence. BCC was removed by a dermatologist in [place] about 8 years ago.    Assessment and Plan:  Based on a thorough discussion of this condition and the management approach to it (including a comprehensive discussion of the known risks, side effects and potential benefits of treatment), the patient (family) agrees to implement the following specific plan:  Regular skin checks- every year    How can basal cell carcinoma be prevented?  The most important way to prevent BCC is to avoid sunburn. This is especially important in childhood and early life. Fair skinned individuals and those with a personal or family history of BCC should protect their skin from sun exposure daily, year-round and lifelong.  Stay indoors or under the shade in the middle of the day   Wear covering clothing   Apply high protection factor SPF50+ broad-spectrum sunscreens generously to exposed skin if outdoors   Avoid indoor tanning (sun beds, solaria)  Oral nicotinamide (vitamin B3) in a dose of 500 mg twice daily may reduce the number and severity of BCCs.    What is the outlook for basal cell carcinoma?  Most BCCs are cured by treatment. Cure is most likely if treatment is undertaken when the lesion is small.  About 50% of people with BCC develop a second one within 3 years of the first. They are also at increased risk of other skin cancers, especially melanoma. Regular self-skin examinations and long-term annual skin checks by an experienced health professional are recommended.      NEOPLASMS OF UNCERTAIN BEHAVIOR OF SKIN    Physical Exam:  (Anatomic Location); (Size and Morphological Description); (Differential Diagnosis):  Specimen A; right temple; skin; shave biopsy; 79 year old with 1.5 cm x 0.5 cm ulcerated pink plaque; Rule out: basal cell carcinoma  Specimen B; right mid back; skin; shave biopsy; 79 year old with 3.5 cm x 3.0 cm stuck on brown plaque; Rule out: seborrheic keratosis  Pertinent Positives:  Pertinent  "Negatives:                    Additional History of Present Condition:  Patient is here for spot on his right temple. About 8 years ago, biopsy showed basal cell carcinoma and area was excised in [place]. Patient now says that a new spot has developed int he same exact location. It has been present since summer 2024. It has not grown in size. Patient's wife says that it sometimes looked red and inflamed. Spot does not itch. Bleeds sometimes.     Assessment and Plan:  I have discussed with the patient that a sample of skin via a \"skin biopsy” would be potentially helpful to further make a specific diagnosis under the microscope.  Based on a thorough discussion of this condition and the management approach to it (including a comprehensive discussion of the known risks, side effects and potential benefits of treatment), the patient (family) agrees to implement the following specific plan:    Procedure:  Skin Biopsy.  After a thorough discussion of treatment options and risk/benefits/alternatives (including but not limited to local pain, scarring, dyspigmentation, blistering, possible superinfection, and inability to confirm a diagnosis via histopathology), verbal and written consent were obtained and portion of the rash was biopsied for tissue sample.  See below for consent that was obtained from patient and subsequent Procedure Note.    PROCEDURE TANGENTIAL (SHAVE) BIOPSY NOTE:    Performing Physician:   Anatomic Location; Clinical Description with size (cm); Pre-Op Diagnosis:   Specimen A; right temple; skin; shave biopsy; 79 year old with 1.5 cm x 0.5 cm ulcerated pink plaque; Rule out: basal cell carcinoma  Specimen B; right mid back; skin; shave biopsy; 79 year old with 3.5 cm x 3.0 cm stuck on brown plaque; Rule out: seborrheic keratosis  Post-op diagnosis: Same     Local anesthesia: 1% xylocaine with epi      Topical anesthesia: None    Hemostasis: Aluminum chloride       After obtaining informed " "consent  at which time there was a discussion about the purpose of biopsy  and low risks of infection and bleeding.  The area was prepped and draped in the usual fashion. Anesthesia was obtained with 1% lidocaine with epinephrine. A shave biopsy to an appropriate sampling depth was obtained by Shave (Dermablade or 15 blade) The resulting wound was covered with surgical ointment and bandaged appropriately.     The patient tolerated the procedure well without complications and was without signs of functional compromise.      Specimen has been sent for review by Dermatopathology.    Standard post-procedure care has been explained and has been included in written form within the patient's copy of Informed Consent.    INFORMED CONSENT DISCUSSION AND POST-OPERATIVE INSTRUCTIONS FOR PATIENT    I.  RATIONALE FOR PROCEDURE  I understand that a skin biopsy allows the Dermatologist to test a lesion or rash under the microscope to obtain a diagnosis.  It usually involves numbing the area with numbing medication and removing a small piece of skin; sometimes the area will be closed with sutures. In this specific procedure, sutures are not usually needed.  If any sutures are placed, then they are usually need to be removed in 2 weeks or less.    I understand that my Dermatologist recommends that a skin \"shave\" biopsy be performed today.  A local anesthetic, similar to the kind that a dentist uses when filling a cavity, will be injected with a very small needle into the skin area to be sampled.  The injected skin and tissue underneath \"will go to sleep” and become numb so no pain should be felt afterwards.  An instrument shaped like a tiny \"razor blade\" (shave biopsy instrument) will be used to cut a small piece of tissue and skin from the area so that a sample of tissue can be taken and examined more closely under the microscope.  A slight amount of bleeding will occur, but it will be stopped with direct pressure and a pressure " "bandage and any other appropriate methods.  I understands that a scar will form where the wound was created.  Surgical ointment will be applied to help protect the wound.  Sutures are not usually needed.    II.  RISKS AND POTENTIAL COMPLICATIONS   I understand the risks and potential complications of a skin biopsy include but are not limited to the following:  Bleeding  Infection  Pain  Scar/keloid  Skin discoloration  Incomplete Removal  Recurrence  Nerve Damage/Numbness/Loss of Function  Allergic Reaction to Anesthesia  Biopsies are diagnostic procedures and based on findings additional treatment or evaluation may be required  Loss or destruction of specimen resulting in no additional findings    My Dermatologist has explained to me the nature of the condition, the nature of the procedure, and the benefits to be reasonably expected compared with alternative approaches.  My Dermatologist has discussed the likelihood of major risks or complications of this procedure including the specific risks listed above, such as bleeding, infection, and scarring/keloid.  I understand that a scar is expected after this procedure.  I understand that my physician cannot predict if the scar will form a \"keloid,\" which extends beyond the borders of the wound that is created.  A keloid is a thick, painful, and bumpy scar.  A keloid can be difficult to treat, as it does not always respond well to therapy, which includes injecting cortisone directly into the keloid every few weeks.  While this usually reduces the pain and size of the scar, it does not eliminate it.      I understand that photographs may be taken before and after the procedure.  These will be maintained as part of the medical providers confidential records and may not be made available to me.  I further authorize the medical provider to use the photographs for teaching purposes or to illustrate scientific papers, books, or lectures if in his/her judgment, medical " "research, education, or science may benefit from its use.    I have had an opportunity to fully inquire about the risks and benefits of this procedure and its alternatives.   I have been given ample time and opportunity to ask questions and to seek a second opinion if I wished to do so.  I acknowledge that there have specifically been no guarantees as to the cosmetic results from the procedure.  I am aware that with any procedure there is always the possibility of an unexpected complication.    III. POST-PROCEDURAL CARE (WHAT YOU WILL NEED TO DO \"AFTER THE BIOPSY\" TO OPTIMIZE HEALING)    Keep the area clean and dry.  Try NOT to remove the bandage or get it wet for the first 24 hours.    Gently clean the area and apply surgical ointment (such as Vaseline petrolatum ointment, which is available \"over the counter\" and not a prescription) to the biopsy site for up to 2 weeks straight.  This acts to protect the wound from the outside world.      Sutures are not usually placed in this procedure.  If any sutures were placed, return for suture removal as instructed (generally 1 week for the face, 2 weeks for the body).      Take Acetaminophen (Tylenol) for discomfort, if no contraindications.  Ibuprofen or aspirin could make bleeding worse.    Call our office immediately for signs of infection: fever, chills, increased redness, warmth, tenderness, discomfort/pain, or pus or foul smell coming from the wound.    WHAT TO DO IF THERE IS ANY BLEEDING?  If a small amount of bleeding is noticed, place a clean cloth over the area and apply firm pressure for ten minutes.  Check the wound after 10 minutes of direct pressure.  If bleeding persists, try one more time for an additional 10 minutes of direct pressure on the area.  If the bleeding becomes heavier or does not stop after the second attempt, or if you have any other questions about this procedure, then please call your [facility] Dermatologist by calling [phone] (SKIN). "     I hereby acknowledge that I have reviewed and verified the site with my Dermatologist and have requested and authorized my Dermatologist to proceed with the procedure.      ACTINIC KERATOSES  Physical Exam:  Anatomic Location Affected:  posterior scalp, frontal scalp, right temple  Morphological Description:  Thin pink papule(s) with gritty scale       Assessment and Plan:  Based on a thorough discussion of this condition and the management approach to it (including a comprehensive discussion of the known risks, side effects and potential benefits of treatment), the patient (family) agrees to implement the following specific plan:  Treated with cryotherapy today; written and verbal consent obtained     PROCEDURE:  DESTRUCTION OF PRE-MALIGNANT LESIONS  After a thorough discussion of treatment options and risk/benefits/alternatives (including but not limited to local pain, scarring, dyspigmentation, blistering, and possible superinfection), verbal and written consent were obtained and the aforementioned lesions were treated on with cryotherapy using liquid nitrogen x 2 cycles for 5-10 seconds. The patient tolerated the procedure well, and after-care instructions were provided.     TOTAL NUMBER of 6 pre-malignant lesions were treated today on the ANATOMIC LOCATION: posterior scalp x2, frontal scalp x3, right temple x1.       Patient instructions:  Your pre-cancerous lesions (called actinic keratosis) were treated with liquid nitrogen today. The treated areas will get more red, crusted over the next few days. There might be some blistering. Apply vaseline to the treated area for the next week to help it heal fully. Do not pick at the area. Return in 3-4 weeks for another round of liquid nitrogen treatment if lesion(s)  fails to fully resolve.        Grant Kemp MD  PGY-II Dermatology Resident     Scribe Attestation      I,:  Zully Macedo am acting as a scribe while in the presence of the attending physician.:        I,:  Grant Kemp MD personally performed the services described in this documentation    as scribed in my presence.:

## 2024-11-21 NOTE — PATIENT INSTRUCTIONS
"HISTORY OF BASAL CELL CARCINOMA  How can basal cell carcinoma be prevented?  The most important way to prevent BCC is to avoid sunburn. This is especially important in childhood and early life. Fair skinned individuals and those with a personal or family history of BCC should protect their skin from sun exposure daily, year-round and lifelong.  Stay indoors or under the shade in the middle of the day   Wear covering clothing   Apply high protection factor SPF50+ broad-spectrum sunscreens generously to exposed skin if outdoors   Avoid indoor tanning (sun beds, solaria)  Oral nicotinamide (vitamin B3) in a dose of 500 mg twice daily may reduce the number and severity of BCCs.    What is the outlook for basal cell carcinoma?  Most BCCs are cured by treatment. Cure is most likely if treatment is undertaken when the lesion is small.  About 50% of people with BCC develop a second one within 3 years of the first. They are also at increased risk of other skin cancers, especially melanoma. Regular self-skin examinations and long-term annual skin checks by an experienced health professional are recommended.      NEOPLASMS OF UNCERTAIN BEHAVIOR OF SKIN  Assessment and Plan:  I have discussed with the patient that a sample of skin via a \"skin biopsy” would be potentially helpful to further make a specific diagnosis under the microscope.  Based on a thorough discussion of this condition and the management approach to it (including a comprehensive discussion of the known risks, side effects and potential benefits of treatment), the patient (family) agrees to implement the following specific plan:    Procedure:  Skin Biopsy.  After a thorough discussion of treatment options and risk/benefits/alternatives (including but not limited to local pain, scarring, dyspigmentation, blistering, possible superinfection, and inability to confirm a diagnosis via histopathology), verbal and written consent were obtained and portion of the rash " "was biopsied for tissue sample.  See below for consent that was obtained from patient and subsequent Procedure Note.    PROCEDURE TANGENTIAL (SHAVE) BIOPSY NOTE:  INFORMED CONSENT DISCUSSION AND POST-OPERATIVE INSTRUCTIONS FOR PATIENT    I.  RATIONALE FOR PROCEDURE  I understand that a skin biopsy allows the Dermatologist to test a lesion or rash under the microscope to obtain a diagnosis.  It usually involves numbing the area with numbing medication and removing a small piece of skin; sometimes the area will be closed with sutures. In this specific procedure, sutures are not usually needed.  If any sutures are placed, then they are usually need to be removed in 2 weeks or less.    I understand that my Dermatologist recommends that a skin \"shave\" biopsy be performed today.  A local anesthetic, similar to the kind that a dentist uses when filling a cavity, will be injected with a very small needle into the skin area to be sampled.  The injected skin and tissue underneath \"will go to sleep” and become numb so no pain should be felt afterwards.  An instrument shaped like a tiny \"razor blade\" (shave biopsy instrument) will be used to cut a small piece of tissue and skin from the area so that a sample of tissue can be taken and examined more closely under the microscope.  A slight amount of bleeding will occur, but it will be stopped with direct pressure and a pressure bandage and any other appropriate methods.  I understands that a scar will form where the wound was created.  Surgical ointment will be applied to help protect the wound.  Sutures are not usually needed.    II.  RISKS AND POTENTIAL COMPLICATIONS   I understand the risks and potential complications of a skin biopsy include but are not limited to the following:  Bleeding  Infection  Pain  Scar/keloid  Skin discoloration  Incomplete Removal  Recurrence  Nerve Damage/Numbness/Loss of Function  Allergic Reaction to Anesthesia  Biopsies are diagnostic procedures " "and based on findings additional treatment or evaluation may be required  Loss or destruction of specimen resulting in no additional findings    My Dermatologist has explained to me the nature of the condition, the nature of the procedure, and the benefits to be reasonably expected compared with alternative approaches.  My Dermatologist has discussed the likelihood of major risks or complications of this procedure including the specific risks listed above, such as bleeding, infection, and scarring/keloid.  I understand that a scar is expected after this procedure.  I understand that my physician cannot predict if the scar will form a \"keloid,\" which extends beyond the borders of the wound that is created.  A keloid is a thick, painful, and bumpy scar.  A keloid can be difficult to treat, as it does not always respond well to therapy, which includes injecting cortisone directly into the keloid every few weeks.  While this usually reduces the pain and size of the scar, it does not eliminate it.      I understand that photographs may be taken before and after the procedure.  These will be maintained as part of the medical providers confidential records and may not be made available to me.  I further authorize the medical provider to use the photographs for teaching purposes or to illustrate scientific papers, books, or lectures if in his/her judgment, medical research, education, or science may benefit from its use.    I have had an opportunity to fully inquire about the risks and benefits of this procedure and its alternatives.   I have been given ample time and opportunity to ask questions and to seek a second opinion if I wished to do so.  I acknowledge that there have specifically been no guarantees as to the cosmetic results from the procedure.  I am aware that with any procedure there is always the possibility of an unexpected complication.    III. POST-PROCEDURAL CARE (WHAT YOU WILL NEED TO DO \"AFTER THE BIOPSY\" " "TO OPTIMIZE HEALING)    Keep the area clean and dry.  Try NOT to remove the bandage or get it wet for the first 24 hours.    Gently clean the area and apply surgical ointment (such as Vaseline petrolatum ointment, which is available \"over the counter\" and not a prescription) to the biopsy site for up to 2 weeks straight.  This acts to protect the wound from the outside world.      Sutures are not usually placed in this procedure.  If any sutures were placed, return for suture removal as instructed (generally 1 week for the face, 2 weeks for the body).      Take Acetaminophen (Tylenol) for discomfort, if no contraindications.  Ibuprofen or aspirin could make bleeding worse.    Call our office immediately for signs of infection: fever, chills, increased redness, warmth, tenderness, discomfort/pain, or pus or foul smell coming from the wound.    WHAT TO DO IF THERE IS ANY BLEEDING?  If a small amount of bleeding is noticed, place a clean cloth over the area and apply firm pressure for ten minutes.  Check the wound after 10 minutes of direct pressure.  If bleeding persists, try one more time for an additional 10 minutes of direct pressure on the area.  If the bleeding becomes heavier or does not stop after the second attempt, or if you have any other questions about this procedure, then please call your [facility]'s Dermatologist by calling [phone] (SKIN).     I hereby acknowledge that I have reviewed and verified the site with my Dermatologist and have requested and authorized my Dermatologist to proceed with the procedure.      ACTINIC KERATOSES  PROCEDURE:  DESTRUCTION OF PRE-MALIGNANT LESIONS  After a thorough discussion of treatment options and risk/benefits/alternatives (including but not limited to local pain, scarring, dyspigmentation, blistering, and possible superinfection), verbal and written consent were obtained and the aforementioned lesions were treated on with cryotherapy using liquid nitrogen x 2 " cycles for 5-10 seconds. The patient tolerated the procedure well, and after-care instructions were provided.     TOTAL NUMBER of 6 pre-malignant lesions were treated today on the ANATOMIC LOCATION: posterior scalp x2, frontal scalp x3, right temple x1.       Patient instructions:  Your pre-cancerous lesions (called actinic keratosis) were treated with liquid nitrogen today. The treated areas will get more red, crusted over the next few days. There might be some blistering. Apply vaseline to the treated area for the next week to help it heal fully. Do not pick at the area. Return in 3-4 weeks for another round of liquid nitrogen treatment if lesion(s)  fails to fully resolve.

## 2024-11-27 ENCOUNTER — NURSE TRIAGE (OUTPATIENT)
Dept: OTHER | Facility: OTHER | Age: 79
End: 2024-11-27

## 2024-11-27 DIAGNOSIS — I10 ESSENTIAL HYPERTENSION, BENIGN: ICD-10-CM

## 2024-11-27 DIAGNOSIS — R30.9 URINATION PAIN: Primary | ICD-10-CM

## 2024-11-27 RX ORDER — SULFAMETHOXAZOLE AND TRIMETHOPRIM 800; 160 MG/1; MG/1
1 TABLET ORAL EVERY 12 HOURS SCHEDULED
Qty: 10 TABLET | Refills: 0 | Status: SHIPPED | OUTPATIENT
Start: 2024-11-27 | End: 2024-12-02

## 2024-11-27 NOTE — TELEPHONE ENCOUNTER
Patient called states Dr. Mason advised patient to provide update if UTI symptoms reappear. Patient states he felt burning sensation 11/27/2024 morning. Patient request message to be conveyed to Dr. Mason and a callback with suggestions Please advise Patient at [phone],if any further questions.

## 2024-11-28 RX ORDER — BENAZEPRIL HYDROCHLORIDE 20 MG/1
20 TABLET ORAL DAILY
Qty: 90 TABLET | Refills: 1 | Status: SHIPPED | OUTPATIENT
Start: 2024-11-28

## 2024-11-28 NOTE — TELEPHONE ENCOUNTER
Notified patient that medication was sent to the Pharmacy. Patient verbalized understanding and has no further questions.

## 2024-11-28 NOTE — TELEPHONE ENCOUNTER
"Reason for Disposition  • All other males with painful urination    Additional Information  • Taking antibiotic for urinary tract infection (UTI)    Answer Assessment - Initial Assessment Questions  1. SEVERITY: \"How bad is the pain?\"  (e.g., Scale 1-10; mild, moderate, or severe)      Moderate burning with urination    2. FREQUENCY: \"How many times have you had painful urination today?\"       Urinating every 15 minutes    3. PATTERN: \"Is pain present every time you urinate or just sometimes?\"       Every time since 3pm today    4. ONSET: \"When did the painful urination start?\"       1.5 weeks ago, was seen by PCP and rx'd bactrim for a UTI- took full course of medicine and symptoms subsided, but now symptoms back.  Patient reports that he was told to call if the symptoms came back for more antibiotics.  He reports that he submitted a urine sample last week but it was lost in the lab.    5. FEVER: \"Do you have a fever?\" If Yes, ask: \"What is your temperature, how was it measured, and when did it start?\"      Denies     6. CAUSE: \"What do you think is causing the painful urination?\"       UTI    7. OTHER SYMPTOMS: \"Do you have any other symptoms?\" (e.g., flank pain, penis discharge, scrotal pain, blood in urine)      Denies    Protocols used: Urination Pain - Male-Adult-    "

## 2024-11-28 NOTE — TELEPHONE ENCOUNTER
Contacted on call provider who advised RN to send Bactrim DS, one tablet every 12 hours for 5 days.  Medication sent; verified confirmation receipt.

## 2024-11-28 NOTE — TELEPHONE ENCOUNTER
"Regarding: frequent urination / burning  ----- Message from Alesia SONG sent at 11/27/2024  7:29 PM EST -----  \"I was seen by Dr. Mason last Thursday for a UTI, they lost my sample so they couldn't test it but Dr. Mason gave me 3 pills and said if it gets worse call. I called earlier today, I started having the burning and urinating quite often\"    "

## 2024-11-29 DIAGNOSIS — N30.00 ACUTE CYSTITIS WITHOUT HEMATURIA: Primary | ICD-10-CM

## 2024-11-30 ENCOUNTER — APPOINTMENT (EMERGENCY)
Dept: RADIOLOGY | Facility: HOSPITAL | Age: 79
End: 2024-11-30
Payer: MEDICARE

## 2024-11-30 ENCOUNTER — APPOINTMENT (OUTPATIENT)
Dept: LAB | Facility: CLINIC | Age: 79
End: 2024-11-30
Payer: MEDICARE

## 2024-11-30 ENCOUNTER — HOSPITAL ENCOUNTER (EMERGENCY)
Facility: HOSPITAL | Age: 79
Discharge: HOME | End: 2024-11-30
Attending: SURGERY
Payer: MEDICARE

## 2024-11-30 VITALS
TEMPERATURE: 99.1 F | OXYGEN SATURATION: 96 % | DIASTOLIC BLOOD PRESSURE: 78 MMHG | BODY MASS INDEX: 26.92 KG/M2 | WEIGHT: 177.03 LBS | RESPIRATION RATE: 18 BRPM | HEART RATE: 76 BPM | SYSTOLIC BLOOD PRESSURE: 134 MMHG

## 2024-11-30 DIAGNOSIS — N30.00 ACUTE CYSTITIS WITHOUT HEMATURIA: ICD-10-CM
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
## 2024-11-30 LAB
ABO GROUP BLD: NORMAL
ANION GAP SERPL CALCULATED.3IONS-SCNC: 7 MMOL/L (ref 4–13)
BASOPHILS # BLD AUTO: 0.05 THOUSANDS/ΜL (ref 0–0.1)
BASOPHILS NFR BLD AUTO: 1 % (ref 0–1)
BILIRUB UR QL STRIP: NEGATIVE
BLD GP AB SCN SERPL QL: NEGATIVE
BUN SERPL-MCNC: 17 MG/DL (ref 5–25)
CALCIUM SERPL-MCNC: 8.9 MG/DL (ref 8.4–10.2)
CHLORIDE SERPL-SCNC: 100 MMOL/L (ref 96–108)
CLARITY UR: CLEAR
CO2 SERPL-SCNC: 24 MMOL/L (ref 21–32)
COLOR UR: NORMAL
CREAT SERPL-MCNC: 0.91 MG/DL (ref 0.6–1.3)
EOSINOPHIL # BLD AUTO: 0.07 THOUSAND/ΜL (ref 0–0.61)
EOSINOPHIL NFR BLD AUTO: 1 % (ref 0–6)
ERYTHROCYTE [DISTWIDTH] IN BLOOD BY AUTOMATED COUNT: 14.3 % (ref 11.6–15.1)
GFR SERPL CREATININE-BSD FRML MDRD: 81 ML/MIN/1.73SQ M
GLUCOSE SERPL-MCNC: 112 MG/DL (ref 65–140)
GLUCOSE UR STRIP-MCNC: NEGATIVE MG/DL
HCT VFR BLD AUTO: 38.8 % (ref 36.5–49.3)
HGB BLD-MCNC: 12.8 G/DL (ref 12–17)
HGB UR QL STRIP.AUTO: NEGATIVE
IMM GRANULOCYTES # BLD AUTO: 0.03 THOUSAND/UL (ref 0–0.2)
IMM GRANULOCYTES NFR BLD AUTO: 0 % (ref 0–2)
KETONES UR STRIP-MCNC: NEGATIVE MG/DL
LEUKOCYTE ESTERASE UR QL STRIP: NEGATIVE
LYMPHOCYTES # BLD AUTO: 0.75 THOUSANDS/ΜL (ref 0.6–4.47)
LYMPHOCYTES NFR BLD AUTO: 8 % (ref 14–44)
MCH RBC QN AUTO: 29.6 PG (ref 26.8–34.3)
MCHC RBC AUTO-ENTMCNC: 33 G/DL (ref 31.4–37.4)
MCV RBC AUTO: 90 FL (ref 82–98)
MONOCYTES # BLD AUTO: 1.09 THOUSAND/ΜL (ref 0.17–1.22)
MONOCYTES NFR BLD AUTO: 12 % (ref 4–12)
NEUTROPHILS # BLD AUTO: 7.25 THOUSANDS/ΜL (ref 1.85–7.62)
NEUTS SEG NFR BLD AUTO: 78 % (ref 43–75)
NITRITE UR QL STRIP: NEGATIVE
NRBC BLD AUTO-RTO: 0 /100 WBCS
PH UR STRIP.AUTO: 6.5 [PH]
PLATELET # BLD AUTO: 194 THOUSANDS/UL (ref 149–390)
PMV BLD AUTO: 9.4 FL (ref 8.9–12.7)
POTASSIUM SERPL-SCNC: 4.5 MMOL/L (ref 3.5–5.3)
PROT UR STRIP-MCNC: NEGATIVE MG/DL
RBC # BLD AUTO: 4.33 MILLION/UL (ref 3.88–5.62)
RH BLD: POSITIVE
SODIUM SERPL-SCNC: 131 MMOL/L (ref 135–147)
SP GR UR STRIP.AUTO: 1.01 (ref 1–1.03)
SPECIMEN EXPIRATION DATE: NORMAL
UROBILINOGEN UR STRIP-ACNC: <2 MG/DL
WBC # BLD AUTO: 9.24 THOUSAND/UL (ref 4.31–10.16)

## 2024-11-30 PROCEDURE — EDAIR PR ED AIR: Performed by: EMERGENCY MEDICINE

## 2024-11-30 PROCEDURE — 85025 COMPLETE CBC W/AUTO DIFF WBC: CPT

## 2024-11-30 PROCEDURE — 86900 BLOOD TYPING SEROLOGIC ABO: CPT | Performed by: SURGERY

## 2024-11-30 PROCEDURE — 80048 BASIC METABOLIC PNL TOTAL CA: CPT

## 2024-11-30 PROCEDURE — 86901 BLOOD TYPING SEROLOGIC RH(D): CPT | Performed by: SURGERY

## 2024-11-30 PROCEDURE — 81003 URINALYSIS AUTO W/O SCOPE: CPT

## 2024-11-30 PROCEDURE — 86850 RBC ANTIBODY SCREEN: CPT | Performed by: SURGERY

## 2024-11-30 PROCEDURE — 36415 COLL VENOUS BLD VENIPUNCTURE: CPT

## 2024-11-30 PROCEDURE — 99284 EMERGENCY DEPT VISIT MOD MDM: CPT | Performed by: SURGERY

## 2024-11-30 PROCEDURE — 71045 X-RAY EXAM CHEST 1 VIEW: CPT

## 2024-11-30 PROCEDURE — 70450 CT HEAD/BRAIN W/O DYE: CPT

## 2024-11-30 PROCEDURE — 99284 EMERGENCY DEPT VISIT MOD MDM: CPT

## 2024-11-30 PROCEDURE — 72125 CT NECK SPINE W/O DYE: CPT

## 2024-11-30 RX ORDER — ATENOLOL 50 MG/1
100 TABLET ORAL DAILY
Status: DISCONTINUED | OUTPATIENT
Start: 2024-11-30 | End: 2024-11-30 | Stop reason: HOSPADM

## 2024-11-30 RX ORDER — LISINOPRIL 20 MG/1
20 TABLET ORAL DAILY
Status: DISCONTINUED | OUTPATIENT
Start: 2024-11-30 | End: 2024-11-30 | Stop reason: HOSPADM

## 2024-11-30 RX ORDER — OXYCODONE HYDROCHLORIDE 5 MG/1
5 TABLET ORAL EVERY 4 HOURS PRN
Refills: 0 | Status: DISCONTINUED | OUTPATIENT
Start: 2024-11-30 | End: 2024-11-30 | Stop reason: HOSPADM

## 2024-11-30 RX ORDER — ATORVASTATIN CALCIUM 40 MG/1
40 TABLET, FILM COATED ORAL
Status: DISCONTINUED | OUTPATIENT
Start: 2024-11-30 | End: 2024-11-30 | Stop reason: HOSPADM

## 2024-11-30 RX ORDER — ACETAMINOPHEN 325 MG/1
975 TABLET ORAL EVERY 8 HOURS SCHEDULED
Status: DISCONTINUED | OUTPATIENT
Start: 2024-11-30 | End: 2024-11-30 | Stop reason: HOSPADM

## 2024-11-30 RX ORDER — SULFAMETHOXAZOLE AND TRIMETHOPRIM 800; 160 MG/1; MG/1
1 TABLET ORAL EVERY 12 HOURS SCHEDULED
Status: DISCONTINUED | OUTPATIENT
Start: 2024-11-30 | End: 2024-11-30

## 2024-11-30 RX ADMIN — ACETAMINOPHEN 975 MG: 325 TABLET, FILM COATED ORAL at 01:38

## 2024-11-30 NOTE — ED PROVIDER NOTES
Emergency Department Airway Evaluation and Management Form    History  Obtained from: pt  No active allergies and Orgovyx [relugolix]  Chief Complaint   Patient presents with    Trauma     See narrator      HPI    78 yo male on ASA and xarelto presents following head strike.    Past Medical History:   Diagnosis Date    A-fib (HCC)     Acute MI (HCC) 06/2002    Arthritis     Atypical chest pain 03/05/2008    Basal cell carcinoma 2009    Coronary artery disease     Diverticulosis 2008    Hematuria, microscopic     History of varicose veins 2008    Hyperlipidemia     Hypertension     Myocardial infarction (HCC)     Nocturia     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     Prostate cancer (HCC) 3/22/2024    Urinary tract infection     Wears glasses      Past Surgical History:   Procedure Laterality Date    COLONOSCOPY      CORONARY ANGIOPLASTY      right CA x 3 vessels: redo PTCA-LAD 10/04    CORONARY STENT PLACEMENT  01/01/2002    INSERTION OF FIDUCIAL MARKER (TRANSRECTAL ULTRASOUND GUIDANCE) N/A 6/27/2024    Procedure: INSERTION OF FIDUCIAL MARKER , SPACEOAR;  Surgeon: Jaret Burks MD;  Location: [facility];  Service: Urology    JOINT REPLACEMENT      R hip    NH BX PROSTATE STRTCTC SATURATION SAMPLING IMG GID N/A 3/22/2024    Procedure: TRANSPERINEAL MRI FUSION BIOPSY PROSTATE;  Surgeon: Gabino Lopez MD;  Location: [facility];  Service: Urology    NH COLONOSCOPY FLX DX W/COLLJ SPEC WHEN PFRMD N/A 06/27/2017    Procedure: COLONOSCOPY;  Surgeon: Scott Last MD;  Location: [facility];  Service: Colorectal    TOTAL HIP ARTHROPLASTY Right     TOTAL HIP ARTHROPLASTY Left 04/30/2019    WISDOM TOOTH EXTRACTION       Family History   Problem Relation Age of Onset    Aneurysm Father         post op AAA repair    Hypertension Sister     Hypertension Brother     Cancer Brother         Agent Orange    Hypertension Mother     Diabetes Mother      Social History     Tobacco Use    Smoking status: Former      Current packs/day: 0.00     Average packs/day: 0.5 packs/day for 5.0 years (2.5 ttl pk-yrs)     Types: Cigarettes     Start date: 1967     Quit date: 1970     Years since quittin.9     Passive exposure: Past    Smokeless tobacco: Never    Tobacco comments:     quit ...0.5 packs/2.00 pack years   Vaping Use    Vaping status: Never Used   Substance Use Topics    Alcohol use: No    Drug use: No     I have reviewed and agree with the history as documented.    Review of Systems no pain    Physical Exam  /78 (BP Location: Right arm)   Pulse 76   Temp 99.1 °F (37.3 °C) (Tympanic)   Resp 18   Wt 80.3 kg (177 lb 0.5 oz)   SpO2 96%   BMI 26.92 kg/m²     Physical Exam    No oral trauma  No stridor  Lungs CTAB  HENNING      ED Medications  Medications   acetaminophen (TYLENOL) tablet 975 mg (975 mg Oral Given 24 0138)   oxyCODONE (ROXICODONE) split tablet 2.5 mg (has no administration in time range)     Or   oxyCODONE (ROXICODONE) IR tablet 5 mg (has no administration in time range)   atenolol (TENORMIN) tablet 100 mg (has no administration in time range)   lisinopril (ZESTRIL) tablet 20 mg (has no administration in time range)   atorvastatin (LIPITOR) tablet 40 mg (has no administration in time range)       Intubation  Procedures    Notes      Final Diagnosis  Final diagnoses:   None       ED Provider  Electronically Signed by     Johnnie Kaur DO  [id number]

## 2024-11-30 NOTE — H&P
"H&P - Trauma   Name: Cruz Patricio Jr. 79 y.o. male I MRN: [id number]  Unit/Bed#: TR 02 I Date of Admission: 11/30/2024   Date of Service: 11/30/2024 I Hospital Day: 0     Assessment & Plan  Fall  Patient presents with a mechanical fall with positive headstrike earlier today. He is currently on ASA81 and Xarelto daily.    -CT head and C-spine negative.  -Pain and nausea control as needed   -Patient is medically stable for discharge. Counseled on red flag symptoms to return to the ED or PCP.     Trauma Alert: Level B   Model of Arrival:  Self     Trauma Team: Attending Mita, Residents Imam  Consultants:     None     History of Present Illness   Chief Complaint: fall  Mechanism:Fall     Cruz Patricio Jr. is a 79 y.o. male who presents after a mechanical fall. He was shopping with his wife in [facility] when he \"toppled over\" and hit his head outside. He denies any pain, dizziness, loss of consciousness, nausea, vomiting, abdominal pain, and extremity pain. He takes ASA81 and Xarelto daily.     Review of Systems  I have reviewed the patient's PMH, PSH, Social History, Family History, Meds, and Allergies  Immunization History   Administered Date(s) Administered    COVID-19 MODERNA VACC 0.5 ML IM 10/21/2022    COVID-19 Moderna Vac BIVALENT 12 Yr+ IM 0.5 ML 10/21/2022, 07/05/2023    COVID-19 Moderna mRNA Vaccine 12 Yr+ 50 mcg/0.5 mL (Spikevax) 12/01/2023    COVID-19 PFIZER VACCINE 0.3 ML IM 02/13/2021, 03/06/2021, 10/06/2021    H1N1, All Formulations 02/24/2010    INFLUENZA 10/27/2015, 10/19/2016, 09/15/2017, 10/11/2022, 11/10/2022, 11/20/2023    Influenza Split High Dose Preservative Free IM 10/15/2014, 10/19/2016, 09/15/2017    Influenza, high dose seasonal 0.7 mL 09/05/2018, 10/14/2019, 10/22/2020, 11/10/2021, 11/20/2023    Influenza, seasonal, injectable 01/01/2007, 06/30/2008, 11/10/2008, 11/24/2009, 10/24/2011, 11/04/2012    Pneumococcal Conjugate 13-Valent 02/18/2015    Pneumococcal Polysaccharide PPV23 " 02/07/2012    Tdap 10/12/2012, 06/14/2023    Zoster 11/18/2013    Zoster Vaccine Recombinant 12/27/2019, 06/24/2020     Last Tetanus: 2023    1. Before the illness or injury that brought you to the Emergency, did you need someone to help you on a regular basis? 0=No   2. Since the illness or injury that brought you to the Emergency, have you needed more help than usual to take care of yourself? 0=No   3. Have you been hospitalized for one or more nights during the past 6 months (excluding a stay in the Emergency Department)? 0=No   4. In general, do you see well? 0=Yes   5. In general, do you have serious problems with your memory? 0=No   6. Do you take more than three different medications everyday? 1=Yes   TOTAL   1     Did you order a geriatric consult if the score was 2 or greater?: no       Objective :  Temp:  [99.1 °F (37.3 °C)] 99.1 °F (37.3 °C)  HR:  [81-86] 81  BP: (137-151)/(66-76) 137/66  Resp:  [18] 18  SpO2:  [97 %-98 %] 98 %  O2 Device: None (Room air)    Initial Vitals:   Temperature: 99.1 °F (37.3 °C) (11/30/24 0055)  Pulse: 86 (11/30/24 0055)  Respirations: 18 (11/30/24 0055)  Blood Pressure: 151/76 (11/30/24 0055)    Primary Survey:   Airway:        Status: patent;        Pre-hospital Interventions: none        Hospital Interventions: none  Breathing:        Pre-hospital Interventions: none       Effort: normal       Right breath sounds: normal       Left breath sounds: normal  Circulation:        Rhythm: regular       Rate: regular   Right Pulses Left Pulses    R radial: 2+    R pedal: 2+     L radial: 2+    L pedal: 2+       Disability:        GCS: Eye: 4; Verbal: 5 Motor: 6 Total: 15       Right Pupil: round;  reactive         Left Pupil:  round;  reactive      R Motor Strength L Motor Strength    R : 5/5  R dorsiflex: 5/5  R plantarflex: 5/5 L : 5/5  L dorsiflex: 5/5  L plantarflex: 5/5          Exposure:       Completed: Yes      Secondary Survey:  Physical Exam  Vitals and nursing  "note reviewed.   Constitutional:       Appearance: Normal appearance.   HENT:      Head: Normocephalic.      Comments: Bandaid on right temple where skin lesion was removed by dermatologist this week.     Mouth/Throat:      Mouth: Mucous membranes are moist.   Eyes:      Extraocular Movements: Extraocular movements intact.      Pupils: Pupils are equal, round, and reactive to light.   Cardiovascular:      Rate and Rhythm: Normal rate and regular rhythm.   Pulmonary:      Effort: Pulmonary effort is normal.      Breath sounds: Normal breath sounds.   Abdominal:      General: There is no distension.      Palpations: Abdomen is soft.      Tenderness: There is no abdominal tenderness.   Musculoskeletal:         General: No tenderness.   Neurological:      General: No focal deficit present.      Mental Status: He is alert and oriented to person, place, and time.      Cranial Nerves: No cranial nerve deficit.   Psychiatric:         Mood and Affect: Mood normal.             Lab Results: I have reviewed the following results:  No results for input(s): \"WBC\", \"HGB\", \"HCT\", \"PLT\", \"BANDSPCT\", \"SODIUM\", \"K\", \"CL\", \"CO2\", \"BUN\", \"CREATININE\", \"GLUC\", \"CAIONIZED\", \"MG\", \"PHOS\", \"AST\", \"ALT\", \"ALB\", \"TBILI\", \"DBILI\", \"ALKPHOS\", \"PTT\", \"INR\", \"HSTNI0\", \"HSTNI2\", \"BNP\", \"LACTICACID\" in the last 72 hours.    Imaging Results: I have personally reviewed pertinent images saved in PACS. CT scan findings (and other pertinent positive findings on images) were discussed with radiology. My interpretation of the images/reports are as follows:  Chest Xray(s): negative for acute findings   FAST exam(s): negative for acute findings   CT Scan(s): pending   Additional Xray(s): N/A     Other Studies: Other Study Results Review: No additional pertinent studies reviewed.  "

## 2024-11-30 NOTE — ASSESSMENT & PLAN NOTE
Patient presents with a mechanical fall with positive headstrike earlier today. He is currently on ASA81 and Xarelto daily.    -CT head and C-spine negative.  -Pain and nausea control as needed   -Patient is medically stable for discharge. Counseled on red flag symptoms to return to the ED or PCP.

## 2024-12-04 ENCOUNTER — RESULTS FOLLOW-UP (OUTPATIENT)
Dept: DERMATOLOGY | Facility: CLINIC | Age: 79
End: 2024-12-04

## 2024-12-04 ENCOUNTER — RESULTS FOLLOW-UP (OUTPATIENT)
Dept: FAMILY MEDICINE CLINIC | Facility: CLINIC | Age: 79
End: 2024-12-04

## 2024-12-04 DIAGNOSIS — C44.310 BASAL CELL CARCINOMA (BCC) OF SKIN OF FACE, UNSPECIFIED PART OF FACE: Primary | ICD-10-CM

## 2024-12-04 PROCEDURE — 88305 TISSUE EXAM BY PATHOLOGIST: CPT | Performed by: DERMATOLOGY

## 2024-12-04 PROCEDURE — 88342 IMHCHEM/IMCYTCHM 1ST ANTB: CPT | Performed by: DERMATOLOGY

## 2024-12-04 NOTE — RESULT ENCOUNTER NOTE
DERMATOPATHOLOGY RESULT NOTE    Results reviewed by ordering physician.  Called patient to personally discuss results. Discussed results with patient.       Instructions for Clinical Derm Team:   (remember to route Result Note to appropriate staff):    Call patient and schedule for mohs    Result & Plan by Specimen:    Specimen A: malignant  Plan: MOHs      Specimen B: benign  Plan: reassured, benign        Tissue Exam: [id number]  Order: [id number]   Status: Final result      Dx: Neoplasm of uncertain behavior    Test Result Released: Yes (seen)    View Follow-Up Encounter     Component  Ref Range & Units (hover)   Case Report  Surgical Pathology Report                         Case: [id number]                                  Authorizing Provider:  Grant Kemp MD            Collected:           11/21/2024 1513              Ordering Location:     [facility]      Received:            11/21/2024 [facility]                                     [place]                                                                      Pathologist:           Romina Chamberlain MD                                                      Specimens:   A) - Skin, Other, Specimen A; right temple;                                                         B) - Skin, Other, Specimen B; right mid back;                                            Final Diagnosis  A. Skin, right temple, shave biopsy:  BASAL CELL CARCINOMA (NODULAR AND INFILTRATIVE TYPES); transected.      B. Skin, right mid back, shave biopsy:  Seborrheic keratosis, extending to biopsy margins.     Comment: SOX10 was reviewed on block B4.      Electronically signed by Romina Chamberlain MD on 12/4/2024 at 0915 EST  Additional Information   All reported additional testing was performed with appropriately reactive controls.  These tests were developed and their performance characteristics determined by [facility] or appropriate performing facility, though some  "tests may be performed on tissues which have not been validated for performance characteristics (such as staining performed on alcohol exposed cell blocks and decalcified tissues).  Results should be interpreted with caution and in the context of the patients' clinical condition. These tests may not be cleared or approved by the U.S. Food and Drug Administration, though the FDA has determined that such clearance or approval is not necessary. These tests are used for clinical purposes and they should not be regarded as investigational or for research. This laboratory has been approved by [place] 88, designated as a high-complexity laboratory and is qualified to perform these tests.  .  Gross Description   A. The specimen is received in formalin, labeled with the patient's name and hospital number, and is designated \" right temple.\"  It consists of a 0.9 x 0.7 cm fragment of skin, less than 0.1 cm thick.  The skin is tan-white with a 0.5 x 0.5 x 0.1 cm scaly tan-red ulcerated lesion.  The margin is inked green and the skin surface is inked red.  The specimen is bisected and submitted entirely in 1 cassette, between sponges.  B. The specimen is received in formalin, labeled with the patient's name and hospital number, and is designated \" right mid back.\"  It consists of 2 irregular fragments of skin, measuring 1.2 x 0.5 cm and 4.4 x 2.2 cm, with thicknesses of 0.4 and 0.9 cm, respectively.  The skin is white-tan to tan-gray to tan-brown, crusted, filiform, and friable.  The margins are inked green.  The specimen is submitted entirely as follows:  1: Smaller fragment, between sponges  2-8: Larger fragment, between sponges    Note: The estimated total formalin fixation time based upon information provided by the submitting clinician and the standard processing schedule is under 72 hours.    MScheib  Clinical Information   ATTN DR CARRILLO  Specimen A; right temple; skin; shave biopsy; 79 year old with 1.5 cm x 0.5 cm " ulcerated pink plaque; Rule out: basal cell carcinoma  Specimen B; right mid back; skin; shave biopsy; 79 year old with 3.5 cm x 3.0 cm stuck on brown plaque; Rule out: seborrheic keratosis  Resulting Agency [facility]          Specimen Collected: 11/21/24  3:13 PM Last Resulted: 12/04/24  9:15 AM

## 2024-12-04 NOTE — TELEPHONE ENCOUNTER
----- Message from Nellie Mason DO sent at 12/4/2024  5:40 PM EST -----  Please let patient know his urine is clear.  ----- Message -----  From: Lab, Background User  Sent: 11/30/2024   9:39 AM EST  To: Nellie Mason DO

## 2024-12-05 ENCOUNTER — TELEPHONE (OUTPATIENT)
Dept: DERMATOLOGY | Facility: CLINIC | Age: 79
End: 2024-12-05

## 2024-12-05 NOTE — TELEPHONE ENCOUNTER
Pre- operative Mohs Telephone Scheduling Note    Do you have a pacemaker, defibrillator, spinal or brain stimulator? no    Do you take antibiotics before skin or dental procedures? yes: keflex  If yes, will likely require pre-operative antibiotics. Ask  the patient why they take the antibiotics (usually because of joint replacement).    Do you have a history of a joint replacements within the past 2 years? no   If yes, will likely require pre-operative antibiotics. Ask if orthopaedic surgeon has prescribed pre-operative antibiotics to take before procedures/dental work?    Do you take any OTC medications that thin your blood (Aspirin, Aleve, Ibuprofen) or supplements that thin your blood (fish oil, garlic, vitamin E, Ginko Biloba)? no    Do you take any prescribed medications that thin your blood (Coumadin, Plavix, Xarelto, Eliquis or another prescribed blood thinner)? yes: xarelto    Do you have an allergy to lidocaine or epinephrine? no    Do you have allergies to Iodine? no    Do you wear a lidocaine patch? no    Have you ever been diagnosed with HIV, AIDS, Hep B and Hep C? no    Do you use a cane, walker or wheelchair? no    Is the patient from a nursing home? no If yes, Is there any special accommodations that is needed for patient n/a    Do you smoke? no      If yes,  patient to try and stop 2 days before surgery and 7 days after the surgery. Minimizing smoking as much as possible during this time will improve healing and the cosmetic result after surgery.    Do you use supplemental oxygen? If so, how many liters and can you be off it for a short period of time? N/a    Date scheduled: 6/6/25 at 10 am with Dr Cash    Coordination of Care with other provider (Oculoplastics, Plastics, ENT) required? no   IF YES, PLEASE FORWARD TO APPROPRIATE PERSONNEL TO HELP COORDINATE.    Are there remaining tumors to be scheduled? no    Was Prior Authorization obtained? No (please use [email or web address] to document prior  auth)

## 2024-12-05 NOTE — LETTER
Cruz Patricio Jr.     1945    [address] [place] [address]    Dear Cruz Patricio Jr.,    You are scheduled to have the MOHS procedure on June 6, 2025 at 10 am for right sharp with Dr.Ryan Cash. Our office is located in [facility] our address is [address] [place] [address]. Once you arrive please check in with our front staff in [address] and they will escort you to the MOHS waiting room.  If you have someone bringing you to your appointment they may wait in the waiting room or accompany you in your visit.      Below you will find some pre-op instructions along with some information regarding the MOHS procedure.     If you have any questions please call our office at [phone].       Thank you,    [facility]         PRE-OPERATIVE INSTRUCTIONS - MOHS    Before your scheduled surgery, there are a number of important precautions and positive steps you should take to help prepare yourself for a successful treatment and speedy recovery.    Some of the steps, which are listed below, may seem unnecessary and inconvenient, but they are important. For example, when you stop smoking, you increase your ability to heal. Occasionally, there may be valid reasons, personal or medical, why you can't comply. In such cases, please call the office so we can discuss possible ways to overcome any obstacles you may be encountering.    If you have any questions about the surgery, or remember additional medical information that you forgot to mention to our staff, please contact the office prior to your surgery.    GENERAL INFORMATION REGARDING MOHS MICROGRAPHIC SURGERY    Mohs surgery is a specialized technique for the removal of skin cancer developed by Dr. Frederick Mohs over 50 years ago to improve the cure rates of skin cancer. Traditionally, skin cancers are treated by destructive methods (radiation, freezing, scraping, and  burning) or excision (cutting out the tissue with standards margins and sending it to an outside laboratory for testing). These methods all yield cure rates between 65%-94%. However, for cancers located in cosmetically sensitive areas, large tumors, or tumors unsuccessfully treated by other means, Mohs surgery offers a higher cure rate. In most cases, Mohs surgery provides you with a 99% cure rate for primary (previously untreated) basal cell cancer and a 95% cure rate for primary squamous cell cancer. In Mohs surgery, tissue is removed and processed in a way that we are able to check 100% of the margins, giving the highest cure rate for any method of treating skin cancers while providing maximal preservation of normal skin. This allows the surgeon to produce an optimal cosmetic result for the patient by maximizing the amount of tissue removed yielding as small a scar as possible    On the day of surgery, you will be given local anesthesia only (similar to what was given to you during your initial biopsy). You will remain awake. You will verify the location of the skin cancer prior to the onset of the surgery. Once the area is numb, the tissue containing the skin cancer will be removed, taking a small safety margin. This margin is usually smaller than what would be taken with a standard excision. Once the tissue is removed, it is marked and oriented. The first layer (“Stage I”) will be processed in our laboratory. The wound will be treated for bleeding and a bandage will be placed to keep you comfortable while you wait an approximate 45 minutes-1 hour (for the processing of the tissue) in your room. Your Mohs surgeon will examine the pathology in the lab, checking all the margins. If any tumor remains, you will need to take a second layer of skin (“Stage 2”). The area will be re-anesthetized and your Mohs surgeon will remove more skin only in the area where the tumor exists. This process will continue until all the  skin cancer is removed. Unfortunately, there is no method to predict how many layers or stages will be taken.    Once the tumor has been removed completely, we will discuss the best ways to close the defect. Most wounds may be closed with stitches. A larger wound may require a skin graft or a flap. In rare instances, especially for cancers around the eye or for larger cancers, we may work with another surgeon (oculoplastic, ENT, plastics) with special skills to assist with reconstruction.  If the surgery is coordinated with another specialist, you will have the Mohs portion of the procedure first and see the coordinated specialist after the skin cancer has been removed.  Always follow the pre-operative instructions of the surgeon doing the closure.      Medications: Please take all your normal medications the morning of your surgery. If you are a diabetic, please bring your insulin or medications with you, as well as a snack to avoid having low blood sugar during your day with us.    1) Blood Thinners    VERY IMPORTANT: We do NOT stop or hold prescribed blood thinners (such as Coumadin/Warfarin, Plavix, Eliquis, Pradaxa, Brilinta, Apixaban, Xarelto, Lovonox, Rivaroxaban, or Aggrenox) before Mohs surgery. Additionally, If you take aspirin because you have had a stroke, heart attack, heart disease, other condition, or your physician has prescribed you to take it, please continue your aspirin.    Although there is a risk of increased bruising and bleeding, we are still able to safely perform surgery while continuing these medications. Please NEVER stop your prescribed blood thinner without the managing doctor's (the doctor that prescribed the medication) permission or knowledge. If you have any concerns about not holding your blood thinner, please address these with your Mohs surgeon.    Most people should stop all non-steroidal anti-inflammatory medications (Motrin, Naproxen, Advil, Midol, Aleve, etc.) for 7 days  prior to your scheduled surgery and 2 days after (unless instructed otherwise after surgery).  You may take Tylenol for pain.     Vitamins and Supplements  Avoid taking any supplements with Vitamin E, Fish Oil, Gingko, Ginseng, and Garlic for 2 weeks before and 2 days after your surgery. These thin your blood.    Lidocaine Patches  Avoid wearing any over the counter or prescribed Lidocaine patches the day of the surgery.    Alcohol: Avoid drinking alcohol for 2 days prior to your surgery, and for 2 days afterwards (it thins the blood and causes more bruising and swelling).    Smoking: Try to STOP or reduce smoking significantly the week before your surgery, and especially the week afterwards (it greatly improves how well you heal). Tobacco smoke deprives the blood of oxygen, which is urgently needed by the wound during the healing process.    Contact Lenses: Do not wear them on the day of the surgery. Instead, wear glasses and bring your case, in case we need to remove them.    Clothing: Do not wear your nicest clothing on your surgery day. We recommend wearing a button down shirt that will not disrupt your post-operative dressing when changing later that night. Please avoid wearing jeans during the procedure to help prevent damage to our equipment.     Bathing: On the morning of your surgery, you may bathe or shower normally. If you get your hair done on a weekly basis, remember to get your hair washed the day before surgery.   - You will need to keep your surgical site dry for a minimum of 48 hours after surgery.    Makeup: If your surgery is on the face, please do not wear any makeup on the day of the surgery.    Jewelry: Please try to avoid wearing jewelry on the day of surgery.    Food: On the morning of surgery, have breakfast but limit your intake of caffeinated beverages. They are diuretic and may inconvenience you during surgery. If you are following up with another surgeon the same day as your Mohs  surgery, you must receive permission to eat breakfast from that surgeon.      What to bring with you on the day of your surgery:  Bring snacks - Since you could be at the office long, you may bring snacks and/or lunch with you. Some snacks and drinks are available at the office as well.   Bring a sweater - Bring a sweater or jacket that buttons or zips down the front and will not disturb your wound dressing during removal.  Bring something to do - You will be spending much of the day in our office. There will be 45-60 minute waiting periods  between layers/stages, and while there is a television with cable in every room, it is nice to have something to keep you occupied such as books, magazines, knitting, music, or work.     Planning Ahead:  Other Appointments - It is important to realize that no matter how small the skin cancer appears to be, looks can be deceiving. Since your surgery may last the entire day, you should not schedule any other appointments that day.  Special Occasions - Surgery often creates swelling and bruising. Also, the post-op dressing may be rather large and obvious. Keep this in mind as you arrange your social/work schedule. If an important event is already planned, please check with your referring physician or your Mohs surgeon to see if the surgery can be postponed.  Activity Limits after Surgery - If surgery was performed on your face, we recommend that you keep your activity level to a minimum for 2-3 days (the blood pressure elevation related to exercise can lead to bleeding). If you have stitches in an area that will be under tension or significant movement (neck, back, arms, legs), you will need to avoid heavy lifting (anything over 5 lbs) or exercise for at least 2 weeks and possibly longer. We also advise that you limit out of town travel for the first 7 days after surgery. You should also wait at least 7 days before going into a pool or the ocean.  Housework - Since you will need to  minimize activity after surgery, plan to do your groceries, laundry, gardening, and other heavy household chores prior to your surgery. Please make arrangements for assistance during the post-op period. If surgery is around your mouth area, you may need to eat soft foods, such as soup, milkshakes, or yogurt for 48 hours.    Purchasing bandage supplies: Prior to surgery, please purchase the following items to care for your surgical wound properly.  Cotton swabs (Q-tips)  Vaseline or Aquaphor  Telfa pads (or any non-stick dressing)  Paper tape or Hypafix tape  Gauze pads (3x3)    Transportation: It is often reassuring and comforting to have a  drive you to and from the surgery. He or she is welcome to wait in the office during the surgery. If you do not have a , you may drive to and from your procedure (unless stated otherwise). If the site being treated is near your eye, be aware that the final bandage may cause some obstruction of vision.     Rescheduling: If you need to reschedule your surgery, please notify the office as soon as possible.

## 2024-12-06 ENCOUNTER — OFFICE VISIT (OUTPATIENT)
Dept: UROLOGY | Facility: MEDICAL CENTER | Age: 79
End: 2024-12-06
Payer: MEDICARE

## 2024-12-06 ENCOUNTER — IMMUNIZATIONS (OUTPATIENT)
Dept: FAMILY MEDICINE CLINIC | Facility: CLINIC | Age: 79
End: 2024-12-06
Payer: MEDICARE

## 2024-12-06 VITALS
DIASTOLIC BLOOD PRESSURE: 70 MMHG | SYSTOLIC BLOOD PRESSURE: 120 MMHG | OXYGEN SATURATION: 98 % | HEART RATE: 73 BPM | BODY MASS INDEX: 26.83 KG/M2 | WEIGHT: 177 LBS | HEIGHT: 68 IN

## 2024-12-06 VITALS — TEMPERATURE: 97.9 F

## 2024-12-06 DIAGNOSIS — C61 PROSTATE CANCER (HCC): Primary | ICD-10-CM

## 2024-12-06 DIAGNOSIS — I25.10 CHRONIC CORONARY ARTERY DISEASE: ICD-10-CM

## 2024-12-06 DIAGNOSIS — I25.709 ATHEROSCLEROSIS OF CORONARY ARTERY BYPASS GRAFT OF NATIVE HEART WITH ANGINA PECTORIS (HCC): ICD-10-CM

## 2024-12-06 DIAGNOSIS — I10 BENIGN ESSENTIAL HYPERTENSION: ICD-10-CM

## 2024-12-06 DIAGNOSIS — Z23 ENCOUNTER FOR IMMUNIZATION: Primary | ICD-10-CM

## 2024-12-06 DIAGNOSIS — N40.0 BENIGN PROSTATIC HYPERPLASIA WITHOUT LOWER URINARY TRACT SYMPTOMS: ICD-10-CM

## 2024-12-06 DIAGNOSIS — R33.9 INCOMPLETE BLADDER EMPTYING: ICD-10-CM

## 2024-12-06 DIAGNOSIS — I48.91 ATRIAL FIBRILLATION, UNSPECIFIED TYPE (HCC): ICD-10-CM

## 2024-12-06 LAB
POST-VOID RESIDUAL VOLUME, ML POC: 246 ML
SL AMB  POCT GLUCOSE, UA: NORMAL
SL AMB LEUKOCYTE ESTERASE,UA: NORMAL
SL AMB POCT BILIRUBIN,UA: NORMAL
SL AMB POCT BLOOD,UA: NORMAL
SL AMB POCT CLARITY,UA: CLEAR
SL AMB POCT COLOR,UA: YELLOW
SL AMB POCT KETONES,UA: NORMAL
SL AMB POCT NITRITE,UA: NORMAL
SL AMB POCT PH,UA: 7
SL AMB POCT SPECIFIC GRAVITY,UA: 1.01
SL AMB POCT URINE PROTEIN: NORMAL
SL AMB POCT UROBILINOGEN: 0.2

## 2024-12-06 PROCEDURE — 90662 IIV NO PRSV INCREASED AG IM: CPT

## 2024-12-06 PROCEDURE — 51798 US URINE CAPACITY MEASURE: CPT | Performed by: UROLOGY

## 2024-12-06 PROCEDURE — 99214 OFFICE O/P EST MOD 30 MIN: CPT | Performed by: UROLOGY

## 2024-12-06 PROCEDURE — 90471 IMMUNIZATION ADMIN: CPT

## 2024-12-06 PROCEDURE — 81003 URINALYSIS AUTO W/O SCOPE: CPT | Performed by: UROLOGY

## 2024-12-06 RX ORDER — TAMSULOSIN HYDROCHLORIDE 0.4 MG/1
0.8 CAPSULE ORAL
Qty: 90 CAPSULE | Refills: 3 | Status: SHIPPED | OUTPATIENT
Start: 2024-12-06

## 2024-12-06 NOTE — PROGRESS NOTES
Name: Cruz Patricio Jr.      : 1945      MRN: [id number]  Encounter Provider: Jaret Burks MD  Encounter Date: 2024   [facility]  :  Assessment & Plan  Prostate cancer (HCC)  He is now 3 months post completion of IMRT.  He completed Firmagon.  PSA in October had fallen to 0.45.  He does note urinary incontinence and postvoid residual is elevated and spite of the use of tamsulosin 0.4.  I recommended he increase tamsulosin to 0.8 mg daily.  He will return in 2 months and we will reassess his bladder emptying and consider cystoscopy if symptoms are not improving.  We will plan to repeat a PSA prior to his next visit.      .Orders:    POCT urine dip auto non-scope    POCT Measure PVR    PSA Total, Diagnostic; Future    tamsulosin (FLOMAX) 0.4 mg; Take 2 capsules (0.8 mg total) by mouth daily with dinner    Incomplete bladder emptying  As above.  PVR was 245 cc today.  Plan as above.  Will recheck at next visit.  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 is a 79 y.o. male who presents for follow-up post IMRT.  He was diagnosed with Cordele 4+ equal 7 prostate cancer and received neoadjuvant Firmagon.  He completed radiation in 2024.  His stream is weak and at times it trickles.  He feels he empties his bladder.  He has urgency and notes that he has been wetting at night.  He wears depends.  No gross hematuria or symptoms of infection.  He remains on tamsulosin.  AUA SYMPTOM SCORE      Flowsheet Row Most Recent  "Value   AUA SYMPTOM SCORE    How often have you had a sensation of not emptying your bladder completely after you finished urinating? 1 (P)     How often have you had to urinate again less than two hours after you finished urinating? 2 (P)     How often have you found you stopped and started again several times when you urinate? 1 (P)     How often have you found it difficult to postpone urination? 1 (P)     How often have you had a weak urinary stream? 1 (P)     How often have you had to push or strain to begin urination? 1 (P)     How many times did you most typically get up to urinate from the time you went to bed at night until the time you got up in the morning? 1 (P)     Quality of Life: If you were to spend the rest of your life with your urinary condition just the way it is now, how would you feel about that? 2 (P)     AUA SYMPTOM SCORE 8 (P)            Review of Systems   Constitutional:  Negative for chills, diaphoresis, fatigue and fever.   HENT: Negative.     Eyes: Negative.    Respiratory: Negative.     Cardiovascular: Negative.    Endocrine: Negative.    Genitourinary:         See HPI   Musculoskeletal: Negative.    Skin: Negative.    Allergic/Immunologic: Negative.    Neurological: Negative.    Hematological: Negative.    Psychiatric/Behavioral: Negative.            Objective   /70 (BP Location: Left arm, Patient Position: Sitting, Cuff Size: Standard)   Pulse 73   Ht 5' 8\" (1.727 m)   Wt 80.3 kg (177 lb)   SpO2 98%   BMI 26.91 kg/m²     Physical Exam  Vitals reviewed.   Constitutional:       General: He is not in acute distress.     Appearance: Normal appearance. He is well-developed and normal weight. He is not ill-appearing, toxic-appearing or diaphoretic.   HENT:      Head: Normocephalic and atraumatic.   Eyes:      General: No scleral icterus.     Conjunctiva/sclera: Conjunctivae normal.   Cardiovascular:      Rate and Rhythm: Normal rate.   Pulmonary:      Effort: Pulmonary effort is " normal.   Abdominal:      General: Bowel sounds are normal. There is no distension.      Palpations: Abdomen is soft. There is no mass.      Tenderness: There is no abdominal tenderness. There is no right CVA tenderness, left CVA tenderness, guarding or rebound.      Hernia: No hernia is present.   Genitourinary:     Penis: Normal. No phimosis or hypospadias.       Testes: Normal.         Right: Mass not present.         Left: Mass not present.   Musculoskeletal:         General: Normal range of motion.      Cervical back: Normal range of motion and neck supple.   Skin:     General: Skin is warm and dry.   Neurological:      General: No focal deficit present.      Mental Status: He is alert and oriented to person, place, and time.   Psychiatric:         Mood and Affect: Mood normal.         Behavior: Behavior normal.         Thought Content: Thought content normal.         Judgment: Judgment normal.         Results  Lab Results   Component Value Date    PSA 0.450 10/19/2024    PSA 5.145 (H) 07/27/2024    PSA 3.367 07/12/2024     Lab Results   Component Value Date    CALCIUM 8.9 11/30/2024    K 4.5 11/30/2024    CO2 24 11/30/2024     11/30/2024    BUN 17 11/30/2024    CREATININE 0.91 11/30/2024     Lab Results   Component Value Date    WBC 9.24 11/30/2024    HGB 12.8 11/30/2024    HCT 38.8 11/30/2024    MCV 90 11/30/2024     11/30/2024       Office Urine Dip  Recent Results (from the past hour)   POCT urine dip auto non-scope    Collection Time: 12/06/24 12:11 PM   Result Value Ref Range     COLOR,UA yellow     CLARITY,UA clear     SPECIFIC GRAVITY,UA 1.015      PH,UA 7.0     LEUKOCYTE ESTERASE,UA neg     NITRITE,UA neg     GLUCOSE, UA neg     KETONES,UA neg     BILIRUBIN,UA neg     BLOOD,UA trace-intact     POCT URINE PROTEIN neg     SL AMB POCT UROBILINOGEN 0.2    POCT Measure PVR    Collection Time: 12/06/24 12:11 PM   Result Value Ref Range    POST-VOID RESIDUAL VOLUME, ML  mL   ]

## 2024-12-06 NOTE — ASSESSMENT & PLAN NOTE
As above.  PVR was 245 cc today.  Plan as above.  Will recheck at next visit.  Orders:    Urinalysis with microscopic; Future    tamsulosin (FLOMAX) 0.4 mg; Take 2 capsules (0.8 mg total) by mouth daily with dinner

## 2024-12-06 NOTE — PATIENT INSTRUCTIONS
Patient Education     Prostate cancer   The Basics   Written by the doctors and editors at [facility]   What is prostate cancer? -- Prostate cancer happens when normal cells in the prostate gland change into abnormal cells and grow out of control. The prostate gland makes fluid that is part of semen. This gland sits below the bladder and in front of the rectum, and forms a ring around the urethra, the tube that carries urine out of the body (figure 1).  Prostate cancer occurs most often in males older than 50 years. Although prostate cancer is very common, most people do not die from it. This is because prostate cancer usually grows very slowly.  What are the symptoms of prostate cancer? -- Prostate cancer often causes no symptoms at first. But if symptoms do occur, they can include:   Needing to urinate more often than usual   A urine stream that is slower than usual  These symptoms can also be caused by conditions that are not prostate cancer. But if you have these symptoms, tell your doctor or nurse.  Is there a test for prostate cancer? -- Yes. Doctors use a blood test called a PSA test and an exam called a rectal exam to check for prostate cancer. In a rectal exam, your doctor or nurse puts a finger in your anus and up into your rectum. They press on the rectum wall to feel for abnormal areas on the prostate (figure 2).  If your doctor or nurse suspects that you have prostate cancer, they will follow up with 1 or more tests. These can include:   Biopsy - A doctor will take a small sample of tissue from your prostate. Then, another doctor will look at the sample under a microscope to see if it has cancer.   Ultrasound, MRI scan, or other imaging tests - These tests create images of the inside of the body and can show abnormal growths.  What is cancer staging? -- Cancer staging is a way in which doctors find out how far a cancer has spread.  How is prostate cancer treated? -- People with prostate cancer can often  "choose their treatment.  The main options are:   Active surveillance - If you choose this option, you will not have treatment right away. But you will have routine tests to check whether the cancer is starting to grow more quickly. If so, you can start active treatment then.   Surgery - Prostate cancer can sometimes be treated with surgery to remove the prostate gland.   Radiation therapy - Radiation kills cancer cells. Radiation can be given from a machine that moves around your body. Or a doctor might put a source of radiation directly into your prostate gland.   Hormone therapy - Male hormones in the body make prostate cancer grow. Hormone therapy reduces the levels of these hormones, which can shrink the cancer. For hormone therapy, you might take medicines. Or you might have surgery to remove your testicles. (Male hormones are made in the testicles.) This treatment is usually only for those with advanced cancer. But some people with early-stage cancer get it along with radiation or surgery.   Chemotherapy - Chemotherapy is the medical term for medicines that kill cancer cells or stop them from growing. If you have advanced prostate cancer, you might get chemotherapy if hormone therapy stops working. In some cases, chemotherapy and hormone therapy are given at the same time.  Some people, especially older males with other serious medical conditions, might choose not to do any of the above. Instead, they might choose \"watchful waiting.\" Watchful waiting is not exactly the same as active surveillance. It does not require regular testing, but involves treating symptoms when they happen.  How do I choose which treatment to have? -- You and your doctor will have to work together to choose the right treatment for you. The right treatment will depend on:   The stage of your cancer   Your age   Whether you have other health problems   How you feel about the treatment options  Always let your doctors and nurses know how " "you feel about a treatment. Any time you are offered a treatment, ask:   What are the benefits of this treatment? Is it likely to help me live longer? Will it reduce or prevent symptoms?   What are the downsides to this treatment?   Are there other options besides this treatment?   What happens if I do not have this treatment?  What happens after treatment? -- After treatment, you might keep getting checked to see if the cancer comes back or starts growing more quickly. Others choose not to be checked. Follow-up tests can include PSA tests, exams, biopsies, or imaging tests.  What happens if the cancer comes back or spreads? -- If the cancer comes back, you might have more radiation therapy, surgery, or hormone therapy. You might also have chemotherapy or immunotherapy. Immunotherapy is the medical term for medicines, including vaccines, that work with the body's infection-fighting system (the \"immune system\") to stop cancer growth.  Can prostate cancer be prevented? -- Those who are at high risk of getting prostate cancer can sometimes take a medicine to help prevent the disease. If you have a family history of prostate cancer, talk to your doctor.  All topics are updated as new evidence becomes available and our peer review process is complete.  This topic retrieved from [employer] on: Feb 26, 2024.  Topic 85679 Version 17.0  Release: 32.2.4 - C32.56  © 2024 UpToDate, Inc. and/or its affiliates. All rights reserved.  figure 1: Prostate gland     This drawing shows male internal organs and a close-up of the prostate gland.  Graphic 95436 Version 5.0  figure 2: Rectal exam     During a rectal exam, the doctor or nurse puts a finger inside your rectum and feels your prostate gland. That way, they can see how big it is and whether it has bumps or dents or anything unusual.  Graphic 07887 Version 6.0  Consumer Information Use and Disclaimer   Disclaimer: This generalized information is a limited summary of diagnosis, " treatment, and/or medication information. It is not meant to be comprehensive and should be used as a tool to help the user understand and/or assess potential diagnostic and treatment options. It does NOT include all information about conditions, treatments, medications, side effects, or risks that may apply to a specific patient. It is not intended to be medical advice or a substitute for the medical advice, diagnosis, or treatment of a health care provider based on the health care provider's examination and assessment of a patient's specific and unique circumstances. Patients must speak with a health care provider for complete information about their health, medical questions, and treatment options, including any risks or benefits regarding use of medications. This information does not endorse any treatments or medications as safe, effective, or approved for treating a specific patient. UpToDate, Inc. and its affiliates disclaim any warranty or liability relating to this information or the use thereof.The use of this information is governed by the Terms of Use, available at https://www.woltersRingCentraluwer.com/en/know/clinical-effectiveness-terms. 2024© UpToDate, Inc. and its affiliates and/or licensors. All rights reserved.  Copyright   © 2024 Up[employer]Date, Inc. and/or its affiliates. All rights reserved.

## 2024-12-06 NOTE — ASSESSMENT & PLAN NOTE
He is now 3 months post completion of IMRT.  He completed Firmagon.  PSA in October had fallen to 0.45.  He does note urinary incontinence and postvoid residual is elevated and spite of the use of tamsulosin 0.4.  I recommended he increase tamsulosin to 0.8 mg daily.  He will return in 2 months and we will reassess his bladder emptying and consider cystoscopy if symptoms are not improving.  We will plan to repeat a PSA prior to his next visit.      .Orders:    POCT urine dip auto non-scope    POCT Measure PVR    PSA Total, Diagnostic; Future    tamsulosin (FLOMAX) 0.4 mg; Take 2 capsules (0.8 mg total) by mouth daily with dinner

## 2024-12-16 ENCOUNTER — OFFICE VISIT (OUTPATIENT)
Dept: FAMILY MEDICINE CLINIC | Facility: CLINIC | Age: 79
End: 2024-12-16
Payer: MEDICARE

## 2024-12-16 VITALS
OXYGEN SATURATION: 96 % | HEIGHT: 68 IN | SYSTOLIC BLOOD PRESSURE: 120 MMHG | BODY MASS INDEX: 26.52 KG/M2 | DIASTOLIC BLOOD PRESSURE: 80 MMHG | WEIGHT: 175 LBS | HEART RATE: 65 BPM | TEMPERATURE: 97.9 F

## 2024-12-16 DIAGNOSIS — N30.00 ACUTE CYSTITIS WITHOUT HEMATURIA: Primary | ICD-10-CM

## 2024-12-16 DIAGNOSIS — R30.0 DYSURIA: ICD-10-CM

## 2024-12-16 LAB
BACTERIA UR QL AUTO: ABNORMAL /HPF
BILIRUB UR QL STRIP: NEGATIVE
CLARITY UR: ABNORMAL
COLOR UR: YELLOW
GLUCOSE UR STRIP-MCNC: NEGATIVE MG/DL
HGB UR QL STRIP.AUTO: ABNORMAL
KETONES UR STRIP-MCNC: NEGATIVE MG/DL
LEUKOCYTE ESTERASE UR QL STRIP: ABNORMAL
NITRITE UR QL STRIP: NEGATIVE
NON-SQ EPI CELLS URNS QL MICRO: ABNORMAL /HPF
PH UR STRIP.AUTO: 7 [PH]
PROT UR STRIP-MCNC: ABNORMAL MG/DL
RBC #/AREA URNS AUTO: ABNORMAL /HPF
SL AMB  POCT GLUCOSE, UA: NEGATIVE
SL AMB LEUKOCYTE ESTERASE,UA: 500
SL AMB POCT BILIRUBIN,UA: NEGATIVE
SL AMB POCT BLOOD,UA: ABNORMAL
SL AMB POCT CLARITY,UA: ABNORMAL
SL AMB POCT COLOR,UA: YELLOW
SL AMB POCT KETONES,UA: 5
SL AMB POCT NITRITE,UA: NEGATIVE
SL AMB POCT PH,UA: 5
SL AMB POCT SPECIFIC GRAVITY,UA: 1.01
SL AMB POCT URINE PROTEIN: 30
SL AMB POCT UROBILINOGEN: NEGATIVE
SP GR UR STRIP.AUTO: 1.01 (ref 1–1.03)
UROBILINOGEN UR STRIP-ACNC: <2 MG/DL
WBC #/AREA URNS AUTO: ABNORMAL /HPF
WBC CLUMPS # UR AUTO: PRESENT /UL

## 2024-12-16 PROCEDURE — 81002 URINALYSIS NONAUTO W/O SCOPE: CPT | Performed by: FAMILY MEDICINE

## 2024-12-16 PROCEDURE — 87077 CULTURE AEROBIC IDENTIFY: CPT | Performed by: FAMILY MEDICINE

## 2024-12-16 PROCEDURE — G2211 COMPLEX E/M VISIT ADD ON: HCPCS | Performed by: FAMILY MEDICINE

## 2024-12-16 PROCEDURE — 99214 OFFICE O/P EST MOD 30 MIN: CPT | Performed by: FAMILY MEDICINE

## 2024-12-16 PROCEDURE — 81001 URINALYSIS AUTO W/SCOPE: CPT | Performed by: FAMILY MEDICINE

## 2024-12-16 PROCEDURE — 87086 URINE CULTURE/COLONY COUNT: CPT | Performed by: FAMILY MEDICINE

## 2024-12-16 PROCEDURE — 87186 SC STD MICRODIL/AGAR DIL: CPT | Performed by: FAMILY MEDICINE

## 2024-12-16 RX ORDER — SULFAMETHOXAZOLE AND TRIMETHOPRIM 800; 160 MG/1; MG/1
1 TABLET ORAL 2 TIMES DAILY
Qty: 6 TABLET | Refills: 0 | Status: SHIPPED | OUTPATIENT
Start: 2024-12-16 | End: 2024-12-16

## 2024-12-16 RX ORDER — SULFAMETHOXAZOLE AND TRIMETHOPRIM 800; 160 MG/1; MG/1
1 TABLET ORAL 2 TIMES DAILY
Qty: 14 TABLET | Refills: 0 | Status: SHIPPED | OUTPATIENT
Start: 2024-12-16 | End: 2024-12-23

## 2024-12-16 NOTE — ASSESSMENT & PLAN NOTE
Repeat cystitis symptoms. Cloudiness, frequency.   Has been on flomax 0.8mg for 10 days.  Bactrim BID 7 days  Orders:  •  UA w Reflex to Microscopic w Reflex to Culture; Future  •  sulfamethoxazole-trimethoprim (BACTRIM DS) 800-160 mg per tablet; Take 1 tablet by mouth 2 (two) times a day for 7 days

## 2024-12-17 NOTE — ASSESSMENT & PLAN NOTE
Sending urine for culture  Orders:  •  POCT urine dip  •  UA w Reflex to Microscopic w Reflex to Culture  •  UA w Reflex to Microscopic w Reflex to Culture; Future  •  sulfamethoxazole-trimethoprim (BACTRIM DS) 800-160 mg per tablet; Take 1 tablet by mouth 2 (two) times a day for 7 days

## 2024-12-17 NOTE — PROGRESS NOTES
Name: Cruz Patricio Jr.      : 1945      MRN: [id number]  Encounter Provider: Nellie Mason DO  Encounter Date: 2024   Encounter department: [facility]  :  Assessment & Plan  Acute cystitis without hematuria  Repeat cystitis symptoms. Cloudiness, frequency.   Has been on flomax 0.8mg for 10 days.  Bactrim BID 7 days  Orders:  •  UA w Reflex to Microscopic w Reflex to Culture; Future  •  sulfamethoxazole-trimethoprim (BACTRIM DS) 800-160 mg per tablet; Take 1 tablet by mouth 2 (two) times a day for 7 days    Dysuria  Sending urine for culture  Orders:  •  POCT urine dip  •  UA w Reflex to Microscopic w Reflex to Culture  •  UA w Reflex to Microscopic w Reflex to Culture; Future  •  sulfamethoxazole-trimethoprim (BACTRIM DS) 800-160 mg per tablet; Take 1 tablet by mouth 2 (two) times a day for 7 days           History of Present Illness     UTI again.    Sending urine out, repeat bactrim tx and culturing    Difficulty Urinating   This is a recurrent problem. The current episode started yesterday. The problem occurs intermittently. The problem has been waxing and waning. The quality of the pain is described as burning. The pain is at a severity of 3/10. The pain is mild. There has been no fever. He is Not sexually active. There is No history of pyelonephritis. Associated symptoms include a discharge, frequency and urgency. Pertinent negatives include no chills, flank pain, hematuria, hesitancy, nausea, sweats or vomiting.     Review of Systems   Constitutional:  Negative for chills and fever.   HENT:  Negative for ear pain and sore throat.    Eyes:  Negative for pain and visual disturbance.   Respiratory:  Negative for cough and shortness of breath.    Cardiovascular:  Negative for chest pain and palpitations.   Gastrointestinal:  Negative for abdominal pain, nausea and vomiting.   Genitourinary:  Positive for dysuria, frequency and urgency. Negative for flank pain, hematuria  "and hesitancy.   Musculoskeletal:  Negative for arthralgias and back pain.   Skin:  Negative for color change and rash.   Neurological:  Negative for seizures and syncope.   All other systems reviewed and are negative.      Objective   /80 (BP Location: Left arm, Patient Position: Sitting, Cuff Size: Standard)   Pulse 65   Temp 97.9 °F (36.6 °C) (Temporal)   Ht 5' 8\" (1.727 m)   Wt 79.4 kg (175 lb)   SpO2 96%   BMI 26.61 kg/m²      Physical Exam  Vitals reviewed.   Constitutional:       General: He is not in acute distress.     Appearance: Normal appearance. He is well-developed.   HENT:      Head: Normocephalic and atraumatic.   Eyes:      Conjunctiva/sclera: Conjunctivae normal.   Cardiovascular:      Rate and Rhythm: Normal rate and regular rhythm.      Pulses: Normal pulses.      Heart sounds: Normal heart sounds. No murmur heard.  Pulmonary:      Effort: Pulmonary effort is normal. No respiratory distress.      Breath sounds: Normal breath sounds.   Abdominal:      Palpations: Abdomen is soft.      Tenderness: There is no abdominal tenderness.   Musculoskeletal:         General: No swelling.      Cervical back: Neck supple.   Skin:     General: Skin is warm and dry.      Capillary Refill: Capillary refill takes less than 2 seconds.   Neurological:      Mental Status: He is alert.   Psychiatric:         Mood and Affect: Mood normal.       Administrative Statements   I have spent a total time of 38 minutes in caring for this patient on the day of the visit/encounter including Diagnostic results, Prognosis, Risks and benefits of tx options, Instructions for management, Risk factor reductions, Impressions, Counseling / Coordination of care, Documenting in the medical record, Reviewing / ordering tests, medicine, procedures  , and Obtaining or reviewing history  .   "

## 2024-12-18 PROBLEM — N39.0 URINARY TRACT INFECTION WITHOUT HEMATURIA: Status: RESOLVED | Noted: 2024-11-18 | Resolved: 2024-12-18

## 2024-12-19 ENCOUNTER — RESULTS FOLLOW-UP (OUTPATIENT)
Dept: FAMILY MEDICINE CLINIC | Facility: CLINIC | Age: 79
End: 2024-12-19

## 2024-12-19 DIAGNOSIS — E78.49 OTHER HYPERLIPIDEMIA: Primary | ICD-10-CM

## 2024-12-19 LAB — BACTERIA UR CULT: ABNORMAL

## 2024-12-19 RX ORDER — ROSUVASTATIN CALCIUM 20 MG/1
20 TABLET, COATED ORAL DAILY
Qty: 90 TABLET | Refills: 1 | Status: SHIPPED | OUTPATIENT
Start: 2024-12-19 | End: 2025-06-17

## 2024-12-19 NOTE — TELEPHONE ENCOUNTER
Patient came to the office to request a refill of Crestor to be sent to [facility] pharmacy.

## 2024-12-21 PROBLEM — Z00.00 MEDICARE ANNUAL WELLNESS VISIT, SUBSEQUENT: Status: RESOLVED | Noted: 2024-11-21 | Resolved: 2024-12-21

## 2024-12-27 NOTE — PROGRESS NOTES
Hematology/Oncology Outpatient Office Note    Date of Service: 2025    [facility] [place]  [address]  [place] [address]  [phone]    Reason for Consultation:   Chief Complaint   Patient presents with    Follow-up       Cancer Stage at diagnosis: IIC    Referral Physician: No ref. provider found    Primary Care Physician:  Nellie Mason DO     Nickname: Gene    Spouse: Judith Goetz ECO     Today's ECO    Goals and Barriers:  Current Goal: Minimize effects of disease burden, extend life.   Barriers to accomplishing this: None    Patient's Capacity to Self Care:  Patient is able to self care      ASSESSMENT & PLAN      Diagnosis ICD-10-CM Associated Orders   1. Prostate cancer (HCC)  C61 PSA Total, Diagnostic            This is a 79 y.o. 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 I reviewed relevant office notes, imaging reports and pathology result as well.  I told the patient that this is a case of curable/disease and what this means. We discussed that the goal of anti-cancer therapy is to provide best quality of life, extend overall survival, and progression free survival as shown in clinical trials. We also discussed that there might be a point when the cancer will no longer respond to this anti-neoplastic therapy. A  I explained the risks/benefits of the proposed cancer therapy:  Orgovyx and after discussion including understanding risks of possible life-threatening complications and therapy-related malignancy development, informed consent for  blood products and treatment has been signed and obtained.  TNM/Staging At Diagnosis  Cancer Staging   Prostate cancer (HCC)  Staging form: Prostate, AJCC 8th Edition  - Clinical stage from 4/26/2024: Stage IIC (cT1c, cN0, cM0, PSA: 7, Grade Group: 3) - Signed by Dion Gibbs MD on 4/26/2024  Prostate specific antigen (PSA) range: Less than 10  Kilgore score: 7  Histologic grading system: 5 grade system    Disease Features/Tumor Markers/Genetics  Tumor Marker: PSA  7/27/2024: 5.145  10/19/2024: 0.45  Notable Path Features: 3/22/2024: R posterior lateral single core, Santa 7 (4+3)  Treatment: Orgovyx   Other Supportive care:   Treatment Team Members  Surgeon: Dr. Burks  Rad Onc: Dr. Gibbs  Palliative  Labs: 3/8/2024: WBC 7k, Hgb 16.8, plt 230k  Diagnostics  4/24/2024 PET/CT: PSMA PSMA avid lesions in the prostate as above suspicious for malignancy. No PSMA avid pelvic adenopathy.  No PSMA avid metastases in the neck, chest, upper abdomen or osseous structures      Discussion of decision making    I personally reviewed the following lab results, the image studies, pathology, other specialty/physicians consult notes and recommendations, and outside medical records. I had a lengthy discussion with the patient and shared the work-up findings. We discussed the diagnosis and management plan as below. I spent 42 minutes reviewing the records (labs, clinician notes, outside records, medical history, ordering medicine/tests/procedures, monitoring of anti-neoplastic toxicities, interpreting the imaging/labs previously done) and coordination of care as well as direct time with the patient today, of which greater than 50% of the time was spent in counseling and coordination of care with the patient/family.      Plan/Labs  F/u Urology  PSA ordered in 6 months  F/u Urology for surveillance   F/u Rad onc         Follow Up: 6 months    All questions were answered to the patient's satisfaction during this encounter. The  patient knows the contact information for our office and knows to reach out for any relevant concerns related to this encounter. They are to call for any temperature 100.4 or higher, new symptoms including but not restricted to shaking chills, decreased appetite, nausea, vomiting, diarrhea, increased fatigue, shortness of breath or chest pain, confusion, and not feeling the strength to come to the clinic. For all other listed problems and medical diagnosis in their chart - they are managed by PCP and/or other specialists, which the patient acknowledges. Thank you very much for your consultation and making us a part of this patient's care. We are continuing to follow closely with you. Please do not hesitate to reach out to me with any additional questions or concerns.    Shasta Shelton MD  Hematology & Medical Oncology Staff Physician             Disclaimer: This document was prepared using [employer] technology. If a word or phrase is confusing, or does not make sense, this is likely due to recognition error which was not discovered during this clinician's review. If you believe an error has occurred, please contact me through [employer] service line for ruma?cation.      ONCOLOGY HISTORY OF PRESENT ILLNESS        Oncology History   Prostate cancer (HCC)   3/22/2024 Initial Diagnosis    Prostate cancer (HCC)     3/22/2024 Biopsy    A. Prostate, left trans. zone:  - Benign prostate glands and stroma with focal acute inflammation.     B. Prostate, right trans. zone:  - Benign prostate glands and stroma with acute and chronic inflammation.      C. Prostate, left post. base:  - Benign prostate glands and stroma.      D. Prostate, right post. base:  - Benign prostate glands and stroma.      E. Prostate, RIGHT POSTERIOR MEDIAL:  - Benign prostate glands and stroma.      F. Prostate, LEFT POSTERIOR MEDIAL:  - Benign prostate glands and stroma.      G. Prostate, LEFT POSTERIOR LATERAL:  - Benign prostate  glands and stroma.      H. Prostate, RIGHT ANTERIOR MEDIAL:  - Benign prostate glands and stroma.      I. Prostate, RIGHT ANTERIOR LATERAL:  - Atypical small acinar proliferation (ASAP), suspicious but not diagnostic for malignancy.     J. Prostate, RIGHT POSTERIOR LATERAL:  - Prostatic adenocarcinoma, Santa grade 4+3 = 7 (70% pattern 4 and 30% pattern 3, Grade group 3), involving one of two cores, 70% of the involved core  and 30% of the involved tissue.  - Perineural invasion is absent.     Comment: Foci of cribriform glands are present with the pattern 4 component.  This feature has been associated with adverse clinical outcomes and molecular features typically seen in advanced disease.  (The 2019 Genitourinary Pathology Society (GUPS) White Paper on Contemporary Grading of Prostate Cancer. Arch Pathol Lab Med. 2021 Apr 1;145(4):461-493.)     Immunohistochemistry for prostate triple stain multiplex (,P63,P504s) was performed on blocks J2, supporting the above diagnosis.     Intradepartmental consultation is in agreement.     K. Prostate, LEFT ANTERIOR MEDIAL:  - Benign prostate glands and stroma.       L. Prostate, LEFT ANTERIOR LATERAL:  - Benign prostate stroma.       M. Prostate, ZARIA right post. lat. apex:  - Benign prostate glands and stroma.          4/26/2024 -  Cancer Staged    Staging form: Prostate, AJCC 8th Edition  - Clinical stage from 4/26/2024: Stage IIC (cT1c, cN0, cM0, PSA: 7, Grade Group: 3) - Signed by Dion Gibbs MD on 4/26/2024  Prostate specific antigen (PSA) range: Less than 10  Santa score: 7  Histologic grading system: 5 grade system       7/22/2024 - 8/28/2024 Radiation        Plan ID Energy Fractions Dose per Fraction (cGy) Dose Correction (cGy) Total Dose Delivered (cGy) Elapsed Days   PROSTATE pSV 6X-FFF 28 / 28 250 0 7,000 37      Treatment dates:  C1: 7/22/2024 - 8/28/2024 5/2024: on Orgovyx for a month  6/4/2024: pruritus and reddened rash on right side of  stomach, cellulitis tx initiated on Bactrim that cleared it: then stopped Orgovyx  7/19/2024-11/14/2024: Firmagon    SUBJECTIVE  (INTERVAL HISTORY)      Clotting History none   Bleeding History none   Cancer History Prostate, Basal Cell   Family Cancer History none   H/O Blood/Plt Transfusion none   Tobacco/etoh/drug abuse 1 PPD x 5 years, quit at age 32 or so, no etoh abuse or rec drug use           Occupation [place] for 5 years, 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      I have reviewed the relevant past medical, surgical, social and family history. I have also reviewed allergies and medications for this patient.    Review of Systems  Baseline weight: 180-185 lbs    Denies F/C, N/V, SOB, CP, LH, HA, rash, itching, gen weakness, melena, hematuria, hematochezia, falls, diarrhea, or constipation       A 10-point review of system was performed, pertinent positive and negative were detailed as above. Otherwise, the 10-point review of system was negative.      Past Medical History:   Diagnosis Date    A-fib (HCC)     Acute MI (HCC) 06/2002    Arthritis     Atypical chest pain 03/05/2008    Basal cell carcinoma 2009    Coronary artery disease     Diverticulosis 2008    Hematuria, microscopic     History of varicose veins 2008    Hyperlipidemia     Hypertension     Myocardial infarction (HCC)     Nocturia     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     Prostate cancer (HCC) 3/22/2024    Urinary tract infection     Wears glasses        Past Surgical History:   Procedure Laterality Date    COLONOSCOPY      CORONARY ANGIOPLASTY      right CA x 3 vessels: redo PTCA-LAD 10/04    CORONARY STENT PLACEMENT  01/01/2002    INSERTION OF FIDUCIAL MARKER (TRANSRECTAL ULTRASOUND GUIDANCE) N/A 6/27/2024    Procedure: INSERTION OF FIDUCIAL MARKER , SPACEOAR;  Surgeon: Jaret Burks MD;  Location:  MAIN OR;  Service: Urology    JOINT REPLACEMENT      R hip    KS BX  PROSTATE STRTCTC SATURATION SAMPLING IMG GID N/A 3/22/2024    Procedure: TRANSPERINEAL MRI FUSION BIOPSY PROSTATE;  Surgeon: Gabino Lopez MD;  Location: [facility];  Service: Urology    TN COLONOSCOPY FLX DX W/COLLJ SPEC WHEN PFRMD N/A 2017    Procedure: COLONOSCOPY;  Surgeon: Scott Last MD;  Location: [facility];  Service: Colorectal    TOTAL HIP ARTHROPLASTY Right     TOTAL HIP ARTHROPLASTY Left 2019    WISDOM TOOTH EXTRACTION         Family History   Problem Relation Age of Onset    Aneurysm Father         post op AAA repair    Hypertension Sister     Hypertension Brother     Cancer Brother         Agent Orange    Hypertension Mother     Diabetes Mother        Social History     Socioeconomic History    Marital status: /Civil Union     Spouse name: Not on file    Number of children: Not on file    Years of education: Not on file    Highest education level: Not on file   Occupational History    Not on file   Tobacco Use    Smoking status: Former     Current packs/day: 0.00     Average packs/day: 0.5 packs/day for 5.0 years (2.5 ttl pk-yrs)     Types: Cigarettes     Start date: 1967     Quit date: 1970     Years since quittin.0     Passive exposure: Past    Smokeless tobacco: Never    Tobacco comments:     quit ...0.5 packs/2.00 pack years   Vaping Use    Vaping status: Never Used   Substance and Sexual Activity    Alcohol use: No    Drug use: No    Sexual activity: Yes     Partners: Female     Birth control/protection: None   Other Topics Concern    Not on file   Social History Narrative    Not on file     Social Drivers of Health     Financial Resource Strain: Low Risk  (2023)    Overall Financial Resource Strain (CARDIA)     Difficulty of Paying Living Expenses: Not hard at all   Food Insecurity: No Food Insecurity (2024)    Hunger Vital Sign     Worried About Running Out of Food in the Last Year: Never true     Ran Out of Food in the Last Year: Never  true   Transportation Needs: No Transportation Needs (11/21/2024)    PRAPARE - Transportation     Lack of Transportation (Medical): No     Lack of Transportation (Non-Medical): No   Physical Activity: Not on file   Stress: Not on file   Social Connections: Not on file   Intimate Partner Violence: Not on file   Housing Stability: Unknown (11/21/2024)    Housing Stability Vital Sign     Unable to Pay for Housing in the Last Year: No     Number of Times Moved in the Last Year: Not on file     Homeless in the Last Year: No       Allergies   Allergen Reactions    No Active Allergies     Orgovyx [Relugolix] Confusion     LH, pre-syncope       Current Outpatient Medications   Medication Sig Dispense Refill    ascorbic acid (VITAMIN C) 1000 MG tablet Take 1,000 mg by mouth daily      aspirin (ECOTRIN LOW STRENGTH) 81 mg EC tablet Take 81 mg by mouth daily Twice a week      atenolol (TENORMIN) 100 mg tablet TAKE 1 TABLET BY MOUTH EVERY DAY 90 tablet 3    B Complex Vitamins (B COMPLEX 100 PO) Take by mouth      benazepril (LOTENSIN) 20 mg tablet TAKE 1 TABLET BY MOUTH EVERY DAY 90 tablet 1    calcium carbonate (OS-LUCA) 600 MG tablet Take 600 mg by mouth daily      ciprofloxacin (CIPRO) 250 mg tablet Take 1 tablet (250 mg total) by mouth every 12 (twelve) hours for 7 days 14 tablet 0    Coenzyme Q10 (CoQ-10) 200 MG CAPS       magnesium oxide (MAG-OX) 400 mg tablet Take 400 mg by mouth daily      multivitamin (THERAGRAN) TABS Take 1 tablet by mouth      Omega-3 Fatty Acids (FISH OIL PO) Take by mouth daily       rosuvastatin (CRESTOR) 20 MG tablet Take 1 tablet (20 mg total) by mouth daily 90 tablet 1    sildenafil (VIAGRA) 50 MG tablet Take 1 tablet (50 mg total) by mouth daily as needed for erectile dysfunction 20 tablet 6    tamsulosin (FLOMAX) 0.4 mg Take 2 capsules (0.8 mg total) by mouth daily with dinner 90 capsule 3    rivaroxaban (Xarelto) 20 mg tablet Take 1 tablet (20 mg total) by mouth daily with breakfast 90 tablet  3     No current facility-administered medications for this visit.       (Not in a hospital admission)      Objective:     24 Hour Vitals Assessment:     Vitals:    01/06/25 0858   BP: 122/88   Pulse: 68   Resp: 18   Temp: (!) 97.1 °F (36.2 °C)   SpO2: 99%           PHYSICIAN EXAM:    General: Appearance: alert, cooperative, no distress.  HEENT: Normocephalic, atraumatic. No scleral icterus. conjunctivae clear. EOMI.  Chest: No tenderness to palpation. No open wound noted.  Lungs: Clear to auscultation bilaterally, Respirations unlabored.  Cardiac: Regular rate, +S1 and S2  Abdomen: Soft, non-tender, non-distended. Bowel sounds are normal.   Extremities:  No edema, cyanosis, clubbing.  Skin: Skin color, turgor are normal. No rashes.  Lymphatics: no palpable supra-cervical, axillary, or inguinal adenopathy  Neurologic: Awake, Alert, and oriented, no gross focal deficits noted b/l.       DATA REVIEW:    Pathology Result:    Final Diagnosis   Date Value Ref Range Status   11/21/2024   Final    A. Skin, right temple, shave biopsy:  BASAL CELL CARCINOMA (NODULAR AND INFILTRATIVE TYPES); transected.       B. Skin, right mid back, shave biopsy:  Seborrheic keratosis, extending to biopsy margins.     Comment: SOX10 was reviewed on block B4.          03/22/2024   Final    A. Prostate, left trans. zone:  - Benign prostate glands and stroma with focal acute inflammation.     B. Prostate, right trans. zone:  - Benign prostate glands and stroma with acute and chronic inflammation.     C. Prostate, left post. base:  - Benign prostate glands and stroma.     D. Prostate, right post. base:  - Benign prostate glands and stroma.     E. Prostate, RIGHT POSTERIOR MEDIAL:  - Benign prostate glands and stroma.     F. Prostate, LEFT POSTERIOR MEDIAL:  - Benign prostate glands and stroma.     G. Prostate, LEFT POSTERIOR LATERAL:  - Benign prostate glands and stroma.     H. Prostate, RIGHT ANTERIOR MEDIAL:  - Benign prostate glands and  stroma.     I. Prostate, RIGHT ANTERIOR LATERAL:  - Atypical small acinar proliferation (ASAP), suspicious but not diagnostic for malignancy.    J. Prostate, RIGHT POSTERIOR LATERAL:  - Prostatic adenocarcinoma, Santa grade 4+3 = 7 (70% pattern 4 and 30% pattern 3, Grade group 3), involving one of two cores, 70% of the involved core  and 30% of the involved tissue.  - Perineural invasion is absent.    Comment: Foci of cribriform glands are present with the pattern 4 component.  This feature has been associated with adverse clinical outcomes and molecular features typically seen in advanced disease.  (The 2019 Genitourinary Pathology Society (GUPS) White Paper on Contemporary Grading of Prostate Cancer. Arch Pathol Lab Med. 2021 Apr 1;145(4):461-493.)    Immunohistochemistry for prostate triple stain multiplex (,P63,P504s) was performed on blocks J2, supporting the above diagnosis.    Intradepartmental consultation is in agreement.    K. Prostate, LEFT ANTERIOR MEDIAL:  - Benign prostate glands and stroma.      L. Prostate, LEFT ANTERIOR LATERAL:  - Benign prostate stroma.      M. Prostate, ZARIA right post. lat. apex:  - Benign prostate glands and stroma.              Image Results:   Image result are reviewed and documented in Hematology/Oncology history    XR chest 1 view  Narrative: TRAUMA SERIES    INDICATION:  TRAUMA.    COMPARISON: 6/6/2024 CT. 5/21/2022 radiographs.    VIEWS:  XR TRAUMA MULTIPLE, XR CHEST 1 VIEW    FINDINGS:    CHEST:    Supine frontal view of the chest is obtained.    Cardiomediastinal silhouette is within normal limits accounting for technique and patient positioning.    Lungs are clear. No layering pleural effusions detected.    No pneumothorax is seen on this supine film.  Upright images are more sensitive to detect anterior pneumothoraces if relevant.    No displaced fractures. Evidence of chronic right rotator cuff tear.  Impression: No acute cardiopulmonary disease within  limitations of supine imaging.    Workstation performed: [id number]  XR Trauma multiple ([facility] trauma bay ONLY)  Narrative: TRAUMA SERIES    INDICATION:  TRAUMA.    COMPARISON: 6/6/2024 CT. 5/21/2022 radiographs.    VIEWS:  XR TRAUMA MULTIPLE, XR CHEST 1 VIEW    FINDINGS:    CHEST:    Supine frontal view of the chest is obtained.    Cardiomediastinal silhouette is within normal limits accounting for technique and patient positioning.    Lungs are clear. No layering pleural effusions detected.    No pneumothorax is seen on this supine film.  Upright images are more sensitive to detect anterior pneumothoraces if relevant.    No displaced fractures. Evidence of chronic right rotator cuff tear.  Impression: No acute cardiopulmonary disease within limitations of supine imaging.    Workstation performed: [id number]  TRAUMA - CT spine cervical wo contrast  Narrative: CT CERVICAL SPINE - WITHOUT CONTRAST    INDICATION:   TRAUMA. Patient fell.    COMPARISON: None available.    TECHNIQUE:  CT examination of the cervical spine was performed without intravenous contrast.  Contiguous axial images were obtained. Multiplanar 2D reformatted images were created from the source data.    Radiation dose length product (DLP) for this visit:  435 mGy-cm .  This examination, like all CT scans performed in the [facility], was performed utilizing techniques to minimize radiation dose exposure, including the use of iterative   reconstruction and automated exposure control.    IMAGE QUALITY:  Diagnostic.    FINDINGS:    ALIGNMENT:  Normal alignment of the cervical spine. No subluxation.    VERTEBRAE: No acute fracture.    DEGENERATIVE CHANGES: Moderate to advanced multilevel degenerative changes are present. Disc base narrowing is most pronounced at C5-6 followed by C6-7. There is multilevel facet arthropathy.    PREVERTEBRAL AND PARASPINAL SOFT TISSUES: Unremarkable.    THORACIC INLET: Unremarkable.  Impression: No acute  cervical spine fracture or traumatic malalignment. Multilevel degenerative changes are present.    I personally discussed this study with AMENA REYES on 11/30/2024 1:55 AM.    Workstation performed: [id number]  TRAUMA - CT head wo contrast  Narrative: CT BRAIN - WITHOUT CONTRAST    INDICATION:   TRAUMA. Patient fell.    COMPARISON: June 6, 2024.    TECHNIQUE:  CT examination of the brain was performed.  Multiplanar 2D reformatted images were created from the source data.    Radiation dose length product (DLP) for this visit:  878 mGy-cm .  This examination, like all CT scans performed in the [facility], was performed utilizing techniques to minimize radiation dose exposure, including the use of iterative   reconstruction and automated exposure control.    IMAGE QUALITY:  Diagnostic.    FINDINGS:    PARENCHYMA: Decreased attenuation is noted in periventricular and subcortical white matter demonstrating an appearance that is statistically most likely to represent mild microangiopathic change; this appearance is similar when compared to most recent   prior examination.    No CT signs of acute infarction.  No intracranial mass, positive mass effect or midline shift.  No acute parenchymal hemorrhage.    VENTRICLES AND EXTRA-AXIAL SPACES:  Normal for the patient's age.    VISUALIZED ORBITS:  Normal visualized orbits.    PARANASAL SINUSES:  Mild scattered mucosal thickening. There is a small amount of fluid in the sphenoid sinuses.    CALVARIUM AND EXTRACRANIAL SOFT TISSUES:  Normal.  Impression: No acute intracranial abnormality. Mild microangiopathy appears similar to the prior study.    I personally discussed this study with AMENA REYES on 11/30/2024 1:49 AM.    Workstation performed: [id number]      LABS:  Lab data are reviewed and documented in [facility]On history.       Lab Results   Component Value Date    HGB 12.8 11/30/2024    HCT 38.8 11/30/2024    MCV 90 11/30/2024     11/30/2024    WBC 9.24  "11/30/2024    NRBC 0 11/30/2024     Lab Results   Component Value Date    K 4.5 11/30/2024     11/30/2024    CO2 24 11/30/2024    BUN 17 11/30/2024    CREATININE 0.91 11/30/2024    GLUF 94 07/27/2024    CALCIUM 8.9 11/30/2024    CORRECTEDCA 9.4 06/13/2024    AST 25 07/27/2024    ALT 15 07/27/2024    ALKPHOS 68 07/27/2024    EGFR 81 11/30/2024       No results found for: \"IRON\", \"TIBC\", \"FERRITIN\"    No results found for: \"[id number]\"    No results for input(s): \"WBC\", \"CREAT\", \"PLT\" in the last 72 hours.    By:  Shasta Shelton MD, 1/6/2025, 9:08 AM                                  "

## 2025-01-05 ENCOUNTER — OFFICE VISIT (OUTPATIENT)
Dept: URGENT CARE | Age: 80
End: 2025-01-05
Payer: MEDICARE

## 2025-01-05 VITALS
TEMPERATURE: 96.6 F | RESPIRATION RATE: 18 BRPM | SYSTOLIC BLOOD PRESSURE: 148 MMHG | DIASTOLIC BLOOD PRESSURE: 71 MMHG | WEIGHT: 176.8 LBS | OXYGEN SATURATION: 97 % | HEART RATE: 61 BPM | BODY MASS INDEX: 26.88 KG/M2

## 2025-01-05 DIAGNOSIS — R39.9 UTI SYMPTOMS: ICD-10-CM

## 2025-01-05 DIAGNOSIS — N39.0 FREQUENT UTI: Primary | ICD-10-CM

## 2025-01-05 PROBLEM — Z77.29 EXPOSURE TO POTENTIALLY HAZARDOUS SUBSTANCE: Status: ACTIVE | Noted: 2025-01-05

## 2025-01-05 PROBLEM — H90.3 SENSORINEURAL HEARING LOSS, BILATERAL: Status: ACTIVE | Noted: 2025-01-05

## 2025-01-05 PROBLEM — R93.1 ABNORMAL ECHOCARDIOGRAM: Status: ACTIVE | Noted: 2022-12-13

## 2025-01-05 LAB
SL AMB  POCT GLUCOSE, UA: ABNORMAL
SL AMB LEUKOCYTE ESTERASE,UA: ABNORMAL
SL AMB POCT BILIRUBIN,UA: ABNORMAL
SL AMB POCT BLOOD,UA: ABNORMAL
SL AMB POCT CLARITY,UA: ABNORMAL
SL AMB POCT COLOR,UA: YELLOW
SL AMB POCT KETONES,UA: NEGATIVE
SL AMB POCT NITRITE,UA: ABNORMAL
SL AMB POCT PH,UA: 8.5
SL AMB POCT SPECIFIC GRAVITY,UA: 1
SL AMB POCT URINE PROTEIN: 100
SL AMB POCT UROBILINOGEN: 0.2

## 2025-01-05 PROCEDURE — 81002 URINALYSIS NONAUTO W/O SCOPE: CPT

## 2025-01-05 PROCEDURE — G0463 HOSPITAL OUTPT CLINIC VISIT: HCPCS

## 2025-01-05 PROCEDURE — 99213 OFFICE O/P EST LOW 20 MIN: CPT

## 2025-01-05 RX ORDER — CIPROFLOXACIN 250 MG/1
250 TABLET, FILM COATED ORAL EVERY 12 HOURS SCHEDULED
Qty: 14 TABLET | Refills: 0 | Status: SHIPPED | OUTPATIENT
Start: 2025-01-05 | End: 2025-01-12

## 2025-01-05 NOTE — PROGRESS NOTES
[facility]        NAME: Cruz Patricio Jr. is a 79 y.o. male  : 1945    MRN: [id number]  DATE: 2025  TIME: 5:21 PM    Assessment and Plan   Frequent UTI [N39.0]  1. Frequent UTI  ciprofloxacin (CIPRO) 250 mg tablet      2. UTI symptoms  POCT urine dip    Urine culture         POC urine reveals large blood and large WBC's. Will start on Cipro as patient completed Bactrim <1 month ago. Will send urine culture and f/u if need to change antibiotic. VSS in clinic, appears in no acute distress. Advised close follow-up with PCP/urologist for persistent UTi's or to report to the ER if symptoms worsen. Patient verbalizes understanding and agreeable to plan.       Patient Instructions     Take antibiotic as prescribed for next 7 days, complete entire course even if feeling better. Will follow-up with urine culture results if need to change antibiotic. Follow-up with PCP/urologist for persistent symptoms. Report to the ER sooner if symptoms worsen.    Chief Complaint     Chief Complaint   Patient presents with    Possible UTI     Started with symptoms on Friday . Frequency and odor . Dysuria started today. Denies back or pelvic pain.          History of Present Illness       79 year old male presents for evaluation of burning with urinary and urinary frequency x 2 days. PMH significant for prostate cancer (off chemo ) and recurrent UTI's, most recently treated with bactrim -24. He denies fevers, urinary odors, discharges, back pain, NVD, or diffiuclty passing urine. He relates his urologist increased his flomax dose a few weeks ago to help with his UTI's but symptoms have not changed.    Urinary Tract Infection   This is a recurrent problem. The current episode started 1 to 4 weeks ago. The problem occurs every urination. The problem has been unchanged. The patient is experiencing no pain. There has been no fever. He is Not sexually active. There is No history of pyelonephritis.  Associated symptoms include frequency and urgency. Pertinent negatives include no chills, discharge, flank pain, hematuria, hesitancy, nausea, possible pregnancy, sweats or vomiting. He has tried antibiotics and increased fluids for the symptoms. The treatment provided no relief. His past medical history is significant for recurrent UTIs and a urological procedure. There is no history of catheterization, kidney stones, a single kidney or urinary stasis.       Review of Systems   Review of Systems   Constitutional:  Negative for activity change, appetite change, chills, fatigue and fever.   Eyes:  Negative for visual disturbance.   Respiratory:  Negative for cough, chest tightness and shortness of breath.    Cardiovascular:  Negative for chest pain.   Gastrointestinal:  Negative for abdominal pain, constipation, diarrhea, nausea and vomiting.   Genitourinary:  Positive for dysuria, frequency and urgency. Negative for difficulty urinating, flank pain, hematuria and hesitancy.   Musculoskeletal:  Negative for arthralgias, back pain, myalgias and neck pain.   Skin:  Negative for color change and pallor.   Allergic/Immunologic: Negative for environmental allergies and food allergies.   Neurological:  Negative for dizziness, light-headedness and headaches.         Current Medications       Current Outpatient Medications:     ascorbic acid (VITAMIN C) 1000 MG tablet, Take 1,000 mg by mouth daily, Disp: , Rfl:     aspirin (ECOTRIN LOW STRENGTH) 81 mg EC tablet, Take 81 mg by mouth daily Twice a week, Disp: , Rfl:     atenolol (TENORMIN) 100 mg tablet, TAKE 1 TABLET BY MOUTH EVERY DAY, Disp: 90 tablet, Rfl: 3    B Complex Vitamins (B COMPLEX 100 PO), Take by mouth, Disp: , Rfl:     benazepril (LOTENSIN) 20 mg tablet, TAKE 1 TABLET BY MOUTH EVERY DAY, Disp: 90 tablet, Rfl: 1    calcium carbonate (OS-LUCA) 600 MG tablet, Take 600 mg by mouth daily, Disp: , Rfl:     ciprofloxacin (CIPRO) 250 mg tablet, Take 1 tablet (250 mg  total) by mouth every 12 (twelve) hours for 7 days, Disp: 14 tablet, Rfl: 0    Coenzyme Q10 (CoQ-10) 200 MG CAPS, , Disp: , Rfl:     magnesium oxide (MAG-OX) 400 mg tablet, Take 400 mg by mouth daily, Disp: , Rfl:     multivitamin (THERAGRAN) TABS, Take 1 tablet by mouth, Disp: , Rfl:     Omega-3 Fatty Acids (FISH OIL PO), Take by mouth daily , Disp: , Rfl:     rosuvastatin (CRESTOR) 20 MG tablet, Take 1 tablet (20 mg total) by mouth daily, Disp: 90 tablet, Rfl: 1    sildenafil (VIAGRA) 50 MG tablet, Take 1 tablet (50 mg total) by mouth daily as needed for erectile dysfunction, Disp: 20 tablet, Rfl: 6    tamsulosin (FLOMAX) 0.4 mg, Take 2 capsules (0.8 mg total) by mouth daily with dinner, Disp: 90 capsule, Rfl: 3    rivaroxaban (Xarelto) 20 mg tablet, Take 1 tablet (20 mg total) by mouth daily with breakfast, Disp: 90 tablet, Rfl: 3    Current Allergies     Allergies as of 01/05/2025 - Reviewed 01/05/2025   Allergen Reaction Noted    No active allergies  05/19/2018    Orgovyx [relugolix] Confusion 07/05/2024            The following portions of the patient's history were reviewed and updated as appropriate: allergies, current medications, past family history, past medical history, past social history, past surgical history and problem list.     Past Medical History:   Diagnosis Date    A-fib (HCC)     Acute MI (HCC) 06/2002    Arthritis     Atypical chest pain 03/05/2008    Basal cell carcinoma 2009    Coronary artery disease     Diverticulosis 2008    Hematuria, microscopic     History of varicose veins 2008    Hyperlipidemia     Hypertension     Myocardial infarction (HCC)     Nocturia     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     Prostate cancer (HCC) 3/22/2024    Urinary tract infection     Wears glasses        Past Surgical History:   Procedure Laterality Date    COLONOSCOPY      CORONARY ANGIOPLASTY      right CA x 3 vessels: redo PTCA-LAD 10/04    CORONARY STENT PLACEMENT  01/01/2002     INSERTION OF FIDUCIAL MARKER (TRANSRECTAL ULTRASOUND GUIDANCE) N/A 6/27/2024    Procedure: INSERTION OF FIDUCIAL MARKER , SPACEOAR;  Surgeon: Jaret Burks MD;  Location: [facility];  Service: Urology    JOINT REPLACEMENT      R hip    IN BX PROSTATE STRTCTC SATURATION SAMPLING IMG GID N/A 3/22/2024    Procedure: TRANSPERINEAL MRI FUSION BIOPSY PROSTATE;  Surgeon: Gabino Lpoez MD;  Location: [facility];  Service: Urology    IN COLONOSCOPY FLX DX W/COLLJ SPEC WHEN PFRMD N/A 06/27/2017    Procedure: COLONOSCOPY;  Surgeon: Scott Last MD;  Location: [facility];  Service: Colorectal    TOTAL HIP ARTHROPLASTY Right     TOTAL HIP ARTHROPLASTY Left 04/30/2019    WISDOM TOOTH EXTRACTION         Family History   Problem Relation Age of Onset    Aneurysm Father         post op AAA repair    Hypertension Sister     Hypertension Brother     Cancer Brother         Agent Orange    Hypertension Mother     Diabetes Mother          Medications have been verified.        Objective   /71   Pulse 61   Temp (!) 96.6 °F (35.9 °C) (Tympanic)   Resp 18   Wt 80.2 kg (176 lb 12.8 oz)   SpO2 97%   BMI 26.88 kg/m²        Physical Exam     Physical Exam  Vitals and nursing note reviewed.   Constitutional:       General: He is awake. He is not in acute distress.     Appearance: Normal appearance. He is well-developed and normal weight.   HENT:      Head: Normocephalic and atraumatic.   Cardiovascular:      Rate and Rhythm: Normal rate and regular rhythm.      Pulses: Normal pulses.      Heart sounds: Normal heart sounds.   Pulmonary:      Effort: Pulmonary effort is normal.      Breath sounds: Normal breath sounds.   Abdominal:      General: Abdomen is flat. Bowel sounds are normal.      Palpations: Abdomen is soft.      Tenderness: There is no abdominal tenderness. There is no right CVA tenderness, left CVA tenderness or guarding.   Musculoskeletal:         General: Normal range of motion.      Cervical back: Normal  range of motion and neck supple.   Skin:     General: Skin is warm and dry.   Neurological:      General: No focal deficit present.      Mental Status: He is alert and oriented to person, place, and time.   Psychiatric:         Mood and Affect: Mood normal.         Behavior: Behavior normal. Behavior is cooperative.         Thought Content: Thought content normal.         Judgment: Judgment normal.

## 2025-01-05 NOTE — PATIENT INSTRUCTIONS
Take antibiotic as prescribed for next 7 days, complete entire course even if feeling better. Will follow-up with urine culture results if need to change antibiotic. Follow-up with PCP/urologist for persistent symptoms. Report to the ER sooner if symptoms worsen.

## 2025-01-06 ENCOUNTER — OFFICE VISIT (OUTPATIENT)
Dept: HEMATOLOGY ONCOLOGY | Facility: CLINIC | Age: 80
End: 2025-01-06
Payer: MEDICARE

## 2025-01-06 ENCOUNTER — DOCUMENTATION (OUTPATIENT)
Dept: ADMINISTRATIVE | Facility: OTHER | Age: 80
End: 2025-01-06

## 2025-01-06 VITALS
TEMPERATURE: 97.1 F | HEART RATE: 68 BPM | HEIGHT: 68 IN | RESPIRATION RATE: 18 BRPM | DIASTOLIC BLOOD PRESSURE: 88 MMHG | SYSTOLIC BLOOD PRESSURE: 122 MMHG | OXYGEN SATURATION: 99 % | WEIGHT: 175 LBS | BODY MASS INDEX: 26.52 KG/M2

## 2025-01-06 VITALS — DIASTOLIC BLOOD PRESSURE: 88 MMHG | SYSTOLIC BLOOD PRESSURE: 122 MMHG

## 2025-01-06 DIAGNOSIS — C61 PROSTATE CANCER (HCC): Primary | ICD-10-CM

## 2025-01-06 PROCEDURE — 99214 OFFICE O/P EST MOD 30 MIN: CPT | Performed by: INTERNAL MEDICINE

## 2025-01-06 NOTE — PROGRESS NOTES
01/06/25 12:21 PM    Patient was called after the Urgent Care visit saw Hematology/oncology BP was 122/88.  Thank you.  Malinda Oro MA  [facility]          Blood pressure elevated  Appointment department: [facility]  Appointment provider: * No providers found *  Blood pressure  01/05/25 1640 148/71  01/05/25 1638 153/78  Inactive

## 2025-01-08 LAB — BACTERIA UR CULT: ABNORMAL

## 2025-02-10 NOTE — PATIENT INSTRUCTIONS
Enuresis alarm    Medicare Preventive Visit Patient Instructions  Thank you for completing your Welcome to Medicare Visit or Medicare Annual Wellness Visit today. Your next wellness visit will be due in one year (11/22/2025).  The screening/preventive services that you may require over the next 5-10 years are detailed below. Some tests may not apply to you based off risk factors and/or age. Screening tests ordered at today's visit but not completed yet may show as past due. Also, please note that scanned in results may not display below.  Preventive Screenings:  Service Recommendations Previous Testing/Comments   Colorectal Cancer Screening  Colonoscopy    Fecal Occult Blood Test (FOBT)/Fecal Immunochemical Test (FIT)  Fecal DNA/Cologuard Test  Flexible Sigmoidoscopy Age: 45-75 years old   Colonoscopy: every 10 years (May be performed more frequently if at higher risk)  OR  FOBT/FIT: every 1 year  OR  Cologuard: every 3 years  OR  Sigmoidoscopy: every 5 years  Screening may be recommended earlier than age 45 if at higher risk for colorectal cancer. Also, an individualized decision between you and your healthcare provider will decide whether screening between the ages of 76-85 would be appropriate. Colonoscopy: 06/27/2017  FOBT/FIT: Not on file  Cologuard: Not on file  Sigmoidoscopy: Not on file    Screening Current     Prostate Cancer Screening Individualized decision between patient and health care provider in men between ages of 55-69   Medicare will cover every 12 months beginning on the day after your 50th birthday PSA: 0.450 ng/mL     History Prostate Cancer  Screening Not Indicated     Hepatitis C Screening Once for adults born between 1945 and 1965  More frequently in patients at high risk for Hepatitis C Hep C Antibody: 04/03/2019    Screening Current   Diabetes Screening 1-2 times per year if you're at risk for diabetes or have pre-diabetes Fasting glucose: 94 mg/dL (7/27/2024)  A1C: 5.6  (5/15/2023)  Screening Current   Cholesterol Screening Once every 5 years if you don't have a lipid disorder. May order more often based on risk factors. Lipid panel: 07/27/2024  Screening Not Indicated  History Lipid Disorder      Other Preventive Screenings Covered by Medicare:  Abdominal Aortic Aneurysm (AAA) Screening: covered once if your at risk. You're considered to be at risk if you have a family history of AAA or a male between the age of 65-75 who smoking at least 100 cigarettes in your lifetime.  Lung Cancer Screening: covers low dose CT scan once per year if you meet all of the following conditions: (1) Age 55-77; (2) No signs or symptoms of lung cancer; (3) Current smoker or have quit smoking within the last 15 years; (4) You have a tobacco smoking history of at least 20 pack years (packs per day x number of years you smoked); (5) You get a written order from a healthcare provider.  Glaucoma Screening: covered annually if you're considered high risk: (1) You have diabetes OR (2) Family history of glaucoma OR (3)  aged 50 and older OR (4)  American aged 65 and older  Osteoporosis Screening: covered every 2 years if you meet one of the following conditions: (1) Have a vertebral abnormality; (2) On glucocorticoid therapy for more than 3 months; (3) Have primary hyperparathyroidism; (4) On osteoporosis medications and need to assess response to drug therapy.  HIV Screening: covered annually if you're between the age of 15-65. Also covered annually if you are younger than 15 and older than 65 with risk factors for HIV infection. For pregnant patients, it is covered up to 3 times per pregnancy.    Immunizations:  Immunization Recommendations   Influenza Vaccine Annual influenza vaccination during flu season is recommended for all persons aged >= 6 months who do not have contraindications   Pneumococcal Vaccine   * Pneumococcal conjugate vaccine = PCV13 (Prevnar 13), PCV15  (Vaxneuvance), PCV20 (Prevnar 20)  * Pneumococcal polysaccharide vaccine = PPSV23 (Pneumovax) Adults 19-63 yo with certain risk factors or if 65+ yo  If never received any pneumonia vaccine: recommend Prevnar 20 (PCV20)  Give PCV20 if previously received 1 dose of PCV13 or PPSV23   Hepatitis B Vaccine 3 dose series if at intermediate or high risk (ex: diabetes, end stage renal disease, liver disease)   Respiratory syncytial virus (RSV) Vaccine - COVERED BY MEDICARE PART D  * RSVPreF3 (Arexvy) CDC recommends that adults 60 years of age and older may receive a single dose of RSV vaccine using shared clinical decision-making (SCDM)   Tetanus (Td) Vaccine - COST NOT COVERED BY MEDICARE PART B Following completion of primary series, a booster dose should be given every 10 years to maintain immunity against tetanus. Td may also be given as tetanus wound prophylaxis.   Tdap Vaccine - COST NOT COVERED BY MEDICARE PART B Recommended at least once for all adults. For pregnant patients, recommended with each pregnancy.   Shingles Vaccine (Shingrix) - COST NOT COVERED BY MEDICARE PART B  2 shot series recommended in those 19 years and older who have or will have weakened immune systems or those 50 years and older     Health Maintenance Due:      Topic Date Due    Hepatitis C Screening  Completed    Colorectal Cancer Screening  Discontinued     Immunizations Due:      Topic Date Due    Influenza Vaccine (1) 09/01/2024    COVID-19 Vaccine (7 - 2024-25 season) 09/01/2024     Advance Directives   What are advance directives?  Advance directives are legal documents that state your wishes and plans for medical care. These plans are made ahead of time in case you lose your ability to make decisions for yourself. Advance directives can apply to any medical decision, such as the treatments you want, and if you want to donate organs.   What are the types of advance directives?  There are many types of advance directives, and each state  has rules about how to use them. You may choose a combination of any of the following:  Living will:  This is a written record of the treatment you want. You can also choose which treatments you do not want, which to limit, and which to stop at a certain time. This includes surgery, medicine, IV fluid, and tube feedings.   Durable power of  for healthcare (DPAHC):  This is a written record that states who you want to make healthcare choices for you when you are unable to make them for yourself. This person, called a proxy, is usually a family member or a friend. You may choose more than 1 proxy.  Do not resuscitate (DNR) order:  A DNR order is used in case your heart stops beating or you stop breathing. It is a request not to have certain forms of treatment, such as CPR. A DNR order may be included in other types of advance directives.  Medical directive:  This covers the care that you want if you are in a coma, near death, or unable to make decisions for yourself. You can list the treatments you want for each condition. Treatment may include pain medicine, surgery, blood transfusions, dialysis, IV or tube feedings, and a ventilator (breathing machine).  Values history:  This document has questions about your views, beliefs, and how you feel and think about life. This information can help others choose the care that you would choose.  Why are advance directives important?  An advance directive helps you control your care. Although spoken wishes may be used, it is better to have your wishes written down. Spoken wishes can be misunderstood, or not followed. Treatments may be given even if you do not want them. An advance directive may make it easier for your family to make difficult choices about your care.   Weight Management   Why it is important to manage your weight:  Being overweight increases your risk of health conditions such as heart disease, high blood pressure, type 2 diabetes, and certain types of  cancer. It can also increase your risk for osteoarthritis, sleep apnea, and other respiratory problems. Aim for a slow, steady weight loss. Even a small amount of weight loss can lower your risk of health problems.  How to lose weight safely:  A safe and healthy way to lose weight is to eat fewer calories and get regular exercise. You can lose up about 1 pound a week by decreasing the number of calories you eat by 500 calories each day.   Healthy meal plan for weight management:  A healthy meal plan includes a variety of foods, contains fewer calories, and helps you stay healthy. A healthy meal plan includes the following:  Eat whole-grain foods more often.  A healthy meal plan should contain fiber. Fiber is the part of grains, fruits, and vegetables that is not broken down by your body. Whole-grain foods are healthy and provide extra fiber in your diet. Some examples of whole-grain foods are whole-wheat breads and pastas, oatmeal, brown rice, and bulgur.  Eat a variety of vegetables every day.  Include dark, leafy greens such as spinach, kale, gino greens, and mustard greens. Eat yellow and orange vegetables such as carrots, sweet potatoes, and winter squash.   Eat a variety of fruits every day.  Choose fresh or canned fruit (canned in its own juice or light syrup) instead of juice. Fruit juice has very little or no fiber.  Eat low-fat dairy foods.  Drink fat-free (skim) milk or 1% milk. Eat fat-free yogurt and low-fat cottage cheese. Try low-fat cheeses such as mozzarella and other reduced-fat cheeses.  Choose meat and other protein foods that are low in fat.  Choose beans or other legumes such as split peas or lentils. Choose fish, skinless poultry (chicken or turkey), or lean cuts of red meat (beef or pork). Before you cook meat or poultry, cut off any visible fat.   Use less fat and oil.  Try baking foods instead of frying them. Add less fat, such as margarine, sour cream, regular salad dressing and  mayonnaise to foods. Eat fewer high-fat foods. Some examples of high-fat foods include french fries, doughnuts, ice cream, and cakes.  Eat fewer sweets.  Limit foods and drinks that are high in sugar. This includes candy, cookies, regular soda, and sweetened drinks.  Exercise:  Exercise at least 30 minutes per day on most days of the week. Some examples of exercise include walking, biking, dancing, and swimming. You can also fit in more physical activity by taking the stairs instead of the elevator or parking farther away from stores. Ask your healthcare provider about the best exercise plan for you.      © Copyright [employer] 2018 Information is for End User's use only and may not be sold, redistributed or otherwise used for commercial purposes. All illustrations and images included in CareNotes® are the copyrighted property of [employer] or [employer]     show

## 2025-02-15 ENCOUNTER — APPOINTMENT (OUTPATIENT)
Dept: LAB | Facility: CLINIC | Age: 80
End: 2025-02-15
Payer: MEDICARE

## 2025-02-15 ENCOUNTER — TRANSCRIBE ORDERS (OUTPATIENT)
Dept: LAB | Facility: CLINIC | Age: 80
End: 2025-02-15

## 2025-02-15 ENCOUNTER — HOSPITAL ENCOUNTER (EMERGENCY)
Facility: HOSPITAL | Age: 80
Discharge: HOME | End: 2025-02-15
Attending: EMERGENCY MEDICINE
Payer: MEDICARE

## 2025-02-15 VITALS
TEMPERATURE: 98 F | RESPIRATION RATE: 18 BRPM | SYSTOLIC BLOOD PRESSURE: 178 MMHG | DIASTOLIC BLOOD PRESSURE: 105 MMHG | OXYGEN SATURATION: 98 % | HEART RATE: 72 BPM

## 2025-02-15 DIAGNOSIS — C61 PROSTATE CANCER (HCC): ICD-10-CM

## 2025-02-15 DIAGNOSIS — I10 HYPERTENSION, BENIGN: ICD-10-CM

## 2025-02-15 DIAGNOSIS — R33.9 INCOMPLETE BLADDER EMPTYING: ICD-10-CM

## 2025-02-15 DIAGNOSIS — R31.9 HEMATURIA: ICD-10-CM

## 2025-02-15 DIAGNOSIS — E78.49 OTHER HYPERLIPIDEMIA: ICD-10-CM

## 2025-02-15 DIAGNOSIS — I48.0 PAF (PAROXYSMAL ATRIAL FIBRILLATION) (HCC): ICD-10-CM

## 2025-02-15 DIAGNOSIS — I48.0 PAF (PAROXYSMAL ATRIAL FIBRILLATION) (HCC): Primary | ICD-10-CM

## 2025-02-15 DIAGNOSIS — N39.0 URINARY TRACT INFECTION: Primary | ICD-10-CM

## 2025-02-15 LAB
ALBUMIN SERPL BCG-MCNC: 4.1 G/DL (ref 3.5–5)
ALP SERPL-CCNC: 66 U/L (ref 34–104)
ALT SERPL W P-5'-P-CCNC: 15 U/L (ref 7–52)
ANION GAP SERPL CALCULATED.3IONS-SCNC: 7 MMOL/L (ref 4–13)
AST SERPL W P-5'-P-CCNC: 19 U/L (ref 13–39)
BACTERIA UR QL AUTO: ABNORMAL /HPF
BILIRUB SERPL-MCNC: 0.85 MG/DL (ref 0.2–1)
BILIRUB UR QL STRIP: NEGATIVE
BUN SERPL-MCNC: 14 MG/DL (ref 5–25)
CALCIUM SERPL-MCNC: 9.9 MG/DL (ref 8.4–10.2)
CAOX CRY URNS QL MICRO: ABNORMAL /HPF
CHLORIDE SERPL-SCNC: 100 MMOL/L (ref 96–108)
CHOLEST SERPL-MCNC: 124 MG/DL (ref ?–200)
CLARITY UR: ABNORMAL
CO2 SERPL-SCNC: 27 MMOL/L (ref 21–32)
COLOR UR: ABNORMAL
CREAT SERPL-MCNC: 0.68 MG/DL (ref 0.6–1.3)
ERYTHROCYTE [DISTWIDTH] IN BLOOD BY AUTOMATED COUNT: 14.2 % (ref 11.6–15.1)
GFR SERPL CREATININE-BSD FRML MDRD: 91 ML/MIN/1.73SQ M
GLUCOSE P FAST SERPL-MCNC: 114 MG/DL (ref 65–99)
GLUCOSE UR STRIP-MCNC: NEGATIVE MG/DL
HCT VFR BLD AUTO: 45.9 % (ref 36.5–49.3)
HDLC SERPL-MCNC: 48 MG/DL
HGB BLD-MCNC: 15.2 G/DL (ref 12–17)
HGB UR QL STRIP.AUTO: ABNORMAL
KETONES UR STRIP-MCNC: NEGATIVE MG/DL
LDLC SERPL CALC-MCNC: 58 MG/DL (ref 0–100)
LEUKOCYTE ESTERASE UR QL STRIP: ABNORMAL
MCH RBC QN AUTO: 29.2 PG (ref 26.8–34.3)
MCHC RBC AUTO-ENTMCNC: 33.1 G/DL (ref 31.4–37.4)
MCV RBC AUTO: 88 FL (ref 82–98)
NITRITE UR QL STRIP: POSITIVE
NON-SQ EPI CELLS URNS QL MICRO: ABNORMAL /HPF
NONHDLC SERPL-MCNC: 76 MG/DL
PH UR STRIP.AUTO: 8 [PH]
PLATELET # BLD AUTO: 191 THOUSANDS/UL (ref 149–390)
PMV BLD AUTO: 10.1 FL (ref 8.9–12.7)
POTASSIUM SERPL-SCNC: 4.1 MMOL/L (ref 3.5–5.3)
PROT SERPL-MCNC: 8 G/DL (ref 6.4–8.4)
PROT UR STRIP-MCNC: ABNORMAL MG/DL
RBC # BLD AUTO: 5.2 MILLION/UL (ref 3.88–5.62)
RBC #/AREA URNS AUTO: ABNORMAL /HPF
SODIUM SERPL-SCNC: 134 MMOL/L (ref 135–147)
SP GR UR STRIP.AUTO: 1.01 (ref 1–1.03)
TRIGL SERPL-MCNC: 90 MG/DL (ref ?–150)
UROBILINOGEN UR STRIP-ACNC: <2 MG/DL
WBC # BLD AUTO: 6.09 THOUSAND/UL (ref 4.31–10.16)
WBC #/AREA URNS AUTO: ABNORMAL /HPF

## 2025-02-15 PROCEDURE — 87086 URINE CULTURE/COLONY COUNT: CPT

## 2025-02-15 PROCEDURE — 99284 EMERGENCY DEPT VISIT MOD MDM: CPT | Performed by: EMERGENCY MEDICINE

## 2025-02-15 PROCEDURE — 85027 COMPLETE CBC AUTOMATED: CPT

## 2025-02-15 PROCEDURE — 80061 LIPID PANEL: CPT

## 2025-02-15 PROCEDURE — 81001 URINALYSIS AUTO W/SCOPE: CPT

## 2025-02-15 PROCEDURE — 87186 SC STD MICRODIL/AGAR DIL: CPT

## 2025-02-15 PROCEDURE — 87077 CULTURE AEROBIC IDENTIFY: CPT

## 2025-02-15 PROCEDURE — 80053 COMPREHEN METABOLIC PANEL: CPT

## 2025-02-15 PROCEDURE — 99283 EMERGENCY DEPT VISIT LOW MDM: CPT

## 2025-02-15 PROCEDURE — 36415 COLL VENOUS BLD VENIPUNCTURE: CPT

## 2025-02-15 RX ORDER — CIPROFLOXACIN 500 MG/1
500 TABLET, FILM COATED ORAL ONCE
Status: COMPLETED | OUTPATIENT
Start: 2025-02-15 | End: 2025-02-15

## 2025-02-15 RX ORDER — CIPROFLOXACIN 500 MG/1
500 TABLET, FILM COATED ORAL 2 TIMES DAILY
Qty: 14 TABLET | Refills: 0 | Status: SHIPPED | OUTPATIENT
Start: 2025-02-15 | End: 2025-02-22

## 2025-02-15 RX ADMIN — CIPROFLOXACIN 500 MG: 500 TABLET ORAL at 05:29

## 2025-02-15 NOTE — DISCHARGE INSTRUCTIONS
You were seen and evaluated in the emergency department for blood in your urine.  You are found to have a urinary tract infection.  He was treated with Cipro in the emergency department.  A prescription was sent to her pharmacy for Cipro for urinary tract infection.  Please call your urologist on Monday to schedule a follow-up appointment.  Please return to the emergency department if you experience any new or worsening symptoms including worsening blood in your urine, abdominal pain, nausea, vomiting.

## 2025-02-15 NOTE — ED ATTENDING ATTESTATION
2/15/2025  IJamal MD, saw and evaluated the patient. I have discussed the patient with the resident/non-physician practitioner and agree with the resident's/non-physician practitioner's findings, Plan of Care, and MDM as documented in the resident's/non-physician practitioner's note, except where noted. All available labs and Radiology studies were reviewed.  I was present for key portions of any procedure(s) performed by the resident/non-physician practitioner and I was immediately available to provide assistance.       At this point I agree with the current assessment done in the Emergency Department.  I have conducted an independent evaluation of this patient a history and physical is as follows:    Final Diagnosis:  1. Urinary tract infection    2. Hematuria      Chief Complaint   Patient presents with    Blood in Urine     PT c/o blood in urine before bed tonight. PT states he has been dealing with UTI recently. PT denies pain but says it had a foul odor.            A:  -80-year-old male who presents with hematuria and foul-smelling urine.      P:  -Likely UTI.  Check urinalysis.  Started on antibiotics.  Previous cultures have grown Proteus.      H:    80-year-old male who presents with hematuria and foul-smelling urine.  Noticed the symptoms tonight.  Does have a history of frequent UTIs.  Denies any dysuria, difficulty urinating, urinary frequency, flank pain, fevers.      PMH:  Past Medical History:   Diagnosis Date    A-fib (Formerly [facility])     Acute MI (Formerly [facility]) 06/2002    Arthritis     Atypical chest pain 03/05/2008    Basal cell carcinoma 2009    Coronary artery disease     Diverticulosis 2008    Hematuria, microscopic     History of varicose veins 2008    Hyperlipidemia     Hypertension     Myocardial infarction (HCC)     Nocturia     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     Prostate cancer (Formerly [facility]) 3/22/2024    Urinary tract infection     Wears glasses        PSH:  Past Surgical History:    Procedure Laterality Date    COLONOSCOPY      CORONARY ANGIOPLASTY      right CA x 3 vessels: redo PTCA-LAD 10/04    CORONARY STENT PLACEMENT  01/01/2002    INSERTION OF FIDUCIAL MARKER (TRANSRECTAL ULTRASOUND GUIDANCE) N/A 6/27/2024    Procedure: INSERTION OF FIDUCIAL MARKER , SPACEOAR;  Surgeon: Jaret Burks MD;  Location:  MAIN OR;  Service: Urology    JOINT REPLACEMENT      R hip    AZ BX PROSTATE STRTCTC SATURATION SAMPLING IMG GID N/A 3/22/2024    Procedure: TRANSPERINEAL MRI FUSION BIOPSY PROSTATE;  Surgeon: Gabino Lopez MD;  Location: [facility];  Service: Urology    AZ COLONOSCOPY FLX DX W/COLLJ SPEC WHEN PFRMD N/A 06/27/2017    Procedure: COLONOSCOPY;  Surgeon: Scott Last MD;  [facility]: [facility];  Service: Colorectal    TOTAL HIP ARTHROPLASTY Right     TOTAL HIP ARTHROPLASTY Left 04/30/2019    WISDOM TOOTH EXTRACTION           PE:   Vitals:    02/15/25 0324   BP: (!) 178/105   Pulse: 72   Resp: 18   Temp: 98 °F (36.7 °C)   SpO2: 98%         Constitutional: Vital signs are normal. He appears well-developed. He is cooperative. No distress.   Cardiovascular: Normal rate, regular rhythm.  Pulmonary/Chest: Effort normal.   Abdominal: Soft. Normal appearance and bowel sounds are normal. There is no tenderness. There is no rebound, no guarding.   Neurological: He is alert.  Skin: Skin is warm, dry and intact.   Psychiatric: He has a normal mood and affect. His speech is normal and behavior is normal. Thought content normal.          - 13 point ROS was performed and all are normal unless stated in the history above.   - Nursing note reviewed. Vitals reviewed.   - Orders placed by myself and/or advanced practitioner / resident.    - Previous chart was reviewed  - No language barrier.   - History obtained from patient.   - There are no limitations to the history obtained. Reasons ROS could not be obtained:  N/A         Medications   ciprofloxacin (CIPRO) tablet 500 mg (500 mg Oral Given  2/15/25 0529)     No orders to display     Orders Placed This Encounter   Procedures    Urine culture    UA w Reflex to Microscopic w Reflex to Culture    Urine Microscopic     Labs Reviewed   UA W REFLEX TO MICROSCOPIC WITH REFLEX TO CULTURE - Abnormal       Result Value Ref Range Status    Color, UA Light Orange   Final    Clarity, UA Extra Turbid   Final    Specific Gravity, UA 1.010  1.003 - 1.030 Final    pH, UA 8.0  4.5, 5.0, 5.5, 6.0, 6.5, 7.0, 7.5, 8.0 Final    Leukocytes, UA Large (*) Negative Final    Nitrite, UA Positive (*) Negative Final    Protein, UA 50 (1+) (*) Negative mg/dl Final    Glucose, UA Negative  Negative mg/dl Final    Ketones, UA Negative  Negative mg/dl Final    Urobilinogen, UA <2.0  <2.0 mg/dl mg/dl Final    Bilirubin, UA Negative  Negative Final    Occult Blood, UA Large (*) Negative Final   URINE MICROSCOPIC - Abnormal    RBC, UA Innumerable (*) None Seen, 1-2 /hpf Final    WBC, UA Innumerable (*) None Seen, 1-2 /hpf Final    Epithelial Cells None Seen  None Seen, Occasional /hpf Final    Bacteria, UA Moderate (*) None Seen, Occasional /hpf Final    Ca Oxalate Terra, UA Occasional (*) None Seen /hpf Final   URINE CULTURE     Time reflects when diagnosis was documented in both MDM as applicable and the Disposition within this note       Time User Action Codes Description Comment    2/15/2025  4:33 AM [place]enny aleman Add [N39.0] Urinary tract infection     2/15/2025  5:29 AM [place]enny aleman Add [R31.9] Hematuria           ED Disposition       ED Disposition   Discharge    Condition   Stable    Date/Time   Sat Feb 15, 2025  5:29 AM    Comment   Cruz Patricio Jr. discharge to home/self care.                   Follow-up Information       Follow up With Specialties Details Why Contact Info    Nellie Mason DO Family Medicine   [address] [place] [address]  [place] [address] [phone]            Current Discharge Medication List        START taking these medications    Details   ciprofloxacin  (CIPRO) 500 mg tablet Take 1 tablet (500 mg total) by mouth 2 (two) times a day for 7 days  Qty: 14 tablet, Refills: 0    Associated Diagnoses: Urinary tract infection           CONTINUE these medications which have NOT CHANGED    Details   ascorbic acid (VITAMIN C) 1000 MG tablet Take 1,000 mg by mouth daily      aspirin (ECOTRIN LOW STRENGTH) 81 mg EC tablet Take 81 mg by mouth daily Twice a week      atenolol (TENORMIN) 100 mg tablet TAKE 1 TABLET BY MOUTH EVERY DAY  Qty: 90 tablet, Refills: 3    Associated Diagnoses: Essential hypertension      B Complex Vitamins (B COMPLEX 100 PO) Take by mouth      benazepril (LOTENSIN) 20 mg tablet TAKE 1 TABLET BY MOUTH EVERY DAY  Qty: 90 tablet, Refills: 1    Associated Diagnoses: Essential hypertension, benign      calcium carbonate (OS-LUCA) 600 MG tablet Take 600 mg by mouth daily      Coenzyme Q10 (CoQ-10) 200 MG CAPS       magnesium oxide (MAG-OX) 400 mg tablet Take 400 mg by mouth daily      multivitamin (THERAGRAN) TABS Take 1 tablet by mouth      Omega-3 Fatty Acids (FISH OIL PO) Take by mouth daily       rivaroxaban (Xarelto) 20 mg tablet Take 1 tablet (20 mg total) by mouth daily with breakfast  Qty: 90 tablet, Refills: 3    Associated Diagnoses: New onset atrial fibrillation (HCC)      rosuvastatin (CRESTOR) 20 MG tablet Take 1 tablet (20 mg total) by mouth daily  Qty: 90 tablet, Refills: 1    Associated Diagnoses: Other hyperlipidemia      sildenafil (VIAGRA) 50 MG tablet Take 1 tablet (50 mg total) by mouth daily as needed for erectile dysfunction  Qty: 20 tablet, Refills: 6    Associated Diagnoses: Male erectile disorder      tamsulosin (FLOMAX) 0.4 mg Take 2 capsules (0.8 mg total) by mouth daily with dinner  Qty: 90 capsule, Refills: 3    Associated Diagnoses: Prostate cancer (HCC); Incomplete bladder emptying           No discharge procedures on file.  Prior to Admission Medications   Prescriptions Last Dose Informant Patient Reported? Taking?   B Complex  "Vitamins (B COMPLEX 100 PO)  Self, Spouse/Significant Other Yes No   Sig: Take by mouth   Coenzyme Q10 (CoQ-10) 200 MG CAPS  Self, Spouse/Significant Other Yes No   Omega-3 Fatty Acids (FISH OIL PO)  Self, Spouse/Significant Other Yes No   Sig: Take by mouth daily    ascorbic acid (VITAMIN C) 1000 MG tablet  Self, Spouse/Significant Other Yes No   Sig: Take 1,000 mg by mouth daily   aspirin (ECOTRIN LOW STRENGTH) 81 mg EC tablet  Self, Spouse/Significant Other Yes No   Sig: Take 81 mg by mouth daily Twice a week   atenolol (TENORMIN) 100 mg tablet  Self, Spouse/Significant Other No No   Sig: TAKE 1 TABLET BY MOUTH EVERY DAY   benazepril (LOTENSIN) 20 mg tablet  Self, Spouse/Significant Other No No   Sig: TAKE 1 TABLET BY MOUTH EVERY DAY   calcium carbonate (OS-LUCA) 600 MG tablet  Self, Spouse/Significant Other Yes No   Sig: Take 600 mg by mouth daily   magnesium oxide (MAG-OX) 400 mg tablet  Self, Spouse/Significant Other Yes No   Sig: Take 400 mg by mouth daily   multivitamin (THERAGRAN) TABS  Self, Spouse/Significant Other Yes No   Sig: Take 1 tablet by mouth   rivaroxaban (Xarelto) 20 mg tablet  Self, Spouse/Significant Other No No   Sig: Take 1 tablet (20 mg total) by mouth daily with breakfast   rosuvastatin (CRESTOR) 20 MG tablet  Self, Spouse/Significant Other No No   Sig: Take 1 tablet (20 mg total) by mouth daily   sildenafil (VIAGRA) 50 MG tablet  Self, Spouse/Significant Other No No   Sig: Take 1 tablet (50 mg total) by mouth daily as needed for erectile dysfunction   tamsulosin (FLOMAX) 0.4 mg  Self, Spouse/Significant Other No No   Sig: Take 2 capsules (0.8 mg total) by mouth daily with dinner      Facility-Administered Medications: None       Portions of the record may have been created with voice recognition software. Occasional wrong word or \"sound a like\" substitutions may have occurred due to the inherent limitations of voice recognition software. Read the chart carefully and recognize, using " context, where substitutions have occurred.       ED Course         Critical Care Time  Procedures

## 2025-02-15 NOTE — ED PROVIDER NOTES
Time reflects when diagnosis was documented in both MDM as applicable and the Disposition within this note       Time User Action Codes Description Comment    2/15/2025  4:33 AM [place] Zaf[place] Add [N39.0] Urinary tract infection     2/15/2025  5:29 AM [place] Zafar Add [R31.9] Hematuria           ED Disposition       ED Disposition   Discharge    Condition   Stable    Date/Time   Sat Feb 15, 2025  5:29 AM    Comment   Cruz Patricio Jr. discharge to home/self care.                   Assessment & Plan       Medical Decision Making  Patient is a 81-year-old male who presents today for evaluation of blood in his urine.  Vitals on arrival hypertensive to 178/105, afebrile.  On initial evaluation patient appears in no acute distress sitting comfortably in bed.  Normal rate and rhythm, lungs clear to auscultation bilaterally.  Abdomen is soft nontender nondistended.  No CVA tenderness bilaterally.  Differential diagnosis includes but is not limited to urinary tract infection, BPH, malignancy, pyelonephritis.  Plan to obtain UA to evaluate for urinary tract infection.  If positive will treat with Cipro.    On chart review patient usually grows Proteus and is treated with Cipro.    Workup in the emergency department consistent with urinary tract infection.  Patient given a dose of Cipro in the emergency department.  Patient stable to be discharged home at this time with strict return precautions.  Prescription sent to patient's pharmacy for additional antibiotics.  Encourage patient to follow-up with urologist on Monday given the bloody urine and an additional urinary tract infection.  Patient is agreeable with this plan.    Amount and/or Complexity of Data Reviewed  Labs: ordered. Decision-making details documented in ED Course.    Risk  Prescription drug management.        ED Course as of 02/15/25 0542   Sat Feb 15, 2025   0432 Leukocytes, UA(!): Large   0432 Nitrite, UA(!): Positive   0434 Will treat with Cipro.    0502 WBC, UA(!): Innumerable   0502 Bacteria, UA(!): Moderate       Medications   ciprofloxacin (CIPRO) tablet 500 mg (500 mg Oral Given 2/15/25 0529)       ED Risk Strat Scores                            SBIRT 22yo+      Flowsheet Row Most Recent Value   Initial Alcohol Screen: US AUDIT-C     1. How often do you have a drink containing alcohol? 0 Filed at: 02/15/2025 0325   2. How many drinks containing alcohol do you have on a typical day you are drinking?  0 Filed at: 02/15/2025 0325   3a. Male UNDER 65: How often do you have five or more drinks on one occasion? 0 Filed at: 02/15/2025 0325   3b. FEMALE Any Age, or MALE 65+: How often do you have 4 or more drinks on one occassion? 0 Filed at: 02/15/2025 0325   Audit-C Score 0 Filed at: 02/15/2025 0325   DANILO: How many times in the past year have you...    Used an illegal drug or used a prescription medication for non-medical reasons? Never Filed at: 02/15/2025 0325                            History of Present Illness       Chief Complaint   Patient presents with    Blood in Urine     PT c/o blood in urine before bed tonight. PT states he has been dealing with UTI recently. PT denies pain but says it had a foul odor.        Past Medical History:   Diagnosis Date    A-fib (HCC)     Acute MI (HCC) 06/2002    Arthritis     Atypical chest pain 03/05/2008    Basal cell carcinoma 2009    Coronary artery disease     Diverticulosis 2008    Hematuria, microscopic     History of varicose veins 2008    Hyperlipidemia     Hypertension     Myocardial infarction (HCC)     Nocturia     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     Prostate cancer (HCC) 3/22/2024    Urinary tract infection     Wears glasses       Past Surgical History:   Procedure Laterality Date    COLONOSCOPY      CORONARY ANGIOPLASTY      right CA x 3 vessels: redo PTCA-LAD 10/04    CORONARY STENT PLACEMENT  01/01/2002    INSERTION OF FIDUCIAL MARKER (TRANSRECTAL ULTRASOUND GUIDANCE) N/A 6/27/2024     Procedure: INSERTION OF FIDUCIAL MARKER , SPACEOAR;  Surgeon: Jaret Burks MD;  Location:  MAIN OR;  Service: Urology    JOINT REPLACEMENT      R hip    CA BX PROSTATE STRTCTC SATURATION SAMPLING IMG GID N/A 3/22/2024    Procedure: TRANSPERINEAL MRI FUSION BIOPSY PROSTATE;  Surgeon: Gabino Lopez MD;  [facility];  Service: Urology    CA COLONOSCOPY FLX DX W/COLLJ SPEC WHEN PFRMD N/A 2017    Procedure: COLONOSCOPY;  Surgeon: Scott Last MD;  [facility];  Service: Colorectal    TOTAL HIP ARTHROPLASTY Right     TOTAL HIP ARTHROPLASTY Left 2019    WISDOM TOOTH EXTRACTION        Family History   Problem Relation Age of Onset    Aneurysm Father         post op AAA repair    Hypertension Sister     Hypertension Brother     Cancer Brother         Agent Orange    Hypertension Mother     Diabetes Mother       Social History     Tobacco Use    Smoking status: Former     Current packs/day: 0.00     Average packs/day: 0.5 packs/day for 5.0 years (2.5 ttl pk-yrs)     Types: Cigarettes     Start date: 1967     Quit date: 1970     Years since quittin.1     Passive exposure: Past    Smokeless tobacco: Never    Tobacco comments:     quit ...0.5 packs/2.00 pack years   Vaping Use    Vaping status: Never Used   Substance Use Topics    Alcohol use: No    Drug use: No      E-Cigarette/Vaping    E-Cigarette Use Never User       E-Cigarette/Vaping Substances    Nicotine No     THC No     CBD No     Flavoring No     Other No     Unknown No       I have reviewed and agree with the history as documented.     Patient is a 80-year-old male with past medical history of atrial fibrillation, CAD, prostate cancer who presents today for evaluation of hematuria.  For the past week he has been noticing foul-smelling urine and feels like he cannot completely empty his bladder.  This evening when going to bed he noticed bright red blood in his urine.  He completed radiation for  prostate cancer in August 2024 and since then he has been having frequent urinary tract infections.  He states his last urinary tract infection was in January and treated with Cipro.  He denies any systemic symptoms including fever/chills.  He denies back pain.  He denies nausea, vomiting or abdominal pain.          Review of Systems   Constitutional:  Negative for chills and fever.   Respiratory:  Negative for shortness of breath.    Cardiovascular:  Negative for chest pain.   Gastrointestinal:  Negative for abdominal pain, diarrhea, nausea and vomiting.   Genitourinary:  Positive for decreased urine volume, difficulty urinating and hematuria. Negative for dysuria and flank pain.   Musculoskeletal:  Negative for back pain.   Neurological:  Negative for dizziness, light-headedness and headaches.           Objective       ED Triage Vitals [02/15/25 0324]   Temperature Pulse Blood Pressure Respirations SpO2 Patient Position - Orthostatic VS   98 °F (36.7 °C) 72 (!) 178/105 18 98 % --      Temp src Heart Rate Source BP Location FiO2 (%) Pain Score    -- Monitor -- -- No Pain      Vitals      Date and Time Temp Pulse SpO2 Resp BP Pain Score FACES Pain Rating User   02/15/25 0324 98 °F (36.7 °C) 72 98 % 18 178/105 No Pain -- AG            Physical Exam  Vitals and nursing note reviewed.   Constitutional:       General: He is not in acute distress.     Appearance: He is well-developed and normal weight. He is not ill-appearing.   HENT:      Head: Normocephalic and atraumatic.      Mouth/Throat:      Mouth: Mucous membranes are moist.   Eyes:      Conjunctiva/sclera: Conjunctivae normal.   Cardiovascular:      Rate and Rhythm: Normal rate and regular rhythm.      Heart sounds: Normal heart sounds.   Pulmonary:      Effort: Pulmonary effort is normal. No respiratory distress.      Breath sounds: Normal breath sounds.   Abdominal:      General: Abdomen is flat. There is no distension.      Palpations: Abdomen is soft.       Tenderness: There is no abdominal tenderness. There is no right CVA tenderness or left CVA tenderness.   Skin:     General: Skin is warm and dry.      Capillary Refill: Capillary refill takes less than 2 seconds.   Neurological:      General: No focal deficit present.      Mental Status: He is alert and oriented to person, place, and time.   Psychiatric:         Mood and Affect: Mood normal.         Behavior: Behavior normal.         Results Reviewed       Procedure Component Value Units Date/Time    Urine Microscopic [[id number]]  (Abnormal) Collected: 02/15/25 0420    Lab Status: Final result Specimen: Urine, Clean Catch Updated: 02/15/25 0459     RBC, UA Innumerable /hpf      WBC, UA Innumerable /hpf      Epithelial Cells None Seen /hpf      Bacteria, UA Moderate /hpf      Ca Oxalate Terra, UA Occasional /hpf     Urine culture [[id number]] Collected: 02/15/25 0420    Lab Status: In process Specimen: Urine, Clean Catch Updated: 02/15/25 0459    UA w Reflex to Microscopic w Reflex to Culture [[id number]]  (Abnormal) Collected: 02/15/25 0420    Lab Status: Final result Specimen: Urine, Clean Catch Updated: 02/15/25 0431     Color, UA Light Orange     Clarity, UA Extra Turbid     Specific Gravity, UA 1.010     pH, UA 8.0     Leukocytes, UA Large     Nitrite, UA Positive     Protein, UA 50 (1+) mg/dl      Glucose, UA Negative mg/dl      Ketones, UA Negative mg/dl      Urobilinogen, UA <2.0 mg/dl      Bilirubin, UA Negative     Occult Blood, UA Large            No orders to display       Procedures    ED Medication and Procedure Management   Prior to Admission Medications   Prescriptions Last Dose Informant Patient Reported? Taking?   B Complex Vitamins (B COMPLEX 100 PO)  Self, Spouse/Significant Other Yes No   Sig: Take by mouth   Coenzyme Q10 (CoQ-10) 200 MG CAPS  Self, Spouse/Significant Other Yes No   Omega-3 Fatty Acids (FISH OIL PO)  Self, Spouse/Significant Other Yes No   Sig: Take by mouth daily    ascorbic acid  (VITAMIN C) 1000 MG tablet  Self, Spouse/Significant Other Yes No   Sig: Take 1,000 mg by mouth daily   aspirin (ECOTRIN LOW STRENGTH) 81 mg EC tablet  Self, Spouse/Significant Other Yes No   Sig: Take 81 mg by mouth daily Twice a week   atenolol (TENORMIN) 100 mg tablet  Self, Spouse/Significant Other No No   Sig: TAKE 1 TABLET BY MOUTH EVERY DAY   benazepril (LOTENSIN) 20 mg tablet  Self, Spouse/Significant Other No No   Sig: TAKE 1 TABLET BY MOUTH EVERY DAY   calcium carbonate (OS-LUCA) 600 MG tablet  Self, Spouse/Significant Other Yes No   Sig: Take 600 mg by mouth daily   magnesium oxide (MAG-OX) 400 mg tablet  Self, Spouse/Significant Other Yes No   Sig: Take 400 mg by mouth daily   multivitamin (THERAGRAN) TABS  Self, Spouse/Significant Other Yes No   Sig: Take 1 tablet by mouth   rivaroxaban (Xarelto) 20 mg tablet  Self, Spouse/Significant Other No No   Sig: Take 1 tablet (20 mg total) by mouth daily with breakfast   rosuvastatin (CRESTOR) 20 MG tablet  Self, Spouse/Significant Other No No   Sig: Take 1 tablet (20 mg total) by mouth daily   sildenafil (VIAGRA) 50 MG tablet  Self, Spouse/Significant Other No No   Sig: Take 1 tablet (50 mg total) by mouth daily as needed for erectile dysfunction   tamsulosin (FLOMAX) 0.4 mg  Self, Spouse/Significant Other No No   Sig: Take 2 capsules (0.8 mg total) by mouth daily with dinner      Facility-Administered Medications: None     Discharge Medication List as of 2/15/2025  5:29 AM        START taking these medications    Details   ciprofloxacin (CIPRO) 500 mg tablet Take 1 tablet (500 mg total) by mouth 2 (two) times a day for 7 days, Starting Sat 2/15/2025, Until Sat 2/22/2025, Normal           CONTINUE these medications which have NOT CHANGED    Details   ascorbic acid (VITAMIN C) 1000 MG tablet Take 1,000 mg by mouth daily, Historical Med      aspirin (ECOTRIN LOW STRENGTH) 81 mg EC tablet Take 81 mg by mouth daily Twice a week, Historical Med      atenolol  (TENORMIN) 100 mg tablet TAKE 1 TABLET BY MOUTH EVERY DAY, Normal      B Complex Vitamins (B COMPLEX 100 PO) Take by mouth, Historical Med      benazepril (LOTENSIN) 20 mg tablet TAKE 1 TABLET BY MOUTH EVERY DAY, Starting Thu 11/28/2024, Normal      calcium carbonate (OS-LUCA) 600 MG tablet Take 600 mg by mouth daily, Historical Med      Coenzyme Q10 (CoQ-10) 200 MG CAPS Historical Med      magnesium oxide (MAG-OX) 400 mg tablet Take 400 mg by mouth daily, Historical Med      multivitamin (THERAGRAN) TABS Take 1 tablet by mouth, Starting Wed 12/12/2012, Historical Med      Omega-3 Fatty Acids (FISH OIL PO) Take by mouth daily , Historical Med      rivaroxaban (Xarelto) 20 mg tablet Take 1 tablet (20 mg total) by mouth daily with breakfast, Starting Thu 9/15/2022, Until Fri 12/6/2024, Normal      rosuvastatin (CRESTOR) 20 MG tablet Take 1 tablet (20 mg total) by mouth daily, Starting Thu 12/19/2024, Until Tue 6/17/2025, Normal      sildenafil (VIAGRA) 50 MG tablet Take 1 tablet (50 mg total) by mouth daily as needed for erectile dysfunction, Starting Fri 1/6/2023, Print      tamsulosin (FLOMAX) 0.4 mg Take 2 capsules (0.8 mg total) by mouth daily with dinner, Starting Fri 12/6/2024, Print           No discharge procedures on file.  ED SEPSIS DOCUMENTATION   Time reflects when diagnosis was documented in both MDM as applicable and the Disposition within this note       Time User Action Codes Description Comment    2/15/2025  4:33 AM [place] Zafar [N39.0] Urinary tract infection     2/15/2025  5:29 AM [place] Zaf[place] [R31.9] Hematuria                  Jenny VALENZUELA'Brien, DO  02/15/25 0542

## 2025-02-16 ENCOUNTER — RESULTS FOLLOW-UP (OUTPATIENT)
Dept: EMERGENCY DEPT | Facility: HOSPITAL | Age: 80
End: 2025-02-16

## 2025-02-17 LAB — BACTERIA UR CULT: ABNORMAL

## 2025-02-24 ENCOUNTER — TELEPHONE (OUTPATIENT)
Dept: OTHER | Facility: HOSPITAL | Age: 80
End: 2025-02-24

## 2025-02-24 DIAGNOSIS — R31.0 GROSS HEMATURIA: Primary | ICD-10-CM

## 2025-02-24 NOTE — TELEPHONE ENCOUNTER
CT renal ordered to evaluate upper tracts with ongoing episodes of gross hematuria. Completed PRIOR to next office visit. Updated kidney function on file, no need for repeat BMP.      Per Dr Burks plan for CT review and cystoscopy in office at his upcoming appointment 4/8/25. Schedule may need to be manipulated to fit in procedure.

## 2025-02-24 NOTE — TELEPHONE ENCOUNTER
Spoke to pt.  Advised he scheduled CT for hematuria workup.  Number for central scheduling was provided.  Rescheduled pt's upcoming appt on 4/8/25 to a cysto appt on 4/7/25.  CT results will be reviewed at this appt.

## 2025-02-26 ENCOUNTER — HOSPITAL ENCOUNTER (OUTPATIENT)
Dept: RADIOLOGY | Facility: HOSPITAL | Age: 80
Discharge: HOME | End: 2025-02-26
Payer: MEDICARE

## 2025-02-26 DIAGNOSIS — R31.0 GROSS HEMATURIA: ICD-10-CM

## 2025-02-26 PROCEDURE — 74178 CT ABD&PLV WO CNTR FLWD CNTR: CPT

## 2025-02-26 RX ADMIN — IOHEXOL 75 ML: 350 INJECTION, SOLUTION INTRAVENOUS at 14:37

## 2025-02-27 ENCOUNTER — OFFICE VISIT (OUTPATIENT)
Dept: FAMILY MEDICINE CLINIC | Facility: CLINIC | Age: 80
End: 2025-02-27
Payer: MEDICARE

## 2025-02-27 VITALS
BODY MASS INDEX: 27.22 KG/M2 | SYSTOLIC BLOOD PRESSURE: 134 MMHG | TEMPERATURE: 97.8 F | WEIGHT: 179.6 LBS | OXYGEN SATURATION: 97 % | DIASTOLIC BLOOD PRESSURE: 76 MMHG | HEART RATE: 64 BPM | HEIGHT: 68 IN

## 2025-02-27 DIAGNOSIS — N40.1 BPH WITH OBSTRUCTION/LOWER URINARY TRACT SYMPTOMS: ICD-10-CM

## 2025-02-27 DIAGNOSIS — I10 BENIGN ESSENTIAL HYPERTENSION: ICD-10-CM

## 2025-02-27 DIAGNOSIS — I48.91 ATRIAL FIBRILLATION, UNSPECIFIED TYPE (HCC): Primary | ICD-10-CM

## 2025-02-27 DIAGNOSIS — N13.8 BPH WITH OBSTRUCTION/LOWER URINARY TRACT SYMPTOMS: ICD-10-CM

## 2025-02-27 PROCEDURE — G2211 COMPLEX E/M VISIT ADD ON: HCPCS | Performed by: FAMILY MEDICINE

## 2025-02-27 PROCEDURE — 99214 OFFICE O/P EST MOD 30 MIN: CPT | Performed by: FAMILY MEDICINE

## 2025-02-28 NOTE — PROGRESS NOTES
"Name: Cruz Patricio Jr.      : 1945      MRN: [id number]  Encounter Provider: Nellie Mason DO  Encounter Date: 2025   Encounter department: [facility]  :  Assessment & Plan  Atrial fibrillation, unspecified type (HCC)  Stable on DOAC.       BPH with obstruction/lower urinary tract symptoms  CT final read not back yet.  Following with Urology, will get repeat cysto in 2025.       Essential hypertension  BP stable.  Continue home meds.             Depression Screening and Follow-up Plan: Patient was screened for depression during today's encounter. They screened negative with a PHQ-2 score of 0.        History of Present Illness   HTN rreivew    BPH review.    Recurrent UTI reviews.    Hypertension  This is a chronic problem. The current episode started more than 1 year ago. The problem is controlled. Pertinent negatives include no chest pain, palpitations or shortness of breath.     Review of Systems   Constitutional:  Negative for chills and fever.   HENT:  Negative for ear pain and sore throat.    Eyes:  Negative for pain and visual disturbance.   Respiratory:  Negative for cough and shortness of breath.    Cardiovascular:  Negative for chest pain and palpitations.   Gastrointestinal:  Negative for abdominal pain and vomiting.   Genitourinary:  Negative for dysuria and hematuria.   Musculoskeletal:  Negative for arthralgias and back pain.   Skin:  Negative for color change and rash.   Neurological:  Negative for seizures and syncope.   All other systems reviewed and are negative.      Objective   /76   Pulse 64   Temp 97.8 °F (36.6 °C) (Temporal)   Ht 5' 8\" (1.727 m)   Wt 81.5 kg (179 lb 9.6 oz)   SpO2 97%   BMI 27.31 kg/m²      Physical Exam  Vitals reviewed.   Constitutional:       General: He is not in acute distress.     Appearance: Normal appearance. He is well-developed.   HENT:      Head: Normocephalic and atraumatic.      Nose: No rhinorrhea.   Eyes:      " Conjunctiva/sclera: Conjunctivae normal.   Cardiovascular:      Rate and Rhythm: Normal rate and regular rhythm.      Pulses: Normal pulses.      Heart sounds: Normal heart sounds. No murmur heard.  Pulmonary:      Effort: Pulmonary effort is normal. No respiratory distress.      Breath sounds: Normal breath sounds.   Abdominal:      Palpations: Abdomen is soft.      Tenderness: There is no abdominal tenderness.   Musculoskeletal:         General: No swelling.      Cervical back: Neck supple.   Skin:     General: Skin is warm and dry.      Capillary Refill: Capillary refill takes less than 2 seconds.   Neurological:      Mental Status: He is alert.   Psychiatric:         Mood and Affect: Mood normal.       Administrative Statements   I have spent a total time of 34 minutes in caring for this patient on the day of the visit/encounter including Diagnostic results, Prognosis, Risks and benefits of tx options, Instructions for management, Patient and family education, Importance of tx compliance, Risk factor reductions, Impressions, Counseling / Coordination of care, Documenting in the medical record, Reviewing/placing orders in the medical record (including tests, medications, and/or procedures), and Obtaining or reviewing history  .

## 2025-03-04 ENCOUNTER — APPOINTMENT (OUTPATIENT)
Dept: LAB | Facility: CLINIC | Age: 80
End: 2025-03-04
Payer: MEDICARE

## 2025-03-04 LAB
BACTERIA UR QL AUTO: NORMAL /HPF
BILIRUB UR QL STRIP: NEGATIVE
CLARITY UR: CLEAR
COLOR UR: COLORLESS
GLUCOSE UR STRIP-MCNC: NEGATIVE MG/DL
HGB UR QL STRIP.AUTO: NEGATIVE
KETONES UR STRIP-MCNC: NEGATIVE MG/DL
LEUKOCYTE ESTERASE UR QL STRIP: NEGATIVE
NITRITE UR QL STRIP: NEGATIVE
NON-SQ EPI CELLS URNS QL MICRO: NORMAL /HPF
PH UR STRIP.AUTO: 7 [PH]
PROT UR STRIP-MCNC: NEGATIVE MG/DL
PSA SERPL-MCNC: 0.76 NG/ML (ref 0–4)
RBC #/AREA URNS AUTO: NORMAL /HPF
SP GR UR STRIP.AUTO: 1.01 (ref 1–1.03)
UROBILINOGEN UR STRIP-ACNC: <2 MG/DL
WBC #/AREA URNS AUTO: NORMAL /HPF

## 2025-03-04 PROCEDURE — 81001 URINALYSIS AUTO W/SCOPE: CPT

## 2025-03-04 PROCEDURE — 84153 ASSAY OF PSA TOTAL: CPT

## 2025-03-05 ENCOUNTER — RESULTS FOLLOW-UP (OUTPATIENT)
Dept: UROLOGY | Facility: HOSPITAL | Age: 80
End: 2025-03-05

## 2025-03-05 NOTE — RESULT ENCOUNTER NOTE
CT renal protocol reviewed --     1. No renal, ureteral or bladder masses   2. Calyceal/papillary abnormalities (which could be normal for patients age)   3. Simple renal cysts noted  4. No renal swelling or obstruction   5. Small non obstructing renal stone, right side  6. Bladder emptying well     Further details and any other questions the patient may have in regards to his most recent imaging can be discussed further in office with Dr. Burks at his upcoming appointment. Cystoscopy is also planned.

## 2025-03-06 NOTE — TELEPHONE ENCOUNTER
----- Message from Valerie Castillo PA-C sent at 3/5/2025  3:05 PM EST -----  CT renal protocol reviewed --     1. No renal, ureteral or bladder masses   2. Calyceal/papillary abnormalities (which could be normal for patients age)   3. Simple renal cysts noted  4. No renal swelling or obstruction   5. Small non obstructing renal stone, right side  6. Bladder emptying well     Further details and any other questions the patient may have in regards to his most recent imaging can be discussed further in office with Dr. Burks at his upcoming appointment. Cystoscopy is also planned.

## 2025-03-06 NOTE — TELEPHONE ENCOUNTER
Left message on spouse phone per communication result of ct results. Stated to keep follow up appointment in April and if any questions to give us a call. Phone number was left.

## 2025-03-25 ENCOUNTER — OFFICE VISIT (OUTPATIENT)
Dept: FAMILY MEDICINE CLINIC | Facility: CLINIC | Age: 80
End: 2025-03-25
Payer: MEDICARE

## 2025-03-25 VITALS
OXYGEN SATURATION: 98 % | WEIGHT: 180.4 LBS | HEART RATE: 76 BPM | DIASTOLIC BLOOD PRESSURE: 82 MMHG | SYSTOLIC BLOOD PRESSURE: 134 MMHG | HEIGHT: 68 IN | BODY MASS INDEX: 27.34 KG/M2 | TEMPERATURE: 98.1 F

## 2025-03-25 DIAGNOSIS — M25.561 ACUTE PAIN OF RIGHT KNEE: Primary | ICD-10-CM

## 2025-03-25 DIAGNOSIS — M17.11 LOCALIZED OSTEOARTHRITIS OF RIGHT KNEE: ICD-10-CM

## 2025-03-25 PROCEDURE — 20610 DRAIN/INJ JOINT/BURSA W/O US: CPT | Performed by: FAMILY MEDICINE

## 2025-03-25 PROCEDURE — 99214 OFFICE O/P EST MOD 30 MIN: CPT | Performed by: FAMILY MEDICINE

## 2025-03-25 RX ORDER — LIDOCAINE HYDROCHLORIDE 10 MG/ML
1 INJECTION, SOLUTION INFILTRATION; PERINEURAL
Status: COMPLETED | OUTPATIENT
Start: 2025-03-25 | End: 2025-03-25

## 2025-03-25 RX ORDER — TRIAMCINOLONE ACETONIDE 40 MG/ML
40 INJECTION, SUSPENSION INTRA-ARTICULAR; INTRAMUSCULAR
Status: COMPLETED | OUTPATIENT
Start: 2025-03-25 | End: 2025-03-25

## 2025-03-25 RX ADMIN — LIDOCAINE HYDROCHLORIDE 1 ML: 10 INJECTION, SOLUTION INFILTRATION; PERINEURAL at 15:00

## 2025-03-25 RX ADMIN — TRIAMCINOLONE ACETONIDE 40 MG: 40 INJECTION, SUSPENSION INTRA-ARTICULAR; INTRAMUSCULAR at 15:00

## 2025-03-25 NOTE — ASSESSMENT & PLAN NOTE
Knee pain exacerbation with a known history of moderate to severe osteoarthritis when using the stairs.  Mild effusion present on the suprapatellar region.  Knee injection successful today.  Use Tylenol as needed, cannot use NSAID due to being on blood thinner

## 2025-03-25 NOTE — PROGRESS NOTES
"Name: Cruz Patricio Jr.      : 1945      MRN: [id number]  Encounter Provider: Nellie Mason DO  Encounter Date: 3/25/2025   Encounter department: [facility]  :  Assessment & Plan  Acute pain of right knee  Knee pain exacerbation with a known history of moderate to severe osteoarthritis when using the stairs.  Mild effusion present on the suprapatellar region.  Knee injection successful today.  Use Tylenol as needed, cannot use NSAID due to being on blood thinner       Localized osteoarthritis of right knee  Patient received 2 cc Kenalog with 1 cc lidocaine today.  Tolerated well.  No complications besides mild bleeding likely due to being on blood thinner.  Stopped within a few seconds.  Orders:  •  Large joint arthrocentesis: R knee  •  lidocaine (XYLOCAINE) 1 % injection 1 mL  •  triamcinolone acetonide (Kenalog-40) 40 mg/mL injection 40 mg           History of Present Illness   Acute knee pain from osteoarthritis.    Knee Pain       Review of Systems   Constitutional:  Negative for chills and fever.   HENT:  Negative for ear pain and sore throat.    Eyes:  Negative for pain and visual disturbance.   Respiratory:  Negative for cough and shortness of breath.    Cardiovascular:  Negative for chest pain and palpitations.   Gastrointestinal:  Negative for abdominal pain and vomiting.   Genitourinary:  Negative for dysuria and hematuria.   Musculoskeletal:  Positive for arthralgias. Negative for back pain.   Skin:  Negative for color change and rash.   Neurological:  Negative for seizures and syncope.   All other systems reviewed and are negative.      Objective   /82   Pulse 76   Temp 98.1 °F (36.7 °C) (Temporal)   Ht 5' 8\" (1.727 m)   Wt 81.8 kg (180 lb 6.4 oz)   SpO2 98%   BMI 27.43 kg/m²      Physical Exam  Vitals reviewed.   Constitutional:       General: He is not in acute distress.     Appearance: Normal appearance. He is well-developed.   HENT:      Head: Normocephalic and " atraumatic.   Eyes:      Conjunctiva/sclera: Conjunctivae normal.   Cardiovascular:      Rate and Rhythm: Normal rate and regular rhythm.      Pulses: Normal pulses.      Heart sounds: Normal heart sounds. No murmur heard.  Pulmonary:      Effort: Pulmonary effort is normal. No respiratory distress.      Breath sounds: Normal breath sounds.   Abdominal:      Palpations: Abdomen is soft.      Tenderness: There is no abdominal tenderness.   Musculoskeletal:         General: Swelling and tenderness present.      Cervical back: Neck supple.      Comments: Mild R swelling around knee.   Skin:     General: Skin is warm and dry.      Capillary Refill: Capillary refill takes less than 2 seconds.   Neurological:      Mental Status: He is alert.   Psychiatric:         Mood and Affect: Mood normal.       Administrative Statements   I have spent a total time of 37 minutes in caring for this patient on the day of the visit/encounter including Diagnostic results, Prognosis, Risks and benefits of tx options, Instructions for management, Patient and family education, Importance of tx compliance, Risk factor reductions, Impressions, Counseling / Coordination of care, Documenting in the medical record, Reviewing/placing orders in the medical record (including tests, medications, and/or procedures), and Obtaining or reviewing history  .    Large joint arthrocentesis: R knee  Universal Protocol:  Consent: Verbal consent obtained.  Risks and benefits: risks, benefits and alternatives were discussed  Consent given by: patient  Supporting Documentation  Indications: pain and joint swelling   Procedure Details  Location: knee - R knee  Preparation: Patient was prepped and draped in the usual sterile fashion  Needle size: 22 G  Ultrasound guidance: no  Approach: anterolateral  Medications administered: 40 mg triamcinolone acetonide 40 mg/mL; 1 mL lidocaine 1 %    Patient tolerance: patient tolerated the procedure well with no immediate  complications  Dressing:  Sterile dressing applied

## 2025-04-07 ENCOUNTER — PROCEDURE VISIT (OUTPATIENT)
Dept: UROLOGY | Facility: MEDICAL CENTER | Age: 80
End: 2025-04-07
Payer: MEDICARE

## 2025-04-07 VITALS
HEIGHT: 68 IN | OXYGEN SATURATION: 98 % | HEART RATE: 62 BPM | BODY MASS INDEX: 26.73 KG/M2 | DIASTOLIC BLOOD PRESSURE: 84 MMHG | SYSTOLIC BLOOD PRESSURE: 130 MMHG | WEIGHT: 176.4 LBS

## 2025-04-07 DIAGNOSIS — C61 PROSTATE CANCER (HCC): Primary | ICD-10-CM

## 2025-04-07 DIAGNOSIS — R31.0 GROSS HEMATURIA: ICD-10-CM

## 2025-04-07 PROBLEM — R30.0 DYSURIA: Status: RESOLVED | Noted: 2024-12-16 | Resolved: 2025-04-07

## 2025-04-07 PROBLEM — R33.9 INCOMPLETE BLADDER EMPTYING: Status: RESOLVED | Noted: 2024-12-06 | Resolved: 2025-04-07

## 2025-04-07 PROBLEM — N13.8 BPH WITH OBSTRUCTION/LOWER URINARY TRACT SYMPTOMS: Status: RESOLVED | Noted: 2018-05-14 | Resolved: 2025-04-07

## 2025-04-07 PROBLEM — N40.1 BPH WITH OBSTRUCTION/LOWER URINARY TRACT SYMPTOMS: Status: RESOLVED | Noted: 2018-05-14 | Resolved: 2025-04-07

## 2025-04-07 LAB
POST-VOID RESIDUAL VOLUME, ML POC: 75 ML
SL AMB  POCT GLUCOSE, UA: ABNORMAL
SL AMB LEUKOCYTE ESTERASE,UA: ABNORMAL
SL AMB POCT BILIRUBIN,UA: ABNORMAL
SL AMB POCT BLOOD,UA: ABNORMAL
SL AMB POCT CLARITY,UA: ABNORMAL
SL AMB POCT COLOR,UA: YELLOW
SL AMB POCT KETONES,UA: ABNORMAL
SL AMB POCT NITRITE,UA: POSITIVE
SL AMB POCT PH,UA: 7
SL AMB POCT SPECIFIC GRAVITY,UA: 1.01
SL AMB POCT URINE PROTEIN: ABNORMAL
SL AMB POCT UROBILINOGEN: 0.2

## 2025-04-07 PROCEDURE — 52000 CYSTOURETHROSCOPY: CPT | Performed by: UROLOGY

## 2025-04-07 PROCEDURE — 87086 URINE CULTURE/COLONY COUNT: CPT | Performed by: UROLOGY

## 2025-04-07 PROCEDURE — 87077 CULTURE AEROBIC IDENTIFY: CPT | Performed by: UROLOGY

## 2025-04-07 PROCEDURE — 99214 OFFICE O/P EST MOD 30 MIN: CPT | Performed by: UROLOGY

## 2025-04-07 PROCEDURE — 51798 US URINE CAPACITY MEASURE: CPT | Performed by: UROLOGY

## 2025-04-07 PROCEDURE — 81003 URINALYSIS AUTO W/O SCOPE: CPT | Performed by: UROLOGY

## 2025-04-07 PROCEDURE — 87186 SC STD MICRODIL/AGAR DIL: CPT | Performed by: UROLOGY

## 2025-04-07 RX ORDER — CEPHALEXIN 500 MG/1
500 CAPSULE ORAL EVERY 12 HOURS SCHEDULED
Qty: 14 CAPSULE | Refills: 0 | Status: SHIPPED | OUTPATIENT
Start: 2025-04-07 | End: 2025-04-14

## 2025-04-07 NOTE — PROGRESS NOTES
Name: Cruz Patricio Jr.      : 1945      MRN: [id number]  Encounter Provider: Jaret Burks MD  Encounter Date: 2025   Encounter department: [facility] [place]  :  Assessment & Plan  Prostate cancer (HCC)  PSA has risen slightly to 0.757 which likely represents the completion of his hormonal therapy.  He feels well and is doing well.  JUAN LUIS is flat and no nodules evident.  Continue to follow PSA and recheck in 6 months.  Orders:    POCT urine dip auto non-scope    POCT Measure PVR    PSA Total, Diagnostic; Future    Gross hematuria  There is a punctate nonobstructing right renal stone and calyceal palp pole artery abnormalities consistent with early mild papillary necrosis on renal protocol CT in February.  No evidence of malignancy was noted.  Cystoscopy does not reveal any bladder tumor.  While he is asymptomatic urine is consistent with infection and we will obtain a urine culture and treat empirically.  Orders:    POCT urine dip auto non-scope    POCT Measure PVR    Cystoscopy    cephalexin (KEFLEX) 500 mg capsule; Take 1 capsule (500 mg total) by mouth every 12 (twelve) hours for 7 days        History of Present Illness   Cruz Patricio Jr. is a 80 y.o. male who presents for follow-up of prostate cancer and to complete evaluation of gross hematuria.  He completed radiation therapy in 2024.he also completed neoadjuvant hormonal therapy.  He notes he is voiding much better on tamsulosin 0.8 mg.  No dysuria or symptoms of infection.  Blood in the urine was noted last month but has not recurred.  No flank pain.  He feels he is emptying his bladder better and he is happy with his voiding pattern at this time.  AUA SYMPTOM SCORE      Flowsheet Row Most Recent Value   AUA SYMPTOM SCORE    How often have you had a sensation of not emptying your bladder completely after you finished urinating? 1 (P)     How often have you had to urinate again less than two hours after you finished  "urinating? 1 (P)     How often have you found you stopped and started again several times when you urinate? 1 (P)     How often have you found it difficult to postpone urination? 1 (P)     How often have you had a weak urinary stream? 1 (P)     How often have you had to push or strain to begin urination? 1 (P)     How many times did you most typically get up to urinate from the time you went to bed at night until the time you got up in the morning? 2 (P)     Quality of Life: If you were to spend the rest of your life with your urinary condition just the way it is now, how would you feel about that? 2 (P)     AUA SYMPTOM SCORE 8 (P)            Review of Systems   Constitutional:  Negative for chills, diaphoresis, fatigue and fever.   HENT: Negative.     Eyes: Negative.    Respiratory: Negative.     Cardiovascular: Negative.    Endocrine: Negative.    Genitourinary:         See HPI   Musculoskeletal: Negative.    Skin: Negative.    Allergic/Immunologic: Negative.    Neurological: Negative.    Hematological: Negative.    Psychiatric/Behavioral: Negative.            Objective   /84 (BP Location: Left arm, Patient Position: Sitting, Cuff Size: Adult)   Pulse 62   Ht 5' 8\" (1.727 m)   Wt 80 kg (176 lb 6.4 oz)   BMI 26.82 kg/m²     Physical Exam  Vitals reviewed.   Constitutional:       General: He is not in acute distress.     Appearance: Normal appearance. He is well-developed. He is not ill-appearing, toxic-appearing or diaphoretic.   HENT:      Head: Normocephalic and atraumatic.   Eyes:      Conjunctiva/sclera: Conjunctivae normal.   Cardiovascular:      Rate and Rhythm: Normal rate.   Pulmonary:      Effort: Pulmonary effort is normal.   Abdominal:      General: There is no distension.      Palpations: Abdomen is soft. There is no mass.      Tenderness: There is no abdominal tenderness. There is no right CVA tenderness, left CVA tenderness, guarding or rebound.      Comments: No inguinal hernia "   Genitourinary:     Penis: Normal. No tenderness.       Testes: Normal.      Rectum: Normal.      Comments: Prostate flat and free of induration  Musculoskeletal:         General: Normal range of motion.      Cervical back: Neck supple.   Skin:     General: Skin is warm and dry.   Neurological:      General: No focal deficit present.      Mental Status: He is alert and oriented to person, place, and time.   Psychiatric:         Mood and Affect: Mood normal.         Behavior: Behavior normal.         Thought Content: Thought content normal.         Judgment: Judgment normal.        Cystoscopy     Date/Time  4/7/2025 10:00 AM     Performed by  Jaret Burks MD   Authorized by  Jaret Burks MD     [facility] Protocol:  procedure performed by consultantConsent: Verbal consent obtained.  Risks and benefits: risks, benefits and alternatives were discussed  Consent given by: patient  Patient understanding: patient states understanding of the procedure being performed  Patient consent: the patient's understanding of the procedure matches consent given  Procedure consent: procedure consent matches procedure scheduled  Patient identity confirmed: verbally with patient      Procedure Details:  Procedure type: cystoscopy    Patient tolerance: Patient tolerated the procedure well with no immediate complications    Additional Procedure Details:      Patient presents for cystoscopy.  I have discussed the reasons for doing the exam, and the potential risks and complications.  Patient expressed understanding, and signed informed consent document.    The patient was carefully  positioned supine on the examining table.  Sterile preparation was performed on the urethra.  Xylocaine jelly was instilled and left  Indwelling for the procedure.  The 15 Cymraes flexible cystoscope was passed with the following findings:      Urethra: coronal hypospadius, otherwise normal urethra    Prostate:  lateral lobes-bilobar obstruction with mild  elevation of the median bar.  Prostate estimated at less than 30 g                  Bladder: Moderate trabeculated with cellules and occasional diverticula, no lesions, tumor, or stones.     Residual urine: Small    Patient tolerated the procedure well and was escorted from the examining table.       Results   Lab Results   Component Value Date    PSA 0.757 03/04/2025    PSA 0.450 10/19/2024    PSA 5.145 (H) 07/27/2024     Lab Results   Component Value Date    CALCIUM 9.9 02/15/2025    K 4.1 02/15/2025    CO2 27 02/15/2025     02/15/2025    BUN 14 02/15/2025    CREATININE 0.68 02/15/2025     Lab Results   Component Value Date    WBC 6.09 02/15/2025    HGB 15.2 02/15/2025    HCT 45.9 02/15/2025    MCV 88 02/15/2025     02/15/2025       Office Urine Dip  Recent Results (from the past hour)   POCT Measure PVR    Collection Time: 04/07/25 10:21 AM   Result Value Ref Range    POST-VOID RESIDUAL VOLUME, ML POC 75 mL   POCT urine dip auto non-scope    Collection Time: 04/07/25 10:24 AM   Result Value Ref Range     COLOR,UA Yellow     CLARITY,UA Cloudy     SPECIFIC GRAVITY,UA 1.015      PH,UA 7     LEUKOCYTE ESTERASE,UA Large     NITRITE,UA Positive     GLUCOSE, UA Neg     KETONES,UA Neg     BILIRUBIN,UA Neg     BLOOD,UA Small     POCT URINE PROTEIN Neg     SL AMB POCT UROBILINOGEN 0.2

## 2025-04-07 NOTE — ASSESSMENT & PLAN NOTE
PSA has risen slightly to 0.757 which likely represents the completion of his hormonal therapy.  He feels well and is doing well.  JUAN LUIS is flat and no nodules evident.  Continue to follow PSA and recheck in 6 months.  Orders:    POCT urine dip auto non-scope    POCT Measure PVR    PSA Total, Diagnostic; Future

## 2025-04-07 NOTE — LETTER
2025     Nellie Mason DO  [address] [facility] [address]    Patient: Cruz Patricio Jr.   YOB: 1945   Date of Visit: 2025       Dear Dr. Nellie Mason DO:    Thank you for referring Cruz Patricio to me for evaluation. Below are my notes for this consultation.    If you have questions, please do not hesitate to call me. I look forward to following your patient along with you.         Sincerely,        Jaret Burks MD        CC: No Recipients    Jaret Burks MD  2025 10:30 AM  Incomplete  Name: Cruz Patricio Jr.      : 1945      MRN: [id number]  Encounter Provider: Jaret Burks MD  Encounter Date: 2025   Encounter department: [facility] UROLOGY [place]  :  Assessment & Plan  Prostate cancer (HCC)  PSA has risen slightly to 0.757 which likely represents the completion of his hormonal therapy.  He feels well and is doing well.  JUAN LUIS is flat and no nodules evident.  Continue to follow PSA and recheck in 6 months.  Orders:  •  POCT urine dip auto non-scope  •  POCT Measure PVR  •  PSA Total, Diagnostic; Future    Gross hematuria  There is a punctate nonobstructing right renal stone and calyceal palp pole artery abnormalities consistent with early mild papillary necrosis on renal protocol CT in February.  No evidence of malignancy was noted.  Cystoscopy does not reveal any bladder tumor.  While he is asymptomatic urine is consistent with infection and we will obtain a urine culture and treat empirically.  Orders:  •  POCT urine dip auto non-scope  •  POCT Measure PVR  •  Cystoscopy  •  cephalexin (KEFLEX) 500 mg capsule; Take 1 capsule (500 mg total) by mouth every 12 (twelve) hours for 7 days        History of Present Illness  Cruz Patricio Jr. is a 80 y.o. male who presents for follow-up of prostate cancer and to complete evaluation of gross hematuria.  He completed radiation therapy in 2024.he also completed neoadjuvant hormonal therapy.  He notes  "he is voiding much better on tamsulosin 0.8 mg.  No dysuria or symptoms of infection.  Blood in the urine was noted last month but has not recurred.  No flank pain.  He feels he is emptying his bladder better and he is happy with his voiding pattern at this time.  AUA SYMPTOM SCORE      Flowsheet Row Most Recent Value   AUA SYMPTOM SCORE    How often have you had a sensation of not emptying your bladder completely after you finished urinating? 1 (P)     How often have you had to urinate again less than two hours after you finished urinating? 1 (P)     How often have you found you stopped and started again several times when you urinate? 1 (P)     How often have you found it difficult to postpone urination? 1 (P)     How often have you had a weak urinary stream? 1 (P)     How often have you had to push or strain to begin urination? 1 (P)     How many times did you most typically get up to urinate from the time you went to bed at night until the time you got up in the morning? 2 (P)     Quality of Life: If you were to spend the rest of your life with your urinary condition just the way it is now, how would you feel about that? 2 (P)     AUA SYMPTOM SCORE 8 (P)            Review of Systems   Constitutional:  Negative for chills, diaphoresis, fatigue and fever.   HENT: Negative.     Eyes: Negative.    Respiratory: Negative.     Cardiovascular: Negative.    Endocrine: Negative.    Genitourinary:         See HPI   Musculoskeletal: Negative.    Skin: Negative.    Allergic/Immunologic: Negative.    Neurological: Negative.    Hematological: Negative.    Psychiatric/Behavioral: Negative.            Objective  /84 (BP Location: Left arm, Patient Position: Sitting, Cuff Size: Adult)   Pulse 62   Ht 5' 8\" (1.727 m)   Wt 80 kg (176 lb 6.4 oz)   BMI 26.82 kg/m²     Physical Exam  Vitals reviewed.   Constitutional:       General: He is not in acute distress.     Appearance: Normal appearance. He is well-developed. He is " not ill-appearing, toxic-appearing or diaphoretic.   HENT:      Head: Normocephalic and atraumatic.   Eyes:      Conjunctiva/sclera: Conjunctivae normal.   Cardiovascular:      Rate and Rhythm: Normal rate.   Pulmonary:      Effort: Pulmonary effort is normal.   Abdominal:      General: There is no distension.      Palpations: Abdomen is soft. There is no mass.      Tenderness: There is no abdominal tenderness. There is no right CVA tenderness, left CVA tenderness, guarding or rebound.      Comments: No inguinal hernia   Genitourinary:     Penis: Normal. No tenderness.       Testes: Normal.      Rectum: Normal.      Comments: Prostate flat and free of induration  Musculoskeletal:         General: Normal range of motion.      Cervical back: Neck supple.   Skin:     General: Skin is warm and dry.   Neurological:      General: No focal deficit present.      Mental Status: He is alert and oriented to person, place, and time.   Psychiatric:         Mood and Affect: Mood normal.         Behavior: Behavior normal.         Thought Content: Thought content normal.         Judgment: Judgment normal.        Cystoscopy     Date/Time  4/7/2025 10:00 AM     Performed by  Jaret Burks MD   Authorized by  Jaret Burks MD     [place] Protocol:  procedure performed by consultantConsent: Verbal consent obtained.  Risks and benefits: risks, benefits and alternatives were discussed  Consent given by: patient  Patient understanding: patient states understanding of the procedure being performed  Patient consent: the patient's understanding of the procedure matches consent given  Procedure consent: procedure consent matches procedure scheduled  Patient identity confirmed: verbally with patient      Procedure Details:  Procedure type: cystoscopy    Patient tolerance: Patient tolerated the procedure well with no immediate complications    Additional Procedure Details:      Patient presents for cystoscopy.  I have discussed the  reasons for doing the exam, and the potential risks and complications.  Patient expressed understanding, and signed informed consent document.    The patient was carefully  positioned supine on the examining table.  Sterile preparation was performed on the urethra.  Xylocaine jelly was instilled and left  Indwelling for the procedure.  The 15 [place] flexible cystoscope was passed with the following findings:      Urethra: coronal hypospadius, otherwise normal urethra    Prostate:  lateral lobes-bilobar obstruction with mild elevation of the median bar.  Prostate estimated at less than 30 g                  Bladder: Moderate trabeculated with cellules and occasional diverticula, no lesions, tumor, or stones.     Residual urine: Small    Patient tolerated the procedure well and was escorted from the examining table.       Results   Lab Results   Component Value Date    PSA 0.757 03/04/2025    PSA 0.450 10/19/2024    PSA 5.145 (H) 07/27/2024     Lab Results   Component Value Date    CALCIUM 9.9 02/15/2025    K 4.1 02/15/2025    CO2 27 02/15/2025     02/15/2025    BUN 14 02/15/2025    CREATININE 0.68 02/15/2025     Lab Results   Component Value Date    WBC 6.09 02/15/2025    HGB 15.2 02/15/2025    HCT 45.9 02/15/2025    MCV 88 02/15/2025     02/15/2025       Office Urine Dip  Recent Results (from the past hour)   POCT Measure PVR    Collection Time: 04/07/25 10:21 AM   Result Value Ref Range    POST-VOID RESIDUAL VOLUME, ML POC 75 mL   POCT urine dip auto non-scope    Collection Time: 04/07/25 10:24 AM   Result Value Ref Range     COLOR,UA Yellow     CLARITY,UA Cloudy     SPECIFIC GRAVITY,UA 1.015      PH,UA 7     LEUKOCYTE ESTERASE,UA Large     NITRITE,UA Positive     GLUCOSE, UA Neg     KETONES,UA Neg     BILIRUBIN,UA Neg     BLOOD,UA Small     POCT URINE PROTEIN Neg     SL AMB POCT UROBILINOGEN 0.2            Jaret Burks MD  4/7/2025 10:27 AM  Sign when Signing Visit  Name: Cruz Patricio Jr.       : 1945      MRN: [id number]  Encounter Provider: Jaret Burks MD  Encounter Date: 2025   [facility]  :  Assessment & Plan  Prostate cancer (HCC)  PSA has risen slightly to 0.757 which likely represents the completion of his hormonal therapy.  He feels well and is doing well.  JUAN LUIS is flat and no nodules evident.  Continue to follow PSA and recheck in 6 months.  Orders:  •  POCT urine dip auto non-scope  •  POCT Measure PVR    Gross hematuria  There is a punctate nonobstructing right renal stone and calyceal palp pole artery abnormalities consistent with early mild papillary necrosis on renal protocol CT in February.  No evidence of malignancy was noted.  Cystoscopy does not reveal any bladder tumor.  While he is asymptomatic urine is consistent with infection and we will obtain a urine culture and treat empirically.  Orders:  •  POCT urine dip auto non-scope  •  POCT Measure PVR  •  Cystoscopy  •  cephalexin (KEFLEX) 500 mg capsule; Take 1 capsule (500 mg total) by mouth every 12 (twelve) hours for 7 days        History of Present Illness  Cruz Patricio Jr. is a 80 y.o. male who presents for follow-up of prostate cancer and to complete evaluation of gross hematuria.  He completed radiation therapy in 2024.he also completed neoadjuvant hormonal therapy.  He notes he is voiding much better on tamsulosin 0.8 mg.  No dysuria or symptoms of infection.  Blood in the urine was noted last month but has not recurred.  No flank pain.  He feels he is emptying his bladder better and he is happy with his voiding pattern at this time.  AUA SYMPTOM SCORE      Flowsheet Row Most Recent Value   AUA SYMPTOM SCORE    How often have you had a sensation of not emptying your bladder completely after you finished urinating? 1 (P)     How often have you had to urinate again less than two hours after you finished urinating? 1 (P)     How often have you found you stopped  "and started again several times when you urinate? 1 (P)     How often have you found it difficult to postpone urination? 1 (P)     How often have you had a weak urinary stream? 1 (P)     How often have you had to push or strain to begin urination? 1 (P)     How many times did you most typically get up to urinate from the time you went to bed at night until the time you got up in the morning? 2 (P)     Quality of Life: If you were to spend the rest of your life with your urinary condition just the way it is now, how would you feel about that? 2 (P)     AUA SYMPTOM SCORE 8 (P)            Review of Systems   Constitutional:  Negative for chills, diaphoresis, fatigue and fever.   HENT: Negative.     Eyes: Negative.    Respiratory: Negative.     Cardiovascular: Negative.    Endocrine: Negative.    Genitourinary:         See HPI   Musculoskeletal: Negative.    Skin: Negative.    Allergic/Immunologic: Negative.    Neurological: Negative.    Hematological: Negative.    Psychiatric/Behavioral: Negative.            Objective  /84 (BP Location: Left arm, Patient Position: Sitting, Cuff Size: Adult)   Pulse 62   Ht 5' 8\" (1.727 m)   Wt 80 kg (176 lb 6.4 oz)   BMI 26.82 kg/m²     Physical Exam  Vitals reviewed.   Constitutional:       General: He is not in acute distress.     Appearance: Normal appearance. He is well-developed. He is not ill-appearing, toxic-appearing or diaphoretic.   HENT:      Head: Normocephalic and atraumatic.   Eyes:      Conjunctiva/sclera: Conjunctivae normal.   Cardiovascular:      Rate and Rhythm: Normal rate.   Pulmonary:      Effort: Pulmonary effort is normal.   Abdominal:      General: There is no distension.      Palpations: Abdomen is soft. There is no mass.      Tenderness: There is no abdominal tenderness. There is no right CVA tenderness, left CVA tenderness, guarding or rebound.      Comments: No inguinal hernia   Genitourinary:     Penis: Normal. No tenderness.       Testes: " Normal.      Rectum: Normal.      Comments: Prostate flat and free of induration  Musculoskeletal:         General: Normal range of motion.      Cervical back: Neck supple.   Skin:     General: Skin is warm and dry.   Neurological:      General: No focal deficit present.      Mental Status: He is alert and oriented to person, place, and time.   Psychiatric:         Mood and Affect: Mood normal.         Behavior: Behavior normal.         Thought Content: Thought content normal.         Judgment: Judgment normal.        Cystoscopy     Date/Time  4/7/2025 10:00 AM     Performed by  Jaret Burks MD   Authorized by  Jaret Burks MD     [place] Protocol:  procedure performed by consultantConsent: Verbal consent obtained.  Risks and benefits: risks, benefits and alternatives were discussed  Consent given by: patient  Patient understanding: patient states understanding of the procedure being performed  Patient consent: the patient's understanding of the procedure matches consent given  Procedure consent: procedure consent matches procedure scheduled  Patient identity confirmed: verbally with patient      Procedure Details:  Procedure type: cystoscopy    Patient tolerance: Patient tolerated the procedure well with no immediate complications    Additional Procedure Details:      Patient presents for cystoscopy.  I have discussed the reasons for doing the exam, and the potential risks and complications.  Patient expressed understanding, and signed informed consent document.    The patient was carefully  positioned supine on the examining table.  Sterile preparation was performed on the urethra.  Xylocaine jelly was instilled and left  Indwelling for the procedure.  The 15 [place] flexible cystoscope was passed with the following findings:      Urethra: coronal hypospadius, otherwise normal urethra    Prostate:  lateral lobes-bilobar obstruction with mild elevation of the median bar.  Prostate estimated at less than 30  g                  Bladder: Moderate trabeculated with cellules and occasional diverticula, no lesions, tumor, or stones.     Residual urine: Small    Patient tolerated the procedure well and was escorted from the examining table.       Results   Lab Results   Component Value Date    PSA 0.757 03/04/2025    PSA 0.450 10/19/2024    PSA 5.145 (H) 07/27/2024     Lab Results   Component Value Date    CALCIUM 9.9 02/15/2025    K 4.1 02/15/2025    CO2 27 02/15/2025     02/15/2025    BUN 14 02/15/2025    CREATININE 0.68 02/15/2025     Lab Results   Component Value Date    WBC 6.09 02/15/2025    HGB 15.2 02/15/2025    HCT 45.9 02/15/2025    MCV 88 02/15/2025     02/15/2025       Office Urine Dip  Recent Results (from the past hour)   POCT Measure PVR    Collection Time: 04/07/25 10:21 AM   Result Value Ref Range    POST-VOID RESIDUAL VOLUME, ML POC 75 mL   POCT urine dip auto non-scope    Collection Time: 04/07/25 10:24 AM   Result Value Ref Range     COLOR,UA Yellow     CLARITY,UA Cloudy     SPECIFIC GRAVITY,UA 1.015      PH,UA 7     LEUKOCYTE ESTERASE,UA Large     NITRITE,UA Positive     GLUCOSE, UA Neg     KETONES,UA Neg     BILIRUBIN,UA Neg     BLOOD,UA Small     POCT URINE PROTEIN Neg     SL AMB POCT UROBILINOGEN 0.2

## 2025-04-07 NOTE — PATIENT INSTRUCTIONS
"  Patient Education     Cystoscopy - Discharge instructions   The Basics   Written by the doctors and editors at [facility]   What are discharge instructions? -- Discharge instructions are information about how to take care of yourself after getting medical care for a health problem.  What is a cystoscopy? -- A cystoscopy is a procedure that lets a doctor see inside the bladder and urethra. The urethra is the tube that transports urine out of the bladder (figure 1). During cystoscopy, a doctor puts a thin tube with a tiny camera on the end into the urethra and moves it up into the bladder (figure 2). The tube is called a \"cystoscope.\"  How do I care for myself at home? -- Ask the doctor or nurse what you should do when you go home. Make sure that you understand exactly what you need to do to care for yourself. Ask questions if there is anything you do not understand.  It's important to drink plenty of water:   This helps flush out your bladder and relieves discomfort.   Drink at least 3 to 4 to glasses of water the first day after your procedure.   Urinate when you feel the need.  Some side effects are common after cystoscopy. These should only last for a short time after your procedure. They include:   Some pain or discomfort when urinating   A small amount of blood in your urine  These side effects should stop after you urinate a few times.  What follow-up care do I need? -- Your doctor or nurse will discuss the results of your cystoscopy with you.   If you had a biopsy, your doctor will let you know when your results are ready. They will talk to you about what they mean.   If your cystoscopy shows that you have a medical problem, your doctor will talk to you about your treatment options.  When should I call the doctor? -- Call for advice if you:   Have pain or discomfort when urinating for more than a day or 2   Have \"urinary urgency\" for more than a day or 2 - This is when you feel like you need to urinate " suddenly or in a hurry.   See a lot of blood in your urine at once   Still see blood in your urine after 5 days   Have a fever   Have pain in your belly  All topics are updated as new evidence becomes available and our peer review process is complete.  This topic retrieved from [employer] on: Apr 11, 2024.  Topic 005309 Version 1.0  Release: 32.3.2 - C32.100  © 2024 UpToDate, Inc. and/or its affiliates. All rights reserved.  figure 1: Anatomy of the urinary tract     Urine is made by the kidneys. It passes from the kidneys into the bladder through 2 tubes called the ureters. Then, it leaves the bladder through another tube called the urethra.  Graphic 06683 Version 8.0  figure 2: Cystoscopy     The doctor inserts a long, thin tube with a tiny camera on the end (cystoscope) into the urethra. Then, they thread the tube into the bladder to see inside.  Graphic 050158 Version 1.0  Consumer Information Use and Disclaimer   Disclaimer: This generalized information is a limited summary of diagnosis, treatment, and/or medication information. It is not meant to be comprehensive and should be used as a tool to help the user understand and/or assess potential diagnostic and treatment options. It does NOT include all information about conditions, treatments, medications, side effects, or risks that may apply to a specific patient. It is not intended to be medical advice or a substitute for the medical advice, diagnosis, or treatment of a health care provider based on the health care provider's examination and assessment of a patient's specific and unique circumstances. Patients must speak with a health care provider for complete information about their health, medical questions, and treatment options, including any risks or benefits regarding use of medications. This information does not endorse any treatments or medications as safe, effective, or approved for treating a specific patient. UpToDate, Inc. and its affiliates  disclaim any warranty or liability relating to this information or the use thereof.The use of this information is governed by the Terms of Use, available at https://www.wolterskluwer.com/en/know/clinical-effectiveness-terms. 2024© "[employer] Inc. and its affiliates and/or licensors. All rights reserved.  Copyright   © 2024 "[employer] Inc. and/or its affiliates. All rights reserved.

## 2025-04-08 ENCOUNTER — RESULTS FOLLOW-UP (OUTPATIENT)
Dept: UROLOGY | Facility: MEDICAL CENTER | Age: 80
End: 2025-04-08

## 2025-04-08 DIAGNOSIS — N30.00 ACUTE CYSTITIS WITHOUT HEMATURIA: Primary | ICD-10-CM

## 2025-04-09 LAB — BACTERIA UR CULT: ABNORMAL

## 2025-04-10 RX ORDER — NITROFURANTOIN 25; 75 MG/1; MG/1
100 CAPSULE ORAL 2 TIMES DAILY
Qty: 14 CAPSULE | Refills: 0 | Status: SHIPPED | OUTPATIENT
Start: 2025-04-10 | End: 2025-04-17

## 2025-04-10 NOTE — RESULT ENCOUNTER NOTE
Inform pt of abnormal test result.  Urine culture was positive.  The bacteria was resistant to the Keflex prescribed.  I would like to treat with Macrobid 1 tablet twice daily x 1 week.

## 2025-04-28 ENCOUNTER — OFFICE VISIT (OUTPATIENT)
Dept: FAMILY MEDICINE CLINIC | Facility: CLINIC | Age: 80
End: 2025-04-28
Payer: MEDICARE

## 2025-04-28 VITALS
DIASTOLIC BLOOD PRESSURE: 70 MMHG | HEIGHT: 68 IN | SYSTOLIC BLOOD PRESSURE: 124 MMHG | TEMPERATURE: 97.3 F | WEIGHT: 179.2 LBS | HEART RATE: 59 BPM | BODY MASS INDEX: 27.16 KG/M2 | OXYGEN SATURATION: 96 %

## 2025-04-28 DIAGNOSIS — L57.0 AK (ACTINIC KERATOSIS): ICD-10-CM

## 2025-04-28 DIAGNOSIS — C44.519 BASAL CELL CARCINOMA (BCC) OF SKIN OF OTHER PART OF TORSO: Primary | Chronic | ICD-10-CM

## 2025-04-28 PROCEDURE — 17000 DESTRUCT PREMALG LESION: CPT | Performed by: FAMILY MEDICINE

## 2025-04-28 PROCEDURE — 17003 DESTRUCT PREMALG LES 2-14: CPT | Performed by: FAMILY MEDICINE

## 2025-04-28 PROCEDURE — 99214 OFFICE O/P EST MOD 30 MIN: CPT | Performed by: FAMILY MEDICINE

## 2025-04-28 NOTE — PROGRESS NOTES
"Name: Cruz Patricio Jr.      : 1945      MRN: [id number]  Encounter Provider: Nellie Mason DO  Encounter Date: 2025   Encounter department: [facility]  :  Assessment & Plan  Basal cell carcinoma (BCC) of skin of other part of torso  Stable, getting Mohs for R facial BCC       AK (actinic keratosis)  3 on head, 7 on back, 1 on R upper arm.  Cryo'd today. Toleratd well. Repeat 6 weeks.  Orders:  •  Lesion Destruction           History of Present Illness   Cryo 3 lesions on head, 7 on back, 1 on L upper arm.    No other concerns.      Review of Systems   Constitutional:  Negative for chills and fever.   HENT:  Negative for ear pain and sore throat.    Eyes:  Negative for pain and visual disturbance.   Respiratory:  Negative for cough and shortness of breath.    Cardiovascular:  Negative for chest pain and palpitations.   Gastrointestinal:  Negative for abdominal pain and vomiting.   Genitourinary:  Negative for dysuria and hematuria.   Musculoskeletal:  Negative for arthralgias and back pain.   Skin:  Negative for color change and rash.   Neurological:  Negative for seizures and syncope.   All other systems reviewed and are negative.      Objective   /70 (BP Location: Left arm, Patient Position: Sitting, Cuff Size: Large)   Pulse 59   Temp (!) 97.3 °F (36.3 °C)   Ht 5' 8\" (1.727 m)   Wt 81.3 kg (179 lb 3.2 oz)   SpO2 96%   BMI 27.25 kg/m²      Physical Exam  Vitals reviewed.   Constitutional:       General: He is not in acute distress.     Appearance: He is well-developed.   HENT:      Head: Normocephalic and atraumatic.   Eyes:      Conjunctiva/sclera: Conjunctivae normal.   Cardiovascular:      Rate and Rhythm: Normal rate and regular rhythm.      Heart sounds: No murmur heard.  Pulmonary:      Effort: Pulmonary effort is normal. No respiratory distress.      Breath sounds: Normal breath sounds.   Abdominal:      Palpations: Abdomen is soft.      Tenderness: " There is no abdominal tenderness.   Musculoskeletal:         General: No swelling.      Cervical back: Neck supple.   Skin:     General: Skin is warm and dry.      Capillary Refill: Capillary refill takes less than 2 seconds.      Findings: Lesion present.      Comments: Skin changes similar to Aks on scalp, back and left upper arm.   Neurological:      Mental Status: He is alert.   Psychiatric:         Mood and Affect: Mood normal.       Administrative Statements   I have spent a total time of 56 minutes in caring for this patient on the day of the visit/encounter including Diagnostic results, Prognosis, Risks and benefits of tx options, Instructions for management, Patient and family education, Importance of tx compliance, Risk factor reductions, Impressions, Counseling / Coordination of care, Documenting in the medical record, Reviewing/placing orders in the medical record (including tests, medications, and/or procedures), and Obtaining or reviewing history  .    Lesion Destruction    Date/Time: 4/28/2025 11:20 AM    Performed by: Nellie Mason DO  Authorized by: Nellie Mason DO  [place] Protocol:  Consent: Verbal consent obtained.  Risks and benefits: risks, benefits and alternatives were discussed  Consent given by: patient  Patient identity confirmed: verbally with patient    Procedure Details - Lesion Destruction:     Number of Lesions:  6  Lesion 1:     Body area:  Head/neck    Head/neck location:  Scalp    Malignancy: pre-malignant lesion      Destruction method: cryotherapy    Lesion 2:     Body area:  Head/neck    Head/neck location:  Scalp    Malignancy: pre-malignant lesion      Destruction method: cryotherapy    Lesion 3:     Body area:  Head/neck    Head/neck location:  Scalp    Malignancy: pre-malignant lesion      Destruction method: cryotherapy    Lesion 4:     Body area:  Trunk    Trunk location:  Back    Malignancy: pre-malignant lesion      Destruction method: cryotherapy       Lesion 7 Body  Area: Left upper arm  Initial Size (mm):   Final Defect size (mm):   Malignancy type:   Skin cancer type:   Destruction Method:   Cosmetic:

## 2025-04-28 NOTE — ASSESSMENT & PLAN NOTE
3 on head, 7 on back, 1 on R upper arm.  Cryo'd today. Toleratd well. Repeat 6 weeks.  Orders:  •  Lesion Destruction

## 2025-05-13 ENCOUNTER — OFFICE VISIT (OUTPATIENT)
Dept: RADIATION ONCOLOGY | Facility: CLINIC | Age: 80
End: 2025-05-13
Attending: INTERNAL MEDICINE
Payer: MEDICARE

## 2025-05-13 VITALS
WEIGHT: 179 LBS | HEART RATE: 58 BPM | SYSTOLIC BLOOD PRESSURE: 128 MMHG | BODY MASS INDEX: 27.13 KG/M2 | OXYGEN SATURATION: 98 % | RESPIRATION RATE: 17 BRPM | TEMPERATURE: 97 F | DIASTOLIC BLOOD PRESSURE: 74 MMHG | HEIGHT: 68 IN

## 2025-05-13 DIAGNOSIS — C61 PROSTATE CANCER (HCC): Primary | ICD-10-CM

## 2025-05-13 PROCEDURE — 99211 OFF/OP EST MAY X REQ PHY/QHP: CPT | Performed by: INTERNAL MEDICINE

## 2025-05-13 PROCEDURE — 99213 OFFICE O/P EST LOW 20 MIN: CPT | Performed by: INTERNAL MEDICINE

## 2025-05-13 NOTE — PROGRESS NOTES
Cruz Patricio Jr. 1945 is a 80 y.o. male With history of [place] (4+3) 7 prostate adenocarcinoma. He completed short term ADT, and radiation on 8/28/24. Last seen via telemedicine 10/23/24. Returns for follow up.     PSA   Latest Ref Rng 0.000 - 4.000 ng/mL   7/27/2024 5.145 (H)    10/19/2024 0.450    3/4/2025 0.757         1/6/25   Continue to follow with urology and Radiation Oncology.     2/15/25 ED visit  Urinary tract infection, he was hypertensive. Given Cipro.     4/7/25    PSA elevated to 0.757, due to completing hormonal therapy. Doing good. PSA and follow up in 6 months. Cystoscopy negative for bladder tumor. Urine shows infection, will obtain urine culture than treat.       Upcoming   9/15/25   10/14/25 Urology      Follow up visit     Oncology History   Prostate cancer (HCC)   3/22/2024 Initial Diagnosis    Prostate cancer (HCC)     3/22/2024 Biopsy    A. Prostate, left trans. zone:  - Benign prostate glands and stroma with focal acute inflammation.     B. Prostate, right trans. zone:  - Benign prostate glands and stroma with acute and chronic inflammation.      C. Prostate, left post. base:  - Benign prostate glands and stroma.      D. Prostate, right post. base:  - Benign prostate glands and stroma.      E. Prostate, RIGHT POSTERIOR MEDIAL:  - Benign prostate glands and stroma.      F. Prostate, LEFT POSTERIOR MEDIAL:  - Benign prostate glands and stroma.      G. Prostate, LEFT POSTERIOR LATERAL:  - Benign prostate glands and stroma.      H. Prostate, RIGHT ANTERIOR MEDIAL:  - Benign prostate glands and stroma.      I. Prostate, RIGHT ANTERIOR LATERAL:  - Atypical small acinar proliferation (ASAP), suspicious but not diagnostic for malignancy.     J. Prostate, RIGHT POSTERIOR LATERAL:  - Prostatic adenocarcinoma, [place] grade 4+3 = 7 (70% pattern 4 and 30% pattern 3, Grade group 3), involving one of two cores, 70% of the involved core  and 30% of the involved  tissue.  - Perineural invasion is absent.     Comment: Foci of cribriform glands are present with the pattern 4 component.  This feature has been associated with adverse clinical outcomes and molecular features typically seen in advanced disease.  (The 2019 Genitourinary Pathology Society (GUPS) White Paper on Contemporary Grading of Prostate Cancer. Arch Pathol Lab Med. 2021 Apr 1;145(4):461-493.)     Immunohistochemistry for prostate triple stain multiplex (,P63,P504s) was performed on blocks J2, supporting the above diagnosis.     Intradepartmental consultation is in agreement.     K. Prostate, LEFT ANTERIOR MEDIAL:  - Benign prostate glands and stroma.       L. Prostate, LEFT ANTERIOR LATERAL:  - Benign prostate stroma.       M. Prostate, ZARIA right post. lat. apex:  - Benign prostate glands and stroma.          4/26/2024 -  Cancer Staged    Staging form: Prostate, AJCC 8th Edition  - Clinical stage from 4/26/2024: Stage IIC (cT1c, cN0, cM0, PSA: 7, Grade Group: 3) - Signed by Dion Gibbs MD on 4/26/2024  Prostate specific antigen (PSA) range: Less than 10  [place] score: 7  Histologic grading system: 5 grade system       7/22/2024 - 8/28/2024 Radiation        Plan ID Energy Fractions Dose per Fraction (cGy) Dose Correction (cGy) Total Dose Delivered (cGy) Elapsed Days   PROSTATE pSV 6X-FFF 28 / 28 250 0 7,000 37      Treatment dates:  C1: 7/22/2024 - 8/28/2024             Review of Systems:  Review of Systems   Constitutional: Negative.    Respiratory: Negative.     Gastrointestinal: Negative.    Genitourinary:         Intermittent incontinence   Neurological: Negative.    Psychiatric/Behavioral: Negative.         Clinical Trial: no    IPSS Questionnaire (AUA-7):  Over the past month…    1)  How often have you had a sensation of not emptying your bladder completely after you finish urinating?  0 - Not at all   2)  How often have you had to urinate again less than two hours after you finished urinating?  0 - Not at all   3)  How often have you found you stopped and started again several times when you urinated?  0 - Not at all   4) How difficult have you found it to postpone urination?  0 - Not at all   5) How often have you had a weak urinary stream?  0 - Not at all   6) How often have you had to push or strain to begin urination?  0 - Not at all   7) How many times did you most typically get up to urinate from the time you went to bed until the time you got up in the morning?  3 - 3 times   Total Score:  3         Health Maintenance   Topic Date Due    RSV Vaccine for Pregnant Patients and Patients Age 60+ Years (1 - 1-dose 75+ series) Never done    COVID-19 Vaccine (7 - 2024-25 season) 09/01/2024    BMI: Followup Plan  12/08/2024    Medicare Annual Wellness Visit (AWV)  11/21/2025    Fall Risk  02/27/2026    BMI: Adult  04/28/2026    Depression Screening  05/13/2026    Hepatitis C Screening  Completed    Zoster Vaccine  Completed    Pneumococcal Vaccine: 65+ Years  Completed    Influenza Vaccine  Completed    Meningococcal B Vaccine  Aged Out    RSV Vaccine age 0-20 Months  Aged Out    HIB Vaccine  Aged Out    IPV Vaccine  Aged Out    Hepatitis A Vaccine  Aged Out    Meningococcal ACWY Vaccine  Aged Out    HPV Vaccine  Aged Out    Colorectal Cancer Screening  Discontinued     Patient Active Problem List   Diagnosis    Essential hypertension    Chronic coronary artery disease    Male erectile disorder    Hyperlipidemia    Primary osteoarthritis of right hip    Lung nodule seen on imaging study    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)    Atrial fibrillation (HCC)    Prediabetes    Basal cell carcinoma    Liver cyst    Adverse reaction to COVID-19 vaccine    Prostate cancer (HCC)    Cellulitis of right lower extremity    Postural dizziness with presyncope    Hyponatremia    Leukocytosis    Dry lips    Fall    Abnormal echocardiogram    Exposure to potentially  hazardous substance    Sensorineural hearing loss, bilateral    Acute pain of right knee    Localized osteoarthritis of right knee    AK (actinic keratosis)     Past Medical History:   Diagnosis Date    A-fib (HCC)     Acute MI (HCC) 06/2002    Arthritis     Atypical chest pain 03/05/2008    Basal cell carcinoma 2009    Coronary artery disease     Diverticulosis 2008    Hematuria, microscopic     History of varicose veins 2008    Hyperlipidemia     Hypertension     Myocardial infarction (HCC)     Nocturia     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     Prostate cancer (HCC) 3/22/2024    Urinary tract infection     Wears glasses      Past Surgical History:   Procedure Laterality Date    COLONOSCOPY      CORONARY ANGIOPLASTY      right CA x 3 vessels: redo PTCA-LAD 10/04    CORONARY STENT PLACEMENT  01/01/2002    INSERTION OF FIDUCIAL MARKER (TRANSRECTAL ULTRASOUND GUIDANCE) N/A 6/27/2024    Procedure: INSERTION OF FIDUCIAL MARKER , SPACEOAR;  Surgeon: Jaret Burks MD;  Location: [facility];  Service: Urology    JOINT REPLACEMENT      R hip    FL BX PROSTATE STRTCTC SATURATION SAMPLING IMG GID N/A 3/22/2024    Procedure: TRANSPERINEAL MRI FUSION BIOPSY PROSTATE;  Surgeon: Gabino Lopez MD;  Location: [facility];  Service: Urology    FL COLONOSCOPY FLX DX W/COLLJ SPEC WHEN PFRMD N/A 06/27/2017    Procedure: COLONOSCOPY;  Surgeon: Scott Last MD;  Location: [facility];  Service: Colorectal    TOTAL HIP ARTHROPLASTY Right     TOTAL HIP ARTHROPLASTY Left 04/30/2019    WISDOM TOOTH EXTRACTION       Family History   Problem Relation Age of Onset    Aneurysm Father         post op AAA repair    Hypertension Father     Hypertension Sister     Hypertension Brother     Cancer Brother         Agent Orange    Hypertension Mother     Diabetes Mother      Social History     Socioeconomic History    Marital status: /Civil Union     Spouse name: Not on file    Number of children: Not on file    Years  of education: Not on file    Highest education level: Not on file   Occupational History    Not on file   Tobacco Use    Smoking status: Former     Current packs/day: 0.00     Average packs/day: 0.5 packs/day for 5.0 years (2.5 ttl pk-yrs)     Types: Cigarettes     Start date: 1967     Quit date: 1970     Years since quittin.4     Passive exposure: Past    Smokeless tobacco: Never    Tobacco comments:     quit ...0.5 packs/2.00 pack years   Vaping Use    Vaping status: Never Used   Substance and Sexual Activity    Alcohol use: No    Drug use: No    Sexual activity: Yes     Partners: Female     Birth control/protection: None   Other Topics Concern    Not on file   Social History Narrative    Not on file     Social Drivers of Health     Financial Resource Strain: Low Risk  (2023)    Overall Financial Resource Strain (CARDIA)     Difficulty of Paying Living Expenses: Not hard at all   Food Insecurity: No Food Insecurity (2024)    Hunger Vital Sign     Worried About Running Out of Food in the Last Year: Never true     Ran Out of Food in the Last Year: Never true   Transportation Needs: No Transportation Needs (2024)    PRAPARE - Transportation     Lack of Transportation (Medical): No     Lack of Transportation (Non-Medical): No   Physical Activity: Not on file   Stress: Not on file   Social Connections: Not on file   Intimate Partner Violence: Not on file   Housing Stability: Unknown (2024)    Housing Stability Vital Sign     Unable to Pay for Housing in the Last Year: No     Number of Times Moved in the Last Year: Not on file     Homeless in the Last Year: No       Current Outpatient Medications:     ascorbic acid (VITAMIN C) 1000 MG tablet, Take 1,000 mg by mouth daily, Disp: , Rfl:     aspirin (ECOTRIN LOW STRENGTH) 81 mg EC tablet, Take 81 mg by mouth daily Twice a week, Disp: , Rfl:     atenolol (TENORMIN) 100 mg tablet, TAKE 1 TABLET BY MOUTH EVERY DAY, Disp: 90 tablet,  "Rfl: 3    B Complex Vitamins (B COMPLEX 100 PO), Take by mouth, Disp: , Rfl:     benazepril (LOTENSIN) 20 mg tablet, TAKE 1 TABLET BY MOUTH EVERY DAY, Disp: 90 tablet, Rfl: 1    calcium carbonate (OS-LUCA) 600 MG tablet, Take 600 mg by mouth daily, Disp: , Rfl:     Coenzyme Q10 (CoQ-10) 200 MG CAPS, , Disp: , Rfl:     magnesium oxide (MAG-OX) 400 mg tablet, Take 400 mg by mouth daily, Disp: , Rfl:     multivitamin (THERAGRAN) TABS, Take 1 tablet by mouth, Disp: , Rfl:     Omega-3 Fatty Acids (FISH OIL PO), Take by mouth daily , Disp: , Rfl:     rosuvastatin (CRESTOR) 20 MG tablet, Take 1 tablet (20 mg total) by mouth daily, Disp: 90 tablet, Rfl: 1    sildenafil (VIAGRA) 50 MG tablet, Take 1 tablet (50 mg total) by mouth daily as needed for erectile dysfunction, Disp: 20 tablet, Rfl: 6    tamsulosin (FLOMAX) 0.4 mg, Take 2 capsules (0.8 mg total) by mouth daily with dinner, Disp: 90 capsule, Rfl: 3    rivaroxaban (Xarelto) 20 mg tablet, Take 1 tablet (20 mg total) by mouth daily with breakfast, Disp: 90 tablet, Rfl: 3  Allergies   Allergen Reactions    Ciprofloxacin Swelling    No Active Allergies     Orgovyx [Relugolix] Confusion     LH, pre-syncope     Vitals:    05/13/25 1116   BP: 128/74   BP Location: Left arm   Patient Position: Sitting   Cuff Size: Standard   Pulse: 58   Resp: 17   Temp: (!) 97 °F (36.1 °C)   TempSrc: Temporal   SpO2: 98%   Weight: 81.2 kg (179 lb)   Height: 5' 8\" (1.727 m)      Pain Score: 0-No pain  "

## 2025-05-13 NOTE — ASSESSMENT & PLAN NOTE
Cruz Patricio is a 80 y.o..male with cardiac comorbidities and bilateral hip implants with unfavorable intermediate risk adenocarcinoma of the prostate ([id number], [place] 4+3, PSA 7.0, with 4% cores involved).  He underwent short-term ADT and definitive radiation therapy completing on 8/28/2024.     He presents for routine f/u 9 months after completion of radiation therapy. He has no late toxicity of treatment. His PSA remains low at 0.757. Reviewed continued PSA surveillance.  9/15/25   10/14/25 Urology  RTC in 1 year.

## 2025-05-13 NOTE — PROGRESS NOTES
Follow-up Visit   Name: Cruz Patricio Jr.      : 1945      MRN: [id number]  Encounter Provider: Dion Gibbs MD  Encounter Date: 2025   Encounter department: [facility] RADIATION ONCOLOGY  :  Assessment & Plan  Prostate cancer (HCC)  Cruz Patricio is a 80 y.o..male with cardiac comorbidities and bilateral hip implants with unfavorable intermediate risk adenocarcinoma of the prostate ([id number], [place] 4+3, PSA 7.0, with 4% cores involved).  He underwent short-term ADT and definitive radiation therapy completing on 2024.     He presents for routine f/u 9 months after completion of radiation therapy. He has no late toxicity of treatment. His PSA remains low at 0.757. Reviewed continued PSA surveillance.  9/15/25   10/14/25 Urology  RTC in 1 year.           History of Present Illness   Chief Complaint   Patient presents with    Follow-up     Radiation therapy   Pertinent Medical History   Cruz Patricio Jr. 1945 is a 80 y.o. male With history of [place] (4+3) 7 prostate adenocarcinoma. He completed short term ADT, and radiation on 24. Last seen via telemedicine 10/23/24. Returns for follow up.       PSA   Latest Ref Rng 0.000 - 4.000 ng/mL   2024 5.145 (H)    10/19/2024 0.450    3/4/2025 0.757          25   Continue to follow with urology and Radiation Oncology.      2/15/25 ED visit  Urinary tract infection, he was hypertensive. Given Cipro.      25    PSA elevated to 0.757, due to completing hormonal therapy. Doing good. PSA and follow up in 6 months. Cystoscopy negative for bladder tumor. Urine shows infection, will obtain urine culture than treat.         Upcoming   9/15/25   10/14/25 Urology    He feels well overall. He is on Flomax 0.8mg QHS.  His IPSS score is a 3.   Oncology History   Cancer Staging   Prostate cancer (HCC)  Staging form: Prostate, AJCC 8th Edition  - Clinical stage from 2024: Stage IIC  (cT1c, cN0, cM0, PSA: 7, Grade Group: 3) - Signed by Dion Gibbs MD on 4/26/2024  Prostate specific antigen (PSA) range: Less than 10  [place] score: 7  Histologic grading system: 5 grade system  Oncology History   Prostate cancer (HCC)   3/22/2024 Initial Diagnosis    Prostate cancer (HCC)     3/22/2024 Biopsy    A. Prostate, left trans. zone:  - Benign prostate glands and stroma with focal acute inflammation.     B. Prostate, right trans. zone:  - Benign prostate glands and stroma with acute and chronic inflammation.      C. Prostate, left post. base:  - Benign prostate glands and stroma.      D. Prostate, right post. base:  - Benign prostate glands and stroma.      E. Prostate, RIGHT POSTERIOR MEDIAL:  - Benign prostate glands and stroma.      F. Prostate, LEFT POSTERIOR MEDIAL:  - Benign prostate glands and stroma.      G. Prostate, LEFT POSTERIOR LATERAL:  - Benign prostate glands and stroma.      H. Prostate, RIGHT ANTERIOR MEDIAL:  - Benign prostate glands and stroma.      I. Prostate, RIGHT ANTERIOR LATERAL:  - Atypical small acinar proliferation (ASAP), suspicious but not diagnostic for malignancy.     J. Prostate, RIGHT POSTERIOR LATERAL:  - Prostatic adenocarcinoma, [place] grade 4+3 = 7 (70% pattern 4 and 30% pattern 3, Grade group 3), involving one of two cores, 70% of the involved core  and 30% of the involved tissue.  - Perineural invasion is absent.     Comment: Foci of cribriform glands are present with the pattern 4 component.  This feature has been associated with adverse clinical outcomes and molecular features typically seen in advanced disease.  (The 2019 Genitourinary Pathology Society (GUPS) White Paper on Contemporary Grading of Prostate Cancer. Arch Pathol Lab Med. 2021 Apr 1;145(4):461-493.)     Immunohistochemistry for prostate triple stain multiplex (,P63,P504s) was performed on blocks J2, supporting the above diagnosis.     Intradepartmental consultation is in agreement.    "  K. Prostate, LEFT ANTERIOR MEDIAL:  - Benign prostate glands and stroma.       L. Prostate, LEFT ANTERIOR LATERAL:  - Benign prostate stroma.       M. Prostate, ZARIA right post. lat. apex:  - Benign prostate glands and stroma.          4/26/2024 -  Cancer Staged    Staging form: Prostate, AJCC 8th Edition  - Clinical stage from 4/26/2024: Stage IIC (cT1c, cN0, cM0, PSA: 7, Grade Group: 3) - Signed by Dion Gibbs MD on 4/26/2024  Prostate specific antigen (PSA) range: Less than 10  [place] score: 7  Histologic grading system: 5 grade system       7/22/2024 - 8/28/2024 Radiation        Plan ID Energy Fractions Dose per Fraction (cGy) Dose Correction (cGy) Total Dose Delivered (cGy) Elapsed Days   PROSTATE pSV 6X-FFF 28 / 28 250 0 7,000 37      Treatment dates:  C1: 7/22/2024 - 8/28/2024            Review of Systems Refer to nursing note.          Objective   /74 (BP Location: Left arm, Patient Position: Sitting, Cuff Size: Standard)   Pulse 58   Temp (!) 97 °F (36.1 °C) (Temporal)   Resp 17   Ht 5' 8\" (1.727 m)   Wt 81.2 kg (179 lb)   SpO2 98%   BMI 27.22 kg/m²     Pain Screening:  Pain Score: 0-No pain  ECOG    Physical Exam   General Appearance:  Alert, cooperative, no distress, appears stated age  Cardiovascular:  No cyanosis  Lungs: Respirations unlabored, able to speak in complete sentences without dyspnea.  Abdomen: Non-distended  Extremities: No edema  Skin: No generalized rash or dermatitis  Neurologic: ANOx3, speech and cognition intact.    Administrative Statements   I have spent a total time of 20 minutes in caring for this patient on the day of the visit/encounter including Counseling / Coordination of care, Documenting in the medical record, Reviewing/placing orders in the medical record (including tests, medications, and/or procedures), and Obtaining or reviewing history  .  Portions of the record may have been created with voice recognition software.  Occasional wrong word or \"sound a " "like\" substitutions may have occurred due to the inherent limitations of voice recognition software.  Read the chart carefully and recognize, using context, where substitutions have occurred.  "

## 2025-06-06 ENCOUNTER — PROCEDURE VISIT (OUTPATIENT)
Dept: DERMATOLOGY | Facility: CLINIC | Age: 80
End: 2025-06-06
Payer: MEDICARE

## 2025-06-06 VITALS
HEART RATE: 68 BPM | OXYGEN SATURATION: 97 % | HEIGHT: 68 IN | BODY MASS INDEX: 27.1 KG/M2 | DIASTOLIC BLOOD PRESSURE: 68 MMHG | SYSTOLIC BLOOD PRESSURE: 122 MMHG | WEIGHT: 178.8 LBS | TEMPERATURE: 98.5 F

## 2025-06-06 DIAGNOSIS — C44.319 BASAL CELL CARCINOMA OF RIGHT TEMPLE REGION: Primary | ICD-10-CM

## 2025-06-06 PROCEDURE — 17312 MOHS ADDL STAGE: CPT | Performed by: DERMATOLOGY

## 2025-06-06 PROCEDURE — 17311 MOHS 1 STAGE H/N/HF/G: CPT | Performed by: DERMATOLOGY

## 2025-06-06 PROCEDURE — 12013 RPR F/E/E/N/L/M 2.6-5.0 CM: CPT | Performed by: DERMATOLOGY

## 2025-06-06 RX ORDER — CEPHALEXIN 500 MG/1
500 CAPSULE ORAL
COMMUNITY
Start: 2025-05-28

## 2025-06-06 NOTE — PROGRESS NOTES
Mohs Procedure Note    Patient: Cruz Patricio Jr.  : 1945  MRN: [id number]  Date: 2025    History of Present Illness: The patient is a 80 y.o. male who presents with complaints of Basal cell carcinoma of the right temple.     Past Medical History[1]    Past Surgical History[2]    Current Medications[3]    Allergies[4]    Physical Exam:   Vitals:    25 1031   BP: 122/68   Pulse: 68   Temp: 98.5 °F (36.9 °C)   SpO2: 97%       Skin: 1.6 cm x 1.1 cm pink pearly plaque    Assessment: Biopsy confirmed basal cell carcinoma infiltrative and nodular types of the right temple.     Plan: Mohs    Mohs Procedure Timeout      Flowsheet Row Most Recent Value   Timeout:    Patient Identity Verified: Yes   Correct Site Verified: Yes   Correct Procedure Verified: Yes            Mohs Diagnosis/Indication/Location/ID      Flowsheet Row Most Recent Value   Pathology Type Basal cell carcinoma   Anatomic Site right temple   Indications for Mohs tumor location   Mohs ID [id number]            Mohs Site/Accession/Pre-Post      Flowsheet Row Most Recent Value   Original Site Identified (as submitted by referring clinician) Photo, Referral   Biopsy Accession/Specimen # (as submitted by referring clincian) [id number]   Pre-Mohs Size Length (cm) 1.6   Pre-Mohs Size Width (cm): 1.1   Post-Mohs Size-Length (cm) 3   Post Mohs Size-Width (cm) 2.6   Repair Type Purse string closure   Suture Type Vicryl   Vicryl Suture Size 3   Anesthetic Used 1% Lidocaine with epinephrine  [17.5 mL]            Mohs Tumor Stage 1 Information      Flowsheet Row Most Recent Value   Tissue Sections (blocks) 1   Microscopic Exam Section 1: -infiltrative basal cell carcinoma in the subcutaneous fat   Tumor Clear After Stage I? No            Mohs Tumor Stage 2 Information      Flowsheet Row Most Recent Value   Tissue Sections (blocks) 1   Microscopic Exam Section 1: Infiltrative basal cell carcinoma fascia   Tumor Clear After Stage II? No            Mohs  Tumor Stage 3 Information      Flowsheet Row Most Recent Value   Tissue Sections (blocks) 1   Microscopic Exam Section 1: No tumor identified in section.   Tumor Clear After Stage III? Yes                      Patient identified, procedure verified, site identified and verified. Time out completed. Surgical removal of the lesion discussed with the patient (risks and benefits, including possibility of scarring, infection, recurrence or potential for further treatment).    I have specifically identified the site with the patient. I have discussed the fact that the patient will have a scar after the procedure regardless of granulation or repair with sutures. I have discussed that the repair options can range from granulation in some cases to linear or curvilinear closures to larger flaps or grafts.  There are sometimes flaps or grafts used that require multiples stages of surgery and will not be completed today, rather be completed over a series of appointments. I have discussed that occasionally due to location, size or depth of the lesion I may recommend consultation with and transfer of care for further removal or the reconstruction to another provider such as ophthalmology surgery, plastic surgery, ENT surgery, or surgical oncology. There are cases in which other testing such as imaging with MRI or CT scan or testing of lymph nodes is recommended because of the nature/depth/location of tumor seen during the removal. There is a risk of injury to nerves causing temporary or permanent numbness or the inability to move muscles full such as the inability to lift eyebrows. Questions answered and verbal and written consent was obtained.    The tumor qualifies for Mohs based on AUC criteria. Dr. Hammond served as the surgeon and pathologist during the procedure.    Stage I:  Mohs surgery was performed in the first stage. A 2mm margin was marked around the visible and palpable tumor and biopsy scar. Excision of the  residual wound resulted in 1 gross section(s). Hemostasis was obtained by spot electrocoagulation and a pressure dressing was placed. The patient tolerated the procedure well and no complications were noted.  Adjacent edges of each gross section were color coded and multiple, horizontal, frozen sections were prepared from the undersurface of the gross sections. The total microscopic area was examined and tumor extension was plotted on the anatomic map. Microscopic tumor (basal cell carcinoma, infiltrative) was found persisting at the surgical margins of the specimen(s) at the level of the subcutaneous fat.      Stage II:  The patient was returned to the surgical operatory where the area was reinfiltrated with lidocaine, prepped, and draped. Excision of the tumor extension resulted in 1 gross section(s) which was processed as above. Hemostasis was obtained by spot electrocoagulation and a dressing was placed. The patient tolerated the procedure well and no complications were noted.  The total microscopic area was examined and tumor extension was plotted on the anatomic map.  Microscopic tumor (basal cell carcinoma, infiltrative) was found persisting at the surgical margins of the specimen(s) at the level of the fascia.     Stage III:  The patient was returned to the surgical operatory where the area was reinfiltrated with lidocaine, prepped, and draped.  Excision of the tumor extension resulted in 1 gross section(s) which was processed as above.  Hemostasis was obtained by spot electrocoagulation and a dressing was placed.  The patient tolerated the procedure well and no complications were noted.  The total microscopic area was examined and tumor extension was plotted on the anatomic map.  A tumor free plane was demonstrated and excision of the tumor was complete. The final defect measured 3.0x2.6 cm and extended to the level of the muscle. Because of the size and depth of the defect, the defect was partially  closed.    Procedure: The surgical defect area was infiltrated with 1% lidocaine with 1:100,000 epinephrine. The patient was prepped and draped in the usual manner. Hemostasis was maintained using spot electrocoagulation. The lateral margins of the defect were undermined at the level of the fascia with blunt and sharp scissor dissection so the wound edges could be apposed without significant tension. The wound was closed with pursestring sutures of 3-0 vicryl. The wound was cleansed with sterile saline and dressed with vaseline and a non-adherent pressure bandage. Wound care instructions were given verbally and in writing. The patient was discharged from the dermatology clinic in good condition and will return as indicated for a wound check/suture removal.     The patient tolerated the procedure well.  The wound was dressed with Surgifoam, vaseline, a non-stick pad and a compression dressing.     Postoperative care: Wound care discussed at length and written instructions provided.  I urged the patient to call us if any problems or question should arise.     Complications: none  Post-op medications: none  Patient condition after procedure: stable  Discharge plans: Plan for follow up as planned with us in 4 weeks for wound check.      Scribe Attestation      I,:  Kenyatta Sol MA am acting as a scribe while in the presence of the attending physician.:       I,:  Linda Hammond MD personally performed the services described in this documentation    as scribed in my presence.:                  [1]   Past Medical History:  Diagnosis Date    A-fib (HCC)     Acute MI (HCC) 06/2002    Arthritis     Atypical chest pain 03/05/2008    Basal cell carcinoma 2009    BCC (basal cell carcinoma of skin) 11/21/2024    right temple, mohs    Coronary artery disease     Diverticulosis 2008    Hematuria, microscopic     History of varicose veins 2008    Hyperlipidemia     Hypertension     Myocardial infarction (HCC)     Nocturia      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     Prostate cancer (HCC) 3/22/2024    Urinary tract infection     Wears glasses    [2]   Past Surgical History:  Procedure Laterality Date    COLONOSCOPY      CORONARY ANGIOPLASTY      right CA x 3 vessels: redo PTCA-LAD 10/04    CORONARY STENT PLACEMENT  01/01/2002    INSERTION OF FIDUCIAL MARKER (TRANSRECTAL ULTRASOUND GUIDANCE) N/A 06/27/2024    Procedure: INSERTION OF FIDUCIAL MARKER , SPACEOAR;  Surgeon: Jaret Burks MD;  Location: [facility];  Service: Urology    JOINT REPLACEMENT      R hip    MOHS SURGERY  06/06/2025    [facility] right temple, Dr Hammond    AR BX PROSTATE STRTCTC SATURATION SAMPLING IMG GID N/A 03/22/2024    Procedure: TRANSPERINEAL MRI FUSION BIOPSY PROSTATE;  Surgeon: Gabino Lopez MD;  Location: [facility];  Service: Urology    AR COLONOSCOPY FLX DX W/COLLJ SPEC WHEN PFRMD N/A 06/27/2017    Procedure: COLONOSCOPY;  Surgeon: Scott Last MD;  Location: [facility];  Service: Colorectal    TOTAL HIP ARTHROPLASTY Right     TOTAL HIP ARTHROPLASTY Left 04/30/2019    WISDOM TOOTH EXTRACTION     [3]   Current Outpatient Medications:     ascorbic acid (VITAMIN C) 1000 MG tablet, Take 1,000 mg by mouth in the morning., Disp: , Rfl:     aspirin (ECOTRIN LOW STRENGTH) 81 mg EC tablet, Take 81 mg by mouth in the morning. Twice a week., Disp: , Rfl:     atenolol (TENORMIN) 100 mg tablet, TAKE 1 TABLET BY MOUTH EVERY DAY, Disp: 90 tablet, Rfl: 3    B Complex Vitamins (B COMPLEX 100 PO), Take by mouth, Disp: , Rfl:     benazepril (LOTENSIN) 20 mg tablet, TAKE 1 TABLET BY MOUTH EVERY DAY, Disp: 90 tablet, Rfl: 1    calcium carbonate (OS-LUCA) 600 MG tablet, Take 600 mg by mouth in the morning., Disp: , Rfl:     cephalexin (KEFLEX) 500 mg capsule, Take 500 mg by mouth, Disp: , Rfl:     Coenzyme Q10 (CoQ-10) 200 MG CAPS, , Disp: , Rfl:     magnesium oxide (MAG-OX) 400 mg tablet, Take 400 mg by mouth in the morning., Disp: , Rfl:      multivitamin (THERAGRAN) TABS, Take 1 tablet by mouth, Disp: , Rfl:     Omega-3 Fatty Acids (FISH OIL PO), Take by mouth in the morning., Disp: , Rfl:     rosuvastatin (CRESTOR) 20 MG tablet, Take 1 tablet (20 mg total) by mouth daily, Disp: 90 tablet, Rfl: 1    sildenafil (VIAGRA) 50 MG tablet, Take 1 tablet (50 mg total) by mouth daily as needed for erectile dysfunction, Disp: 20 tablet, Rfl: 6    tamsulosin (FLOMAX) 0.4 mg, Take 2 capsules (0.8 mg total) by mouth daily with dinner, Disp: 90 capsule, Rfl: 3    rivaroxaban (Xarelto) 20 mg tablet, Take 1 tablet (20 mg total) by mouth daily with breakfast, Disp: 90 tablet, Rfl: 3  [4]   Allergies  Allergen Reactions    Ciprofloxacin Swelling    No Active Allergies     Orgovyx [Relugolix] Confusion     LH, pre-syncope

## 2025-06-06 NOTE — PATIENT INSTRUCTIONS
"Mohs Microscopic Surgery After Care    Your wound will heal via secondary intention, which means no additional surgery was performed after removal of your skin cancer. The wound was left open to heal gradually over time using wound care alone.     Wounds left to heal secondarily can take 2-3 months on average to heal.  The healing occurs step by step. You will notice that over the first three weeks the area will drain straw colored fluid that may have some blood within it. This is normal. Over the first three weeks, you form a nice healing base called fibrin that serves as the scaffold or map for the rest of your body's healing process. The fibrin is a thick yellow tissue. People often worry that it is pus given the yellow color. Unlike pus, this is a thick layer that cannot be rubbed off. After this, you should expect the wound to \"fill in\" to the surface of your skin over the course of several weeks. Lastly, a new layer of skin will form over the wound.    Until your body forms a good fibrin base (which takes ~3 weeks) you should avoid immersing the wound in water (such as in baths, whirlpools, swimming pools, hot tubs, lakes and oceans). You however CAN and should wash the area daily, as instructed below.     After healing, the scar will initially be red (and often times a deep red to purple color on the legs) but will gradually fade over the course of 1 year to a round scar lighter than the skin surrounding it.    We advise you follow the wound care as noted below the entire time it takes for the areas to heal completely.    WOUND CARE AFTER YOUR PROCEDURE    Try NOT to remove the pressure bandage for 48 hours. Keep the area clean and dry while this bandage is on.     After removing the bandage for the first time, gently clean the area with soap and water. If the bandage is difficult to remove, getting the bandage wet in the shower will sometimes help soften the adhesive and allow it to be removed more easily. "     You will now need to cleanse this area daily in the shower with gentle soap. There is no need to scrub the area. Apply plain Vaseline ointment (this is over the counter and not a prescription) to the site followed by a clean appropriately sized bandage to area.  You will need to care for the area until it has healed over completely.  Non stick dressing and paper tape (or Hypafix) are recommended for sensitive skin but a bandaid is fine if it covers the area well.    MANAGING YOUR PAIN AFTER SURGERY     You can expect to have some pain after surgery. This is normal. The pain is typically worse the first two days after surgery, and quickly begins to get better.     The best strategy for controlling your pain after surgery is around the clock pain control. You can take over the counter Acetaminophen (Tylenol) for discomfort, if no contraindications.     If you are taking this at the maximum dose, you can alternate this with Motrin (ibuprofen or Advil) as well. Alternating these medications with each other allows you to maximize your pain control. In addition to Tylenol and Motrin, you can use heating pads or ice packs on your incisions to help reduce your pain.     How will I alternate your regular strength over-the-counter pain medication?  You will take a dose of pain medication every three hours.   Start by taking 650 mg of Tylenol (2 pills of 325 mg)   3 hours later take 600 mg of Motrin (3 pills of 200 mg)   3 hours after taking the Motrin take 650 mg of Tylenol   3 hours after that take 600 mg of Motrin.    See example - if your first dose of Tylenol is at 12:00 PM     12:00 PM  Tylenol 650 mg (2 pills of 325 mg)    3:00 PM  Motrin 600 mg (3 pills of 200 mg)    6:00 PM  Tylenol 650 mg (2 pills of 325 mg)    9:00 PM  Motrin 600 mg (3 pills of 200 mg)    Continue alternating every 3 hours      Important:   Do not take more than 4000mg of Tylenol or 3200mg of Motrin in a 24-hour period.     What if I still have  pain?   If you have pain that is not controlled with the over-the-counter pain medications (Tylenol and Motrin or Advil), don't hesitate to call our staff using the number provided. We will help make sure you are managing your pain in the best way possible, and if necessary, we can provide a prescription for additional pain medication.     CALL OUR OFFICE IMMEDIATELY FOR ANY SIGNS OF INFECTION:    This includes fever, chills, increased redness, warmth, tenderness, severe discomfort/pain, or pus or foul smell coming from the wound. If you are experiencing any of the above, please call [facility] Mohs Department directly at [phone].    IF BLEEDING IS NOTICED:    Place a clean cloth over the area and apply firm pressure for thirty minutes.  Check the wound ONLY after 30 minutes of direct pressure; do not cheat and sneak a peak, as that does not count.  If bleeding persists after 30 minutes of legitimate direct pressure, then try one more round of direct pressure to the area.  Should the bleeding become heavier or not stop after the second attempt, call [facility] directly at [phone]. Your call will get routed to the dermatology surgeon on call even after hours.

## 2025-06-13 ENCOUNTER — OFFICE VISIT (OUTPATIENT)
Dept: FAMILY MEDICINE CLINIC | Facility: CLINIC | Age: 80
End: 2025-06-13
Payer: MEDICARE

## 2025-06-13 VITALS
SYSTOLIC BLOOD PRESSURE: 122 MMHG | TEMPERATURE: 97.7 F | WEIGHT: 178.4 LBS | BODY MASS INDEX: 27.04 KG/M2 | OXYGEN SATURATION: 96 % | HEIGHT: 68 IN | HEART RATE: 57 BPM | DIASTOLIC BLOOD PRESSURE: 80 MMHG

## 2025-06-13 DIAGNOSIS — C61 PROSTATE CANCER (HCC): ICD-10-CM

## 2025-06-13 DIAGNOSIS — C44.519 BASAL CELL CARCINOMA (BCC) OF SKIN OF OTHER PART OF TORSO: Primary | Chronic | ICD-10-CM

## 2025-06-13 PROCEDURE — G2211 COMPLEX E/M VISIT ADD ON: HCPCS | Performed by: FAMILY MEDICINE

## 2025-06-13 PROCEDURE — 99213 OFFICE O/P EST LOW 20 MIN: CPT | Performed by: FAMILY MEDICINE

## 2025-06-13 NOTE — PROGRESS NOTES
"Name: Cruz Patricio Jr.      : 1945      MRN: [id number]  Encounter Provider: Nellie Mason DO  Encounter Date: 2025   Encounter department: [facility]  :  Assessment & Plan  Basal cell carcinoma (BCC) of skin of other part of torso  S/p mohs via derm, healing well. Seeing them next month    Gave gauze pads.       Prostate cancer (HCC)  Stable psa. Follows w/ uro/onc              History of Present Illness   Cryotherapy return 6 weeks. No nitrogen today, next week reschedule.    Mohs surgery went well      Review of Systems   Constitutional:  Negative for chills and fever.   HENT:  Negative for ear pain and sore throat.    Eyes:  Negative for pain and visual disturbance.   Respiratory:  Negative for cough and shortness of breath.    Cardiovascular:  Negative for chest pain and palpitations.   Gastrointestinal:  Negative for abdominal pain and vomiting.   Genitourinary:  Negative for dysuria and hematuria.   Musculoskeletal:  Negative for arthralgias and back pain.   Skin:  Positive for wound. Negative for color change and rash.   Neurological:  Negative for seizures and syncope.   All other systems reviewed and are negative.      Objective   /80 (BP Location: Left arm, Patient Position: Sitting, Cuff Size: Adult)   Pulse 57   Temp 97.7 °F (36.5 °C) (Temporal)   Ht 5' 8\" (1.727 m)   Wt 80.9 kg (178 lb 6.4 oz)   SpO2 96%   BMI 27.13 kg/m²      Physical Exam  Vitals reviewed.   Constitutional:       General: He is not in acute distress.     Appearance: He is well-developed.   HENT:      Head: Normocephalic and atraumatic.     Eyes:      Conjunctiva/sclera: Conjunctivae normal.       Cardiovascular:      Rate and Rhythm: Normal rate and regular rhythm.      Heart sounds: No murmur heard.  Pulmonary:      Effort: Pulmonary effort is normal. No respiratory distress.      Breath sounds: Normal breath sounds.   Abdominal:      Palpations: Abdomen is soft.      " Tenderness: There is no abdominal tenderness.     Musculoskeletal:         General: No swelling.      Cervical back: Neck supple.     Skin:     General: Skin is warm and dry.      Capillary Refill: Capillary refill takes less than 2 seconds.      Findings: Lesion present.      Comments: Skin changes similar to Aks on scalp, back and left upper arm.     Neurological:      Mental Status: He is alert.     Psychiatric:         Mood and Affect: Mood normal.       Administrative Statements   I have spent a total time of 25 minutes in caring for this patient on the day of the visit/encounter including Diagnostic results, Prognosis, Risks and benefits of tx options, Instructions for management, Patient and family education, Importance of tx compliance, Risk factor reductions, Impressions, Counseling / Coordination of care, Documenting in the medical record, Reviewing/placing orders in the medical record (including tests, medications, and/or procedures), and Obtaining or reviewing history  .

## 2025-06-20 ENCOUNTER — OFFICE VISIT (OUTPATIENT)
Dept: FAMILY MEDICINE CLINIC | Facility: CLINIC | Age: 80
End: 2025-06-20

## 2025-06-20 VITALS
HEIGHT: 68 IN | DIASTOLIC BLOOD PRESSURE: 60 MMHG | SYSTOLIC BLOOD PRESSURE: 98 MMHG | OXYGEN SATURATION: 97 % | HEART RATE: 56 BPM | WEIGHT: 179.2 LBS | TEMPERATURE: 98 F | BODY MASS INDEX: 27.16 KG/M2

## 2025-06-20 DIAGNOSIS — C44.519 BASAL CELL CARCINOMA (BCC) OF SKIN OF OTHER PART OF TORSO: Primary | Chronic | ICD-10-CM

## 2025-06-20 DIAGNOSIS — L57.0 AK (ACTINIC KERATOSIS): ICD-10-CM

## 2025-06-20 NOTE — PROGRESS NOTES
"Name: Cruz Patricio Jr.      : 1945      MRN: [id number]  Encounter Provider: Nellie Mason DO  Encounter Date: 2025   Encounter department: [facility]  :  Assessment & Plan  Basal cell carcinoma (BCC) of skin of other part of torso  Sees derm, has Mohs done of face, R temporal, bandaged and healing through secondary intention.       AK (actinic keratosis)  Repeat cryo, 7 lesions on head, 4 on back, 5 on L lower arm.  Toleratd well.  Orders:  •  Lesion Destruction           History of Present Illness   Cryo 7 on head, 4 on back, 5 on L arm    No other concerns.      Review of Systems   Constitutional:  Negative for chills and fever.   HENT:  Negative for ear pain and sore throat.    Eyes:  Negative for pain and visual disturbance.   Respiratory:  Negative for cough and shortness of breath.    Cardiovascular:  Negative for chest pain and palpitations.   Gastrointestinal:  Negative for abdominal pain and vomiting.   Genitourinary:  Negative for dysuria and hematuria.   Musculoskeletal:  Negative for arthralgias and back pain.   Skin:  Positive for rash. Negative for color change.   Neurological:  Negative for seizures and syncope.   All other systems reviewed and are negative.      Objective   BP 98/60 (BP Location: Left arm, Patient Position: Sitting, Cuff Size: Adult)   Pulse 56   Temp 98 °F (36.7 °C) (Temporal)   Ht 5' 8\" (1.727 m)   Wt 81.3 kg (179 lb 3.2 oz)   SpO2 97%   BMI 27.25 kg/m²      Physical Exam  Vitals reviewed.   Constitutional:       General: He is not in acute distress.     Appearance: Normal appearance. He is well-developed.   HENT:      Head: Normocephalic and atraumatic.     Eyes:      Conjunctiva/sclera: Conjunctivae normal.       Cardiovascular:      Rate and Rhythm: Normal rate and regular rhythm.      Pulses: Normal pulses.      Heart sounds: Normal heart sounds. No murmur heard.  Pulmonary:      Effort: Pulmonary effort is normal. No " respiratory distress.      Breath sounds: Normal breath sounds.   Abdominal:      Palpations: Abdomen is soft.      Tenderness: There is no abdominal tenderness.     Musculoskeletal:         General: No swelling.      Cervical back: Neck supple.     Skin:     General: Skin is warm and dry.      Capillary Refill: Capillary refill takes less than 2 seconds.      Findings: Rash present.     Neurological:      Mental Status: He is alert.     Psychiatric:         Mood and Affect: Mood normal.       Administrative Statements   I have spent a total time of 45 minutes in caring for this patient on the day of the visit/encounter including Diagnostic results, Prognosis, Risks and benefits of tx options, Instructions for management, Patient and family education, Importance of tx compliance, Risk factor reductions, Impressions, Counseling / Coordination of care, Documenting in the medical record, Reviewing/placing orders in the medical record (including tests, medications, and/or procedures), and Obtaining or reviewing history  .    Lesion Destruction    Date/Time: 6/20/2025 12:20 PM    Performed by: Nellie Mason DO  Authorized by: Nellie Mason DO    Universal Protocol:  Consent: Verbal consent obtained  Risks and benefits: risks, benefits and alternatives were discussed  Consent given by: patient  Patient identity confirmed: verbally with patient    Procedure Details - Lesion Destruction:     Number of Lesions:  6  Lesion 1:     Body area:  Head/neck    Head/neck location:  Scalp    Malignancy: pre-malignant lesion      Destruction method: cryotherapy    Lesion 2:     Body area:  Head/neck    Head/neck location:  Scalp    Malignancy: pre-malignant lesion      Destruction method: cryotherapy    Lesion 3:     Body area:  Head/neck    Head/neck location:  Scalp    Malignancy: pre-malignant lesion      Destruction method: cryotherapy    Lesion 4:     Body area:  Trunk    Trunk location:  Back    Malignancy: pre-malignant lesion       Destruction method: cryotherapy       Lesion 7 Body Area: Left upper arm  Initial Size (mm):   Final Defect size (mm):   Malignancy type:   Skin cancer type:   Destruction Method:   Cosmetic:

## 2025-06-20 NOTE — ASSESSMENT & PLAN NOTE
Sees derm, has Mohs done of face, R temporal, bandaged and healing through secondary intention.

## 2025-06-20 NOTE — ASSESSMENT & PLAN NOTE
Repeat cryo, 7 lesions on head, 4 on back, 5 on L lower arm.  Toleratd well.  Orders:  •  Lesion Destruction

## 2025-06-25 DIAGNOSIS — E78.49 OTHER HYPERLIPIDEMIA: ICD-10-CM

## 2025-06-26 RX ORDER — ROSUVASTATIN CALCIUM 20 MG/1
20 TABLET, COATED ORAL DAILY
Qty: 90 TABLET | Refills: 1 | Status: SHIPPED | OUTPATIENT
Start: 2025-06-26

## 2025-06-27 ENCOUNTER — APPOINTMENT (OUTPATIENT)
Dept: LAB | Facility: CLINIC | Age: 80
End: 2025-06-27
Attending: INTERNAL MEDICINE
Payer: MEDICARE

## 2025-06-27 DIAGNOSIS — C61 PROSTATE CANCER (HCC): ICD-10-CM

## 2025-06-27 LAB — PSA SERPL-MCNC: 0.69 NG/ML (ref 0–4)

## 2025-06-27 PROCEDURE — 84153 ASSAY OF PSA TOTAL: CPT

## 2025-06-27 PROCEDURE — 36415 COLL VENOUS BLD VENIPUNCTURE: CPT

## 2025-07-10 ENCOUNTER — OFFICE VISIT (OUTPATIENT)
Dept: DERMATOLOGY | Facility: CLINIC | Age: 80
End: 2025-07-10

## 2025-07-10 DIAGNOSIS — Z48.89 ENCOUNTER FOR POST SURGICAL WOUND CHECK: Primary | ICD-10-CM

## 2025-07-10 NOTE — PROGRESS NOTES
WOUND CHECK    Physical Exam:  Anatomic Location Affected:  Right temple  Description of wound: well healing wound   Closure Type: Purse string closure    Additional History of Present Condition:  Patient presents for wound check s/p Mohs on 06/06/25.    Assessment and Plan:  Based on a thorough discussion of this condition and the management approach to it (including a comprehensive discussion of the known risks, side effects and potential benefits of treatment), the patient (family) agrees to implement the following specific plan:  Continue to apply Vaseline daily until fully healed   Follow up as needed

## 2025-07-16 ENCOUNTER — OFFICE VISIT (OUTPATIENT)
Dept: FAMILY MEDICINE CLINIC | Facility: CLINIC | Age: 80
End: 2025-07-16
Payer: MEDICARE

## 2025-07-16 VITALS
WEIGHT: 181 LBS | BODY MASS INDEX: 27.43 KG/M2 | DIASTOLIC BLOOD PRESSURE: 70 MMHG | OXYGEN SATURATION: 97 % | SYSTOLIC BLOOD PRESSURE: 130 MMHG | TEMPERATURE: 98.3 F | HEART RATE: 61 BPM | HEIGHT: 68 IN

## 2025-07-16 DIAGNOSIS — N30.00 ACUTE CYSTITIS WITHOUT HEMATURIA: ICD-10-CM

## 2025-07-16 DIAGNOSIS — R39.9 UTI SYMPTOMS: Primary | ICD-10-CM

## 2025-07-16 LAB
SL AMB  POCT GLUCOSE, UA: NEGATIVE
SL AMB LEUKOCYTE ESTERASE,UA: ABNORMAL
SL AMB POCT BILIRUBIN,UA: NEGATIVE
SL AMB POCT BLOOD,UA: 50
SL AMB POCT CLARITY,UA: ABNORMAL
SL AMB POCT COLOR,UA: YELLOW
SL AMB POCT KETONES,UA: NEGATIVE
SL AMB POCT NITRITE,UA: POSITIVE
SL AMB POCT PH,UA: 8
SL AMB POCT SPECIFIC GRAVITY,UA: 1
SL AMB POCT URINE PROTEIN: 30
SL AMB POCT UROBILINOGEN: 0.2

## 2025-07-16 PROCEDURE — 99213 OFFICE O/P EST LOW 20 MIN: CPT

## 2025-07-16 PROCEDURE — G2211 COMPLEX E/M VISIT ADD ON: HCPCS

## 2025-07-16 PROCEDURE — 81002 URINALYSIS NONAUTO W/O SCOPE: CPT

## 2025-07-16 RX ORDER — SULFAMETHOXAZOLE AND TRIMETHOPRIM 800; 160 MG/1; MG/1
1 TABLET ORAL EVERY 12 HOURS SCHEDULED
Qty: 6 TABLET | Refills: 0 | Status: SHIPPED | OUTPATIENT
Start: 2025-07-16 | End: 2025-07-19

## 2025-07-16 NOTE — PROGRESS NOTES
"Assessment:      Acute cystitis      Plan:  Plan:      1. Medications: TMP/SMX  2. Maintain adequate hydration  3. Follow up if symptoms not improving, and prn.     Subjective:      Cruz Patricio Jr. is a 80 y.o. male who complains of abnormal smelling urine, burning with urination, and dysuria for 6 days.  Patient denies back pain, congestion, cough, fever, headache, rhinitis, sorethroat, and stomach ache.  Patient does have a history of recurrent UTI.  Patient does not have a history of pyelonephritis.  The following portions of the patient's history were reviewed and updated as appropriate: allergies, current medications, past family history, past medical history, past social history, past surgical history, and problem list.    Review of Systems  Pertinent items are noted in HPI.      Objective:      /70 (BP Location: Left arm, Patient Position: Sitting, Cuff Size: Large)   Pulse 61   Temp 98.3 °F (36.8 °C) (Temporal)   Ht 5' 8\" (1.727 m)   Wt 82.1 kg (181 lb)   SpO2 97%   BMI 27.52 kg/m²   General: alert and oriented, in no acute distress   Abdomen: soft, non-tender, without masses or organomegaly    Back: CVA tenderness absent   : defer exam     Laboratory:   Urine dipstick shows 1+ for hemoglobin, 4+ for leukocyte esterase, 2+ for nitrites, and 2+ for protein.           Answers submitted by the patient for this visit:  Painful Urination Questionnaire (Submitted on 7/16/2025)  Chief Complaint: Dysuria  Chronicity: recurrent  Onset: in the past 7 days  Progression since onset: waxing and waning  Pain quality: burning  Pain severity: mild  Pain - numeric: 3/10  Fever: no fever  History of pyelonephritis?: No  hematuria: No  chills: No  hesitancy: No  discharge: No  frequency: Yes  nausea: No  flank pain: No  sweats: No  vomiting: No    "

## 2025-07-17 LAB
BACTERIA UR QL AUTO: ABNORMAL /HPF
BILIRUB UR QL STRIP: NEGATIVE
CLARITY UR: ABNORMAL
COLOR UR: YELLOW
GLUCOSE UR STRIP-MCNC: NEGATIVE MG/DL
HGB UR QL STRIP.AUTO: NEGATIVE
KETONES UR STRIP-MCNC: NEGATIVE MG/DL
LEUKOCYTE ESTERASE UR QL STRIP: ABNORMAL
NITRITE UR QL STRIP: POSITIVE
NON-SQ EPI CELLS URNS QL MICRO: ABNORMAL /HPF
PH UR STRIP.AUTO: 8.5 [PH]
PROT UR STRIP-MCNC: ABNORMAL MG/DL
RBC #/AREA URNS AUTO: ABNORMAL /HPF
SP GR UR STRIP.AUTO: 1.01 (ref 1–1.03)
TRI-PHOS CRY URNS QL MICRO: ABNORMAL /HPF
UROBILINOGEN UR STRIP-ACNC: <2 MG/DL
WBC #/AREA URNS AUTO: ABNORMAL /HPF

## 2025-07-17 PROCEDURE — 81001 URINALYSIS AUTO W/SCOPE: CPT

## 2025-07-18 ENCOUNTER — TELEPHONE (OUTPATIENT)
Age: 80
End: 2025-07-18

## 2025-07-18 NOTE — TELEPHONE ENCOUNTER
Patient called the office regarding his urine test. Patient would like an overseeing provider to review this and advise him if he should continue or stop taking his Bactrim. Please review and advise

## 2025-08-25 PROBLEM — N30.00 ACUTE CYSTITIS WITHOUT HEMATURIA: Status: ACTIVE | Noted: 2025-08-25

## (undated) DEVICE — RAZOR STERILE

## (undated) DEVICE — SURGICAL CLIPPER BLADE GENERAL USE

## (undated) DEVICE — SPECIMEN CONTAINER STERILE PEEL PACK

## (undated) DEVICE — SYRINGE 10ML LL

## (undated) DEVICE — 3M™ TRANSPORE™ WHITE SURGICAL TAPE 1534-1, 1 INCH X 10 YARD (2,5CM X 9,1M), 12 ROLLS/CARTON 10 CARTONS/CASE: Brand: 3M™ TRANSPORE™

## (undated) DEVICE — TABLE COVER: Brand: CONVERTORS

## (undated) DEVICE — LUBRICANT JELLY SURGILUBE TUBE 2OZ FLIP TOP

## (undated) DEVICE — STERILE POLYISOPRENE POWDER-FREE SURGICAL GLOVES: Brand: PROTEXIS

## (undated) DEVICE — STERILE (2 X 30CM) LATEX COVER: Brand: PROCOVERS™

## (undated) DEVICE — INVIEW CLEAR LEGGINGS: Brand: CONVERTORS

## (undated) DEVICE — PROBE ULTRASOUND URONAV TRIPLANE

## (undated) DEVICE — SCD SEQUENTIAL COMPRESSION COMFORT SLEEVE MEDIUM KNEE LENGTH: Brand: KENDALL SCD

## (undated) DEVICE — UTILITY MARKER,BLACK WITH LABELS: Brand: DEVON

## (undated) DEVICE — GAUZE SPONGES,16 PLY: Brand: CURITY

## (undated) DEVICE — ULTRASOUND GEL STERILE FOIL PK

## (undated) DEVICE — SPONGE 4 X 4 XRAY 16 PLY STRL LF RFD

## (undated) DEVICE — CHLORAPREP HI-LITE 26ML ORANGE

## (undated) DEVICE — NEEDLE SPINAL 22G X 3.5IN  QUINCKE

## (undated) DEVICE — NEEDLE BLUNT 18 G X 1 1/2IN

## (undated) DEVICE — CHLORAPREP HI-LITE 10.5ML ORANGE

## (undated) DEVICE — PREP PAD BNS: Brand: CONVERTORS

## (undated) DEVICE — NEEDLE 25G X 1 1/2

## (undated) DEVICE — SYRINGE 20ML LL

## (undated) DEVICE — SYSTEM TRANSPERINEAL ACCESS PRECISIONPOINT

## (undated) DEVICE — GLOVE SRG BIOGEL 8

## (undated) DEVICE — SYRINGE 30ML LL

## (undated) DEVICE — FORMALIN PRE-FILLED 10 PCT PROSTATE BIOPSY

## (undated) DEVICE — SYRINGE,TOOMEY,IRRIGATION,70CC,STERILE: Brand: MEDLINE

## (undated) DEVICE — SKIN MARKER DUAL TIP WITH RULER CAP, FLEXIBLE RULER AND LABELS: Brand: DEVON

## (undated) DEVICE — MAX-CORE® DISPOSABLE CORE BIOPSY INSTRUMENT, 18G X 25CM: Brand: MAX-CORE

## (undated) DEVICE — 3M™ IOBAN™ 2 ANTIMICROBIAL INCISE DRAPE 6650EZ: Brand: IOBAN™ 2

## (undated) DEVICE — 3M™ IOBAN™ 2 ANTIMICROBIAL INCISE DRAPE 6648EZ: Brand: IOBAN™ 2

## (undated) DEVICE — TELFA NON-ADHERENT ABSORBENT DRESSING: Brand: TELFA